# Patient Record
Sex: FEMALE | Race: BLACK OR AFRICAN AMERICAN | NOT HISPANIC OR LATINO
[De-identification: names, ages, dates, MRNs, and addresses within clinical notes are randomized per-mention and may not be internally consistent; named-entity substitution may affect disease eponyms.]

---

## 2017-12-11 ENCOUNTER — TRANSCRIPTION ENCOUNTER (OUTPATIENT)
Age: 72
End: 2017-12-11

## 2018-02-01 ENCOUNTER — EMERGENCY (EMERGENCY)
Facility: HOSPITAL | Age: 73
LOS: 1 days | Discharge: HOME | End: 2018-02-01
Attending: EMERGENCY MEDICINE | Admitting: EMERGENCY MEDICINE
Payer: COMMERCIAL

## 2018-02-01 VITALS
RESPIRATION RATE: 18 BRPM | WEIGHT: 197.98 LBS | HEART RATE: 81 BPM | TEMPERATURE: 98 F | OXYGEN SATURATION: 97 % | HEIGHT: 64 IN | SYSTOLIC BLOOD PRESSURE: 199 MMHG | DIASTOLIC BLOOD PRESSURE: 71 MMHG

## 2018-02-01 DIAGNOSIS — E89.0 POSTPROCEDURAL HYPOTHYROIDISM: Chronic | ICD-10-CM

## 2018-02-01 PROCEDURE — 70450 CT HEAD/BRAIN W/O DYE: CPT | Mod: 26

## 2018-02-01 PROCEDURE — 96375 TX/PRO/DX INJ NEW DRUG ADDON: CPT

## 2018-02-01 PROCEDURE — 96374 THER/PROPH/DIAG INJ IV PUSH: CPT

## 2018-02-01 PROCEDURE — 70450 CT HEAD/BRAIN W/O DYE: CPT

## 2018-02-01 PROCEDURE — 99284 EMERGENCY DEPT VISIT MOD MDM: CPT

## 2018-02-01 PROCEDURE — 99284 EMERGENCY DEPT VISIT MOD MDM: CPT | Mod: 25

## 2018-02-01 RX ORDER — ACETAMINOPHEN 500 MG
1000 TABLET ORAL ONCE
Qty: 0 | Refills: 0 | Status: COMPLETED | OUTPATIENT
Start: 2018-02-01 | End: 2018-02-01

## 2018-02-01 RX ORDER — SODIUM CHLORIDE 9 MG/ML
1000 INJECTION INTRAMUSCULAR; INTRAVENOUS; SUBCUTANEOUS ONCE
Qty: 0 | Refills: 0 | Status: COMPLETED | OUTPATIENT
Start: 2018-02-01 | End: 2018-02-01

## 2018-02-01 RX ORDER — METOCLOPRAMIDE HCL 10 MG
10 TABLET ORAL ONCE
Qty: 0 | Refills: 0 | Status: COMPLETED | OUTPATIENT
Start: 2018-02-01 | End: 2018-02-01

## 2018-02-01 RX ADMIN — Medication 10 MILLIGRAM(S): at 22:21

## 2018-02-01 RX ADMIN — Medication 400 MILLIGRAM(S): at 22:22

## 2018-02-01 RX ADMIN — SODIUM CHLORIDE 1000 MILLILITER(S): 9 INJECTION INTRAMUSCULAR; INTRAVENOUS; SUBCUTANEOUS at 22:22

## 2018-02-01 NOTE — ED PROVIDER NOTE - OBJECTIVE STATEMENT
73F pmhx dm, htn, here c/o left sided, pulsating headache that began approx 2 weeks ago while pt was sleeping. Since then the headache has come intermittently and usually comes at night. Pt denies f/c, numbness/tingling, focal weakness, changes in her vision. Today she went to see her neurologist who recommended she come to the ED 73F pmhx dm, htn, here c/o left sided, pulsating headache that began approx 2 weeks ago while pt was sleeping. Since then the headache has come intermittently and usually comes at night. Pt denies f/c, numbness/tingling, focal weakness, changes in her vision, n/v/d. Today she went to see her neurologist who recommended she come to the ED for a repeat ct scan head (she had one a week ago for same symptoms at [facility] which was negative). Pt unable to explain what her neurologist told her about why she needs another CT scan.     Neurologist: Leader

## 2018-02-01 NOTE — ED ADULT NURSE NOTE - OBJECTIVE STATEMENT
72 y/o female hx DM, HTN Anemia came in c/o headache started 12/29 74 y/o female hx DM, HTN Anemia came in c/o headache started 12/29. Went to [facility], given prednosone -CT. Pt is alert & orientedx4, no weakness, no facial droop, no drift. Neuro intact. Pt denies chest pain or SOB, no nausea or vomiting, no urinary symptoms as per pt. Safety maintained & continue monitor.

## 2018-02-01 NOTE — ED PROVIDER NOTE - ATTENDING CONTRIBUTION TO CARE
Nemes - 72yo F w MDS, HTN, in the ER for 2 wks of R sided HA, no trauma, no neuro, no other stx. Seen at a different facility 1 mo ago for similar stx, had neg w/u, CT neg, except ESR-70s Nemes - 74yo F w MDS, HTN, in the ER for 2 wks of R sided throbbing HA, no trauma, no neuro, no blurry vision/deficits, no other stx. Seen at a different facility 1 mo ago for similar stx, had neg w/u, CT neg, except ESR-70s. Seen by Neuro today, sent in the ER for CT head. Well appearing, neck supple, HEENT wnl, no tenderness over temples/scalp, neuro intact. Will give Tylenol/Reglan IV, get Ct head, DC w Neuro f/u. Low suspicion for SDH, dural sinus thrombosis (no risk factors), temporal arteritis (no scalp tenderness, no vision deficits).

## 2018-02-01 NOTE — ED PROVIDER NOTE - PLAN OF CARE
1) Please follow-up with your neurologist within the next 3 days.  If you cannot follow-up with your doctor(s), please return to the ED for any urgent issues.  2) If you have any worsening of symptoms or any other concerns please return to the ED immediately.  3) Please continue taking your home medications as directed.  4) You may have been given a copy of your labs and/or imaging.  Please go over these with your neurologist.

## 2018-02-01 NOTE — ED ADULT NURSE NOTE - PMH
Anemia    Diabetes    Hypercholesteremia    Hypertension    Myelodysplasia (myelodysplastic syndrome)    Thyromegaly

## 2018-02-01 NOTE — ED ADULT TRIAGE NOTE - CHIEF COMPLAINT QUOTE
pt sent from neuro office for ct of head pt has been having headaches x 2 weeks feeling pulsating in her head

## 2018-02-01 NOTE — ED PROVIDER NOTE - CARE PLAN
Principal Discharge DX:	Headache  Assessment and plan of treatment:	1) Please follow-up with your neurologist within the next 3 days.  If you cannot follow-up with your doctor(s), please return to the ED for any urgent issues.  2) If you have any worsening of symptoms or any other concerns please return to the ED immediately.  3) Please continue taking your home medications as directed.  4) You may have been given a copy of your labs and/or imaging.  Please go over these with your neurologist.

## 2018-02-01 NOTE — ED PROVIDER NOTE - MEDICAL DECISION MAKING DETAILS
73F pmhx dm, htn, w/ r sided HA x 2 weeks, worse at night. Had CTH done last week in setting of same headache at [place] and it was negative. Today pt saw neurologist who advised she go for a repeat head CT. Plan for head ct and pain control. Will d/c w/ neuro f/u if neg.

## 2018-02-02 VITALS
HEART RATE: 72 BPM | RESPIRATION RATE: 18 BRPM | SYSTOLIC BLOOD PRESSURE: 161 MMHG | OXYGEN SATURATION: 98 % | DIASTOLIC BLOOD PRESSURE: 89 MMHG | TEMPERATURE: 98 F

## 2018-02-06 ENCOUNTER — APPOINTMENT (OUTPATIENT)
Dept: MRI IMAGING | Facility: HOSPITAL | Age: 73
End: 2018-02-06
Payer: COMMERCIAL

## 2018-02-06 ENCOUNTER — OUTPATIENT (OUTPATIENT)
Dept: OUTPATIENT SERVICES | Facility: HOSPITAL | Age: 73
LOS: 1 days | End: 2018-02-06
Payer: COMMERCIAL

## 2018-02-06 DIAGNOSIS — E89.0 POSTPROCEDURAL HYPOTHYROIDISM: Chronic | ICD-10-CM

## 2018-02-06 DIAGNOSIS — Z00.8 ENCOUNTER FOR OTHER GENERAL EXAMINATION: ICD-10-CM

## 2018-02-06 DIAGNOSIS — H93.A9 PULSATILE TINNITUS, UNSPECIFIED EAR: ICD-10-CM

## 2018-02-06 PROCEDURE — 70551 MRI BRAIN STEM W/O DYE: CPT | Mod: 26

## 2018-02-06 PROCEDURE — 70551 MRI BRAIN STEM W/O DYE: CPT

## 2018-02-06 PROCEDURE — 70544 MR ANGIOGRAPHY HEAD W/O DYE: CPT

## 2018-02-06 PROCEDURE — 70544 MR ANGIOGRAPHY HEAD W/O DYE: CPT | Mod: 26,59

## 2018-03-09 ENCOUNTER — OUTPATIENT (OUTPATIENT)
Dept: OUTPATIENT SERVICES | Facility: HOSPITAL | Age: 73
LOS: 1 days | End: 2018-03-09
Payer: COMMERCIAL

## 2018-03-09 ENCOUNTER — APPOINTMENT (OUTPATIENT)
Dept: CV DIAGNOSTICS | Facility: HOSPITAL | Age: 73
End: 2018-03-09

## 2018-03-09 DIAGNOSIS — I25.10 ATHEROSCLEROTIC HEART DISEASE OF NATIVE CORONARY ARTERY WITHOUT ANGINA PECTORIS: ICD-10-CM

## 2018-03-09 DIAGNOSIS — E89.0 POSTPROCEDURAL HYPOTHYROIDISM: Chronic | ICD-10-CM

## 2018-03-09 PROCEDURE — 78452 HT MUSCLE IMAGE SPECT MULT: CPT | Mod: 26

## 2018-03-09 PROCEDURE — 93016 CV STRESS TEST SUPVJ ONLY: CPT

## 2018-03-09 PROCEDURE — 93018 CV STRESS TEST I&R ONLY: CPT

## 2018-03-09 PROCEDURE — 78452 HT MUSCLE IMAGE SPECT MULT: CPT

## 2018-03-09 PROCEDURE — A9500: CPT

## 2018-03-09 PROCEDURE — 93017 CV STRESS TEST TRACING ONLY: CPT

## 2018-03-22 ENCOUNTER — APPOINTMENT (OUTPATIENT)
Dept: GASTROENTEROLOGY | Facility: CLINIC | Age: 73
End: 2018-03-22
Payer: COMMERCIAL

## 2018-03-22 VITALS
SYSTOLIC BLOOD PRESSURE: 160 MMHG | OXYGEN SATURATION: 99 % | TEMPERATURE: 98 F | HEART RATE: 91 BPM | DIASTOLIC BLOOD PRESSURE: 74 MMHG

## 2018-03-22 VITALS — BODY MASS INDEX: 34.15 KG/M2 | HEIGHT: 64 IN | WEIGHT: 200 LBS

## 2018-03-22 DIAGNOSIS — Z80.0 FAMILY HISTORY OF MALIGNANT NEOPLASM OF DIGESTIVE ORGANS: ICD-10-CM

## 2018-03-22 PROCEDURE — 99204 OFFICE O/P NEW MOD 45 MIN: CPT

## 2018-04-25 ENCOUNTER — APPOINTMENT (OUTPATIENT)
Dept: VASCULAR SURGERY | Facility: CLINIC | Age: 73
End: 2018-04-25
Payer: MEDICARE

## 2018-04-25 VITALS
HEIGHT: 64 IN | TEMPERATURE: 98.1 F | DIASTOLIC BLOOD PRESSURE: 79 MMHG | HEART RATE: 89 BPM | SYSTOLIC BLOOD PRESSURE: 145 MMHG

## 2018-04-25 PROCEDURE — 99204 OFFICE O/P NEW MOD 45 MIN: CPT

## 2018-04-25 RX ORDER — MULTIVIT-MIN/IRON FUM/FOLIC AC 7.5 MG-4
TABLET ORAL
Refills: 0 | Status: ACTIVE | COMMUNITY

## 2018-04-25 RX ORDER — METOPROLOL SUCCINATE 50 MG/1
50 TABLET, EXTENDED RELEASE ORAL
Refills: 0 | Status: ACTIVE | COMMUNITY

## 2018-04-25 RX ORDER — BLOOD SUGAR DIAGNOSTIC
STRIP MISCELLANEOUS
Qty: 50 | Refills: 0 | Status: ACTIVE | COMMUNITY
Start: 2017-12-12

## 2018-04-25 RX ORDER — GLIPIZIDE 2.5 MG/1
2.5 TABLET, FILM COATED, EXTENDED RELEASE ORAL
Qty: 90 | Refills: 0 | Status: ACTIVE | COMMUNITY
Start: 2018-02-23

## 2018-04-30 ENCOUNTER — OUTPATIENT (OUTPATIENT)
Dept: OUTPATIENT SERVICES | Facility: HOSPITAL | Age: 73
LOS: 1 days | End: 2018-04-30
Payer: COMMERCIAL

## 2018-04-30 ENCOUNTER — RESULT REVIEW (OUTPATIENT)
Age: 73
End: 2018-04-30

## 2018-04-30 DIAGNOSIS — K31.7 POLYP OF STOMACH AND DUODENUM: ICD-10-CM

## 2018-04-30 DIAGNOSIS — E89.0 POSTPROCEDURAL HYPOTHYROIDISM: Chronic | ICD-10-CM

## 2018-04-30 PROCEDURE — 88305 TISSUE EXAM BY PATHOLOGIST: CPT

## 2018-04-30 PROCEDURE — 88312 SPECIAL STAINS GROUP 1: CPT

## 2018-04-30 PROCEDURE — 43251 EGD REMOVE LESION SNARE: CPT

## 2018-04-30 PROCEDURE — 43239 EGD BIOPSY SINGLE/MULTIPLE: CPT | Mod: XS

## 2018-04-30 PROCEDURE — 88312 SPECIAL STAINS GROUP 1: CPT | Mod: 26

## 2018-04-30 PROCEDURE — 88305 TISSUE EXAM BY PATHOLOGIST: CPT | Mod: 26

## 2018-04-30 PROCEDURE — C1889: CPT

## 2018-04-30 PROCEDURE — 82962 GLUCOSE BLOOD TEST: CPT

## 2018-05-01 ENCOUNTER — APPOINTMENT (OUTPATIENT)
Dept: RHEUMATOLOGY | Facility: CLINIC | Age: 73
End: 2018-05-01
Payer: MEDICARE

## 2018-05-01 VITALS
SYSTOLIC BLOOD PRESSURE: 160 MMHG | WEIGHT: 206 LBS | HEART RATE: 57 BPM | DIASTOLIC BLOOD PRESSURE: 78 MMHG | HEIGHT: 64 IN | RESPIRATION RATE: 16 BRPM | OXYGEN SATURATION: 97 % | BODY MASS INDEX: 35.17 KG/M2

## 2018-05-01 DIAGNOSIS — M19.90 UNSPECIFIED OSTEOARTHRITIS, UNSPECIFIED SITE: ICD-10-CM

## 2018-05-01 DIAGNOSIS — R06.00 DYSPNEA, UNSPECIFIED: ICD-10-CM

## 2018-05-01 DIAGNOSIS — Z86.39 PERSONAL HISTORY OF OTHER ENDOCRINE, NUTRITIONAL AND METABOLIC DISEASE: ICD-10-CM

## 2018-05-01 DIAGNOSIS — Z87.09 PERSONAL HISTORY OF OTHER DISEASES OF THE RESPIRATORY SYSTEM: ICD-10-CM

## 2018-05-01 DIAGNOSIS — Z63.4 DISAPPEARANCE AND DEATH OF FAMILY MEMBER: ICD-10-CM

## 2018-05-01 DIAGNOSIS — Z86.2 PERSONAL HISTORY OF DISEASES OF THE BLOOD AND BLOOD-FORMING ORGANS AND CERTAIN DISORDERS INVOLVING THE IMMUNE MECHANISM: ICD-10-CM

## 2018-05-01 PROCEDURE — 99205 OFFICE O/P NEW HI 60 MIN: CPT

## 2018-05-01 SDOH — SOCIAL STABILITY - SOCIAL INSECURITY: DISSAPEARANCE AND DEATH OF FAMILY MEMBER: Z63.4

## 2018-05-04 ENCOUNTER — OUTPATIENT (OUTPATIENT)
Dept: OUTPATIENT SERVICES | Facility: HOSPITAL | Age: 73
LOS: 1 days | End: 2018-05-04
Payer: MEDICARE

## 2018-05-04 VITALS
SYSTOLIC BLOOD PRESSURE: 134 MMHG | RESPIRATION RATE: 16 BRPM | DIASTOLIC BLOOD PRESSURE: 80 MMHG | WEIGHT: 205.91 LBS | OXYGEN SATURATION: 97 % | HEART RATE: 80 BPM | HEIGHT: 64.5 IN | TEMPERATURE: 97 F

## 2018-05-04 DIAGNOSIS — I10 ESSENTIAL (PRIMARY) HYPERTENSION: ICD-10-CM

## 2018-05-04 DIAGNOSIS — Z90.710 ACQUIRED ABSENCE OF BOTH CERVIX AND UTERUS: Chronic | ICD-10-CM

## 2018-05-04 DIAGNOSIS — E89.0 POSTPROCEDURAL HYPOTHYROIDISM: Chronic | ICD-10-CM

## 2018-05-04 DIAGNOSIS — M31.6 OTHER GIANT CELL ARTERITIS: ICD-10-CM

## 2018-05-04 DIAGNOSIS — E11.9 TYPE 2 DIABETES MELLITUS WITHOUT COMPLICATIONS: ICD-10-CM

## 2018-05-04 DIAGNOSIS — Z98.49 CATARACT EXTRACTION STATUS, UNSPECIFIED EYE: Chronic | ICD-10-CM

## 2018-05-04 LAB — HBA1C BLD-MCNC: 7.8 % — HIGH (ref 4–5.6)

## 2018-05-04 PROCEDURE — 93010 ELECTROCARDIOGRAM REPORT: CPT

## 2018-05-04 RX ORDER — SODIUM CHLORIDE 9 MG/ML
1000 INJECTION, SOLUTION INTRAVENOUS
Qty: 0 | Refills: 0 | Status: DISCONTINUED | OUTPATIENT
Start: 2018-05-08 | End: 2018-05-23

## 2018-05-04 NOTE — H&P PST ADULT - FAMILY HISTORY
Mother  Still living? No  Family history of diabetes mellitus, Age at diagnosis: Age Unknown  Family history of hypertension, Age at diagnosis: Age Unknown     Father  Still living? No  Family history of diabetes mellitus, Age at diagnosis: Age Unknown  Family history of hypertension, Age at diagnosis: Age Unknown  Family history of acute renal failure, Age at diagnosis: Age Unknown

## 2018-05-04 NOTE — H&P PST ADULT - RS GEN PE MLT RESP DETAILS PC
no wheezes/respirations non-labored/clear to auscultation bilaterally/no chest wall tenderness/breath sounds equal/no rales/good air movement/airway patent/no rhonchi

## 2018-05-04 NOTE — H&P PST ADULT - NEGATIVE OPHTHALMOLOGIC SYMPTOMS
no blurred vision R/no discharge L/no pain R/no loss of vision L/no discharge R/no loss of vision R/no blurred vision L

## 2018-05-04 NOTE — H&P PST ADULT - NSANTHOSAYNRD_GEN_A_CORE
No. SHERRI screening performed.  STOP BANG Legend: 0-2 = LOW Risk; 3-4 = INTERMEDIATE Risk; 5-8 = HIGH Risk

## 2018-05-04 NOTE — H&P PST ADULT - PROBLEM SELECTOR PLAN 3
instructed last dose on diabetes medication on 5/7 am. instructed last dose on diabetes medication on 5/7 am. OR booking notified of hx of DM type 2

## 2018-05-04 NOTE — H&P PST ADULT - NEGATIVE ENMT SYMPTOMS
no sinus symptoms/no throat pain/no dysphagia/no tinnitus/no nose bleeds/no ear pain/no hearing difficulty/no vertigo

## 2018-05-04 NOTE — H&P PST ADULT - NEGATIVE GENERAL SYMPTOMS
no polydipsia/no fever/no chills/no fatigue/no anorexia/no weight loss/no polyphagia/no weight gain/no polyuria/no malaise

## 2018-05-04 NOTE — H&P PST ADULT - BP NONINVASIVE SYSTOLIC (MM HG)
Quality 110: Preventive Care And Screening: Influenza Immunization: Influenza Immunization previously received during influenza season Detail Level: Detailed Quality 111:Pneumonia Vaccination Status For Older Adults: Pneumococcal Vaccination Previously Received Quality 226: Preventive Care And Screening: Tobacco Use: Screening And Cessation Intervention: Patient screened for tobacco and is an ex-smoker Quality 130: Documentation Of Current Medications In The Medical Record: Current Medications Documented Quality 131: Pain Assessment And Follow-Up: Pain assessment using a standardized tool is documented as negative, no follow-up plan required Quality 47: Advance Care Plan: Advance Care Planning discussed and documented in the medical record; patient did not wish or was not able to name a surrogate decision maker or provide an advance care plan. 134

## 2018-05-04 NOTE — H&P PST ADULT - HISTORY OF PRESENT ILLNESS
73 year old female presents to presurgical testing with diagnosis of other giant cell arteritis scheduled for bilateral temporal artery biopsy for 5/8/18. Pt reports a throbbing right sided temporal headache since 12/2017, evaluated at [facility] ED in 2/2018, referred for biopsy. Denies vision changes. Pt was treated with prednisone but stopped taking medication due to lower extremity swelling. 73 year old female presents to presurgical testing with diagnosis of other giant cell arteritis scheduled for bilateral temporal artery biopsy for 5/8/18. Pt reports a throbbing right sided temporal headache since 12/2017, evaluated at [facility] ED in 2/2018, referred for biopsy. Denies vision changes. Pt was treated with prednisone but stopped taking medication due to lower extremity swelling and shortness of breath in 4/2018.

## 2018-05-04 NOTE — H&P PST ADULT - NEGATIVE NEUROLOGICAL SYMPTOMS
no transient paralysis/no vertigo/no syncope/no tremors/no weakness/no generalized seizures/no focal seizures

## 2018-05-04 NOTE — H&P PST ADULT - MUSCULOSKELETAL
details… detailed exam no calf tenderness/no joint warmth/no joint erythema/ROM intact/no joint swelling/normal strength

## 2018-05-04 NOTE — H&P PST ADULT - PROBLEM SELECTOR PLAN 2
instructed to take amlodipine losartan and metoprolol on the morning of procedure with a sip of water.

## 2018-05-04 NOTE — H&P PST ADULT - PMH
Anemia    Arthritis    Diabetes  type 2  Hypercholesteremia    Hypertension    Myelodysplasia (myelodysplastic syndrome)    Other giant cell arteritis    Thyroid disease    Thyromegaly  s/p partial thyroidectomy long time ago

## 2018-05-07 ENCOUNTER — TRANSCRIPTION ENCOUNTER (OUTPATIENT)
Age: 73
End: 2018-05-07

## 2018-05-07 LAB
25(OH)D3 SERPL-MCNC: 28.6 NG/ML
ALBUMIN SERPL ELPH-MCNC: 4 G/DL
ALP BLD-CCNC: 62 U/L
ALT SERPL-CCNC: 16 U/L
ANA SER IF-ACNC: NEGATIVE
ANION GAP SERPL CALC-SCNC: 18 MMOL/L
APPEARANCE: CLEAR
AST SERPL-CCNC: 17 U/L
BACTERIA: NEGATIVE
BASOPHILS # BLD AUTO: 0.05 K/UL
BASOPHILS NFR BLD AUTO: 0.6 %
BILIRUB SERPL-MCNC: <0.2 MG/DL
BILIRUBIN URINE: NEGATIVE
BLOOD URINE: NEGATIVE
BUN SERPL-MCNC: 30 MG/DL
CALCIUM SERPL-MCNC: 9.3 MG/DL
CCP AB SER IA-ACNC: <8 UNITS
CHLORIDE SERPL-SCNC: 105 MMOL/L
CO2 SERPL-SCNC: 22 MMOL/L
COLOR: YELLOW
CREAT SERPL-MCNC: 1.38 MG/DL
CREAT SPEC-SCNC: 170 MG/DL
CREAT/PROT UR: 3.2 RATIO
CRP SERPL-MCNC: 1.4 MG/DL
EOSINOPHIL # BLD AUTO: 0.31 K/UL
EOSINOPHIL NFR BLD AUTO: 3.5 %
ERYTHROCYTE [SEDIMENTATION RATE] IN BLOOD BY WESTERGREN METHOD: 87 MM/HR
GLUCOSE QUALITATIVE U: NEGATIVE MG/DL
GLUCOSE SERPL-MCNC: 94 MG/DL
HCT VFR BLD CALC: 31 %
HGB BLD-MCNC: 9.7 G/DL
HYALINE CASTS: 6 /LPF
IMM GRANULOCYTES NFR BLD AUTO: 0.1 %
KETONES URINE: NEGATIVE
LEUKOCYTE ESTERASE URINE: NEGATIVE
LYMPHOCYTES # BLD AUTO: 2.98 K/UL
LYMPHOCYTES NFR BLD AUTO: 34.1 %
MAN DIFF?: NORMAL
MCHC RBC-ENTMCNC: 26.6 PG
MCHC RBC-ENTMCNC: 31.3 GM/DL
MCV RBC AUTO: 85.2 FL
MICROSCOPIC-UA: NORMAL
MONOCYTES # BLD AUTO: 0.6 K/UL
MONOCYTES NFR BLD AUTO: 6.9 %
NEUTROPHILS # BLD AUTO: 4.8 K/UL
NEUTROPHILS NFR BLD AUTO: 54.8 %
NITRITE URINE: NEGATIVE
PH URINE: 5.5
PLATELET # BLD AUTO: 406 K/UL
POTASSIUM SERPL-SCNC: 4.5 MMOL/L
PROT SERPL-MCNC: 7 G/DL
PROT UR-MCNC: 543 MG/DL
PROTEIN URINE: 300 MG/DL
RBC # BLD: 3.64 M/UL
RBC # FLD: 16 %
RED BLOOD CELLS URINE: 3 /HPF
RF+CCP IGG SER-IMP: NEGATIVE
RHEUMATOID FACT SER QL: <7 IU/ML
SODIUM SERPL-SCNC: 145 MMOL/L
SPECIFIC GRAVITY URINE: 1.02
SQUAMOUS EPITHELIAL CELLS: 5 /HPF
TSH SERPL-ACNC: 0.8 UIU/ML
URATE SERPL-MCNC: 7.7 MG/DL
UROBILINOGEN URINE: NEGATIVE MG/DL
WBC # FLD AUTO: 8.75 K/UL
WHITE BLOOD CELLS URINE: 3 /HPF

## 2018-05-08 ENCOUNTER — OUTPATIENT (OUTPATIENT)
Dept: OUTPATIENT SERVICES | Facility: HOSPITAL | Age: 73
LOS: 1 days | Discharge: HOME | End: 2018-05-08
Payer: MEDICARE

## 2018-05-08 ENCOUNTER — APPOINTMENT (OUTPATIENT)
Dept: VASCULAR SURGERY | Facility: HOSPITAL | Age: 73
End: 2018-05-08

## 2018-05-08 ENCOUNTER — RESULT REVIEW (OUTPATIENT)
Age: 73
End: 2018-05-08

## 2018-05-08 VITALS
OXYGEN SATURATION: 99 % | SYSTOLIC BLOOD PRESSURE: 147 MMHG | HEART RATE: 97 BPM | TEMPERATURE: 98 F | HEIGHT: 64.5 IN | DIASTOLIC BLOOD PRESSURE: 77 MMHG | RESPIRATION RATE: 16 BRPM | WEIGHT: 205.91 LBS

## 2018-05-08 VITALS
RESPIRATION RATE: 16 BRPM | SYSTOLIC BLOOD PRESSURE: 124 MMHG | DIASTOLIC BLOOD PRESSURE: 74 MMHG | HEART RATE: 80 BPM | OXYGEN SATURATION: 95 %

## 2018-05-08 DIAGNOSIS — E89.0 POSTPROCEDURAL HYPOTHYROIDISM: Chronic | ICD-10-CM

## 2018-05-08 DIAGNOSIS — Z90.710 ACQUIRED ABSENCE OF BOTH CERVIX AND UTERUS: Chronic | ICD-10-CM

## 2018-05-08 DIAGNOSIS — M31.6 OTHER GIANT CELL ARTERITIS: ICD-10-CM

## 2018-05-08 DIAGNOSIS — Z98.49 CATARACT EXTRACTION STATUS, UNSPECIFIED EYE: Chronic | ICD-10-CM

## 2018-05-08 PROCEDURE — 88305 TISSUE EXAM BY PATHOLOGIST: CPT | Mod: 26

## 2018-05-08 PROCEDURE — 37609 LIGATION/BX TEMPORAL ARTERY: CPT | Mod: 50

## 2018-05-08 PROCEDURE — 88313 SPECIAL STAINS GROUP 2: CPT | Mod: 26

## 2018-05-08 NOTE — ASU DISCHARGE PLAN (ADULT/PEDIATRIC). - NOTIFY
Fever greater than 101/Pain not relieved by Medications/Bleeding that does not stop Persistent Nausea and Vomiting/Fever greater than 101/Pain not relieved by Medications/Unable to Urinate/Bleeding that does not stop/Swelling that continues

## 2018-05-08 NOTE — ASU DISCHARGE PLAN (ADULT/PEDIATRIC). - NURSING INSTRUCTIONS
Please report any signs and symptoms of infection including Fever (Temp >101 or >100.4 if GYN procedure), uncontrollable nausea, vomiting, diarrhea, chills & inability to urinate. Shower with soap & water when appropriate, pat dry with clean towel & no ointments, creams, powders or lotions on incisions unless okayed by MD. Please report any puss or increased drainage from incision sites, or if redness develops and spreads around sites. Please practice good hand hygiene especially after using the bathroom. Follow up with all MD appointments and take medication(s) as prescribed    Do not take pain medication on an empty stomach.  Increase fluids and fiber in diet to prevent constipation.  When taking pain meds - take with food and know it may cause constipation and nausea - Do NOT drive while on narcotics.

## 2018-05-08 NOTE — ASU DISCHARGE PLAN (ADULT/PEDIATRIC). - MEDICATION SUMMARY - MEDICATIONS TO TAKE
I will START or STAY ON the medications listed below when I get home from the hospital:    predniSONE 10 mg oral tablet  -- 1 tab(s) by mouth 4 times a day   stopped on 4/10/18  -- Indication: For giant cell arteritis    Tylenol 325 mg oral tablet  -- 2 tab(s) by mouth every 4 hours, As Needed  -- Indication: For pain    Percocet 5/325 oral tablet  -- 1 tab(s) by mouth every 6 hours, As Needed MDD:4   -- Caution federal law prohibits the transfer of this drug to any person other  than the person for whom it was prescribed.  May cause drowsiness.  Alcohol may intensify this effect.  Use care when operating dangerous machinery.  This prescription cannot be refilled.  This product contains acetaminophen.  Do not use  with any other product containing acetaminophen to prevent possible liver damage.  Using more of this medication than prescribed may cause serious breathing problems.    -- Indication: For post op pain    losartan 50 mg oral tablet  -- 1 tab(s) by mouth once a day  -- Indication: For hypertension    Glucotrol XL 2.5 mg oral tablet, extended release  -- 1 tab(s) by mouth once a day  -- Indication: For diabetes    Glucophage 1000 mg oral tablet  -- 1 tab(s) by mouth 2 times a day  -- Indication: For diabetes    metoprolol succinate 50 mg oral tablet, extended release  -- 1 tab(s) by mouth once a day  -- Indication: For hypertension    amLODIPine 10 mg oral tablet  -- 1 tab(s) by mouth once a day  -- Indication: For hypertension    ferrous sulfate 325 mg (65 mg elemental iron) oral delayed release tablet  -- 1 tab(s) by mouth once a day  -- Indication: For supplement    Multiple Vitamins oral tablet  -- 1 tab(s) by mouth once a day  -- Indication: For supplement

## 2018-05-10 NOTE — H&P PST ADULT - SURGICAL SITE INCISION
- c/w atenolol 50 mg PO daily  - INR supratherapeutic, will hold today's coumadin. - c/w atenolol 50 mg PO daily  - dose coumadin tonight  - outpatient follow up with PCP for INR check no

## 2018-05-15 ENCOUNTER — APPOINTMENT (OUTPATIENT)
Dept: NEPHROLOGY | Facility: CLINIC | Age: 73
End: 2018-05-15

## 2018-05-24 ENCOUNTER — APPOINTMENT (OUTPATIENT)
Dept: GASTROENTEROLOGY | Facility: CLINIC | Age: 73
End: 2018-05-24
Payer: MEDICARE

## 2018-05-24 VITALS
WEIGHT: 201 LBS | OXYGEN SATURATION: 98 % | SYSTOLIC BLOOD PRESSURE: 148 MMHG | BODY MASS INDEX: 34.5 KG/M2 | DIASTOLIC BLOOD PRESSURE: 78 MMHG | TEMPERATURE: 97.8 F | HEART RATE: 69 BPM

## 2018-05-24 PROCEDURE — 99213 OFFICE O/P EST LOW 20 MIN: CPT

## 2018-06-08 ENCOUNTER — APPOINTMENT (OUTPATIENT)
Dept: NEPHROLOGY | Facility: CLINIC | Age: 73
End: 2018-06-08
Payer: MEDICARE

## 2018-06-08 VITALS
BODY MASS INDEX: 34.83 KG/M2 | HEIGHT: 64 IN | DIASTOLIC BLOOD PRESSURE: 79 MMHG | WEIGHT: 204 LBS | HEART RATE: 85 BPM | OXYGEN SATURATION: 98 % | SYSTOLIC BLOOD PRESSURE: 152 MMHG

## 2018-06-08 DIAGNOSIS — E11.9 TYPE 2 DIABETES MELLITUS W/OUT COMPLICATIONS: ICD-10-CM

## 2018-06-08 DIAGNOSIS — N17.9 ACUTE KIDNEY FAILURE, UNSPECIFIED: ICD-10-CM

## 2018-06-08 DIAGNOSIS — N20.0 CALCULUS OF KIDNEY: ICD-10-CM

## 2018-06-08 DIAGNOSIS — E78.5 HYPERLIPIDEMIA, UNSPECIFIED: ICD-10-CM

## 2018-06-08 PROCEDURE — 99205 OFFICE O/P NEW HI 60 MIN: CPT

## 2018-06-11 ENCOUNTER — OUTPATIENT (OUTPATIENT)
Dept: OUTPATIENT SERVICES | Facility: HOSPITAL | Age: 73
LOS: 1 days | End: 2018-06-11
Payer: COMMERCIAL

## 2018-06-11 ENCOUNTER — APPOINTMENT (OUTPATIENT)
Dept: ULTRASOUND IMAGING | Facility: CLINIC | Age: 73
End: 2018-06-11
Payer: MEDICARE

## 2018-06-11 DIAGNOSIS — Z90.710 ACQUIRED ABSENCE OF BOTH CERVIX AND UTERUS: Chronic | ICD-10-CM

## 2018-06-11 DIAGNOSIS — E89.0 POSTPROCEDURAL HYPOTHYROIDISM: Chronic | ICD-10-CM

## 2018-06-11 DIAGNOSIS — Z98.49 CATARACT EXTRACTION STATUS, UNSPECIFIED EYE: Chronic | ICD-10-CM

## 2018-06-11 DIAGNOSIS — N18.3 CHRONIC KIDNEY DISEASE, STAGE 3 (MODERATE): ICD-10-CM

## 2018-06-11 PROCEDURE — 76775 US EXAM ABDO BACK WALL LIM: CPT

## 2018-06-11 PROCEDURE — 76775 US EXAM ABDO BACK WALL LIM: CPT | Mod: 26

## 2018-06-26 ENCOUNTER — LABORATORY RESULT (OUTPATIENT)
Age: 73
End: 2018-06-26

## 2018-06-26 ENCOUNTER — APPOINTMENT (OUTPATIENT)
Dept: NEPHROLOGY | Facility: CLINIC | Age: 73
End: 2018-06-26
Payer: MEDICARE

## 2018-06-26 VITALS
SYSTOLIC BLOOD PRESSURE: 155 MMHG | OXYGEN SATURATION: 98 % | DIASTOLIC BLOOD PRESSURE: 84 MMHG | WEIGHT: 202.82 LBS | HEART RATE: 95 BPM | HEIGHT: 64 IN | BODY MASS INDEX: 34.63 KG/M2

## 2018-06-26 DIAGNOSIS — R80.9 PROTEINURIA, UNSPECIFIED: ICD-10-CM

## 2018-06-26 DIAGNOSIS — I10 ESSENTIAL (PRIMARY) HYPERTENSION: ICD-10-CM

## 2018-06-26 DIAGNOSIS — N18.3 CHRONIC KIDNEY DISEASE, STAGE 3 (MODERATE): ICD-10-CM

## 2018-06-26 DIAGNOSIS — D64.9 ANEMIA, UNSPECIFIED: ICD-10-CM

## 2018-06-26 PROCEDURE — 99214 OFFICE O/P EST MOD 30 MIN: CPT

## 2018-06-26 RX ORDER — METOPROLOL SUCCINATE 50 MG/1
50 TABLET, EXTENDED RELEASE ORAL
Qty: 90 | Refills: 0 | Status: DISCONTINUED | COMMUNITY
Start: 2017-08-01 | End: 2018-06-26

## 2018-06-26 RX ORDER — LOSARTAN POTASSIUM 50 MG/1
50 TABLET, FILM COATED ORAL
Refills: 0 | Status: DISCONTINUED | COMMUNITY
End: 2018-06-26

## 2018-06-27 LAB
ALBUMIN MFR SERPL ELPH: 54.4 %
ALBUMIN SERPL ELPH-MCNC: 4.1 G/DL
ALBUMIN SERPL-MCNC: 4 G/DL
ALBUMIN/GLOB SERPL: 1.2 RATIO
ALPHA1 GLOB MFR SERPL ELPH: 4.2 %
ALPHA1 GLOB SERPL ELPH-MCNC: 0.3 G/DL
ALPHA2 GLOB MFR SERPL ELPH: 16.4 %
ALPHA2 GLOB SERPL ELPH-MCNC: 1.2 G/DL
ANA SER IF-ACNC: NEGATIVE
ANION GAP SERPL CALC-SCNC: 18 MMOL/L
APPEARANCE: CLEAR
APTT BLD: 29.6 SEC
B-GLOBULIN MFR SERPL ELPH: 10.8 %
B-GLOBULIN SERPL ELPH-MCNC: 0.8 G/DL
BACTERIA: NEGATIVE
BASOPHILS # BLD AUTO: 0.03 K/UL
BASOPHILS NFR BLD AUTO: 0.4 %
BILIRUBIN URINE: NEGATIVE
BLOOD URINE: NEGATIVE
BUN SERPL-MCNC: 40 MG/DL
C3 SERPL-MCNC: 185 MG/DL
C4 SERPL-MCNC: 40 MG/DL
CALCIUM SERPL-MCNC: 9.5 MG/DL
CHLORIDE SERPL-SCNC: 102 MMOL/L
CO2 SERPL-SCNC: 21 MMOL/L
COLOR: YELLOW
CREAT SERPL-MCNC: 1.35 MG/DL
CREAT SPEC-SCNC: 76 MG/DL
CREAT/PROT UR: 1.3 RATIO
DEPRECATED KAPPA LC FREE/LAMBDA SER: 1.77 RATIO
DSDNA AB SER-ACNC: <12 IU/ML
EOSINOPHIL # BLD AUTO: 0.26 K/UL
EOSINOPHIL NFR BLD AUTO: 3.9 %
GAMMA GLOB FLD ELPH-MCNC: 1 G/DL
GAMMA GLOB MFR SERPL ELPH: 14.2 %
GLUCOSE QUALITATIVE U: NEGATIVE MG/DL
GLUCOSE SERPL-MCNC: 149 MG/DL
HBV CORE IGG+IGM SER QL: NONREACTIVE
HBV CORE IGM SER QL: NONREACTIVE
HBV SURFACE AB SER QL: NONREACTIVE
HBV SURFACE AG SER QL: NONREACTIVE
HCT VFR BLD CALC: 31.7 %
HCV AB SER QL: NONREACTIVE
HCV S/CO RATIO: 0.08 S/CO
HGB BLD-MCNC: 9.9 G/DL
HIV1+2 AB SPEC QL IA.RAPID: NONREACTIVE
HYALINE CASTS: 3 /LPF
IMM GRANULOCYTES NFR BLD AUTO: 0.1 %
INR PPP: 0.98 RATIO
INTERPRETATION SERPL IEP-IMP: NORMAL
KAPPA LC CSF-MCNC: 2.08 MG/DL
KAPPA LC SERPL-MCNC: 3.69 MG/DL
KETONES URINE: NEGATIVE
LEUKOCYTE ESTERASE URINE: NEGATIVE
LYMPHOCYTES # BLD AUTO: 2.6 K/UL
LYMPHOCYTES NFR BLD AUTO: 38.8 %
M PROTEIN SPEC IFE-MCNC: NORMAL
MAN DIFF?: NORMAL
MCHC RBC-ENTMCNC: 26.3 PG
MCHC RBC-ENTMCNC: 31.2 GM/DL
MCV RBC AUTO: 84.3 FL
MICROSCOPIC-UA: NORMAL
MONOCYTES # BLD AUTO: 0.43 K/UL
MONOCYTES NFR BLD AUTO: 6.4 %
MPO AB + PR3 PNL SER: NORMAL
NEUTROPHILS # BLD AUTO: 3.37 K/UL
NEUTROPHILS NFR BLD AUTO: 50.4 %
NITRITE URINE: NEGATIVE
PH URINE: 5
PHOSPHATE SERPL-MCNC: 4.2 MG/DL
PLATELET # BLD AUTO: 293 K/UL
POTASSIUM SERPL-SCNC: 4.1 MMOL/L
PROT SERPL-MCNC: 7.3 G/DL
PROT SERPL-MCNC: 7.3 G/DL
PROT UR-MCNC: 100 MG/DL
PROTEIN URINE: 100 MG/DL
PT BLD: 11.1 SEC
RBC # BLD: 3.76 M/UL
RBC # FLD: 14.9 %
RED BLOOD CELLS URINE: 1 /HPF
SODIUM SERPL-SCNC: 141 MMOL/L
SPECIFIC GRAVITY URINE: 1.02
SQUAMOUS EPITHELIAL CELLS: 1 /HPF
UROBILINOGEN URINE: NEGATIVE MG/DL
WBC # FLD AUTO: 6.7 K/UL
WHITE BLOOD CELLS URINE: 1 /HPF

## 2018-07-02 LAB
CRYOGLOB SERPL-MCNC: NEGATIVE
GBM AB TITR SER IF: <0.2 U
PHOSPHOLIPASE A2 RECEPTOR ELISA: <2 RU/ML
PHOSPHOLIPASE A2 RECEPTOR IFA: NEGATIVE

## 2018-07-13 RX ORDER — CHLORTHALIDONE 25 MG/1
25 TABLET ORAL DAILY
Qty: 90 | Refills: 3 | Status: ACTIVE | COMMUNITY
Start: 2018-06-08 | End: 1900-01-01

## 2018-07-13 RX ORDER — TELMISARTAN 80 MG/1
80 TABLET ORAL
Qty: 90 | Refills: 3 | Status: ACTIVE | COMMUNITY
Start: 2018-06-08 | End: 1900-01-01

## 2018-07-17 PROBLEM — E01.0 IODINE-DEFICIENCY RELATED DIFFUSE (ENDEMIC) GOITER: Chronic | Status: ACTIVE | Noted: 2018-02-01

## 2018-07-17 PROBLEM — E11.9 TYPE 2 DIABETES MELLITUS WITHOUT COMPLICATIONS: Chronic | Status: INACTIVE | Noted: 2018-02-01 | Resolved: 2018-05-04

## 2018-07-17 PROBLEM — I10 ESSENTIAL (PRIMARY) HYPERTENSION: Chronic | Status: INACTIVE | Noted: 2018-02-01 | Resolved: 2018-05-04

## 2018-08-09 ENCOUNTER — APPOINTMENT (OUTPATIENT)
Dept: GASTROENTEROLOGY | Facility: CLINIC | Age: 73
End: 2018-08-09

## 2018-12-26 ENCOUNTER — TRANSCRIPTION ENCOUNTER (OUTPATIENT)
Age: 73
End: 2018-12-26

## 2019-02-26 PROBLEM — E78.00 PURE HYPERCHOLESTEROLEMIA, UNSPECIFIED: Chronic | Status: ACTIVE | Noted: 2018-02-01

## 2019-02-26 PROBLEM — M19.90 UNSPECIFIED OSTEOARTHRITIS, UNSPECIFIED SITE: Chronic | Status: ACTIVE | Noted: 2018-05-04

## 2019-02-26 PROBLEM — M31.6 OTHER GIANT CELL ARTERITIS: Chronic | Status: ACTIVE | Noted: 2018-05-04

## 2019-02-26 PROBLEM — D64.9 ANEMIA, UNSPECIFIED: Chronic | Status: ACTIVE | Noted: 2018-02-01

## 2019-02-26 PROBLEM — D46.9 MYELODYSPLASTIC SYNDROME, UNSPECIFIED: Chronic | Status: ACTIVE | Noted: 2018-02-01

## 2019-03-12 ENCOUNTER — APPOINTMENT (OUTPATIENT)
Dept: OTOLARYNGOLOGY | Facility: CLINIC | Age: 74
End: 2019-03-12
Payer: MEDICARE

## 2019-03-12 VITALS
HEIGHT: 64 IN | HEART RATE: 72 BPM | SYSTOLIC BLOOD PRESSURE: 126 MMHG | RESPIRATION RATE: 16 BRPM | WEIGHT: 202 LBS | DIASTOLIC BLOOD PRESSURE: 74 MMHG | BODY MASS INDEX: 34.49 KG/M2

## 2019-03-12 DIAGNOSIS — J39.8 OTHER SPECIFIED DISEASES OF UPPER RESPIRATORY TRACT: ICD-10-CM

## 2019-03-12 PROCEDURE — 31575 DIAGNOSTIC LARYNGOSCOPY: CPT

## 2019-03-12 PROCEDURE — 99204 OFFICE O/P NEW MOD 45 MIN: CPT | Mod: 25

## 2019-03-12 NOTE — HISTORY OF PRESENT ILLNESS
[de-identified] : Mrs. Gupta presents with history of having an enlarged substernal thyroid found on a chest xray done in January 2019.  Patient is under the care of Dr. Samir Workman who ordered a thyroid ultrasound noting the enlarged right thyroid with a 9.7cm nodule.  Patient also gives a history of having a left thyroidectomy about 10 plus years ago at [facility] but recent sonogram notes the left thyroid lobe. Denies any shortness of breath, pain, discomfort, or dysphagia. her goiter on L was reported as substernal by pt.  Pt had bx about 3 yeasr ago by her report and was told benign. no swallow or resp complaints.  no reported cardiac issues.  pt does not lay flat w sleeping.  pt breathing sl noisy. [Neck Mass] : no neck mass [Difficulty Swallowing] : no difficulty swallowing [Painful Swallowing] : no painful swallowing

## 2019-03-12 NOTE — PROCEDURE
[Topical Lidocaine] : topical lidocaine [Oxymetazoline HCl] : oxymetazoline HCl [Flexible Endoscope] : examined with the flexible endoscope [Serial Number: ___] : Serial Number: [unfilled] [Normal] : normal [de-identified] : nl Bilat TVF mobiltiy w sl limited bilat abduction.  Omega shaped epiglottis [de-identified] : goiter

## 2019-03-12 NOTE — CONSULT LETTER
[Dear  ___] : Dear  [unfilled], [Consult Letter:] : I had the pleasure of evaluating your patient, [unfilled]. [Please see my note below.] : Please see my note below. [Consult Closing:] : Thank you very much for allowing me to participate in the care of this patient.  If you have any questions, please do not hesitate to contact me. [Sincerely,] : Sincerely, [FreeTextEntry2] : Samir Workman MD ([place], [place])\par  [FreeTextEntry3] : Aroldo Landon MD

## 2019-03-12 NOTE — PHYSICAL EXAM
[Enlarged] : enlarged [FreeTextEntry1] : bilat enlarged thyroid [Midline] : trachea located in midline position [Normal] : no rashes

## 2019-03-14 ENCOUNTER — FORM ENCOUNTER (OUTPATIENT)
Age: 74
End: 2019-03-14

## 2019-03-15 ENCOUNTER — OUTPATIENT (OUTPATIENT)
Dept: OUTPATIENT SERVICES | Facility: HOSPITAL | Age: 74
LOS: 1 days | End: 2019-03-15
Payer: COMMERCIAL

## 2019-03-15 ENCOUNTER — APPOINTMENT (OUTPATIENT)
Dept: CT IMAGING | Facility: IMAGING CENTER | Age: 74
End: 2019-03-15

## 2019-03-15 DIAGNOSIS — Z98.49 CATARACT EXTRACTION STATUS, UNSPECIFIED EYE: Chronic | ICD-10-CM

## 2019-03-15 DIAGNOSIS — J39.8 OTHER SPECIFIED DISEASES OF UPPER RESPIRATORY TRACT: ICD-10-CM

## 2019-03-15 DIAGNOSIS — E89.0 POSTPROCEDURAL HYPOTHYROIDISM: Chronic | ICD-10-CM

## 2019-03-15 DIAGNOSIS — Z90.710 ACQUIRED ABSENCE OF BOTH CERVIX AND UTERUS: Chronic | ICD-10-CM

## 2019-03-15 DIAGNOSIS — Z00.8 ENCOUNTER FOR OTHER GENERAL EXAMINATION: ICD-10-CM

## 2019-03-15 PROCEDURE — 70490 CT SOFT TISSUE NECK W/O DYE: CPT

## 2019-03-15 PROCEDURE — 70490 CT SOFT TISSUE NECK W/O DYE: CPT | Mod: 26

## 2019-04-23 ENCOUNTER — APPOINTMENT (OUTPATIENT)
Dept: OTOLARYNGOLOGY | Facility: CLINIC | Age: 74
End: 2019-04-23
Payer: MEDICARE

## 2019-04-23 VITALS
BODY MASS INDEX: 34.49 KG/M2 | HEART RATE: 86 BPM | DIASTOLIC BLOOD PRESSURE: 73 MMHG | SYSTOLIC BLOOD PRESSURE: 136 MMHG | RESPIRATION RATE: 16 BRPM | WEIGHT: 202 LBS | HEIGHT: 64 IN

## 2019-04-23 PROCEDURE — 99214 OFFICE O/P EST MOD 30 MIN: CPT

## 2019-04-23 NOTE — REVIEW OF SYSTEMS
[As Noted in HPI] : as noted in HPI [Swelling Neck] : swelling neck [Negative] : Endocrine [Swelling Face] : no face swelling

## 2019-04-23 NOTE — CONSULT LETTER
[Courtesy Letter:] : I had the pleasure of seeing your patient, [unfilled], in my office today. [Dear  ___] : Dear  [unfilled], [Please see my note below.] : Please see my note below. [Sincerely,] : Sincerely, [FreeTextEntry2] : Sera Workman MD ([place], [place]) [FreeTextEntry3] : Aroldo Landon MD

## 2019-04-23 NOTE — HISTORY OF PRESENT ILLNESS
[Neck Mass] : neck mass [de-identified] : Mrs. Gupta presents for follow up for an enlarged substernal thyroid found on chest xray.  She is under the care of Dr. Samir Workman for endocrinology.  Her recent chest CT noted R>L thyroid goiter with left sided tracheal deviation and right thyroid substernal component.  She presents today for planning.  Denies any pain ro discomfort. [Painful Swallowing] : no painful swallowing [Difficulty Swallowing] : no difficulty swallowing

## 2019-06-06 ENCOUNTER — TRANSCRIPTION ENCOUNTER (OUTPATIENT)
Age: 74
End: 2019-06-06

## 2019-08-25 ENCOUNTER — FORM ENCOUNTER (OUTPATIENT)
Age: 74
End: 2019-08-25

## 2019-08-26 ENCOUNTER — APPOINTMENT (OUTPATIENT)
Dept: ULTRASOUND IMAGING | Facility: IMAGING CENTER | Age: 74
End: 2019-08-26
Payer: MEDICARE

## 2019-08-26 ENCOUNTER — OUTPATIENT (OUTPATIENT)
Dept: OUTPATIENT SERVICES | Facility: HOSPITAL | Age: 74
LOS: 1 days | End: 2019-08-26
Payer: COMMERCIAL

## 2019-08-26 DIAGNOSIS — E89.0 POSTPROCEDURAL HYPOTHYROIDISM: Chronic | ICD-10-CM

## 2019-08-26 DIAGNOSIS — E04.2 NONTOXIC MULTINODULAR GOITER: ICD-10-CM

## 2019-08-26 DIAGNOSIS — Z98.49 CATARACT EXTRACTION STATUS, UNSPECIFIED EYE: Chronic | ICD-10-CM

## 2019-08-26 DIAGNOSIS — Z90.710 ACQUIRED ABSENCE OF BOTH CERVIX AND UTERUS: Chronic | ICD-10-CM

## 2019-08-26 PROCEDURE — 76536 US EXAM OF HEAD AND NECK: CPT | Mod: 26

## 2019-08-26 PROCEDURE — 76536 US EXAM OF HEAD AND NECK: CPT

## 2019-08-28 ENCOUNTER — RESULT REVIEW (OUTPATIENT)
Age: 74
End: 2019-08-28

## 2019-10-29 ENCOUNTER — APPOINTMENT (OUTPATIENT)
Dept: OTOLARYNGOLOGY | Facility: CLINIC | Age: 74
End: 2019-10-29
Payer: MEDICARE

## 2019-10-29 VITALS
DIASTOLIC BLOOD PRESSURE: 80 MMHG | HEART RATE: 85 BPM | HEIGHT: 64 IN | SYSTOLIC BLOOD PRESSURE: 160 MMHG | WEIGHT: 200 LBS | RESPIRATION RATE: 18 BRPM | BODY MASS INDEX: 34.15 KG/M2

## 2019-10-29 PROCEDURE — 99214 OFFICE O/P EST MOD 30 MIN: CPT

## 2019-10-29 NOTE — REASON FOR VISIT
[Subsequent Evaluation] : a subsequent evaluation for [Other: _____] : [unfilled] [FreeTextEntry2] : follow up visit for enlarged thyroid

## 2019-10-29 NOTE — HISTORY OF PRESENT ILLNESS
[de-identified] : 74 year old female presents  for follow up visit for an enlarged substernal thyroid found on chest x -ray showing R>L thyroid goiter with left sided tracheal  deviation and right thyroid substernal component. Pt had thyroid sonogram August 2019 is here today to discuss treatment plan. Pt had ? partial L lobectomy in past. 10 years or so ago on L at [facility].

## 2019-10-29 NOTE — CONSULT LETTER
[Dear  ___] : Dear  [unfilled], [Courtesy Letter:] : I had the pleasure of seeing your patient, [unfilled], in my office today. [Please see my note below.] : Please see my note below. [Sincerely,] : Sincerely, [FreeTextEntry2] : Sera Workman MD ([place], [place]) [FreeTextEntry3] : Aroldo Landon MD

## 2019-10-29 NOTE — PHYSICAL EXAM
[Enlarged] : enlarged [FreeTextEntry1] : bilat enlarged thyroid. dom on R [Midline] : trachea located in midline position [Normal] : no rashes

## 2019-11-06 ENCOUNTER — FORM ENCOUNTER (OUTPATIENT)
Age: 74
End: 2019-11-06

## 2019-11-07 ENCOUNTER — OUTPATIENT (OUTPATIENT)
Dept: OUTPATIENT SERVICES | Facility: HOSPITAL | Age: 74
LOS: 1 days | End: 2019-11-07
Payer: COMMERCIAL

## 2019-11-07 ENCOUNTER — APPOINTMENT (OUTPATIENT)
Dept: ULTRASOUND IMAGING | Facility: IMAGING CENTER | Age: 74
End: 2019-11-07
Payer: MEDICARE

## 2019-11-07 ENCOUNTER — RESULT REVIEW (OUTPATIENT)
Age: 74
End: 2019-11-07

## 2019-11-07 DIAGNOSIS — Z98.49 CATARACT EXTRACTION STATUS, UNSPECIFIED EYE: Chronic | ICD-10-CM

## 2019-11-07 DIAGNOSIS — Z90.710 ACQUIRED ABSENCE OF BOTH CERVIX AND UTERUS: Chronic | ICD-10-CM

## 2019-11-07 DIAGNOSIS — Z09 ENCOUNTER FOR FOLLOW-UP EXAMINATION AFTER COMPLETED TREATMENT FOR CONDITIONS OTHER THAN MALIGNANT NEOPLASM: ICD-10-CM

## 2019-11-07 DIAGNOSIS — E04.2 NONTOXIC MULTINODULAR GOITER: ICD-10-CM

## 2019-11-07 DIAGNOSIS — E89.0 POSTPROCEDURAL HYPOTHYROIDISM: Chronic | ICD-10-CM

## 2019-11-07 PROCEDURE — 10005 FNA BX W/US GDN 1ST LES: CPT

## 2019-11-07 PROCEDURE — 88173 CYTOPATH EVAL FNA REPORT: CPT | Mod: 26

## 2019-11-07 PROCEDURE — 88173 CYTOPATH EVAL FNA REPORT: CPT

## 2019-11-07 PROCEDURE — 88172 CYTP DX EVAL FNA 1ST EA SITE: CPT

## 2019-11-12 LAB — NON-GYNECOLOGICAL CYTOLOGY STUDY: SIGNIFICANT CHANGE UP

## 2019-11-13 ENCOUNTER — RESULT REVIEW (OUTPATIENT)
Age: 74
End: 2019-11-13

## 2019-12-30 ENCOUNTER — OUTPATIENT (OUTPATIENT)
Dept: OUTPATIENT SERVICES | Facility: HOSPITAL | Age: 74
LOS: 1 days | End: 2019-12-30

## 2019-12-30 VITALS
HEIGHT: 63 IN | RESPIRATION RATE: 14 BRPM | TEMPERATURE: 97 F | SYSTOLIC BLOOD PRESSURE: 122 MMHG | HEART RATE: 9 BPM | DIASTOLIC BLOOD PRESSURE: 71 MMHG | OXYGEN SATURATION: 98 % | WEIGHT: 199.96 LBS

## 2019-12-30 DIAGNOSIS — Z01.818 ENCOUNTER FOR OTHER PREPROCEDURAL EXAMINATION: ICD-10-CM

## 2019-12-30 DIAGNOSIS — E04.2 NONTOXIC MULTINODULAR GOITER: ICD-10-CM

## 2019-12-30 DIAGNOSIS — Z90.710 ACQUIRED ABSENCE OF BOTH CERVIX AND UTERUS: Chronic | ICD-10-CM

## 2019-12-30 DIAGNOSIS — E89.0 POSTPROCEDURAL HYPOTHYROIDISM: Chronic | ICD-10-CM

## 2019-12-30 DIAGNOSIS — Z98.49 CATARACT EXTRACTION STATUS, UNSPECIFIED EYE: Chronic | ICD-10-CM

## 2019-12-30 LAB
ALBUMIN SERPL ELPH-MCNC: 4.1 G/DL — SIGNIFICANT CHANGE UP (ref 3.3–5)
ALP SERPL-CCNC: 73 U/L — SIGNIFICANT CHANGE UP (ref 40–120)
ALT FLD-CCNC: 10 U/L — SIGNIFICANT CHANGE UP (ref 4–33)
ANION GAP SERPL CALC-SCNC: 14 MMO/L — SIGNIFICANT CHANGE UP (ref 7–14)
AST SERPL-CCNC: 13 U/L — SIGNIFICANT CHANGE UP (ref 4–32)
BILIRUB SERPL-MCNC: 0.2 MG/DL — SIGNIFICANT CHANGE UP (ref 0.2–1.2)
BUN SERPL-MCNC: 28 MG/DL — HIGH (ref 7–23)
CALCIUM SERPL-MCNC: 9.6 MG/DL — SIGNIFICANT CHANGE UP (ref 8.4–10.5)
CHLORIDE SERPL-SCNC: 104 MMOL/L — SIGNIFICANT CHANGE UP (ref 98–107)
CO2 SERPL-SCNC: 22 MMOL/L — SIGNIFICANT CHANGE UP (ref 22–31)
CREAT SERPL-MCNC: 1.09 MG/DL — SIGNIFICANT CHANGE UP (ref 0.5–1.3)
GLUCOSE SERPL-MCNC: 117 MG/DL — HIGH (ref 70–99)
HBA1C BLD-MCNC: 6.4 % — HIGH (ref 4–5.6)
HCT VFR BLD CALC: 31.4 % — LOW (ref 34.5–45)
HGB BLD-MCNC: 9.4 G/DL — LOW (ref 11.5–15.5)
MCHC RBC-ENTMCNC: 26.4 PG — LOW (ref 27–34)
MCHC RBC-ENTMCNC: 29.9 % — LOW (ref 32–36)
MCV RBC AUTO: 88.2 FL — SIGNIFICANT CHANGE UP (ref 80–100)
NRBC # FLD: 0 K/UL — SIGNIFICANT CHANGE UP (ref 0–0)
PLATELET # BLD AUTO: 283 K/UL — SIGNIFICANT CHANGE UP (ref 150–400)
PMV BLD: 10.4 FL — SIGNIFICANT CHANGE UP (ref 7–13)
POTASSIUM SERPL-MCNC: 4.7 MMOL/L — SIGNIFICANT CHANGE UP (ref 3.5–5.3)
POTASSIUM SERPL-SCNC: 4.7 MMOL/L — SIGNIFICANT CHANGE UP (ref 3.5–5.3)
PROT SERPL-MCNC: 7.4 G/DL — SIGNIFICANT CHANGE UP (ref 6–8.3)
RBC # BLD: 3.56 M/UL — LOW (ref 3.8–5.2)
RBC # FLD: 15.9 % — HIGH (ref 10.3–14.5)
SODIUM SERPL-SCNC: 140 MMOL/L — SIGNIFICANT CHANGE UP (ref 135–145)
WBC # BLD: 6.18 K/UL — SIGNIFICANT CHANGE UP (ref 3.8–10.5)
WBC # FLD AUTO: 6.18 K/UL — SIGNIFICANT CHANGE UP (ref 3.8–10.5)

## 2019-12-30 RX ORDER — SODIUM CHLORIDE 9 MG/ML
1000 INJECTION, SOLUTION INTRAVENOUS
Refills: 0 | Status: DISCONTINUED | OUTPATIENT
Start: 2020-01-09 | End: 2020-01-09

## 2019-12-30 RX ORDER — METFORMIN HYDROCHLORIDE 850 MG/1
1 TABLET ORAL
Qty: 0 | Refills: 0 | DISCHARGE

## 2019-12-30 RX ORDER — LOSARTAN POTASSIUM 100 MG/1
1 TABLET, FILM COATED ORAL
Qty: 0 | Refills: 0 | DISCHARGE

## 2019-12-30 NOTE — H&P PST ADULT - NSICDXPASTMEDICALHX_GEN_ALL_CORE_FT
PAST MEDICAL HISTORY:  Anemia     Arthritis     Diabetes type 2    Hypercholesteremia     Hypertension     Myelodysplasia (myelodysplastic syndrome)     Other giant cell arteritis     Thyroid disease     Thyromegaly s/p partial thyroidectomy long time ago

## 2019-12-30 NOTE — H&P PST ADULT - NSICDXPASTSURGICALHX_GEN_ALL_CORE_FT
PAST SURGICAL HISTORY:  H/O partial thyroidectomy     H/O: hysterectomy     History of cataract surgery

## 2019-12-30 NOTE — H&P PST ADULT - RS GEN PE MLT RESP DETAILS PC
good air movement/no chest wall tenderness/breath sounds equal/no rhonchi/no wheezes/no intercostal retractions/airway patent/clear to auscultation bilaterally/respirations non-labored/no rales

## 2019-12-30 NOTE — H&P PST ADULT - NEGATIVE SKIN SYMPTOMS
no change in size/color of mole/no tumor/no pitted nails/no hair loss/no rash/no itching/no dryness/no brittle nails

## 2019-12-30 NOTE — H&P PST ADULT - NEGATIVE OPHTHALMOLOGIC SYMPTOMS
no loss of vision R/no irritation L/no diplopia/no photophobia/no pain R/no scleral injection L/no discharge R/no irritation R/no loss of vision L/no discharge L/no lacrimation L/no lacrimation R/no blurred vision L/no blurred vision R/no pain L

## 2019-12-30 NOTE — H&P PST ADULT - NSANTHOSAYNRD_GEN_A_CORE
No. SHERRI screening performed.  STOP BANG Legend: 0-2 = LOW Risk; 3-4 = INTERMEDIATE Risk; 5-8 = HIGH Risk/denies test

## 2019-12-30 NOTE — H&P PST ADULT - NEGATIVE PSYCHIATRIC SYMPTOMS
no auditory hallucinations/no depression/no insomnia/no mood swings/no agitation/no suicidal ideation/no anxiety/no memory loss/no hyperactivity/no paranoia/no visual hallucinations

## 2019-12-30 NOTE — H&P PST ADULT - NSICDXPROBLEM_GEN_ALL_CORE_FT
PROBLEM DIAGNOSES  Problem: Nontoxic multinodular goiter  Assessment and Plan: preop instructions provided including npo status, pepcid and hbiclense wash. Pt t stop MVI/ OTC meds herbals and melixicam on 1/2/20. C/W all meds ( am iron, metoprolol and pm telmisartan, amlodipine ) as ordered, diuretic to be held in am dos but may continue as ordered until 1/8/20. but aware to stop Metformin and glipizide on 1/8/20 after am doses, hold any pm doses and none to be taken on 1/9/20. FS ordered.  BW sent awaiting results. MC/CC requested and pending (c/o MORGAN). PROBLEM DIAGNOSES  Problem: Nontoxic multinodular goiter  Assessment and Plan: preop instructions provided including npo status, Pepcid and hibiclense wash. Pt t stop MVI/ OTC meds herbals and melixicam on 1/2/20. C/W all meds ( am iron, metoprolol and pm telmisartan, amlodipine ) as ordered, diuretic to be held in am dos but may continue as ordered until 1/8/20. Aware to stop Metformin and glipizide on 1/8/20 after am doses, hold any pm doses and none to be taken on 1/9/20. FS ordered.  BW sent awaiting results. MC/CC requested and pending (c/o MORGAN).

## 2019-12-30 NOTE — H&P PST ADULT - NSICDXFAMILYHX_GEN_ALL_CORE_FT
FAMILY HISTORY:  Father  Still living? No  Family history of acute renal failure, Age at diagnosis: Age Unknown  Family history of diabetes mellitus, Age at diagnosis: Age Unknown  Family history of hypertension, Age at diagnosis: Age Unknown    Mother  Still living? No  Family history of diabetes mellitus, Age at diagnosis: Age Unknown  Family history of hypertension, Age at diagnosis: Age Unknown

## 2019-12-30 NOTE — H&P PST ADULT - NEGATIVE GENERAL GENITOURINARY SYMPTOMS
no flank pain R/no bladder infections/normal urinary frequency/no nocturia/no urine discoloration/no gas in urine/no flank pain L/no hematuria/no dysuria/no urinary hesitancy/no renal colic

## 2019-12-30 NOTE — H&P PST ADULT - NEGATIVE ENMT SYMPTOMS
no hearing difficulty/no ear pain/no vertigo/no sinus symptoms/no nose bleeds/no throat pain/no dysphagia/no tinnitus no sinus symptoms/no hearing difficulty/no post-nasal discharge/no gum bleeding/no dry mouth/no nose bleeds/no abnormal taste sensation/no vertigo/no nasal congestion/no nasal discharge/no recurrent cold sores/no throat pain/no dysphagia/no ear pain/no tinnitus/no nasal obstruction

## 2019-12-30 NOTE — H&P PST ADULT - GASTROINTESTINAL DETAILS
no guarding/no organomegaly/bowel sounds normal/no distention/nontender/no rebound tenderness/no rigidity/soft

## 2019-12-30 NOTE — H&P PST ADULT - NEGATIVE NEUROLOGICAL SYMPTOMS
no generalized seizures/no tremors/no syncope/no facial palsy/no focal seizures/no loss of consciousness/no hemiparesis/no transient paralysis/no vertigo/no confusion

## 2019-12-30 NOTE — H&P PST ADULT - HISTORY OF PRESENT ILLNESS
74 year old female presents to presurgical testing with diagnosis of nontoxic multinodular goiter and to be evaluated for a scheduled right thyroidectomy substernal thyroid cervical approach on 1/9/20.   Pt reports had s/p  left thyroidectomy > 10 years ago. For few years noted rt side of neck increasing in size but did not re evaluate until went to an [facility] recently for PNA and x-rays done where noted thyroid to be "pressuring to trachea causing SOB and some deviation". Pt referred to Dr Landon who recommended surgical intervention at this time.

## 2019-12-30 NOTE — H&P PST ADULT - MAMMOGRAM, RESULTS OF LAST, PROFILE
Medical Necessity Information: It is in your best interest to select a reason for this procedure from the list below. All of these items fulfill various CMS LCD requirements except the new and changing color options. normal as per patient

## 2019-12-30 NOTE — H&P PST ADULT - ASSESSMENT
DX: nontoxic multinodular goiter and evaluated for a scheduled right thyroidectomy substernal thyroid cervical approach on 1/9/20.

## 2019-12-30 NOTE — H&P PST ADULT - MUSCULOSKELETAL
details… detailed exam no calf tenderness/decreased ROM/no joint swelling/diminished strength/no joint erythema/no joint warmth

## 2019-12-30 NOTE — H&P PST ADULT - NEGATIVE GENERAL SYMPTOMS
no weight loss/no chills/no anorexia/no fever/no weight gain/no polyphagia/no polyuria/no polydipsia/no fatigue/no malaise

## 2020-01-08 ENCOUNTER — TRANSCRIPTION ENCOUNTER (OUTPATIENT)
Age: 75
End: 2020-01-08

## 2020-01-09 ENCOUNTER — INPATIENT (INPATIENT)
Facility: HOSPITAL | Age: 75
LOS: 0 days | Discharge: HOME | End: 2020-01-09
Attending: OTOLARYNGOLOGY | Admitting: OTOLARYNGOLOGY
Payer: MEDICARE

## 2020-01-09 ENCOUNTER — APPOINTMENT (OUTPATIENT)
Dept: OTOLARYNGOLOGY | Facility: HOSPITAL | Age: 75
End: 2020-01-09

## 2020-01-09 ENCOUNTER — RESULT REVIEW (OUTPATIENT)
Age: 75
End: 2020-01-09

## 2020-01-09 VITALS
WEIGHT: 199.96 LBS | HEIGHT: 64 IN | DIASTOLIC BLOOD PRESSURE: 79 MMHG | TEMPERATURE: 98 F | OXYGEN SATURATION: 100 % | HEART RATE: 84 BPM | SYSTOLIC BLOOD PRESSURE: 154 MMHG

## 2020-01-09 VITALS
RESPIRATION RATE: 18 BRPM | OXYGEN SATURATION: 98 % | SYSTOLIC BLOOD PRESSURE: 144 MMHG | DIASTOLIC BLOOD PRESSURE: 76 MMHG | HEART RATE: 86 BPM

## 2020-01-09 DIAGNOSIS — Z98.49 CATARACT EXTRACTION STATUS, UNSPECIFIED EYE: Chronic | ICD-10-CM

## 2020-01-09 DIAGNOSIS — E89.0 POSTPROCEDURAL HYPOTHYROIDISM: Chronic | ICD-10-CM

## 2020-01-09 DIAGNOSIS — E04.2 NONTOXIC MULTINODULAR GOITER: ICD-10-CM

## 2020-01-09 DIAGNOSIS — Z90.710 ACQUIRED ABSENCE OF BOTH CERVIX AND UTERUS: Chronic | ICD-10-CM

## 2020-01-09 LAB
BLD GP AB SCN SERPL QL: NEGATIVE — SIGNIFICANT CHANGE UP
CALCIUM SERPL-MCNC: 9 MG/DL — SIGNIFICANT CHANGE UP (ref 8.4–10.5)
GLUCOSE BLDC GLUCOMTR-MCNC: 176 MG/DL — HIGH (ref 70–99)
GLUCOSE BLDC GLUCOMTR-MCNC: 99 MG/DL — SIGNIFICANT CHANGE UP (ref 70–99)
PTH-INTACT SERPL-MCNC: 37.99 PG/ML — SIGNIFICANT CHANGE UP (ref 15–65)
RH IG SCN BLD-IMP: NEGATIVE — SIGNIFICANT CHANGE UP
RH IG SCN BLD-IMP: NEGATIVE — SIGNIFICANT CHANGE UP

## 2020-01-09 PROCEDURE — 88307 TISSUE EXAM BY PATHOLOGIST: CPT | Mod: 26

## 2020-01-09 RX ORDER — OXYCODONE HYDROCHLORIDE 5 MG/1
5 TABLET ORAL EVERY 6 HOURS
Refills: 0 | Status: DISCONTINUED | OUTPATIENT
Start: 2020-01-09 | End: 2020-01-09

## 2020-01-09 RX ORDER — DEXTROSE 50 % IN WATER 50 %
12.5 SYRINGE (ML) INTRAVENOUS ONCE
Refills: 0 | Status: DISCONTINUED | OUTPATIENT
Start: 2020-01-09 | End: 2020-01-09

## 2020-01-09 RX ORDER — LOSARTAN POTASSIUM 100 MG/1
100 TABLET, FILM COATED ORAL DAILY
Refills: 0 | Status: DISCONTINUED | OUTPATIENT
Start: 2020-01-09 | End: 2020-01-09

## 2020-01-09 RX ORDER — OXYCODONE HYDROCHLORIDE 5 MG/1
10 TABLET ORAL EVERY 6 HOURS
Refills: 0 | Status: DISCONTINUED | OUTPATIENT
Start: 2020-01-09 | End: 2020-01-09

## 2020-01-09 RX ORDER — DEXTROSE 50 % IN WATER 50 %
15 SYRINGE (ML) INTRAVENOUS ONCE
Refills: 0 | Status: DISCONTINUED | OUTPATIENT
Start: 2020-01-09 | End: 2020-01-09

## 2020-01-09 RX ORDER — ONDANSETRON 8 MG/1
4 TABLET, FILM COATED ORAL ONCE
Refills: 0 | Status: DISCONTINUED | OUTPATIENT
Start: 2020-01-09 | End: 2020-01-09

## 2020-01-09 RX ORDER — INSULIN LISPRO 100/ML
VIAL (ML) SUBCUTANEOUS
Refills: 0 | Status: DISCONTINUED | OUTPATIENT
Start: 2020-01-09 | End: 2020-01-09

## 2020-01-09 RX ORDER — ACETAMINOPHEN 500 MG
2 TABLET ORAL
Qty: 0 | Refills: 0 | DISCHARGE

## 2020-01-09 RX ORDER — HEPARIN SODIUM 5000 [USP'U]/ML
5000 INJECTION INTRAVENOUS; SUBCUTANEOUS EVERY 12 HOURS
Refills: 0 | Status: DISCONTINUED | OUTPATIENT
Start: 2020-01-09 | End: 2020-01-09

## 2020-01-09 RX ORDER — OXYCODONE HYDROCHLORIDE 5 MG/1
1 TABLET ORAL
Qty: 12 | Refills: 0
Start: 2020-01-09

## 2020-01-09 RX ORDER — SODIUM CHLORIDE 9 MG/ML
1000 INJECTION, SOLUTION INTRAVENOUS
Refills: 0 | Status: DISCONTINUED | OUTPATIENT
Start: 2020-01-09 | End: 2020-01-09

## 2020-01-09 RX ORDER — AMLODIPINE BESYLATE 2.5 MG/1
10 TABLET ORAL DAILY
Refills: 0 | Status: DISCONTINUED | OUTPATIENT
Start: 2020-01-09 | End: 2020-01-09

## 2020-01-09 RX ORDER — HYDROMORPHONE HYDROCHLORIDE 2 MG/ML
0.25 INJECTION INTRAMUSCULAR; INTRAVENOUS; SUBCUTANEOUS
Refills: 0 | Status: DISCONTINUED | OUTPATIENT
Start: 2020-01-09 | End: 2020-01-09

## 2020-01-09 RX ORDER — DEXTROSE 50 % IN WATER 50 %
25 SYRINGE (ML) INTRAVENOUS ONCE
Refills: 0 | Status: DISCONTINUED | OUTPATIENT
Start: 2020-01-09 | End: 2020-01-09

## 2020-01-09 RX ORDER — GLUCAGON INJECTION, SOLUTION 0.5 MG/.1ML
1 INJECTION, SOLUTION SUBCUTANEOUS ONCE
Refills: 0 | Status: DISCONTINUED | OUTPATIENT
Start: 2020-01-09 | End: 2020-01-09

## 2020-01-09 RX ORDER — ACETAMINOPHEN 500 MG
650 TABLET ORAL EVERY 6 HOURS
Refills: 0 | Status: DISCONTINUED | OUTPATIENT
Start: 2020-01-09 | End: 2020-01-09

## 2020-01-09 RX ORDER — METOPROLOL TARTRATE 50 MG
50 TABLET ORAL DAILY
Refills: 0 | Status: DISCONTINUED | OUTPATIENT
Start: 2020-01-09 | End: 2020-01-09

## 2020-01-09 RX ADMIN — HYDROMORPHONE HYDROCHLORIDE 0.25 MILLIGRAM(S): 2 INJECTION INTRAMUSCULAR; INTRAVENOUS; SUBCUTANEOUS at 11:35

## 2020-01-09 RX ADMIN — HYDROMORPHONE HYDROCHLORIDE 0.25 MILLIGRAM(S): 2 INJECTION INTRAMUSCULAR; INTRAVENOUS; SUBCUTANEOUS at 11:21

## 2020-01-09 NOTE — ASU DISCHARGE PLAN (ADULT/PEDIATRIC) - ASU DC SPECIAL INSTRUCTIONSFT
Tylenol/ibuprofen for mild to moderate pain as needed  Oxycodone for severe pain every 6 hours as needed  Take stool softener while taking oxycodone to prevent constipation  Can take top dressing off tomorrow, leave steri-strips (white stickers) in place, will fall off on their own  Empty and record drain output 2x daily  Follow-up with ENT as scheduled, Dr. Landon's office will call tomorrow with appointment date and time

## 2020-01-09 NOTE — ASU DISCHARGE PLAN (ADULT/PEDIATRIC) - CARE PROVIDER_API CALL
Aroldo Landon)  [facility]; Otolaryngology  [address] [place] [id number]  Phone: [phone]  Fax: [phone]  Follow Up Time:

## 2020-01-09 NOTE — ASU DISCHARGE PLAN (ADULT/PEDIATRIC) - CALL YOUR DOCTOR IF YOU HAVE ANY OF THE FOLLOWING:
Swelling that gets worse/Bleeding that does not stop Bleeding that does not stop/Swelling that gets worse/Nausea and vomiting that does not stop

## 2020-01-09 NOTE — ASU PREOP CHECKLIST - HEIGHT IN INCHES
Date of Birth: [birth year]	Time of Birth:     Admission Weight (g): 3870    Admission Date and Time:  19 @ 11:25         Gestational Age: 39     Source of admission [ _x ] Inborn     [ __ ]Transport from    [facility]:  39 wk female infant to a 38 y.o., , B+/GBS negative (). OBhx: c/s ( for macrosomia)- hx of post partum hemorrhage with this delivery, top x1 ().  Medhx: reflux and anxiety, HPV -abnormal pap, negative colposcopy.  IVF pregnancy, scheduled repeat c/s. Delayed cord clamping done, W/D/S/S. NCPAP started at ~4 min due to poor color, pulse ox placed and sat 67%, increased fiO2 to max 40%, attempeted to trial off and infant would have grunting/nasal flaring/retractions. Brought to NICU on NCPAP +6, FiO2 30%.    Social History: No history of alcohol/tobacco exposure obtained  FHx: non-contributory to the condition being treated or details of FH documented here  ROS: unable to obtain ()     PHYSICAL EXAM:    General:	         Awake and active;   Head:		AFOF  Eyes:		Normally set bilaterally  Ears:		Patent bilaterally, no deformities  Nose/Mouth:	Nares patent, palate intact  Neck:		No masses, intact clavicles  Chest/Lungs:      Breath sounds equal to auscultation. No retractions  CV:		No murmurs appreciated, normal pulses bilaterally  Abdomen:          Soft nontender nondistended, no masses, bowel sounds present  :		Normal for gestational age  Back:		Intact skin, no sacral dimples or tags  Anus:		Grossly patent  Extremities:	FROM, no hip clicks  Skin:		Pink, no lesions  Neuro exam:	Appropriate tone, activity    **************************************************************************************************  Age:1d    LOS:1d    Vital Signs:  T(C): 36.9 ( @ 08:00), Max: 37.3 ( @ 20:49)  HR: 130 ( @ 08:00) (114 - 164)  BP: 60/44 ( @ 20:49) (60/44 - 74/42)  RR: 38 ( @ 08:00) (29 - 76)  SpO2: 97% ( @ 08:00) (87% - 100%)        LABS:         Blood type, Baby [] ABO: AB  Rh; Positive DC; Negative                              16.7   19.06 )-----------( 199             [ @ 12:15]                  49.9  S 30.0%  B 0%  Samaria 1.0%  Myelo 1.0%  Promyelo 0%  Blasts 0%  Lymph 52.0%  Mono 10.0%  Eos 4.0%  Baso 0%  Retic 0%                         POCT Glucose:    65    [08:19] ,    53    [05:23] ,    63    [02:21] ,    72    [23:41] ,    78    [14:41] ,    75    [13:48] ,    40    [12:15]                  CBG - ( 2019 13:45 )  pH: 7.31  /  pCO2: 52    /  pO2: 42.8  / HCO3: 23    / Base Excess: -0.3  /  SO2: 80.9  / Lactate: x                           **************************************************************************************************		  DISCHARGE PLANNING (date and status):  Hep B Vacc:  CCHD:			  :					  Hearing:    screen:	  Circumcision:  Hip US rec:  	  Synagis: 			  Other Immunizations (with dates):    		  Neurodevelop eval?	  CPR class done?  	  PVS at DC?  Vit D at DC?	  FE at DC?	    PMD:          Name:  ______________ _             Contact information:  ______________ _  Pharmacy: Name:  ______________ _              Contact information:  ______________ _    Follow-up appointments (list):      Time spent on the total subsequent encounter with >50% of the visit spent on counseling and/or coordination of care:[ _ ] 15 min[ _ ] 25 min[ _ ] 35 min  [ _ ] Discharge time spent >30 min   [ __ ] Car seat oximetry reviewed. 4

## 2020-01-10 ENCOUNTER — APPOINTMENT (OUTPATIENT)
Dept: OTOLARYNGOLOGY | Facility: CLINIC | Age: 75
End: 2020-01-10
Payer: MEDICARE

## 2020-01-10 PROCEDURE — 99024 POSTOP FOLLOW-UP VISIT: CPT

## 2020-01-10 NOTE — HISTORY OF PRESENT ILLNESS
[de-identified] : pt is here for drain removal today, pt has right thyroidectomy on 1/9/2020 and doing well. pt has no c.o. pt has no dyspnea or dysphagia or voice change.

## 2020-01-14 LAB — SURGICAL PATHOLOGY STUDY: SIGNIFICANT CHANGE UP

## 2020-01-16 ENCOUNTER — APPOINTMENT (OUTPATIENT)
Dept: OTOLARYNGOLOGY | Facility: CLINIC | Age: 75
End: 2020-01-16
Payer: MEDICARE

## 2020-01-16 PROCEDURE — 99024 POSTOP FOLLOW-UP VISIT: CPT

## 2020-01-16 NOTE — HISTORY OF PRESENT ILLNESS
[None] : The patient is currently asymptomatic. [Neck Mass] : no neck mass [de-identified] : Mrs. Gupta presents one week post right thyroid lobectomy and isthmusectomy for an enlarged goiter on January 9, 2020.  She is doing well .  She is under the care of her endocrinologist, Dr. Workman whom she will be following up with in 3-4 weeks.  She denies any pain, discomfort, or dysphagia and is otherwise well.  final path was cw a benign process. [Difficulty Swallowing] : no difficulty swallowing [Painful Swallowing] : no painful swallowing

## 2020-01-16 NOTE — PHYSICAL EXAM
[Normal] : temporomandibular joint is normal [de-identified] : Wound clean and intact without any swellig, tenderness, erythema or discharge.

## 2020-01-16 NOTE — CONSULT LETTER
[Dear  ___] : Dear  [unfilled], [Courtesy Letter:] : I had the pleasure of seeing your patient, [unfilled], in my office today. [Please see my note below.] : Please see my note below. [Sincerely,] : Sincerely, [FreeTextEntry2] : Sera Workman MD ([place], [place])\par  [FreeTextEntry3] : Rahul Burciaga PA-C\par Aroldo Landon MD, FACS\par \par Department of Otolaryngology\par [facility].\par [address]\[place] [address]\par

## 2020-01-27 ENCOUNTER — APPOINTMENT (OUTPATIENT)
Dept: VASCULAR SURGERY | Facility: CLINIC | Age: 75
End: 2020-01-27

## 2020-01-27 ENCOUNTER — APPOINTMENT (OUTPATIENT)
Dept: VASCULAR SURGERY | Facility: CLINIC | Age: 75
End: 2020-01-27
Payer: MEDICARE

## 2020-01-27 VITALS
SYSTOLIC BLOOD PRESSURE: 176 MMHG | BODY MASS INDEX: 34.15 KG/M2 | DIASTOLIC BLOOD PRESSURE: 86 MMHG | HEIGHT: 64 IN | HEART RATE: 86 BPM | WEIGHT: 200 LBS

## 2020-01-27 PROCEDURE — 99214 OFFICE O/P EST MOD 30 MIN: CPT

## 2020-01-27 RX ORDER — GLIPIZIDE AND METFORMIN HYDROCHLORIDE 2.5; 25 MG/1; MG/1
TABLET, FILM COATED ORAL
Refills: 0 | Status: DISCONTINUED | COMMUNITY
End: 2020-01-27

## 2020-01-27 RX ORDER — METFORMIN HYDROCHLORIDE 850 MG/1
850 TABLET, COATED ORAL TWICE DAILY
Refills: 0 | Status: ACTIVE | COMMUNITY
Start: 2017-06-08

## 2020-01-27 RX ORDER — ROSUVASTATIN CALCIUM 10 MG/1
10 TABLET, FILM COATED ORAL
Refills: 0 | Status: ACTIVE | COMMUNITY

## 2020-01-27 RX ORDER — METOPROLOL TARTRATE 75 MG/1
TABLET, FILM COATED ORAL
Refills: 0 | Status: DISCONTINUED | COMMUNITY
End: 2020-01-27

## 2020-01-27 RX ORDER — METFORMIN HYDROCHLORIDE 1000 MG/1
1000 TABLET, EXTENDED RELEASE ORAL TWICE DAILY
Refills: 0 | Status: DISCONTINUED | COMMUNITY
End: 2020-01-27

## 2020-01-27 NOTE — REVIEW OF SYSTEMS
[Shortness Of Breath] : shortness of breath [As Noted in HPI] : as noted in HPI [Negative] : Neurological

## 2020-01-30 NOTE — PHYSICAL EXAM
[2+] : left 2+ [] : bilaterally [Ankle Swelling Bilaterally] : severe [No Rash or Lesion] : No rash or lesion [Alert] : alert [Calm] : calm [JVD] : no jugular venous distention  [Normal Breath Sounds] : Normal breath sounds [Normal Heart Sounds] : normal heart sounds [Ankle Swelling (On Exam)] : not present [Varicose Veins Of Lower Extremities] : not present [Skin Ulcer] : no ulcer [Abdomen Masses] : No abdominal masses [Oriented to Place] : oriented to place [Oriented to Person] : oriented to person [de-identified] : appears well

## 2020-01-30 NOTE — ASSESSMENT
[FreeTextEntry1] : 74 yo female with history of htn, hld, goider s/p thyroidectomy, anemia, dm, ckd presents for evaluation of b/l lower extremity edema pain and venous stasis skin changes.\par would recommend compression stockings and elevation \par pt to follow up in 2 months with venous duplex \par \par pt to follow up with pcp for sob on exertion \par pt to follow up with renal given history of ckd

## 2020-01-30 NOTE — HISTORY OF PRESENT ILLNESS
[FreeTextEntry1] : 76 yo female with history of htn, hld, goider s/p thyroidectomy, anemia, dm, ckd presents for evaluation of b/l lower extremity edema pain and venous stasis skin changes for 2 years.  pt states that she has been wearing her compression stockings without any improvement in her symptoms.  pt states that she has had an ulcer of the left lower extremity in the past that took about 6 months to heal with local wound care.  pt reports left knee pain and back pain is what limits her ability to ambulate.  \par pt denies any active wounds or ulcers at this time.

## 2020-07-21 ENCOUNTER — APPOINTMENT (OUTPATIENT)
Dept: OTOLARYNGOLOGY | Facility: CLINIC | Age: 75
End: 2020-07-21
Payer: MEDICARE

## 2020-07-21 VITALS
WEIGHT: 195 LBS | BODY MASS INDEX: 33.29 KG/M2 | DIASTOLIC BLOOD PRESSURE: 77 MMHG | TEMPERATURE: 98.9 F | HEIGHT: 64 IN | SYSTOLIC BLOOD PRESSURE: 145 MMHG | RESPIRATION RATE: 18 BRPM | HEART RATE: 81 BPM

## 2020-07-21 DIAGNOSIS — E04.2 NONTOXIC MULTINODULAR GOITER: ICD-10-CM

## 2020-07-21 DIAGNOSIS — Z09 ENCOUNTER FOR FOLLOW-UP EXAMINATION AFTER COMPLETED TREATMENT FOR CONDITIONS OTHER THAN MALIGNANT NEOPLASM: ICD-10-CM

## 2020-07-21 PROCEDURE — 99214 OFFICE O/P EST MOD 30 MIN: CPT

## 2020-07-21 NOTE — HISTORY OF PRESENT ILLNESS
[de-identified] : 75 year old female presents s/p R thyroid lobectomy and isthmusectomy for an enlarged goiter on January 9, 2020. pt to see endocrinologist Dr. Workman September 2, 2020, pt to have blood work August 26, 2020. pt doing well, no c/o's offered.

## 2020-07-21 NOTE — REASON FOR VISIT
[Subsequent Evaluation] : a subsequent evaluation for [Other: _____] : [unfilled] [FreeTextEntry2] : follow up visit s/p R thyroid lobectomy

## 2020-07-27 ENCOUNTER — APPOINTMENT (OUTPATIENT)
Dept: VASCULAR SURGERY | Facility: CLINIC | Age: 75
End: 2020-07-27
Payer: MEDICARE

## 2020-07-27 VITALS — TEMPERATURE: 97.7 F

## 2020-07-27 PROCEDURE — 99213 OFFICE O/P EST LOW 20 MIN: CPT

## 2020-07-27 PROCEDURE — 93970 EXTREMITY STUDY: CPT

## 2020-07-27 RX ORDER — AMLODIPINE BESYLATE 10 MG/1
10 TABLET ORAL
Qty: 90 | Refills: 0 | Status: ACTIVE | COMMUNITY
Start: 2020-06-30

## 2020-07-27 NOTE — HISTORY OF PRESENT ILLNESS
[FreeTextEntry1] : 74 yo female with history of htn, hld, goider s/p thyroidectomy, anemia, dm, ckd presents for evaluation of b/l lower extremity edema pain and venous stasis skin changes for 2 years.  pt states that she has been wearing her compression stockings without any improvement in her symptoms.  pt states that she has had an ulcer of the left lower extremity in the past that took about 6 months to heal with local wound care.  pt reports left knee pain and back pain is what limits her ability to ambulate.  \par \par

## 2020-07-27 NOTE — PHYSICAL EXAM
[Normal Heart Sounds] : normal heart sounds [JVD] : no jugular venous distention  [Normal Breath Sounds] : Normal breath sounds [2+] : right 2+ [Ankle Swelling (On Exam)] : not present [Varicose Veins Of Lower Extremities] : not present [] : bilaterally [Ankle Swelling Bilaterally] : severe [Abdomen Masses] : No abdominal masses [Skin Ulcer] : no ulcer [No Rash or Lesion] : No rash or lesion [Alert] : alert [Oriented to Person] : oriented to person [Oriented to Place] : oriented to place [de-identified] : appears well [Calm] : calm

## 2020-07-27 NOTE — ASSESSMENT
[FreeTextEntry1] : 74 yo female with history of htn, hld, goider s/p thyroidectomy, anemia, dm, ckd presents for evaluation of b/l lower extremity edema pain and venous stasis skin changes.\par \par Patient with recurrent ulceration of bilateral lower extremity, right leg ulceration was the latest.\par \par BURKE ABARCA is a 75 year F with persistent and worsening right____ Lower extremity venous insufficiency, which is unresponsive to at least 3 months of compression stockings 20-30 mm, leg elevation, exercise and over-the-counter pain medication. Patient has complaints of worsening leg discomfort with swelling, fatigue, heaviness, achiness, and painful varicosities. The patient's symptoms interfere with their activity of daily life such as walking, shopping, housework and their ability to walk and exercise. Patient has intact pulses in both legs without evidence of arterial insufficiency.\par \par Treatment is indicated now for non-cosmetic reasons for symptomatic venous reflux disease with symptoms which is refractory to conservative therapy. Venous duplex study demonstrates a lower extremity venous insufficiency. The need for definite elective treatment is based on severe interfering and worsening reflux symptoms with evidence of venous insufficiency on venous ultrasound.\par \par The risks and benefits of endovenous ablation of saphenous vein versus conservative treatment was discussed with the patient. Patient chooses to proceed with the procedure. Treatment plan to be scheduled.\par \par \par needs medical clearance\par \par \par \par pt to follow up with renal given history of ckd

## 2020-07-27 NOTE — REVIEW OF SYSTEMS
[Shortness Of Breath] : shortness of breath [As Noted in HPI] : as noted in HPI [Negative] : Cardiovascular

## 2020-09-06 ENCOUNTER — APPOINTMENT (OUTPATIENT)
Dept: DISASTER EMERGENCY | Facility: CLINIC | Age: 75
End: 2020-09-06

## 2020-09-26 DIAGNOSIS — Z01.818 ENCOUNTER FOR OTHER PREPROCEDURAL EXAMINATION: ICD-10-CM

## 2020-09-27 ENCOUNTER — APPOINTMENT (OUTPATIENT)
Dept: DISASTER EMERGENCY | Facility: CLINIC | Age: 75
End: 2020-09-27

## 2020-09-28 LAB — SARS-COV-2 N GENE NPH QL NAA+PROBE: NOT DETECTED

## 2020-09-29 PROBLEM — I87.8 VENOUS STASIS: Status: ACTIVE | Noted: 2020-01-27

## 2020-09-30 ENCOUNTER — APPOINTMENT (OUTPATIENT)
Dept: ENDOVASCULAR SURGERY | Facility: CLINIC | Age: 75
End: 2020-09-30
Payer: MEDICARE

## 2020-09-30 ENCOUNTER — LABORATORY RESULT (OUTPATIENT)
Age: 75
End: 2020-09-30

## 2020-09-30 VITALS
HEIGHT: 64 IN | HEART RATE: 66 BPM | BODY MASS INDEX: 33.29 KG/M2 | SYSTOLIC BLOOD PRESSURE: 161 MMHG | DIASTOLIC BLOOD PRESSURE: 80 MMHG | OXYGEN SATURATION: 99 % | RESPIRATION RATE: 15 BRPM | WEIGHT: 195 LBS | TEMPERATURE: 97.6 F

## 2020-09-30 DIAGNOSIS — I87.8 OTHER SPECIFIED DISORDERS OF VEINS: ICD-10-CM

## 2020-09-30 PROCEDURE — 37765 STAB PHLEB VEINS XTR 10-20: CPT | Mod: RT

## 2020-09-30 PROCEDURE — 36475 ENDOVENOUS RF 1ST VEIN: CPT | Mod: RT

## 2020-09-30 RX ORDER — ATENOLOL 50 MG/1
TABLET ORAL
Refills: 0 | Status: ACTIVE | COMMUNITY

## 2020-10-05 ENCOUNTER — APPOINTMENT (OUTPATIENT)
Dept: VASCULAR SURGERY | Facility: CLINIC | Age: 75
End: 2020-10-05
Payer: MEDICARE

## 2020-10-05 VITALS
HEIGHT: 64 IN | DIASTOLIC BLOOD PRESSURE: 70 MMHG | SYSTOLIC BLOOD PRESSURE: 140 MMHG | BODY MASS INDEX: 33.29 KG/M2 | WEIGHT: 195 LBS | HEART RATE: 65 BPM

## 2020-10-05 VITALS — TEMPERATURE: 97.5 F

## 2020-10-05 PROCEDURE — 99024 POSTOP FOLLOW-UP VISIT: CPT

## 2020-10-05 PROCEDURE — 93971 EXTREMITY STUDY: CPT

## 2020-10-05 RX ORDER — ATORVASTATIN CALCIUM 20 MG/1
20 TABLET, FILM COATED ORAL
Qty: 90 | Refills: 0 | Status: ACTIVE | COMMUNITY
Start: 2020-08-06

## 2020-10-05 NOTE — REASON FOR VISIT
[de-identified] : Status post ablation of right saphenous vein with stab phlebectomy.  Patient doing very well.  Right foot feels a lot better.  No evidence of DVT.  Follow-up PRN.

## 2020-10-13 NOTE — ASU PREOP CHECKLIST - ASSESSMENT, HISTORY & PHYSICAL COMPLETED AND ON MEDICAL RECORD
Cardiovascular Disease Progress Note    Overnight events: No acute events overnight. Mr. Raymundo denies chest pain or SOB.    Otherwise review of systems negative    Objective Findings:  T(C): 36.8 (10-13-20 @ 04:17), Max: 36.9 (10-12-20 @ 21:00)  HR: 75 (10-13-20 @ 04:17) (67 - 76)  BP: 132/84 (10-13-20 @ 04:17) (122/73 - 151/90)  RR: 18 (10-13-20 @ 04:17) (18 - 18)  SpO2: 96% (10-13-20 @ 04:17) (96% - 96%)  Wt(kg): --  Daily     Daily Weight in k.6 (13 Oct 2020 04:17)      Physical Exam:  Gen: NAD; Patient resting comfortably  HEENT: EOMI, Normocephalic/ atraumatic  CV: RRR, normal S1 + S2, no m/r/g  Lungs:  Normal respiratory effort; clear to auscultation bilaterally  Abd: soft, non-tender; bowel sounds present  Ext: No edema; warm and well perfused    Telemetry: Sinus    Laboratory Data:                        7.4    5.33  )-----------( 146      ( 13 Oct 2020 07:05 )             23.2     10-12    143  |  106  |  62<H>  ----------------------------<  119<H>  3.7   |  25  |  5.45<H>    Ca    7.9<L>      12 Oct 2020 19:10  Mg     1.8     10-12                Inpatient Medications:  MEDICATIONS  (STANDING):  finasteride 5 milliGRAM(s) Oral daily  heparin   Injectable 5000 Unit(s) SubCutaneous every 8 hours  influenza   Vaccine 0.5 milliLiter(s) IntraMuscular once  labetalol 100 milliGRAM(s) Oral two times a day  polyethylene glycol 3350 17 Gram(s) Oral daily  senna 2 Tablet(s) Oral at bedtime  sodium chloride 0.9%. 1000 milliLiter(s) (50 mL/Hr) IV Continuous <Continuous>  tamsulosin 0.4 milliGRAM(s) Oral at bedtime      Assessment: 75 year old man with CAD s/p PCI several years ago, HTN, and BPH presents with obstructive VENUS and a-fib with RVR.     Plan of Care:    #A-fib with RVR-  Driven by obstructive uropathy and profound renal injury in the ED.  No further a-fib noted after Mr. Raymundo was admitted.   Given the transient nature of his a-fib, I would hold off on anticoagulation.   Echo reviewed- TR with mild pHTN noted.     #Elevated troponins-  Secondary to reduced creatinine clearance.  No plan for coronary ischemic work up at this time given VENUS.    #CAD s/p PCI-  PCI several years ago.   Start ASA 81 mg, as there is no plan for urologic intervention at this time.       Over 25 minutes spent on total encounter; more than 50% of the visit was spent counseling and/or coordinating care by the attending physician.      Lionel Miller MD [facility]  Cardiovascular Disease  [phone] done

## 2020-10-23 NOTE — PAST MEDICAL HISTORY
[Increasing age ( >40 years old)] : Increasing age ( >40 years old) [No therapy indicated for cases scheduled for less than one hour] : No therapy indicated for cases scheduled for less than one hour. [FreeTextEntry1] : Malignant Hyperthermia Screening Tool and Risk of Bleeding Assessment\par \par Ms. BURKE ABARCA denies family history of unexpected death following Anesthesia or Exercise.\par Denies Family history of Malignant Hyperthermia, Muscle or Neuromuscular disorder and High Temperature following exercise.\par \par Ms. BURKE ABARCA denies history of Muscle Spasm, Dark or Chocolate - Colored urine and Unanticipated fever immediately following anesthesia or serious exercise. \par Ms. ABARCA also denies bleeding tendencies/ Risks of Bleeding.\par

## 2020-10-23 NOTE — PROCEDURE
[D/C IV on discharge] : D/C IV on discharge [Resume diet] : resume diet [Site check for bleeding/hematoma] : Site check for bleeding/hematoma [Vital signs on admission the q 15 mins x2] : Vital signs on admission the q 15 mins x2 [FreeTextEntry1] : right leg radiofrequency ablation/phlebectomy

## 2020-10-23 NOTE — HISTORY OF PRESENT ILLNESS
[FreeTextEntry1] : alert and oriented x 3 \par accompanied by\par no reported falls \par 9/27/2020 covid test negative  [FreeTextEntry5] : yesterday at 8 pm [FreeTextEntry6] : Dr Franco

## 2021-05-17 NOTE — H&P PST ADULT - PROBLEM SELECTOR PLAN 1
17-May-2021 22:30 Pt is scheduled for bilateral temporal artery biopsy for 5/8/18. Preop instructions, pepcid, provided. Pt stated understanding. SHERRI precaution, OR booking notified. Pt is scheduled for bilateral temporal artery biopsy for 5/8/18. Preop instructions, pepcid, provided. Pt stated understanding. SHERRI precaution, OR booking notified.  Pending last PMD note from last visit in 4/2018 due to Hx of HTN HLD DM type 2 and anemia.

## 2021-11-18 ENCOUNTER — APPOINTMENT (OUTPATIENT)
Dept: GASTROENTEROLOGY | Facility: CLINIC | Age: 76
End: 2021-11-18
Payer: MEDICARE

## 2021-11-18 VITALS
DIASTOLIC BLOOD PRESSURE: 80 MMHG | HEIGHT: 64 IN | RESPIRATION RATE: 15 BRPM | OXYGEN SATURATION: 98 % | WEIGHT: 195 LBS | HEART RATE: 70 BPM | SYSTOLIC BLOOD PRESSURE: 130 MMHG | TEMPERATURE: 98 F | BODY MASS INDEX: 33.29 KG/M2

## 2021-11-18 PROCEDURE — 99203 OFFICE O/P NEW LOW 30 MIN: CPT

## 2021-11-26 LAB
ALBUMIN SERPL ELPH-MCNC: 4.4 G/DL
ALP BLD-CCNC: 93 U/L
ALT SERPL-CCNC: 11 U/L
ANION GAP SERPL CALC-SCNC: 18 MMOL/L
AST SERPL-CCNC: 10 U/L
BASOPHILS # BLD AUTO: 0.04 K/UL
BASOPHILS NFR BLD AUTO: 0.5 %
BILIRUB SERPL-MCNC: 0.2 MG/DL
BUN SERPL-MCNC: 54 MG/DL
CALCIUM SERPL-MCNC: 9.3 MG/DL
CHLORIDE SERPL-SCNC: 108 MMOL/L
CO2 SERPL-SCNC: 17 MMOL/L
CREAT SERPL-MCNC: 1.58 MG/DL
EOSINOPHIL # BLD AUTO: 0.17 K/UL
EOSINOPHIL NFR BLD AUTO: 2.1 %
GLUCOSE SERPL-MCNC: 109 MG/DL
HCT VFR BLD CALC: 29.8 %
HGB BLD-MCNC: 9.2 G/DL
IMM GRANULOCYTES NFR BLD AUTO: 0.3 %
LYMPHOCYTES # BLD AUTO: 2.38 K/UL
LYMPHOCYTES NFR BLD AUTO: 30 %
MAN DIFF?: NORMAL
MCHC RBC-ENTMCNC: 26.8 PG
MCHC RBC-ENTMCNC: 30.9 GM/DL
MCV RBC AUTO: 86.9 FL
MONOCYTES # BLD AUTO: 0.51 K/UL
MONOCYTES NFR BLD AUTO: 6.4 %
NEUTROPHILS # BLD AUTO: 4.82 K/UL
NEUTROPHILS NFR BLD AUTO: 60.7 %
PLATELET # BLD AUTO: 354 K/UL
POTASSIUM SERPL-SCNC: 4.5 MMOL/L
PROT SERPL-MCNC: 7.6 G/DL
RBC # BLD: 3.43 M/UL
RBC # FLD: 15.6 %
SODIUM SERPL-SCNC: 142 MMOL/L
WBC # FLD AUTO: 7.94 K/UL

## 2022-05-26 NOTE — ASU PATIENT PROFILE, ADULT - MEDICATIONS BROUGHT TO HOSPITAL, PROFILE
Rx Refill Note  Requested Prescriptions     Pending Prescriptions Disp Refills   • ALPRAZolam (XANAX) 0.25 MG tablet [Pharmacy Med Name: ALPRAZOLAM 0.25MG] 15 tablet 0     Sig: TAKE 1-2 TABLETS BY MOUTH TWICE DAILY AS NEEDED FOR SEVERE ANXIETY      Last office visit with prescribing clinician: Visit date not found      Next office visit with prescribing clinician: Visit date not found            Sherley Blank MA  05/26/22, 10:54 EDT   no

## 2022-11-19 ENCOUNTER — NON-APPOINTMENT (OUTPATIENT)
Age: 77
End: 2022-11-19

## 2023-02-27 ENCOUNTER — APPOINTMENT (OUTPATIENT)
Dept: VASCULAR SURGERY | Facility: CLINIC | Age: 78
End: 2023-02-27
Payer: MEDICARE

## 2023-02-27 ENCOUNTER — LABORATORY RESULT (OUTPATIENT)
Age: 78
End: 2023-02-27

## 2023-02-27 VITALS
HEIGHT: 64 IN | WEIGHT: 198 LBS | SYSTOLIC BLOOD PRESSURE: 152 MMHG | HEART RATE: 80 BPM | DIASTOLIC BLOOD PRESSURE: 78 MMHG | BODY MASS INDEX: 33.8 KG/M2

## 2023-02-27 PROCEDURE — 99214 OFFICE O/P EST MOD 30 MIN: CPT | Mod: 25

## 2023-02-27 PROCEDURE — 93971 EXTREMITY STUDY: CPT | Mod: RT

## 2023-02-27 PROCEDURE — 10160 PNXR ASPIR ABSC HMTMA BULLA: CPT | Mod: RT

## 2023-02-27 RX ORDER — MULTIVIT-MIN/IRON/FOLIC ACID/K 18-600-40
CAPSULE ORAL
Refills: 0 | Status: ACTIVE | COMMUNITY

## 2023-02-27 RX ORDER — AMLODIPINE BESYLATE 5 MG/1
5 TABLET ORAL DAILY
Qty: 90 | Refills: 3 | Status: DISCONTINUED | COMMUNITY
End: 2023-02-27

## 2023-02-27 RX ORDER — MELOXICAM 15 MG/1
TABLET ORAL
Refills: 0 | Status: DISCONTINUED | COMMUNITY
End: 2023-02-27

## 2023-02-27 RX ORDER — AMOXICILLIN 500 MG/1
500 CAPSULE ORAL
Qty: 30 | Refills: 0 | Status: DISCONTINUED | COMMUNITY
Start: 2020-09-18 | End: 2023-02-27

## 2023-02-27 RX ORDER — ERYTHROPOIETIN 40000 [IU]/ML
40000 INJECTION, SOLUTION INTRAVENOUS; SUBCUTANEOUS
Refills: 0 | Status: ACTIVE | COMMUNITY

## 2023-02-27 RX ORDER — FERROUS SULFATE TAB EC 325 MG (65 MG FE EQUIVALENT) 325 (65 FE) MG
325 (65 FE) TABLET DELAYED RESPONSE ORAL
Qty: 90 | Refills: 0 | Status: DISCONTINUED | COMMUNITY
Start: 2020-06-30 | End: 2023-02-27

## 2023-02-27 RX ORDER — ALLOPURINOL 100 MG/1
100 TABLET ORAL
Refills: 0 | Status: ACTIVE | COMMUNITY

## 2023-02-27 NOTE — PHYSICAL EXAM
[JVD] : no jugular venous distention  [2+] : left 2+ [] : bilaterally [Ankle Swelling On The Left] : moderate [Abdomen Masses] : No abdominal masses [Skin Ulcer] : no ulcer [de-identified] : 10 x 10 cm soft cystic mass on the proximal portion of the medial calf.  No redness or tenderness associated with it

## 2023-02-27 NOTE — PROCEDURE
[FreeTextEntry1] : Needle aspiration of the cystic mass was done under sterile conditions.  80 cc of clear fluid was drawn and sent for cultures.

## 2023-02-27 NOTE — ASSESSMENT
[FreeTextEntry1] : Patient with venous insufficiency and right leg cystic mass which was aspirated.  Suspect seroma.  We will check the cultures.  Follow-up in 1 to 2 weeks.

## 2023-02-27 NOTE — HISTORY OF PRESENT ILLNESS
[FreeTextEntry1] : Patient is a 78-year-old who underwent ablation of right saphenous vein and stab phlebectomy of varicosities 3 years ago.  Patient has developed a right medial proximal leg mass over the past year.  This has been growing steadily.  No fevers or chills.  No rest pain or claudication.  No chest pain or shortness of breath.

## 2023-03-13 ENCOUNTER — APPOINTMENT (OUTPATIENT)
Dept: VASCULAR SURGERY | Facility: CLINIC | Age: 78
End: 2023-03-13
Payer: MEDICARE

## 2023-03-13 VITALS
HEIGHT: 64 IN | HEART RATE: 81 BPM | DIASTOLIC BLOOD PRESSURE: 70 MMHG | BODY MASS INDEX: 33.8 KG/M2 | SYSTOLIC BLOOD PRESSURE: 142 MMHG | WEIGHT: 198 LBS

## 2023-03-13 PROCEDURE — 10160 PNXR ASPIR ABSC HMTMA BULLA: CPT | Mod: RT

## 2023-03-13 PROCEDURE — 99213 OFFICE O/P EST LOW 20 MIN: CPT | Mod: 25

## 2023-03-13 NOTE — ASSESSMENT
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  Continue aspiration.  May need open ligation of lymphatic leak.

## 2023-03-13 NOTE — REASON FOR VISIT
[Follow-Up: _____] : a [unfilled] follow-up visit [FreeTextEntry1] : venous insufficient and right medial cystic mass

## 2023-03-13 NOTE — HISTORY OF PRESENT ILLNESS
[FreeTextEntry1] : Patient is a 78-year-old who underwent ablation of right saphenous vein and stab phlebectomy of varicosities 3 years ago. Patient has developed a right medial cystic leg mass s/p needle aspiration. No fevers or chills. No rest pain or claudication. No chest pain or shortness of breath.

## 2023-04-03 ENCOUNTER — APPOINTMENT (OUTPATIENT)
Dept: VASCULAR SURGERY | Facility: CLINIC | Age: 78
End: 2023-04-03
Payer: MEDICARE

## 2023-04-03 DIAGNOSIS — M25.861 OTHER SPECIFIED JOINT DISORDERS, RIGHT KNEE: ICD-10-CM

## 2023-04-03 PROCEDURE — 93971 EXTREMITY STUDY: CPT | Mod: RT

## 2023-04-03 PROCEDURE — 99214 OFFICE O/P EST MOD 30 MIN: CPT

## 2023-04-03 NOTE — ASSESSMENT
[FreeTextEntry1] : Patient with recurrent seroma of the right medial leg.\par \par Patient has had recurrent accumulation of the fluid.\par \par We will order MRI of the right knee to evaluate if there is any connection between the seroma and the popliteal fossa with possibility of a Baker's cyst.  Unusual for postoperative seroma to accumulate about 3 years postoperatively.\par \par This was all discussed with the patient detail.

## 2023-04-10 ENCOUNTER — NON-APPOINTMENT (OUTPATIENT)
Age: 78
End: 2023-04-10

## 2023-05-04 ENCOUNTER — APPOINTMENT (OUTPATIENT)
Dept: MRI IMAGING | Facility: CLINIC | Age: 78
End: 2023-05-04
Payer: MEDICARE

## 2023-05-04 ENCOUNTER — OUTPATIENT (OUTPATIENT)
Dept: OUTPATIENT SERVICES | Facility: HOSPITAL | Age: 78
LOS: 1 days | End: 2023-05-04
Payer: COMMERCIAL

## 2023-05-04 DIAGNOSIS — Z98.49 CATARACT EXTRACTION STATUS, UNSPECIFIED EYE: Chronic | ICD-10-CM

## 2023-05-04 DIAGNOSIS — Z90.710 ACQUIRED ABSENCE OF BOTH CERVIX AND UTERUS: Chronic | ICD-10-CM

## 2023-05-04 DIAGNOSIS — E89.0 POSTPROCEDURAL HYPOTHYROIDISM: Chronic | ICD-10-CM

## 2023-05-04 DIAGNOSIS — M25.861 OTHER SPECIFIED JOINT DISORDERS, RIGHT KNEE: ICD-10-CM

## 2023-05-04 PROCEDURE — 73723 MRI JOINT LWR EXTR W/O&W/DYE: CPT | Mod: 26,RT

## 2023-05-04 PROCEDURE — 73723 MRI JOINT LWR EXTR W/O&W/DYE: CPT

## 2023-05-04 PROCEDURE — A9585: CPT

## 2023-06-12 ENCOUNTER — APPOINTMENT (OUTPATIENT)
Dept: VASCULAR SURGERY | Facility: CLINIC | Age: 78
End: 2023-06-12
Payer: MEDICARE

## 2023-06-12 PROCEDURE — 99214 OFFICE O/P EST MOD 30 MIN: CPT

## 2023-06-12 NOTE — ASSESSMENT
[FreeTextEntry1] : Patient with recurrent seroma of the right medial leg.\par \par Patient has had recurrent accumulation of the fluid.\par \par MRI shows no connection to the joint space.  Seroma seems to be all subcutaneous in nature.\par \par We will schedule the patient for resection and repair of this seroma.\par This was all discussed with the patient detail.

## 2023-08-14 ENCOUNTER — OUTPATIENT (OUTPATIENT)
Dept: OUTPATIENT SERVICES | Facility: HOSPITAL | Age: 78
LOS: 1 days | End: 2023-08-14
Payer: COMMERCIAL

## 2023-08-14 VITALS
HEART RATE: 85 BPM | SYSTOLIC BLOOD PRESSURE: 134 MMHG | RESPIRATION RATE: 16 BRPM | WEIGHT: 201.06 LBS | TEMPERATURE: 99 F | DIASTOLIC BLOOD PRESSURE: 73 MMHG | OXYGEN SATURATION: 97 % | HEIGHT: 63 IN

## 2023-08-14 DIAGNOSIS — E89.0 POSTPROCEDURAL HYPOTHYROIDISM: Chronic | ICD-10-CM

## 2023-08-14 DIAGNOSIS — I97.648 POSTPROCEDURAL SEROMA OF A CIRCULATORY SYSTEM ORGAN OR STRUCTURE FOLLOWING OTHER CIRCULATORY SYSTEM PROCEDURE: ICD-10-CM

## 2023-08-14 DIAGNOSIS — Z01.818 ENCOUNTER FOR OTHER PREPROCEDURAL EXAMINATION: ICD-10-CM

## 2023-08-14 DIAGNOSIS — I10 ESSENTIAL (PRIMARY) HYPERTENSION: ICD-10-CM

## 2023-08-14 DIAGNOSIS — Z98.49 CATARACT EXTRACTION STATUS, UNSPECIFIED EYE: Chronic | ICD-10-CM

## 2023-08-14 DIAGNOSIS — E11.9 TYPE 2 DIABETES MELLITUS WITHOUT COMPLICATIONS: ICD-10-CM

## 2023-08-14 DIAGNOSIS — L76.34 POSTPROCEDURAL SEROMA OF SKIN AND SUBCUTANEOUS TISSUE FOLLOWING OTHER PROCEDURE: ICD-10-CM

## 2023-08-14 DIAGNOSIS — D64.9 ANEMIA, UNSPECIFIED: ICD-10-CM

## 2023-08-14 DIAGNOSIS — I73.9 PERIPHERAL VASCULAR DISEASE, UNSPECIFIED: ICD-10-CM

## 2023-08-14 DIAGNOSIS — Z90.710 ACQUIRED ABSENCE OF BOTH CERVIX AND UTERUS: Chronic | ICD-10-CM

## 2023-08-14 DIAGNOSIS — Z98.890 OTHER SPECIFIED POSTPROCEDURAL STATES: Chronic | ICD-10-CM

## 2023-08-14 DIAGNOSIS — E78.5 HYPERLIPIDEMIA, UNSPECIFIED: ICD-10-CM

## 2023-08-14 LAB
ALBUMIN SERPL ELPH-MCNC: 3.3 G/DL — SIGNIFICANT CHANGE UP (ref 3.3–5)
ALP SERPL-CCNC: 80 U/L — SIGNIFICANT CHANGE UP (ref 40–120)
ALT FLD-CCNC: 10 U/L — SIGNIFICANT CHANGE UP (ref 10–45)
ANION GAP SERPL CALC-SCNC: 12 MMOL/L — SIGNIFICANT CHANGE UP (ref 5–17)
AST SERPL-CCNC: 13 U/L — SIGNIFICANT CHANGE UP (ref 10–40)
BILIRUB SERPL-MCNC: 0.3 MG/DL — SIGNIFICANT CHANGE UP (ref 0.2–1.2)
BUN SERPL-MCNC: 42 MG/DL — HIGH (ref 7–23)
CALCIUM SERPL-MCNC: 8.7 MG/DL — SIGNIFICANT CHANGE UP (ref 8.4–10.5)
CHLORIDE SERPL-SCNC: 109 MMOL/L — HIGH (ref 96–108)
CO2 SERPL-SCNC: 23 MMOL/L — SIGNIFICANT CHANGE UP (ref 22–31)
CREAT SERPL-MCNC: 1.74 MG/DL — HIGH (ref 0.5–1.3)
EGFR: 30 ML/MIN/1.73M2 — LOW
GLUCOSE SERPL-MCNC: 107 MG/DL — HIGH (ref 70–99)
HCT VFR BLD CALC: 32.3 % — LOW (ref 34.5–45)
HGB BLD-MCNC: 9.7 G/DL — LOW (ref 11.5–15.5)
MCHC RBC-ENTMCNC: 27.2 PG — SIGNIFICANT CHANGE UP (ref 27–34)
MCHC RBC-ENTMCNC: 30 GM/DL — LOW (ref 32–36)
MCV RBC AUTO: 90.5 FL — SIGNIFICANT CHANGE UP (ref 80–100)
NRBC # BLD: 0 /100 WBCS — SIGNIFICANT CHANGE UP (ref 0–0)
PLATELET # BLD AUTO: 276 K/UL — SIGNIFICANT CHANGE UP (ref 150–400)
POTASSIUM SERPL-MCNC: 4.3 MMOL/L — SIGNIFICANT CHANGE UP (ref 3.5–5.3)
POTASSIUM SERPL-SCNC: 4.3 MMOL/L — SIGNIFICANT CHANGE UP (ref 3.5–5.3)
PROT SERPL-MCNC: 7.5 G/DL — SIGNIFICANT CHANGE UP (ref 6–8.3)
RBC # BLD: 3.57 M/UL — LOW (ref 3.8–5.2)
RBC # FLD: 15.9 % — HIGH (ref 10.3–14.5)
SODIUM SERPL-SCNC: 144 MMOL/L — SIGNIFICANT CHANGE UP (ref 135–145)
WBC # BLD: 6.53 K/UL — SIGNIFICANT CHANGE UP (ref 3.8–10.5)
WBC # FLD AUTO: 6.53 K/UL — SIGNIFICANT CHANGE UP (ref 3.8–10.5)

## 2023-08-14 PROCEDURE — G0463: CPT

## 2023-08-14 PROCEDURE — 85027 COMPLETE CBC AUTOMATED: CPT

## 2023-08-14 PROCEDURE — 80053 COMPREHEN METABOLIC PANEL: CPT

## 2023-08-14 PROCEDURE — 36415 COLL VENOUS BLD VENIPUNCTURE: CPT

## 2023-08-14 PROCEDURE — 93005 ELECTROCARDIOGRAM TRACING: CPT

## 2023-08-14 PROCEDURE — 93010 ELECTROCARDIOGRAM REPORT: CPT | Mod: NC

## 2023-08-14 RX ORDER — MELOXICAM 15 MG/1
1 TABLET ORAL
Qty: 0 | Refills: 0 | DISCHARGE

## 2023-08-14 RX ORDER — METOPROLOL TARTRATE 50 MG
1 TABLET ORAL
Qty: 0 | Refills: 0 | DISCHARGE

## 2023-08-14 NOTE — H&P PST ADULT - HISTORY OF PRESENT ILLNESS
77yo female with medical h/o HTN, HDL, T2DM, Kidney disease w/Anemia, and PVD lower extremities, reports pshx Vascular surgery x 3years ago with recurrent right knee mass/seroma. Pt presents today for PST for scheduled Excision Right Medial Knee Seroma on 8/21/2023

## 2023-08-14 NOTE — H&P PST ADULT - NEGATIVE MUSCULOSKELETAL SYMPTOMS
no joint swelling/no myalgia/no muscle cramps/no muscle weakness/no neck pain/no arm pain L/no arm pain R/no back pain

## 2023-08-14 NOTE — H&P PST ADULT - NSICDXPASTSURGICALHX_GEN_ALL_CORE_FT
PAST SURGICAL HISTORY:  H/O partial thyroidectomy     H/O: hysterectomy     History of cataract surgery     History of vascular surgery

## 2023-08-14 NOTE — H&P PST ADULT - NSICDXFAMILYHX_GEN_ALL_CORE_FT
FAMILY HISTORY:  Family history of acute renal failure  Family history of diabetes mellitus  Family history of hypertension

## 2023-08-14 NOTE — H&P PST ADULT - NSANTHOSAYNRD_GEN_A_CORE
neck 13inches/No. SHERRI screening performed.  STOP BANG Legend: 0-2 = LOW Risk; 3-4 = INTERMEDIATE Risk; 5-8 = HIGH Risk

## 2023-08-14 NOTE — H&P PST ADULT - MUSCULOSKELETAL
ROM intact/no joint warmth/no calf tenderness/decreased ROM/strength 5/5 bilateral upper extremities/strength 5/5 bilateral lower extremities details…

## 2023-08-14 NOTE — H&P PST ADULT - PROBLEM SELECTOR PLAN 1
Pre-op instructions given. Pt verbalized understanding  Chlorhexidine wash instructions given  Pt instructed to hold all NSAIDs + herbal supplements/vitamins 7 days before surgery  Pt instructed to HOLD all diabetic meds + Accu-Chek ordered STAT for day of surgery  Pending: lab result  Pending: M/C

## 2023-08-14 NOTE — H&P PST ADULT - CONSTITUTIONAL
Constitutional: No fever, chills.  Eyes: No visual changes.  ENT: No hearing changes. No sore throat.  Neck: No neck pain or stiffness.  Cardiovascular: No chest pain, palpitations, edema.  Pulmonary: No SOB, cough. No hemoptysis.  Abdominal: No abdominal pain, nausea, vomiting, diarrhea.  : No dysuria, frequency.  Neuro: No headache, dizziness. + syncope.  MS: No back pain. No calf pain/swelling.  Psych: No suicidal ideations.
well-groomed/no distress

## 2023-08-14 NOTE — H&P PST ADULT - SKIN COMMENTS
hyperpigmented skin to lower extremities hyperpigmented skin to lower extremities + right medial knee mass/seroma

## 2023-08-14 NOTE — H&P PST ADULT - NSICDXPASTMEDICALHX_GEN_ALL_CORE_FT
PAST MEDICAL HISTORY:  Anemia     Arthritis     Diabetes type 2    Hypercholesteremia     Hypertension     Kidney insufficiency     Mass of right knee     Myelodysplasia (myelodysplastic syndrome)     Other giant cell arteritis     PVD (peripheral vascular disease)     Thyroid disease     Thyromegaly s/p partial thyroidectomy long time ago

## 2023-08-20 ENCOUNTER — TRANSCRIPTION ENCOUNTER (OUTPATIENT)
Age: 78
End: 2023-08-20

## 2023-08-21 ENCOUNTER — APPOINTMENT (OUTPATIENT)
Dept: VASCULAR SURGERY | Facility: HOSPITAL | Age: 78
End: 2023-08-21
Payer: MEDICARE

## 2023-08-21 ENCOUNTER — TRANSCRIPTION ENCOUNTER (OUTPATIENT)
Age: 78
End: 2023-08-21

## 2023-08-21 ENCOUNTER — OUTPATIENT (OUTPATIENT)
Dept: OUTPATIENT SERVICES | Facility: HOSPITAL | Age: 78
LOS: 1 days | End: 2023-08-21
Payer: COMMERCIAL

## 2023-08-21 VITALS
OXYGEN SATURATION: 97 % | HEART RATE: 65 BPM | DIASTOLIC BLOOD PRESSURE: 75 MMHG | HEIGHT: 63 IN | RESPIRATION RATE: 15 BRPM | WEIGHT: 201.06 LBS | TEMPERATURE: 98 F | SYSTOLIC BLOOD PRESSURE: 131 MMHG

## 2023-08-21 VITALS
RESPIRATION RATE: 16 BRPM | SYSTOLIC BLOOD PRESSURE: 152 MMHG | HEART RATE: 90 BPM | DIASTOLIC BLOOD PRESSURE: 79 MMHG | TEMPERATURE: 98 F

## 2023-08-21 DIAGNOSIS — Z98.49 CATARACT EXTRACTION STATUS, UNSPECIFIED EYE: Chronic | ICD-10-CM

## 2023-08-21 DIAGNOSIS — E89.0 POSTPROCEDURAL HYPOTHYROIDISM: Chronic | ICD-10-CM

## 2023-08-21 DIAGNOSIS — Z98.890 OTHER SPECIFIED POSTPROCEDURAL STATES: Chronic | ICD-10-CM

## 2023-08-21 DIAGNOSIS — I97.648 POSTPROCEDURAL SEROMA OF A CIRCULATORY SYSTEM ORGAN OR STRUCTURE FOLLOWING OTHER CIRCULATORY SYSTEM PROCEDURE: ICD-10-CM

## 2023-08-21 DIAGNOSIS — Z90.710 ACQUIRED ABSENCE OF BOTH CERVIX AND UTERUS: Chronic | ICD-10-CM

## 2023-08-21 LAB
GLUCOSE BLDC GLUCOMTR-MCNC: 83 MG/DL — SIGNIFICANT CHANGE UP (ref 70–99)
GLUCOSE BLDC GLUCOMTR-MCNC: 86 MG/DL — SIGNIFICANT CHANGE UP (ref 70–99)

## 2023-08-21 PROCEDURE — 10140 I&D HMTMA SEROMA/FLUID COLLJ: CPT | Mod: RT

## 2023-08-21 PROCEDURE — 93010 ELECTROCARDIOGRAM REPORT: CPT

## 2023-08-21 DEVICE — ARISTA 3GR: Type: IMPLANTABLE DEVICE | Site: RIGHT | Status: FUNCTIONAL

## 2023-08-21 DEVICE — CLIP APPLIER ETHICON LIGACLIP 11.5" MEDIUM: Type: IMPLANTABLE DEVICE | Site: RIGHT | Status: FUNCTIONAL

## 2023-08-21 RX ORDER — IBUPROFEN 200 MG
400 TABLET ORAL EVERY 6 HOURS
Refills: 0 | Status: DISCONTINUED | OUTPATIENT
Start: 2023-08-21 | End: 2023-09-04

## 2023-08-21 RX ORDER — FERROUS SULFATE 325(65) MG
1 TABLET ORAL
Qty: 0 | Refills: 0 | DISCHARGE

## 2023-08-21 RX ORDER — HYDROMORPHONE HYDROCHLORIDE 2 MG/ML
0.5 INJECTION INTRAMUSCULAR; INTRAVENOUS; SUBCUTANEOUS
Refills: 0 | Status: DISCONTINUED | OUTPATIENT
Start: 2023-08-21 | End: 2023-08-21

## 2023-08-21 RX ORDER — OXYCODONE HYDROCHLORIDE 5 MG/1
5 TABLET ORAL EVERY 6 HOURS
Refills: 0 | Status: DISCONTINUED | OUTPATIENT
Start: 2023-08-21 | End: 2023-08-21

## 2023-08-21 RX ORDER — ACETAMINOPHEN 500 MG
2 TABLET ORAL
Refills: 0 | DISCHARGE

## 2023-08-21 RX ORDER — SODIUM CHLORIDE 9 MG/ML
1000 INJECTION, SOLUTION INTRAVENOUS
Refills: 0 | Status: DISCONTINUED | OUTPATIENT
Start: 2023-08-21 | End: 2023-08-21

## 2023-08-21 RX ORDER — OXYCODONE HYDROCHLORIDE 5 MG/1
2.5 TABLET ORAL EVERY 6 HOURS
Refills: 0 | Status: DISCONTINUED | OUTPATIENT
Start: 2023-08-21 | End: 2023-08-21

## 2023-08-21 RX ORDER — OXYCODONE AND ACETAMINOPHEN 5; 325 MG/1; MG/1
1 TABLET ORAL
Qty: 6 | Refills: 0
Start: 2023-08-21

## 2023-08-21 RX ORDER — ONDANSETRON 8 MG/1
4 TABLET, FILM COATED ORAL ONCE
Refills: 0 | Status: DISCONTINUED | OUTPATIENT
Start: 2023-08-21 | End: 2023-08-21

## 2023-08-21 RX ORDER — ACETAMINOPHEN 500 MG
975 TABLET ORAL EVERY 6 HOURS
Refills: 0 | Status: DISCONTINUED | OUTPATIENT
Start: 2023-08-21 | End: 2023-09-04

## 2023-08-21 RX ADMIN — SODIUM CHLORIDE 50 MILLILITER(S): 9 INJECTION, SOLUTION INTRAVENOUS at 13:13

## 2023-08-21 NOTE — CHART NOTE - NSCHARTNOTEFT_GEN_A_CORE
78 yof pmh HTN, HDL, T2DM, Kidney disease w/Anemia, and PVD lower extremities, reports pshx Vascular surgery x 3years ago with recurrent right knee mass/seroma. s/p Excision Right Medial Knee Seroma on 8/21/2023, called by RN for abnormal ekg reading.  Pt seen at bedside comfortable, denied any chest pain, sob, dizziness, N/V/HA/Palpitations.     PAST MEDICAL & SURGICAL HISTORY:  Hypertension      Diabetes  type 2      Thyroid disease      Anemia      Hypercholesteremia      Myelodysplasia (myelodysplastic syndrome)      Thyromegaly  s/p partial thyroidectomy long time ago      Other giant cell arteritis      Arthritis      Kidney insufficiency      PVD (peripheral vascular disease)      Mass of right knee      Urinary incontinence      H/O partial thyroidectomy      H/O: hysterectomy      History of cataract surgery      History of vascular surgery      MEDICATIONS  (STANDING):  acetaminophen     Tablet .. 975 milliGRAM(s) Oral every 6 hours  ibuprofen  Tablet. 400 milliGRAM(s) Oral every 6 hours  lactated ringers. 1000 milliLiter(s) (75 mL/Hr) IV Continuous <Continuous>    MEDICATIONS  (PRN):  HYDROmorphone  Injectable 0.5 milliGRAM(s) IV Push every 10 minutes PRN Moderate Pain (4 - 6)  ondansetron Injectable 4 milliGRAM(s) IV Push once PRN Nausea and/or Vomiting  oxyCODONE    IR 5 milliGRAM(s) Oral every 6 hours PRN Severe Pain (7 - 10)  oxyCODONE    IR 2.5 milliGRAM(s) Oral every 6 hours PRN Moderate Pain (4 - 6)      PE: Vital Signs Last 24 Hrs  T(C): 36.2 (21 Aug 2023 16:07), Max: 36.6 (21 Aug 2023 05:01)  T(F): 97.2 (21 Aug 2023 16:07), Max: 97.8 (21 Aug 2023 05:01)  HR: 90 (21 Aug 2023 16:22) (62 - 91)  BP: 149/90 (21 Aug 2023 16:22) (131/75 - 160/90)  BP(mean): 108 (21 Aug 2023 16:07) (89 - 111)  RR: 14 (21 Aug 2023 16:22) (14 - 15)  SpO2: 100% (21 Aug 2023 16:22) (97% - 100%)    Parameters below as of 21 Aug 2023 16:22  Patient On (Oxygen Delivery Method): room air    Gen: A&O x3 nad  abd: soft, nt, nd,ng  RLE: dressing intact clean dry   : voiding    EKG: acclerated junctional 102 BPM    Plan:   will continue to monitor, if pt symptomatic or rhythm changes call surgical PA  can dc home with outpt Medical/Vascular fu  discussed EKG changes and plan with Anesthesia and Dr. Stout (vascular surgery)

## 2023-08-21 NOTE — ASU DISCHARGE PLAN (ADULT/PEDIATRIC) - ASU DC SPECIAL INSTRUCTIONSFT
PAIN CONTROL: Please take tylenol for pain control. You may take  every 6 hours.    You have been prescribed Percocet for severe pain not managed with over the counter tylenol.  Please be aware that Percocet contains Tylenol. Do not exceed 4000mg Tylenol in 24 hours.     WOUND CARE:  Please keep incisions clean and dry. Please do not Scrub or rub incisions. Do not use lotion or powder on incisions. You have staples in your incision, these will come out in the office with Dr. Stout. You have an ACE wrap on your leg, please leave in place until Wednesday.     BATHING: You may shower and/or sponge bathe starting Wednesday evening. Please do not submerge wound underwater. You may use warm soapy water in the shower and rinse, pat dry.    ACTIVITY: No heavy lifting or straining. Otherwise, you may return to your usual level of physical activity. If you are taking narcotic pain medication DO NOT drive a car, operate machinery or make important decisions.    DIET: Return to your usual diet.    NOTIFY YOUR SURGEON IF YOU HAVE: any bleeding that does not stop, any pus draining from your wound(s), any fever (over 100.4 F) persistent nausea/vomiting, or if your pain is not controlled on your discharge pain medications, unable to urinate.    Please follow up with your primary care physician in one week regarding your hospitalization, bring copies of your discharge paperwork.    Please follow-up with your surgeon, Dr. Stout, at your scheduled appointment.

## 2023-08-21 NOTE — BRIEF OPERATIVE NOTE - OPERATION/FINDINGS
Incision and drainage of seroma at medial aspect of R knee. Serous fluid expressed ~25cc. Areas of lymphatic leakage within seroma space, ligated with 2-0 vicryl. Potential space obliterated with 3-0 vicryl interrupted sutures. Skin closure with staples.

## 2023-08-21 NOTE — ASU PATIENT PROFILE, ADULT - FALL HARM RISK - UNIVERSAL INTERVENTIONS
Bed in lowest position, wheels locked, appropriate side rails in place/Call bell, personal items and telephone in reach/Instruct patient to call for assistance before getting out of bed or chair/Non-slip footwear when patient is out of bed/[place] to call system/Physically safe environment - no spills, clutter or unnecessary equipment/Purposeful Proactive Rounding/Room/bathroom lighting operational, light cord in reach

## 2023-08-21 NOTE — ASU DISCHARGE PLAN (ADULT/PEDIATRIC) - NS MD DC FALL RISK RISK
For information on Fall & Injury Prevention, visit: [email or web address] OR  [email or web address] OR  https://www.cdc.gov/steadi/patient.html

## 2023-08-21 NOTE — ASU DISCHARGE PLAN (ADULT/PEDIATRIC) - CARE PROVIDER_API CALL
Donn Stout  Vascular Surgery  [address], [address]  [place] [address]  Phone: [phone]  Fax: [phone]  Follow Up Time: Routine

## 2023-08-21 NOTE — ASU PATIENT PROFILE, ADULT - NSICDXPASTMEDICALHX_GEN_ALL_CORE_FT
PAST MEDICAL HISTORY:  Anemia     Arthritis     Diabetes type 2    Hypercholesteremia     Hypertension     Kidney insufficiency     Mass of right knee     Myelodysplasia (myelodysplastic syndrome)     Other giant cell arteritis     PVD (peripheral vascular disease)     Thyroid disease     Thyromegaly s/p partial thyroidectomy long time ago     PAST MEDICAL HISTORY:  Anemia     Arthritis     Diabetes type 2    Hypercholesteremia     Hypertension     Kidney insufficiency     Mass of right knee     Myelodysplasia (myelodysplastic syndrome)     Other giant cell arteritis     PVD (peripheral vascular disease)     Thyroid disease     Thyromegaly s/p partial thyroidectomy long time ago    Urinary incontinence

## 2023-08-22 ENCOUNTER — RESULT REVIEW (OUTPATIENT)
Age: 78
End: 2023-08-22

## 2023-08-22 PROCEDURE — 10140 I&D HMTMA SEROMA/FLUID COLLJ: CPT

## 2023-08-22 PROCEDURE — 88304 TISSUE EXAM BY PATHOLOGIST: CPT

## 2023-08-22 PROCEDURE — 93005 ELECTROCARDIOGRAM TRACING: CPT

## 2023-08-22 PROCEDURE — C1889: CPT

## 2023-08-22 PROCEDURE — 88304 TISSUE EXAM BY PATHOLOGIST: CPT | Mod: 26

## 2023-08-22 PROCEDURE — 82962 GLUCOSE BLOOD TEST: CPT

## 2023-08-25 LAB — SURGICAL PATHOLOGY STUDY: SIGNIFICANT CHANGE UP

## 2023-09-05 ENCOUNTER — APPOINTMENT (OUTPATIENT)
Dept: VASCULAR SURGERY | Facility: CLINIC | Age: 78
End: 2023-09-05
Payer: MEDICARE

## 2023-09-05 DIAGNOSIS — I97.648 POSTPROCEDURAL SEROMA OF A CIRCULATORY SYSTEM ORGAN OR STRUCTURE FOLLOWING OTHER CIRCULATORY SYSTEM PROCEDURE: ICD-10-CM

## 2023-09-05 PROBLEM — N28.9 DISORDER OF KIDNEY AND URETER, UNSPECIFIED: Chronic | Status: ACTIVE | Noted: 2023-08-14

## 2023-09-05 PROBLEM — R22.41 LOCALIZED SWELLING, MASS AND LUMP, RIGHT LOWER LIMB: Chronic | Status: ACTIVE | Noted: 2023-08-14

## 2023-09-05 PROBLEM — I73.9 PERIPHERAL VASCULAR DISEASE, UNSPECIFIED: Chronic | Status: ACTIVE | Noted: 2023-08-14

## 2023-09-05 PROBLEM — R32 UNSPECIFIED URINARY INCONTINENCE: Chronic | Status: ACTIVE | Noted: 2023-08-21

## 2023-09-05 PROCEDURE — 99024 POSTOP FOLLOW-UP VISIT: CPT

## 2023-09-05 NOTE — REASON FOR VISIT
[de-identified] : Resection of right lower extremity seroma [de-identified] : 8/21/2023 [de-identified] : Patient with no current concerns.  Denies fever or chills.

## 2023-09-05 NOTE — DISCUSSION/SUMMARY
[FreeTextEntry1] : Incision is clean dry and intact.  Staples were removed.  Patient to return to office in 4 to 6 weeks for duplex of right lower extremity.

## 2023-10-19 ENCOUNTER — APPOINTMENT (OUTPATIENT)
Dept: VASCULAR SURGERY | Facility: CLINIC | Age: 78
End: 2023-10-19
Payer: MEDICARE

## 2023-10-19 PROCEDURE — 93971 EXTREMITY STUDY: CPT | Mod: RT

## 2023-10-19 PROCEDURE — XXXXX: CPT

## 2024-03-08 ENCOUNTER — OUTPATIENT (OUTPATIENT)
Dept: OUTPATIENT SERVICES | Facility: HOSPITAL | Age: 79
LOS: 1 days | End: 2024-03-08
Payer: MEDICARE

## 2024-03-08 ENCOUNTER — APPOINTMENT (OUTPATIENT)
Dept: RADIOLOGY | Facility: HOSPITAL | Age: 79
End: 2024-03-08
Payer: MEDICARE

## 2024-03-08 ENCOUNTER — APPOINTMENT (OUTPATIENT)
Dept: NUCLEAR MEDICINE | Facility: HOSPITAL | Age: 79
End: 2024-03-08
Payer: MEDICARE

## 2024-03-08 ENCOUNTER — OUTPATIENT (OUTPATIENT)
Dept: OUTPATIENT SERVICES | Facility: HOSPITAL | Age: 79
LOS: 1 days | End: 2024-03-08

## 2024-03-08 DIAGNOSIS — E89.0 POSTPROCEDURAL HYPOTHYROIDISM: Chronic | ICD-10-CM

## 2024-03-08 DIAGNOSIS — Z98.49 CATARACT EXTRACTION STATUS, UNSPECIFIED EYE: Chronic | ICD-10-CM

## 2024-03-08 DIAGNOSIS — Z98.890 OTHER SPECIFIED POSTPROCEDURAL STATES: Chronic | ICD-10-CM

## 2024-03-08 DIAGNOSIS — R06.09 OTHER FORMS OF DYSPNEA: ICD-10-CM

## 2024-03-08 DIAGNOSIS — I47.19 OTHER SUPRAVENTRICULAR TACHYCARDIA: ICD-10-CM

## 2024-03-08 DIAGNOSIS — Z90.710 ACQUIRED ABSENCE OF BOTH CERVIX AND UTERUS: Chronic | ICD-10-CM

## 2024-03-08 PROCEDURE — 78582 LUNG VENTILAT&PERFUS IMAGING: CPT | Mod: 26,GC

## 2024-03-08 PROCEDURE — 71046 X-RAY EXAM CHEST 2 VIEWS: CPT | Mod: 26

## 2024-05-05 ENCOUNTER — INPATIENT (INPATIENT)
Facility: HOSPITAL | Age: 79
LOS: 3 days | Discharge: HOME | DRG: 189 | End: 2024-05-09
Attending: FAMILY MEDICINE | Admitting: STUDENT IN AN ORGANIZED HEALTH CARE EDUCATION/TRAINING PROGRAM
Payer: COMMERCIAL

## 2024-05-05 VITALS
RESPIRATION RATE: 20 BRPM | SYSTOLIC BLOOD PRESSURE: 205 MMHG | HEART RATE: 85 BPM | WEIGHT: 199.96 LBS | TEMPERATURE: 98 F | OXYGEN SATURATION: 98 % | DIASTOLIC BLOOD PRESSURE: 101 MMHG

## 2024-05-05 DIAGNOSIS — Z98.890 OTHER SPECIFIED POSTPROCEDURAL STATES: Chronic | ICD-10-CM

## 2024-05-05 DIAGNOSIS — E89.0 POSTPROCEDURAL HYPOTHYROIDISM: Chronic | ICD-10-CM

## 2024-05-05 DIAGNOSIS — Z90.710 ACQUIRED ABSENCE OF BOTH CERVIX AND UTERUS: Chronic | ICD-10-CM

## 2024-05-05 DIAGNOSIS — Z98.49 CATARACT EXTRACTION STATUS, UNSPECIFIED EYE: Chronic | ICD-10-CM

## 2024-05-05 LAB
ANION GAP SERPL CALC-SCNC: 12 MMOL/L — SIGNIFICANT CHANGE UP (ref 5–17)
BASOPHILS # BLD AUTO: 0.04 K/UL — SIGNIFICANT CHANGE UP (ref 0–0.2)
BASOPHILS NFR BLD AUTO: 0.3 % — SIGNIFICANT CHANGE UP (ref 0–2)
BUN SERPL-MCNC: 65 MG/DL — HIGH (ref 7–23)
CALCIUM SERPL-MCNC: 7.1 MG/DL — LOW (ref 8.4–10.5)
CHLORIDE SERPL-SCNC: 108 MMOL/L — SIGNIFICANT CHANGE UP (ref 96–108)
CO2 SERPL-SCNC: 22 MMOL/L — SIGNIFICANT CHANGE UP (ref 22–31)
CREAT SERPL-MCNC: 2.24 MG/DL — HIGH (ref 0.5–1.3)
EGFR: 22 ML/MIN/1.73M2 — LOW
EOSINOPHIL # BLD AUTO: 0.06 K/UL — SIGNIFICANT CHANGE UP (ref 0–0.5)
EOSINOPHIL NFR BLD AUTO: 0.5 % — SIGNIFICANT CHANGE UP (ref 0–6)
GLUCOSE SERPL-MCNC: 228 MG/DL — HIGH (ref 70–99)
HCT VFR BLD CALC: 31.4 % — LOW (ref 34.5–45)
HGB BLD-MCNC: 9.1 G/DL — LOW (ref 11.5–15.5)
IMM GRANULOCYTES NFR BLD AUTO: 0.5 % — SIGNIFICANT CHANGE UP (ref 0–0.9)
LYMPHOCYTES # BLD AUTO: 0.84 K/UL — LOW (ref 1–3.3)
LYMPHOCYTES # BLD AUTO: 6.7 % — LOW (ref 13–44)
MCHC RBC-ENTMCNC: 26.4 PG — LOW (ref 27–34)
MCHC RBC-ENTMCNC: 29 GM/DL — LOW (ref 32–36)
MCV RBC AUTO: 91 FL — SIGNIFICANT CHANGE UP (ref 80–100)
MONOCYTES # BLD AUTO: 0.42 K/UL — SIGNIFICANT CHANGE UP (ref 0–0.9)
MONOCYTES NFR BLD AUTO: 3.3 % — SIGNIFICANT CHANGE UP (ref 2–14)
NEUTROPHILS # BLD AUTO: 11.14 K/UL — HIGH (ref 1.8–7.4)
NEUTROPHILS NFR BLD AUTO: 88.7 % — HIGH (ref 43–77)
NRBC # BLD: 0 /100 WBCS — SIGNIFICANT CHANGE UP (ref 0–0)
PLATELET # BLD AUTO: 272 K/UL — SIGNIFICANT CHANGE UP (ref 150–400)
POTASSIUM SERPL-MCNC: 5.3 MMOL/L — SIGNIFICANT CHANGE UP (ref 3.5–5.3)
POTASSIUM SERPL-SCNC: 5.3 MMOL/L — SIGNIFICANT CHANGE UP (ref 3.5–5.3)
RBC # BLD: 3.45 M/UL — LOW (ref 3.8–5.2)
RBC # FLD: 17.2 % — HIGH (ref 10.3–14.5)
SODIUM SERPL-SCNC: 142 MMOL/L — SIGNIFICANT CHANGE UP (ref 135–145)
WBC # BLD: 12.56 K/UL — HIGH (ref 3.8–10.5)
WBC # FLD AUTO: 12.56 K/UL — HIGH (ref 3.8–10.5)

## 2024-05-05 PROCEDURE — 93010 ELECTROCARDIOGRAM REPORT: CPT

## 2024-05-05 PROCEDURE — 99285 EMERGENCY DEPT VISIT HI MDM: CPT

## 2024-05-05 PROCEDURE — 71045 X-RAY EXAM CHEST 1 VIEW: CPT | Mod: 26

## 2024-05-05 RX ORDER — NITROGLYCERIN 6.5 MG
0.4 CAPSULE, EXTENDED RELEASE ORAL ONCE
Refills: 0 | Status: COMPLETED | OUTPATIENT
Start: 2024-05-05 | End: 2024-05-05

## 2024-05-05 RX ORDER — FUROSEMIDE 40 MG
40 TABLET ORAL ONCE
Refills: 0 | Status: COMPLETED | OUTPATIENT
Start: 2024-05-05 | End: 2024-05-05

## 2024-05-05 RX ADMIN — Medication 0.4 MILLIGRAM(S): at 23:06

## 2024-05-05 RX ADMIN — Medication 40 MILLIGRAM(S): at 23:30

## 2024-05-05 NOTE — ED ADULT NURSE NOTE - NSFALLHARMRISKINTERV_ED_ALL_ED

## 2024-05-05 NOTE — ED PROVIDER NOTE - NSICDXPASTMEDICALHX_GEN_ALL_CORE_FT
PAST MEDICAL HISTORY:  Anemia     Arthritis     Diabetes type 2    Hypercholesteremia     Hypertension     Kidney insufficiency     Mass of right knee     Myelodysplasia (myelodysplastic syndrome)     Other giant cell arteritis     PVD (peripheral vascular disease)     Thyroid disease     Thyromegaly s/p partial thyroidectomy long time ago    Urinary incontinence

## 2024-05-05 NOTE — ED PROVIDER NOTE - CLINICAL SUMMARY MEDICAL DECISION MAKING FREE TEXT BOX
79-year-old female with history of diabetes, hypertension, anemia complaining of acute onset shortness of breath at about 9 PM today after dinner.  No chest pain or palpitations.  No abdominal pain nausea or vomiting.  Was well earlier in the day.  No recent cough congestion sore throat rhinorrhea congestion.  No fever or chills.  Has chronic leg swelling, no change.  Patient states she was taken off a diuretic about a month ago by her kidney doctor    Patient with mildly labored breathing, rales lower third lung fields with mildly decreased breath sounds, hypertensive, 205/101.  Most likely acute pulmonary edema/CHF.  Partial DDx: CHF, ACS, pneumonia, PE.  Will check EKG, labs, chest x-ray.  Give IV Lasix.  Nitroglycerin.  Close reassessment.  Will need admission.  Consider BiPAP and ICU admission if not improving 79-year-old female with history of diabetes, hypertension, anemia complaining of acute onset shortness of breath at about 9 PM today after dinner.  No chest pain or palpitations.  No abdominal pain nausea or vomiting.  Was well earlier in the day.  No recent cough congestion sore throat rhinorrhea congestion.  No fever or chills.  Has chronic leg swelling, no change.  Patient states she was taken off a diuretic about a month ago by her kidney doctor    Patient with mildly labored breathing, rales lower third lung fields with mildly decreased breath sounds, hypertensive, 205/101.  Most likely acute pulmonary edema/CHF.  Partial DDx: CHF, ACS, pneumonia, PE.  Will check EKG, labs, chest x-ray.  Give IV Lasix.  Nitroglycerin.  Close reassessment.  Will need admission.  Consider BiPAP and ICU admission if not improving    Patient with chest x-ray consistent with pulmonary edema.  Has elevated proBNP, 1476.  Mild leukocytosis, 12 K.  Afebrile.  No obvious focal infiltrate.  Case discussed with attending hospitalist Dr. Ac, requesting coverage for possible underlying pna. will start rocephin /zithromax. Patient appears significantly improved clinically.  No labored breathing.  Blood pressure improved to 150s.  SpO2 99% on 2 L nasal cannula

## 2024-05-05 NOTE — ED ADULT TRIAGE NOTE - CCCP TRG CHIEF CMPLNT
difficulty breathing Brow Lift Text: A midfrontal incision was made medially to the defect to allow access to the tissues just superior to the left eyebrow. Following careful dissection inferiorly in a supraperiosteal plane to the level of the left eyebrow, several 3-0 monocryl sutures were used to resuspend the eyebrow orbicularis oculi muscular unit to the superior frontal bone periosteum. This resulted in an appropriate reapproximation of static eyebrow symmetry and correction of the left brow ptosis.

## 2024-05-05 NOTE — ED PROVIDER NOTE - OBJECTIVE STATEMENT
79-year-old female with history of diabetes, hypertension, anemia complaining of acute onset shortness of breath at about 9 PM today after dinner.  No chest pain or palpitations.  No abdominal pain nausea or vomiting.  Was well earlier in the day.  No recent cough congestion sore throat rhinorrhea congestion.  No fever or chills.  Has chronic leg swelling, no change.  Patient states she was taken off a diuretic about a month ago by her kidney doctor

## 2024-05-05 NOTE — ED PROVIDER NOTE - PHYSICAL EXAMINATION
exam:   General: Mildly distressed, mildly labored breathing  HEENT: eyes perrl, nose normal, OP no erythema/exudate/swelling.   cor: RRR, s1s2, 2+rad pulses.   lungs: Mildly labored breathing.  Rales lower third lung fields bilaterally.  Mildly decreased breath sounds  abd: soft, ntnd.   neuro: a&ox3, cn2-12 intact, WHITMAN, 5/5 strength c nl sensation all extremities, nl coordination.   MSK: Equal bilateral mild lower extremity edema  Skin: normal, no rash

## 2024-05-06 ENCOUNTER — RESULT REVIEW (OUTPATIENT)
Age: 79
End: 2024-05-06

## 2024-05-06 DIAGNOSIS — J81.0 ACUTE PULMONARY EDEMA: ICD-10-CM

## 2024-05-06 LAB
A1C WITH ESTIMATED AVERAGE GLUCOSE RESULT: 6.8 % — HIGH (ref 4–5.6)
ALBUMIN SERPL ELPH-MCNC: 3.1 G/DL — LOW (ref 3.3–5)
ALBUMIN SERPL ELPH-MCNC: 3.2 G/DL — LOW (ref 3.3–5)
ALP SERPL-CCNC: 94 U/L — SIGNIFICANT CHANGE UP (ref 40–120)
ALP SERPL-CCNC: 97 U/L — SIGNIFICANT CHANGE UP (ref 40–120)
ALT FLD-CCNC: 25 U/L — SIGNIFICANT CHANGE UP (ref 10–45)
ALT FLD-CCNC: 26 U/L — SIGNIFICANT CHANGE UP (ref 10–45)
ANION GAP SERPL CALC-SCNC: 14 MMOL/L — SIGNIFICANT CHANGE UP (ref 5–17)
APPEARANCE UR: CLEAR — SIGNIFICANT CHANGE UP
APTT BLD: 24.4 SEC — LOW (ref 24.5–35.6)
APTT BLD: 26.6 SEC — SIGNIFICANT CHANGE UP (ref 24.5–35.6)
APTT BLD: 48.5 SEC — HIGH (ref 24.5–35.6)
AST SERPL-CCNC: 12 U/L — SIGNIFICANT CHANGE UP (ref 10–40)
AST SERPL-CCNC: 21 U/L — SIGNIFICANT CHANGE UP (ref 10–40)
BACTERIA # UR AUTO: NEGATIVE /HPF — SIGNIFICANT CHANGE UP
BILIRUB SERPL-MCNC: 0.1 MG/DL — LOW (ref 0.2–1.2)
BILIRUB SERPL-MCNC: 0.2 MG/DL — SIGNIFICANT CHANGE UP (ref 0.2–1.2)
BILIRUB UR-MCNC: NEGATIVE — SIGNIFICANT CHANGE UP
BUN SERPL-MCNC: 64 MG/DL — HIGH (ref 7–23)
BUN SERPL-MCNC: 65 MG/DL — HIGH (ref 7–23)
CALCIUM SERPL-MCNC: 7.2 MG/DL — LOW (ref 8.4–10.5)
CALCIUM SERPL-MCNC: 7.4 MG/DL — LOW (ref 8.4–10.5)
CHLORIDE SERPL-SCNC: 107 MMOL/L — SIGNIFICANT CHANGE UP (ref 96–108)
CHOLEST SERPL-MCNC: 203 MG/DL — HIGH
CO2 SERPL-SCNC: 21 MMOL/L — LOW (ref 22–31)
COLOR SPEC: YELLOW — SIGNIFICANT CHANGE UP
CREAT ?TM UR-MCNC: 20 MG/DL — SIGNIFICANT CHANGE UP
CREAT SERPL-MCNC: 2.13 MG/DL — HIGH (ref 0.5–1.3)
CREAT SERPL-MCNC: 2.21 MG/DL — HIGH (ref 0.5–1.3)
D DIMER BLD IA.RAPID-MCNC: 1564 NG/ML DDU — HIGH
DIFF PNL FLD: NEGATIVE — SIGNIFICANT CHANGE UP
EGFR: 22 ML/MIN/1.73M2 — LOW
EGFR: 23 ML/MIN/1.73M2 — LOW
EPI CELLS # UR: 7 — SIGNIFICANT CHANGE UP
ESTIMATED AVERAGE GLUCOSE: 148 MG/DL — HIGH (ref 68–114)
FERRITIN SERPL-MCNC: 285 NG/ML — SIGNIFICANT CHANGE UP (ref 13–330)
GLUCOSE BLDC GLUCOMTR-MCNC: 104 MG/DL — HIGH (ref 70–99)
GLUCOSE BLDC GLUCOMTR-MCNC: 118 MG/DL — HIGH (ref 70–99)
GLUCOSE BLDC GLUCOMTR-MCNC: 147 MG/DL — HIGH (ref 70–99)
GLUCOSE BLDC GLUCOMTR-MCNC: 218 MG/DL — HIGH (ref 70–99)
GLUCOSE SERPL-MCNC: 154 MG/DL — HIGH (ref 70–99)
GLUCOSE UR QL: 250 MG/DL
HCT VFR BLD CALC: 28.7 % — LOW (ref 34.5–45)
HCT VFR BLD CALC: 29.4 % — LOW (ref 34.5–45)
HDLC SERPL-MCNC: 55 MG/DL — SIGNIFICANT CHANGE UP
HGB BLD-MCNC: 8.8 G/DL — LOW (ref 11.5–15.5)
HGB BLD-MCNC: 8.8 G/DL — LOW (ref 11.5–15.5)
HYALINE CASTS # UR AUTO: SIGNIFICANT CHANGE UP
INR BLD: 1 RATIO — SIGNIFICANT CHANGE UP (ref 0.85–1.18)
IRON SATN MFR SERPL: 25 % — SIGNIFICANT CHANGE UP (ref 14–50)
IRON SATN MFR SERPL: 54 UG/DL — SIGNIFICANT CHANGE UP (ref 30–160)
KETONES UR-MCNC: NEGATIVE MG/DL — SIGNIFICANT CHANGE UP
LEUKOCYTE ESTERASE UR-ACNC: NEGATIVE — SIGNIFICANT CHANGE UP
LIPID PNL WITH DIRECT LDL SERPL: 137 MG/DL — HIGH
MAGNESIUM SERPL-MCNC: 1.9 MG/DL — SIGNIFICANT CHANGE UP (ref 1.6–2.6)
MCHC RBC-ENTMCNC: 26.5 PG — LOW (ref 27–34)
MCHC RBC-ENTMCNC: 26.5 PG — LOW (ref 27–34)
MCHC RBC-ENTMCNC: 29.9 GM/DL — LOW (ref 32–36)
MCHC RBC-ENTMCNC: 30.7 GM/DL — LOW (ref 32–36)
MCV RBC AUTO: 86.4 FL — SIGNIFICANT CHANGE UP (ref 80–100)
MCV RBC AUTO: 88.6 FL — SIGNIFICANT CHANGE UP (ref 80–100)
NITRITE UR-MCNC: NEGATIVE — SIGNIFICANT CHANGE UP
NON HDL CHOLESTEROL: 148 MG/DL — HIGH
NRBC # BLD: 0 /100 WBCS — SIGNIFICANT CHANGE UP (ref 0–0)
NRBC # BLD: 0 /100 WBCS — SIGNIFICANT CHANGE UP (ref 0–0)
NT-PROBNP SERPL-SCNC: 1476 PG/ML — HIGH (ref 0–300)
PH UR: 5 — SIGNIFICANT CHANGE UP (ref 5–8)
PHOSPHATE SERPL-MCNC: 6.3 MG/DL — HIGH (ref 2.5–4.5)
PLATELET # BLD AUTO: 286 K/UL — SIGNIFICANT CHANGE UP (ref 150–400)
PLATELET # BLD AUTO: 308 K/UL — SIGNIFICANT CHANGE UP (ref 150–400)
POTASSIUM SERPL-MCNC: 4.5 MMOL/L — SIGNIFICANT CHANGE UP (ref 3.5–5.3)
POTASSIUM SERPL-SCNC: 4.5 MMOL/L — SIGNIFICANT CHANGE UP (ref 3.5–5.3)
PROCALCITONIN SERPL-MCNC: 0.14 NG/ML — HIGH
PROT SERPL-MCNC: 7.7 G/DL — SIGNIFICANT CHANGE UP (ref 6–8.3)
PROT SERPL-MCNC: 7.9 G/DL — SIGNIFICANT CHANGE UP (ref 6–8.3)
PROT UR-MCNC: 100 MG/DL
PROTHROM AB SERPL-ACNC: 11.7 SEC — SIGNIFICANT CHANGE UP (ref 9.5–13)
PTH-INTACT FLD-MCNC: 129 PG/ML — HIGH (ref 15–65)
RAPID RVP RESULT: SIGNIFICANT CHANGE UP
RBC # BLD: 3.32 M/UL — LOW (ref 3.8–5.2)
RBC # BLD: 3.32 M/UL — LOW (ref 3.8–5.2)
RBC # FLD: 16.5 % — HIGH (ref 10.3–14.5)
RBC # FLD: 16.9 % — HIGH (ref 10.3–14.5)
RBC CASTS # UR COMP ASSIST: 1 /HPF — SIGNIFICANT CHANGE UP (ref 0–4)
SARS-COV-2 RNA SPEC QL NAA+PROBE: SIGNIFICANT CHANGE UP
SODIUM SERPL-SCNC: 142 MMOL/L — SIGNIFICANT CHANGE UP (ref 135–145)
SODIUM UR-SCNC: 114 MMOL/L — SIGNIFICANT CHANGE UP
SP GR SPEC: 1.01 — SIGNIFICANT CHANGE UP (ref 1–1.03)
TIBC SERPL-MCNC: 219 UG/DL — LOW (ref 220–430)
TRIGL SERPL-MCNC: 63 MG/DL — SIGNIFICANT CHANGE UP
TROPONIN I, HIGH SENSITIVITY RESULT: 27 NG/L — SIGNIFICANT CHANGE UP
TROPONIN I, HIGH SENSITIVITY RESULT: 34.8 NG/L — SIGNIFICANT CHANGE UP
TSH SERPL-MCNC: 1.94 UIU/ML — SIGNIFICANT CHANGE UP (ref 0.36–3.74)
UIBC SERPL-MCNC: 165 UG/DL — SIGNIFICANT CHANGE UP (ref 110–370)
UROBILINOGEN FLD QL: 0.2 MG/DL — SIGNIFICANT CHANGE UP (ref 0.2–1)
UUN UR-MCNC: 265 MG/DL — SIGNIFICANT CHANGE UP
VIT D25+D1,25 OH+D1,25 PNL SERPL-MCNC: 47.2 PG/ML — SIGNIFICANT CHANGE UP (ref 19.9–79.3)
WBC # BLD: 10.16 K/UL — SIGNIFICANT CHANGE UP (ref 3.8–10.5)
WBC # BLD: 9.48 K/UL — SIGNIFICANT CHANGE UP (ref 3.8–10.5)
WBC # FLD AUTO: 10.16 K/UL — SIGNIFICANT CHANGE UP (ref 3.8–10.5)
WBC # FLD AUTO: 9.48 K/UL — SIGNIFICANT CHANGE UP (ref 3.8–10.5)
WBC UR QL: 1 /HPF — SIGNIFICANT CHANGE UP (ref 0–5)

## 2024-05-06 PROCEDURE — 99222 1ST HOSP IP/OBS MODERATE 55: CPT

## 2024-05-06 PROCEDURE — 93306 TTE W/DOPPLER COMPLETE: CPT | Mod: 26

## 2024-05-06 PROCEDURE — 99223 1ST HOSP IP/OBS HIGH 75: CPT | Mod: GC

## 2024-05-06 PROCEDURE — 78582 LUNG VENTILAT&PERFUS IMAGING: CPT | Mod: 26

## 2024-05-06 PROCEDURE — 93970 EXTREMITY STUDY: CPT | Mod: 26

## 2024-05-06 PROCEDURE — 76775 US EXAM ABDO BACK WALL LIM: CPT | Mod: 26

## 2024-05-06 RX ORDER — DILTIAZEM HCL 120 MG
180 CAPSULE, EXT RELEASE 24 HR ORAL DAILY
Refills: 0 | Status: DISCONTINUED | OUTPATIENT
Start: 2024-05-06 | End: 2024-05-09

## 2024-05-06 RX ORDER — INSULIN LISPRO 100/ML
VIAL (ML) SUBCUTANEOUS AT BEDTIME
Refills: 0 | Status: DISCONTINUED | OUTPATIENT
Start: 2024-05-06 | End: 2024-05-09

## 2024-05-06 RX ORDER — NITROGLYCERIN 6.5 MG
0.4 CAPSULE, EXTENDED RELEASE ORAL ONCE
Refills: 0 | Status: COMPLETED | OUTPATIENT
Start: 2024-05-06 | End: 2024-05-06

## 2024-05-06 RX ORDER — ONDANSETRON 8 MG/1
4 TABLET, FILM COATED ORAL EVERY 8 HOURS
Refills: 0 | Status: DISCONTINUED | OUTPATIENT
Start: 2024-05-06 | End: 2024-05-09

## 2024-05-06 RX ORDER — DEXTROSE 50 % IN WATER 50 %
25 SYRINGE (ML) INTRAVENOUS ONCE
Refills: 0 | Status: DISCONTINUED | OUTPATIENT
Start: 2024-05-06 | End: 2024-05-09

## 2024-05-06 RX ORDER — ROSUVASTATIN CALCIUM 5 MG/1
5 TABLET ORAL AT BEDTIME
Refills: 0 | Status: DISCONTINUED | OUTPATIENT
Start: 2024-05-06 | End: 2024-05-09

## 2024-05-06 RX ORDER — CEFTRIAXONE 500 MG/1
1000 INJECTION, POWDER, FOR SOLUTION INTRAMUSCULAR; INTRAVENOUS ONCE
Refills: 0 | Status: COMPLETED | OUTPATIENT
Start: 2024-05-06 | End: 2024-05-06

## 2024-05-06 RX ORDER — INSULIN LISPRO 100/ML
VIAL (ML) SUBCUTANEOUS
Refills: 0 | Status: DISCONTINUED | OUTPATIENT
Start: 2024-05-06 | End: 2024-05-09

## 2024-05-06 RX ORDER — MAGNESIUM SULFATE 500 MG/ML
1 VIAL (ML) INJECTION ONCE
Refills: 0 | Status: COMPLETED | OUTPATIENT
Start: 2024-05-06 | End: 2024-05-06

## 2024-05-06 RX ORDER — SODIUM CHLORIDE 9 MG/ML
1000 INJECTION, SOLUTION INTRAVENOUS
Refills: 0 | Status: DISCONTINUED | OUTPATIENT
Start: 2024-05-06 | End: 2024-05-09

## 2024-05-06 RX ORDER — GLIMEPIRIDE 1 MG
1 TABLET ORAL
Qty: 0 | Refills: 0 | DISCHARGE

## 2024-05-06 RX ORDER — CALCIUM ACETATE 667 MG
667 TABLET ORAL
Refills: 0 | Status: DISCONTINUED | OUTPATIENT
Start: 2024-05-06 | End: 2024-05-09

## 2024-05-06 RX ORDER — FUROSEMIDE 40 MG
40 TABLET ORAL
Refills: 0 | Status: DISCONTINUED | OUTPATIENT
Start: 2024-05-06 | End: 2024-05-07

## 2024-05-06 RX ORDER — HEPARIN SODIUM 5000 [USP'U]/ML
5000 INJECTION INTRAVENOUS; SUBCUTANEOUS EVERY 8 HOURS
Refills: 0 | Status: DISCONTINUED | OUTPATIENT
Start: 2024-05-06 | End: 2024-05-06

## 2024-05-06 RX ORDER — ACETAMINOPHEN 500 MG
650 TABLET ORAL EVERY 6 HOURS
Refills: 0 | Status: DISCONTINUED | OUTPATIENT
Start: 2024-05-06 | End: 2024-05-09

## 2024-05-06 RX ORDER — ATENOLOL 25 MG/1
1 TABLET ORAL
Refills: 0 | DISCHARGE

## 2024-05-06 RX ORDER — IPRATROPIUM/ALBUTEROL SULFATE 18-103MCG
3 AEROSOL WITH ADAPTER (GRAM) INHALATION EVERY 6 HOURS
Refills: 0 | Status: DISCONTINUED | OUTPATIENT
Start: 2024-05-06 | End: 2024-05-06

## 2024-05-06 RX ORDER — HYDRALAZINE HCL 50 MG
25 TABLET ORAL EVERY 8 HOURS
Refills: 0 | Status: DISCONTINUED | OUTPATIENT
Start: 2024-05-06 | End: 2024-05-07

## 2024-05-06 RX ORDER — HYDRALAZINE HCL 50 MG
10 TABLET ORAL THREE TIMES A DAY
Refills: 0 | Status: DISCONTINUED | OUTPATIENT
Start: 2024-05-06 | End: 2024-05-06

## 2024-05-06 RX ORDER — HEPARIN SODIUM 5000 [USP'U]/ML
INJECTION INTRAVENOUS; SUBCUTANEOUS
Qty: 25000 | Refills: 0 | Status: DISCONTINUED | OUTPATIENT
Start: 2024-05-06 | End: 2024-05-06

## 2024-05-06 RX ORDER — IPRATROPIUM/ALBUTEROL SULFATE 18-103MCG
3 AEROSOL WITH ADAPTER (GRAM) INHALATION EVERY 4 HOURS
Refills: 0 | Status: DISCONTINUED | OUTPATIENT
Start: 2024-05-06 | End: 2024-05-06

## 2024-05-06 RX ORDER — DEXTROSE 10 % IN WATER 10 %
125 INTRAVENOUS SOLUTION INTRAVENOUS ONCE
Refills: 0 | Status: DISCONTINUED | OUTPATIENT
Start: 2024-05-06 | End: 2024-05-09

## 2024-05-06 RX ORDER — HEPARIN SODIUM 5000 [USP'U]/ML
4000 INJECTION INTRAVENOUS; SUBCUTANEOUS EVERY 6 HOURS
Refills: 0 | Status: DISCONTINUED | OUTPATIENT
Start: 2024-05-06 | End: 2024-05-06

## 2024-05-06 RX ORDER — DEXTROSE 50 % IN WATER 50 %
15 SYRINGE (ML) INTRAVENOUS ONCE
Refills: 0 | Status: DISCONTINUED | OUTPATIENT
Start: 2024-05-06 | End: 2024-05-09

## 2024-05-06 RX ORDER — DEXTROSE 50 % IN WATER 50 %
12.5 SYRINGE (ML) INTRAVENOUS ONCE
Refills: 0 | Status: DISCONTINUED | OUTPATIENT
Start: 2024-05-06 | End: 2024-05-09

## 2024-05-06 RX ORDER — CEFTRIAXONE 500 MG/1
1000 INJECTION, POWDER, FOR SOLUTION INTRAMUSCULAR; INTRAVENOUS EVERY 24 HOURS
Refills: 0 | Status: DISCONTINUED | OUTPATIENT
Start: 2024-05-07 | End: 2024-05-06

## 2024-05-06 RX ORDER — CARVEDILOL PHOSPHATE 80 MG/1
25 CAPSULE, EXTENDED RELEASE ORAL EVERY 12 HOURS
Refills: 0 | Status: DISCONTINUED | OUTPATIENT
Start: 2024-05-06 | End: 2024-05-09

## 2024-05-06 RX ORDER — AZITHROMYCIN 500 MG/1
500 TABLET, FILM COATED ORAL EVERY 24 HOURS
Refills: 0 | Status: DISCONTINUED | OUTPATIENT
Start: 2024-05-07 | End: 2024-05-06

## 2024-05-06 RX ORDER — AZITHROMYCIN 500 MG/1
500 TABLET, FILM COATED ORAL ONCE
Refills: 0 | Status: COMPLETED | OUTPATIENT
Start: 2024-05-06 | End: 2024-05-06

## 2024-05-06 RX ORDER — HEPARIN SODIUM 5000 [USP'U]/ML
5000 INJECTION INTRAVENOUS; SUBCUTANEOUS EVERY 8 HOURS
Refills: 0 | Status: DISCONTINUED | OUTPATIENT
Start: 2024-05-06 | End: 2024-05-09

## 2024-05-06 RX ORDER — GLUCAGON INJECTION, SOLUTION 0.5 MG/.1ML
1 INJECTION, SOLUTION SUBCUTANEOUS ONCE
Refills: 0 | Status: DISCONTINUED | OUTPATIENT
Start: 2024-05-06 | End: 2024-05-09

## 2024-05-06 RX ORDER — AMLODIPINE BESYLATE 2.5 MG/1
1 TABLET ORAL
Qty: 0 | Refills: 0 | DISCHARGE

## 2024-05-06 RX ORDER — HEPARIN SODIUM 5000 [USP'U]/ML
8000 INJECTION INTRAVENOUS; SUBCUTANEOUS ONCE
Refills: 0 | Status: DISCONTINUED | OUTPATIENT
Start: 2024-05-06 | End: 2024-05-06

## 2024-05-06 RX ORDER — HEPARIN SODIUM 5000 [USP'U]/ML
8000 INJECTION INTRAVENOUS; SUBCUTANEOUS EVERY 6 HOURS
Refills: 0 | Status: DISCONTINUED | OUTPATIENT
Start: 2024-05-06 | End: 2024-05-06

## 2024-05-06 RX ORDER — LANOLIN ALCOHOL/MO/W.PET/CERES
3 CREAM (GRAM) TOPICAL AT BEDTIME
Refills: 0 | Status: DISCONTINUED | OUTPATIENT
Start: 2024-05-06 | End: 2024-05-09

## 2024-05-06 RX ORDER — METFORMIN HYDROCHLORIDE 850 MG/1
1 TABLET ORAL
Qty: 0 | Refills: 0 | DISCHARGE

## 2024-05-06 RX ORDER — DIPHENHYDRAMINE HCL 50 MG
25 CAPSULE ORAL ONCE
Refills: 0 | Status: COMPLETED | OUTPATIENT
Start: 2024-05-06 | End: 2024-05-06

## 2024-05-06 RX ORDER — CHLORTHALIDONE 50 MG
1 TABLET ORAL
Qty: 0 | Refills: 0 | DISCHARGE

## 2024-05-06 RX ORDER — SEVELAMER CARBONATE 2400 MG/1
800 POWDER, FOR SUSPENSION ORAL THREE TIMES A DAY
Refills: 0 | Status: DISCONTINUED | OUTPATIENT
Start: 2024-05-06 | End: 2024-05-06

## 2024-05-06 RX ADMIN — Medication 3 MILLILITER(S): at 09:20

## 2024-05-06 RX ADMIN — Medication 3 MILLILITER(S): at 03:53

## 2024-05-06 RX ADMIN — Medication 25 MILLIGRAM(S): at 02:00

## 2024-05-06 RX ADMIN — SEVELAMER CARBONATE 800 MILLIGRAM(S): 2400 POWDER, FOR SUSPENSION ORAL at 06:12

## 2024-05-06 RX ADMIN — Medication 100 GRAM(S): at 15:08

## 2024-05-06 RX ADMIN — CEFTRIAXONE 100 MILLIGRAM(S): 500 INJECTION, POWDER, FOR SOLUTION INTRAMUSCULAR; INTRAVENOUS at 00:31

## 2024-05-06 RX ADMIN — CARVEDILOL PHOSPHATE 25 MILLIGRAM(S): 80 CAPSULE, EXTENDED RELEASE ORAL at 06:12

## 2024-05-06 RX ADMIN — CARVEDILOL PHOSPHATE 25 MILLIGRAM(S): 80 CAPSULE, EXTENDED RELEASE ORAL at 17:40

## 2024-05-06 RX ADMIN — AZITHROMYCIN 255 MILLIGRAM(S): 500 TABLET, FILM COATED ORAL at 02:02

## 2024-05-06 RX ADMIN — Medication 180 MILLIGRAM(S): at 06:12

## 2024-05-06 RX ADMIN — HEPARIN SODIUM 5000 UNIT(S): 5000 INJECTION INTRAVENOUS; SUBCUTANEOUS at 14:43

## 2024-05-06 RX ADMIN — Medication 25 MILLIGRAM(S): at 22:14

## 2024-05-06 RX ADMIN — Medication 3 MILLILITER(S): at 02:45

## 2024-05-06 RX ADMIN — HEPARIN SODIUM 1800 UNIT(S)/HR: 5000 INJECTION INTRAVENOUS; SUBCUTANEOUS at 07:37

## 2024-05-06 RX ADMIN — HEPARIN SODIUM 1800 UNIT(S)/HR: 5000 INJECTION INTRAVENOUS; SUBCUTANEOUS at 06:21

## 2024-05-06 RX ADMIN — HEPARIN SODIUM 5000 UNIT(S): 5000 INJECTION INTRAVENOUS; SUBCUTANEOUS at 22:08

## 2024-05-06 RX ADMIN — ROSUVASTATIN CALCIUM 5 MILLIGRAM(S): 5 TABLET ORAL at 22:08

## 2024-05-06 RX ADMIN — Medication 40 MILLIGRAM(S): at 06:11

## 2024-05-06 RX ADMIN — SEVELAMER CARBONATE 800 MILLIGRAM(S): 2400 POWDER, FOR SUSPENSION ORAL at 14:43

## 2024-05-06 RX ADMIN — Medication 40 MILLIGRAM(S): at 14:42

## 2024-05-06 RX ADMIN — Medication 0.4 MILLIGRAM(S): at 00:17

## 2024-05-06 NOTE — PATIENT PROFILE ADULT - FUNCTIONAL ASSESSMENT - DAILY ACTIVITY ASSESSMENT TYPE
Medication:   Requested Prescriptions     Pending Prescriptions Disp Refills    pramipexole (MIRAPEX) 0.25 MG tablet [Pharmacy Med Name: PRAMIPEXOLE 0.25 MG TABLET] 180 tablet 1     Sig: TAKE 1 TO 2 TABLETS BY MOUTH AT BEDTIME     Last Filled:  12/15/21    Last appt: 08/12/21   Next appt: Visit date not found    Last OARRS:   RX Monitoring 6/8/2018   Attestation The Prescription Monitoring Report for this patient was reviewed today. Acute Pain Prescriptions Severe pain not adequately treated with lower dose. Periodic Controlled Substance Monitoring No signs of potential drug abuse or diversion identified. Patient doesn't have any futures appts, does he needs an appt or his next appt will be in a year?
Admission

## 2024-05-06 NOTE — PROGRESS NOTE ADULT - SUBJECTIVE AND OBJECTIVE BOX
Patient is a 79-year-old female with PMHx of type 2 diabetes, HTN, HLD, CKD, anemia, presenting with SOB, admitted for CHF exacerbation vs PNA.    Overnight Events: None  Interval HPI: Today is hospital day 0. Patient seen and examined at bedside.     REVIEW OF SYSTEMS:  CONSTITUTIONAL: (-) weakness, (-) fevers, (-) chills  EYES/ENT: (-) visual changes,  (-) vertigo,  (-) throat pain   NECK:  (-) pain, (-) stiffness  RESPIRATORY:  (-) shortness of breath, (-) cough,  (-) wheezing,  (-) hemoptysis   CARDIOVASCULAR:  (-) chest pain, (-) palpitations  GASTROINTESTINAL:  (-) abdominal or epigastric pain, (-) nausea, (-) vomiting, (-) diarrhea, (-) constipation, (-) melena,  (-) hematemesis,  (-) hematochezia  GENITOURINARY: (-) dysuria, (-) frequency, (-) hematuria  NEUROLOGICAL: (-) numbness, (-) weakness  SKIN: (-) itching, (-) rashes, (-) lesions    Vital Signs Last 24 Hrs  T(C): 36.8 (06 May 2024 05:10), Max: 36.8 (06 May 2024 02:00)  T(F): 98.2 (06 May 2024 05:10), Max: 98.2 (06 May 2024 02:00)  HR: 66 (06 May 2024 05:10) (64 - 85)  BP: 166/77 (06 May 2024 05:10) (159/74 - 205/101)  BP(mean): --  RR: 17 (06 May 2024 05:10) (16 - 24)  SpO2: 95% (06 May 2024 05:10) (95% - 100%)    Parameters below as of 06 May 2024 05:10  Patient On (Oxygen Delivery Method): nasal cannula  O2 Flow (L/min): 2    PHYSICAL EXAM:  GENERAL: NAD, lying in bed comfortably  HEAD:  Atraumatic, Normocephalic  EYES: EOMI, conjunctiva and sclera clear  ENT: Moist mucous membranes  NECK: Supple, No JVD  CHEST/LUNG: Clear to auscultation bilaterally, good air entry bilaterally; No wheezing, rales, or rhonchi. Unlabored respirations  HEART: Regular rate and rhythm. S1 and S2. No murmurs, rubs, or gallops  ABDOMEN: Soft, Nontender, Nondistended. Bowel sounds present.   EXTREMITIES:  2+ Peripheral Pulses. No clubbing, cyanosis, or edema  NERVOUS SYSTEM:  Alert & Oriented X3, speech clear. No deficits.  MSK: FROM all 4 extremities, full and equal strength  SKIN: No rashes, bruises, or other lesions    LABS:   All Labs Personally Reviewed                         9.1    12.56 )-----------( 272      ( 05 May 2024 23:20 )             31.4     05-06    x   |  x   |  64<H>  ----------------------------<  x   x    |  x   |  2.21<H>    Ca    7.1<L>      05 May 2024 23:20  Phos  6.3     05-06  Mg     1.9     05-06    TPro  7.9  /  Alb  3.2<L>  /  TBili  0.1<L>  /  DBili  x   /  AST  21  /  ALT  25  /  AlkPhos  97  05-05    PT/INR - ( 05 May 2024 23:20 )   PT: 11.7 sec;   INR: 1.00 ratio         PTT - ( 05 May 2024 23:20 )  PTT:26.6 sec      Blood Culture:   I&O's Summary    05 May 2024 07:01  -  06 May 2024 07:00  --------------------------------------------------------  IN: 0 mL / OUT: 600 mL / NET: -600 mL      CAPILLARY BLOOD GLUCOSE    RADIOLOGY/EKG:  All Imaging and EKGs Personally Reviewed     MEDICATIONS:  MEDICATIONS  (STANDING):  albuterol/ipratropium for Nebulization 3 milliLiter(s) Nebulizer every 6 hours  azithromycin  IVPB 500 milliGRAM(s) IV Intermittent every 24 hours  carvedilol 25 milliGRAM(s) Oral every 12 hours  cefTRIAXone   IVPB 1000 milliGRAM(s) IV Intermittent every 24 hours  dextrose 10% Bolus 125 milliLiter(s) IV Bolus once  dextrose 5%. 1000 milliLiter(s) (50 mL/Hr) IV Continuous <Continuous>  dextrose 5%. 1000 milliLiter(s) (100 mL/Hr) IV Continuous <Continuous>  dextrose 50% Injectable 25 Gram(s) IV Push once  dextrose 50% Injectable 12.5 Gram(s) IV Push once  diltiazem    milliGRAM(s) Oral daily  furosemide   Injectable 40 milliGRAM(s) IV Push two times a day  glucagon  Injectable 1 milliGRAM(s) IntraMuscular once  heparin   Injectable 8000 Unit(s) IV Push once  heparin  Infusion.  Unit(s)/Hr (18 mL/Hr) IV Continuous <Continuous>  insulin lispro (ADMELOG) corrective regimen sliding scale   SubCutaneous three times a day before meals  insulin lispro (ADMELOG) corrective regimen sliding scale   SubCutaneous at bedtime  magnesium sulfate  IVPB 1 Gram(s) IV Intermittent once  rosuvastatin 5 milliGRAM(s) Oral at bedtime  sevelamer carbonate 800 milliGRAM(s) Oral three times a day         Patient is a 79-year-old female with PMHx of type 2 diabetes, HTN, HLD, CKD, anemia, presenting with SOB, admitted for CHF exacerbation vs PNA.    Overnight Events: None  Interval HPI: Today is hospital day 0. Patient seen and examined at bedside. Aware US, TTE, NM VQ scan plan for today. Will c/w diuresis, d/c heparin if VQ negative. No further questions or concerns at this time. Declines wanting this provider to update family, will do it on her own.     REVIEW OF SYSTEMS:  CONSTITUTIONAL: (-) weakness, (-) fevers, (-) chills  EYES/ENT: (-) visual changes,  (-) vertigo,  (-) throat pain   NECK:  (-) pain, (-) stiffness  RESPIRATORY:  (-) shortness of breath, (-) cough,  (-) wheezing,  (-) hemoptysis   CARDIOVASCULAR:  (-) chest pain, (-) palpitations  GASTROINTESTINAL:  (-) abdominal or epigastric pain, (-) nausea, (-) vomiting, (-) diarrhea, (-) constipation, (-) melena,  (-) hematemesis,  (-) hematochezia  GENITOURINARY: (-) dysuria, (-) frequency, (-) hematuria  NEUROLOGICAL: (-) numbness, (-) weakness  SKIN: (-) itching, (-) rashes, (-) lesions    Vital Signs Last 24 Hrs  T(C): 36.8 (06 May 2024 05:10), Max: 36.8 (06 May 2024 02:00)  T(F): 98.2 (06 May 2024 05:10), Max: 98.2 (06 May 2024 02:00)  HR: 66 (06 May 2024 05:10) (64 - 85)  BP: 166/77 (06 May 2024 05:10) (159/74 - 205/101)  BP(mean): --  RR: 17 (06 May 2024 05:10) (16 - 24)  SpO2: 95% (06 May 2024 05:10) (95% - 100%)    Parameters below as of 06 May 2024 05:10  Patient On (Oxygen Delivery Method): nasal cannula  O2 Flow (L/min): 2    PHYSICAL EXAM:  GENERAL: NAD, lying in bed comfortably  HEAD:  Atraumatic, Normocephalic  EYES: EOMI, conjunctiva and sclera clear  ENT: Dry mucous membranes  NECK: Supple  CHEST/LUNG: Clear to auscultation bilaterally, good air entry bilaterally; No wheezing, rales, or rhonchi. Unlabored respirations  HEART: Regular rate and rhythm. S1 and S2. No murmurs, rubs, or gallops  ABDOMEN: Soft, Nontender, Nondistended. Bowel sounds present.   EXTREMITIES:  2+ Peripheral Pulses. b/l chronic venous stasis discoloration. No clubbing, cyanosis, or edema  NERVOUS SYSTEM:  Alert & Oriented X3, speech clear. No deficits.  SKIN: No rashes, bruises, or other lesions    LABS:   All Labs Personally Reviewed                         8.8    10.16 )-----------( 308      ( 06 May 2024 06:32 )             29.4     06 May 2024 06:32    142    |  107    |  65     ----------------------------<  154    4.5     |  21     |  2.13     Ca    7.4        06 May 2024 06:32  Phos  6.3       06 May 2024 02:00  Mg     1.9       06 May 2024 02:00    TPro  7.7    /  Alb  3.1    /  TBili  0.2    /  DBili  x      /  AST  12     /  ALT  26     /  AlkPhos  94     06 May 2024 06:32    PT/INR - ( 05 May 2024 23:20 )   PT: 11.7 sec;   INR: 1.00 ratio         PTT - ( 06 May 2024 05:20 )  PTT:24.4 sec  CAPILLARY BLOOD GLUCOSE      POCT Blood Glucose.: 118 mg/dL (06 May 2024 08:52)    LIVER FUNCTIONS - ( 06 May 2024 06:32 )  Alb: 3.1 g/dL / Pro: 7.7 g/dL / ALK PHOS: 94 U/L / ALT: 26 U/L / AST: 12 U/L / GGT: x           Urinalysis Basic - ( 06 May 2024 06:32 )    Color: x / Appearance: x / SG: x / pH: x  Gluc: 154 mg/dL / Ketone: x  / Bili: x / Urobili: x   Blood: x / Protein: x / Nitrite: x   Leuk Esterase: x / RBC: x / WBC x   Sq Epi: x / Non Sq Epi: x / Bacteria: x    RADIOLOGY/EKG:  All Imaging and EKGs Personally Reviewed     US Duplex Venous Lower Ext Complete, Bilateral (05.06.24 @ 08:59)   IMPRESSION:  No evidence of deep venous thrombosis in either lower extremity.      US Renal (05.06.24 @ 09:04)   IMPRESSION:  Trace bilateral pelvic fullness versus extrarenal pelves.  If indicated,   CT could be obtained for further evaluation.      MEDICATIONS:  MEDICATIONS  (STANDING):  albuterol/ipratropium for Nebulization 3 milliLiter(s) Nebulizer every 6 hours  azithromycin  IVPB 500 milliGRAM(s) IV Intermittent every 24 hours  carvedilol 25 milliGRAM(s) Oral every 12 hours  cefTRIAXone   IVPB 1000 milliGRAM(s) IV Intermittent every 24 hours  dextrose 10% Bolus 125 milliLiter(s) IV Bolus once  dextrose 5%. 1000 milliLiter(s) (50 mL/Hr) IV Continuous <Continuous>  dextrose 5%. 1000 milliLiter(s) (100 mL/Hr) IV Continuous <Continuous>  dextrose 50% Injectable 25 Gram(s) IV Push once  dextrose 50% Injectable 12.5 Gram(s) IV Push once  diltiazem    milliGRAM(s) Oral daily  furosemide   Injectable 40 milliGRAM(s) IV Push two times a day  glucagon  Injectable 1 milliGRAM(s) IntraMuscular once  heparin   Injectable 8000 Unit(s) IV Push once  heparin  Infusion.  Unit(s)/Hr (18 mL/Hr) IV Continuous <Continuous>  insulin lispro (ADMELOG) corrective regimen sliding scale   SubCutaneous three times a day before meals  insulin lispro (ADMELOG) corrective regimen sliding scale   SubCutaneous at bedtime  magnesium sulfate  IVPB 1 Gram(s) IV Intermittent once  rosuvastatin 5 milliGRAM(s) Oral at bedtime  sevelamer carbonate 800 milliGRAM(s) Oral three times a day    MEDICATIONS  (PRN):  acetaminophen     Tablet .. 650 milliGRAM(s) Oral every 6 hours PRN Temp greater or equal to 38C (100.4F), Mild Pain (1 - 3)  aluminum hydroxide/magnesium hydroxide/simethicone Suspension 30 milliLiter(s) Oral every 4 hours PRN Dyspepsia  dextrose Oral Gel 15 Gram(s) Oral once PRN Blood Glucose LESS THAN 70 milliGRAM(s)/deciliter  heparin   Injectable 8000 Unit(s) IV Push every 6 hours PRN For aPTT less than 40  heparin   Injectable 4000 Unit(s) IV Push every 6 hours PRN For aPTT between 40 - 57  melatonin 3 milliGRAM(s) Oral at bedtime PRN Insomnia  ondansetron Injectable 4 milliGRAM(s) IV Push every 8 hours PRN Nausea and/or Vomiting

## 2024-05-06 NOTE — CONSULT NOTE ADULT - ASSESSMENT
Assessment: 79-year-old female history DM, HTN, HLD, and CKD admitted with acute onset SOB x 1 day.  Patient denies any chest pain, fevers, chills, or palpitations.  Was recently taken off chlorthalidone recently after improvement in edema.  Cardiology consulted for shortness of breath    Recommendations  EKG sinus rhythm with first-degree block, no ischemia  Troponins negative x 2, doubt ACS  Labs notable for elevated dimer and proBNP  Ultrasound negative for DVT, creatinine elevated reasonable to pursue VQ scan to rule out PE  X-ray concerning for CHF, continue IV diuresis, monitor kidney function and electrolytes   Assessment: 79-year-old female with history of DM, HTN, HLD, and CKD admitted with acute onset shortness of breath x 1 day.  Patient denies any chest pain, fevers, chills, or palpitations.  Was recently taken off chlorthalidone recently after improvement in edema.  Cardiology consulted for shortness of breath    Recommendations  EKG sinus rhythm with first-degree block, no ischemia  Troponins negative x 2, doubt ACS  Labs notable for elevated dimer and proBNP  Ultrasound negative for DVT, creatinine elevated reasonable to pursue VQ scan to rule out PE  X-ray concerning for CHF, continue IV diuresis, monitor kidney function and electrolytes

## 2024-05-06 NOTE — H&P ADULT - ASSESSMENT
79-year-old female with history of T2DM, HTN, anemia, CKD, presents to ED for c/o acute onset SOB, admitted for possible CHF vs underlying PNA.    #Possible CHF exacerbation vs underlying PNA  Nuclear stress testing normal 2/2024   Echo 2/2024 w/ grade 2 diastolic dysfunction   WBC 12.56, BNP 1476  s/p azithro and rocephin in ED  -Admit to telemetry  -c/w Rocephin and azithro  -f/u RVP  -Lasix 40 IV BID  -d-dimer ordered  -duonebs q4 [id number]  -f/u CXR  -echo ordered  -A1c, lipids, TSH ordered  -cardiology consult  -I&O  -daily weight    #ZORAIDA on CKD  #Hypocalcemia  Cr 1.74 in 8/2023, 1.6 in June  Cr 2.24, Ca 7.1 today  -renal US  -bladder scan  -FE urea  -PTH, vit D 1 25, phos, Mg, d-dimer  -Nephro consult    #Chronic Anemia  Monthly home procrit  Hg 9.1 on admission  -monitor Hg    #Steroid intolerance  -IV benadryl 25 x1 stat  -hold home methylprednisolone   -hold home flonase    #T2DM  A1c ordered  -hold home farxiga and glucotrol  -ISS  -hypoglycemic protocol    #HTN  c/w home diltiazem 180  c/w home coreg 25BID    #HLD  -c/w home rosuvastatin    #DVT ppx: heparin  #Diet: consistent carb  #Code: Full    *Case d/w Dr. Ac 79-year-old female with history of T2DM, HTN, anemia, CKD, presents to ED for c/o acute onset SOB, admitted for possible CHF vs underlying PNA.    #Possible CHF exacerbation vs underlying PNA  Nuclear stress testing normal 2/2024   Echo 2/2024 w/ grade 2 diastolic dysfunction   WBC 12.56, BNP 1476  s/p azithro and rocephin in ED  -Admit to telemetry  -c/w Rocephin and azithro  -f/u RVP  -Lasix 40 IV BID  -d-dimer ordered  -duonebs q4 [id number]  -f/u CXR  -echo ordered  -A1c, lipids, TSH ordered  -cardiology consult  -I&O  -daily weight    #ZORAIDA on CKD4 vs worsening CKD  #Hypocalcemia  Cr 1.74 in 8/2023, 1.6 in June  Cr 2.24, Ca 7.1 today  -renal US  -bladder scan  -FE urea  -PTH, vit D 1 25, phos, Mg, d-dimer  -Nephro consult    #Chronic Anemia  Monthly home procrit  Hg 9.1 on admission  -monitor Hg    #Steroid intolerance  -IV benadryl 25 x1 stat  -hold home methylprednisolone   -hold home flonase    #T2DM  A1c ordered  -hold home farxiga and glucotrol  -ISS  -hypoglycemic protocol    #HTN  c/w home diltiazem 180  c/w home coreg 25BID    #HLD  -c/w home rosuvastatin    #DVT ppx: heparin  #Diet: consistent carb  #Code: Full    *Case d/w Dr. Ac

## 2024-05-06 NOTE — CONSULT NOTE ADULT - SUBJECTIVE AND OBJECTIVE BOX
NEPHROLOGY CONSULTATION    CHIEF COMPLAINT: SOB    HPI:  Pt is 80 yo f with hx of T2 DM, HTN, HLD, anemia, CKD 3, presents to ED for c/o acute onset SOB. No fever, chills, chest pain, palpitations, abdominal pain, nausea, or vomiting. She was taken off chlorthalidone 25mg about a month ago by her nephrologist because of improved edema, however since then, LE edema has worsened. In ED noted to have ZORAIDA/CKD 3 and uncontrolled HTN. V/Q scan low prob for PE. Was on ARB, farxiga as op.     ROS:  as above    Allergies:  statins (Muscle Pain)  ACE inhibitors (Angioedema)    Intolerances:  predniSONE (Other)    PAST MEDICAL & SURGICAL HISTORY:  Hypertension  Diabetes  type 2  Thyroid disease  Anemia  Hypercholesteremia  Myelodysplasia (myelodysplastic syndrome)  Thyromegaly  s/p partial thyroidectomy long time ago  Other giant cell arteritis  Arthritis  Kidney insufficiency  PVD (peripheral vascular disease)  Mass of right knee  Urinary incontinence  H/O partial thyroidectomy  H/O: hysterectomy  History of cataract surgery  History of vascular surgery    SOCIAL HISTORY:  negative    FAMILY HISTORY:  diabetes mellitus  hypertension  renal failure    MEDICATIONS  (STANDING):  carvedilol 25 milliGRAM(s) Oral every 12 hours  dextrose 10% Bolus 125 milliLiter(s) IV Bolus once  dextrose 5%. 1000 milliLiter(s) (50 mL/Hr) IV Continuous <Continuous>  dextrose 5%. 1000 milliLiter(s) (100 mL/Hr) IV Continuous <Continuous>  dextrose 50% Injectable 25 Gram(s) IV Push once  dextrose 50% Injectable 12.5 Gram(s) IV Push once  diltiazem    milliGRAM(s) Oral daily  furosemide   Injectable 40 milliGRAM(s) IV Push two times a day  glucagon  Injectable 1 milliGRAM(s) IntraMuscular once  heparin   Injectable 5000 Unit(s) SubCutaneous every 8 hours  hydrALAZINE 10 milliGRAM(s) Oral three times a day  insulin lispro (ADMELOG) corrective regimen sliding scale   SubCutaneous three times a day before meals  insulin lispro (ADMELOG) corrective regimen sliding scale   SubCutaneous at bedtime  rosuvastatin 5 milliGRAM(s) Oral at bedtime  sevelamer carbonate 800 milliGRAM(s) Oral three times a day    Home Medications:  carvedilol 25 mg oral tablet: 1 tab(s) orally 2 times a day (06 May 2024 01:08)  DilTIAZem (Eqv-Tiazac) 180 mg/24 hours oral capsule, extended release: 1 cap(s) orally once a day (06 May 2024 01:04)  ezetimibe 10 mg oral tablet: 1 tab(s) orally once a day (06 May 2024 01:03)  Farxiga 5 mg oral tablet: 1 tab(s) orally once a day (06 May 2024 01:05)  Flonase 50 mcg/inh nasal spray: 2 spray(s) in each nostril once a day (06 May 2024 01:13)  Glucotrol 5 mg oral tablet: 1 tab(s) orally 2 times a day (06 May 2024 01:07)  methylPREDNISolone 4 mg oral tablet: 4 milligram(s) orally once a day (06 May 2024 01:18)  Procrit 40,000 units/mL preservative-free injectable solution: 3,000 intravenously once a month (06 May 2024 01:19)  rosuvastatin 5 mg oral tablet: 1 orally once a day (at bedtime) (06 May 2024 01:19)  telmisartan 80 mg oral tablet: 1 tab(s) orally once a day (at bedtime) (06 May 2024 01:19)    Vital Signs Last 24 Hrs  T(C): 36.8 (05-06-24 @ 05:10), Max: 36.8 (05-06-24 @ 02:00)  T(F): 98.2 (05-06-24 @ 05:10), Max: 98.2 (05-06-24 @ 02:00)  HR: 77 (05-06-24 @ 09:20) (64 - 85)  BP: 166/77 (05-06-24 @ 05:10) (159/74 - 205/101)  RR: 17 (05-06-24 @ 05:10) (16 - 24)  SpO2: 97% (05-06-24 @ 09:20) (95% - 100%)    I&O's Detail    05 May 2024 07:01  -  06 May 2024 07:00  --------------------------------------------------------  OUT:    Voided (mL): 600 mL  Total OUT: 600 mL    06 May 2024 07:01  -  06 May 2024 20:04  --------------------------------------------------------  OUT:    Voided (mL): 700 mL  Total OUT: 700 mL    s1s2  b/l air entry  soft, ND  tr edema     LABS:                        8.8    9.48  )-----------( 286      ( 06 May 2024 13:35 )             28.7     05-06    142  |  107  |  65<H>  ----------------------------<  154<H>  4.5   |  21<L>  |  2.13<H>    Ca    7.4<L>      06 May 2024 06:32  Phos  6.3     05-06  Mg     1.9     05-06    TPro  7.7  /  Alb  3.1<L>  /  TBili  0.2  /  DBili  x   /  AST  12  /  ALT  26  /  AlkPhos  94  05-06    LIVER FUNCTIONS - ( 06 May 2024 06:32 )  Alb: 3.1 g/dL / Pro: 7.7 g/dL / ALK PHOS: 94 U/L / ALT: 26 U/L / AST: 12 U/L / GGT: x           PT/INR - ( 05 May 2024 23:20 )   PT: 11.7 sec;   INR: 1.00 ratio       PTT - ( 06 May 2024 13:35 )  PTT:48.5 sec    A/P:    DM, HTN, CKD 3 (Cr 1.74 - 8/14/23)  Adm w/CHF  Suspect hemodynamic, cardio-renal ZORAIDA/CKD 3 vs DM CKD progression   Agree w/holding ACE/ARB, Farxiga for now   Agree w/IV Laskevin VARGAS noted, will check CT A/P no contrast   Phoslo for high Phos   Epoetin once BP better controlled  Will f/u CBC, BMP, anemia studies     [phone]

## 2024-05-06 NOTE — PATIENT PROFILE ADULT - PATIENT'S SEXUAL ORIENTATION
HPI   Left low back pain.  PT, was slightly better.  Still bothersome, shooting down left buttock, down to calf.  No weakness in legs.  No B/B dysfunction.  \"daily debilitating.\"    Review of Systems   Constitutional: Negative.    Respiratory: Negative.    Cardiovascular: Negative.        Past Medical History:   Diagnosis Date   • Abnormal mammogram        Past Surgical History:   Procedure Laterality Date   •  section, classic         Family History   Problem Relation Age of Onset   • Cancer, Colon Mother    • Cancer, Prostate Father        Social History     Socioeconomic History   • Marital status: /Civil Union     Spouse name: Not on file   • Number of children: Not on file   • Years of education: Not on file   • Highest education level: Not on file   Social Needs   • Financial resource strain: Not on file   • Food insecurity - worry: Not on file   • Food insecurity - inability: Not on file   • Transportation needs - medical: Not on file   • Transportation needs - non-medical: Not on file   Occupational History   • Not on file   Tobacco Use   • Smoking status: Former Smoker   • Smokeless tobacco: Never Used   Substance and Sexual Activity   • Alcohol use: Not on file   • Drug use: Not on file   • Sexual activity: Not on file   Other Topics Concern   • Not on file   Social History Narrative   • Not on file       Current Outpatient Medications   Medication Sig Dispense Refill   • albuterol (PROAIR HFA) 108 (90 Base) MCG/ACT inhaler Inhale 2 puffs into the lungs every 4 hours as needed for Shortness of Breath or Wheezing.     • Fluticasone-Umeclidin-Vilant (TRELEGY ELLIPTA) 100-62.5-25 MCG/INH AEROSOL POWDER, BREATH ACTIVATED Inhale 1 Dose into the lungs daily.       No current facility-administered medications for this visit.        Visit Vitals  /80 (BP Location: [facility] [place], Patient Position: Sitting)   Pulse 95   Temp 98.7 °F (37.1 °C)   Resp 16   Ht 5' 8\" (1.727 m)   Wt 78.3 kg (172 lb 9.9 oz)    SpO2 95%   BMI 26.25 kg/m²       Physical Exam   Constitutional: She appears well-developed and well-nourished.   Cardiovascular: Normal rate, regular rhythm and normal heart sounds.   Pulmonary/Chest: Effort normal and breath sounds normal.   Abdominal: Soft. There is no tenderness.   Musculoskeletal:   +TTP over left PSIS.  Neg straight leg raise test bilaterally.  Normal strengths/sensation and dtr bilateral LE       Assessment and Plan  (M54.32) Sciatica of left side  (primary encounter diagnosis)  Comment:  See rx.  Plan: MRI LUMBAR SPINE WO CONTRAST, SERVICE TO         ORTHOPEDICS, methylPREDNISolone (MEDROL         DOSEPAK) 4 MG tablet               Heterosexual

## 2024-05-06 NOTE — PROGRESS NOTE ADULT - ASSESSMENT
Patient is a 79-year-old female with PMHx of type 2 diabetes, HTN, HLD, CKD, anemia, presenting with SOB, admitted for CHF exacerbation vs PNA.    FULL NOTE PENDING    #Possible CHF exacerbation vs underlying PNA  Nuclear stress testing normal 2/2024   Echo 2/2024 w/ grade 2 diastolic dysfunction   WBC 12.56, BNP 1476  s/p azithro and rocephin in ED  -Admit to telemetry  -c/w Rocephin and azithro  -f/u RVP  -Lasix 40 IV BID  -d-dimer ordered  -duonebs q4 [id number]  -f/u CXR  -echo ordered  -A1c, lipids, TSH ordered  -cardiology consult  -I&O  -daily weight    #ZORAIDA on CKD4 vs worsening CKD  #Hypocalcemia  Cr 1.74 in 8/2023, 1.6 in June  Cr 2.24, Ca 7.1 today  -renal US  -bladder scan  -FE urea  -PTH, vit D 1 25, phos, Mg, d-dimer  -Nephro consult    #Chronic Anemia  -Monthly home procrit  -Hg 9.1 on admission  -monitor Hg    #Steroid intolerance  -IV benadryl 25 x1 stat  -hold home methylprednisolone   -hold home flonase    #T2DM  -A1c ordered  -hold home farxiga and glucotrol  -ISS  -hypoglycemic protocol    #HTN  c/w home diltiazem 180  c/w home coreg 25BID    #HLD  -c/w home rosuvastatin    #DVT ppx: heparin  #Diet: consistent carb  #Code: Full        Discussed with attending, Dr. Fuentes  Patient is a 79-year-old female with PMHx of type 2 diabetes, HTN, HLD, CKD, anemia, presenting with SOB, admitted for CHF exacerbation.    #CHF Exacerbation  -Presented with SOB, MORGAN  -Nuclear stress testing normal 2/2024   -TTE 2/2024 w/ grade 2 diastolic dysfunction   -BNP 1476, trops neg x 2, DDimer 1564 (age corrected 395)  -LE dopplers negative   -WBC 12.56, procal 0.14, RVP neg   -CXR w/ congestive features, no consolidations  -S/p azitho/CTX in ED, will D/C abx and duonebs  -Lasix 40 IV BID, will decrease to QD tomorrow as appears euvolemic  -Strict I&Os, daily weights  -F/U TTE and VQ   -Cardiology recs appreciated    #ZORAIDA on CKD4 vs worsening CKD  #CKD w/ MBD  -Cr 1.74 in 8/2023, 1.6 in June, 2.24 on admit  -Cr 2.13 today, Ca 7.4 today, Phosp 6.3 yesterday  -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)  -Renal US w/ trace bilateral pelvic fullness versus extrarenal pelves  -C/w renvela 800mg TID  -Monitor BMP  -F/U FE urea  -Nephro consult appreciated     #Chronic Anemia 2/2 CKD  -Monthly home procrit  -Hg 9.1 on admission, 8.8 today  -Monitor Hgb    #T2DM  -Chronic  -Hold home farxiga and glucotrol  -C/w ISS  -Hypoglycemic protocol  -Consistent carb diet   -F/U a1c    #HTN  -Chronic  -C/w home diltiazem 180  -C/w home coreg 25BID  -Monitor vitals    #HLD  -Chronic  -C/w home rosuvastatin    #DVT ppx: heparin, will D/C as long as NM VQ scan negative   #Code: Full    Discussed with attending, Dr. Fuentes

## 2024-05-06 NOTE — H&P ADULT - HISTORY OF PRESENT ILLNESS
79-year-old female with history of diabetes, hypertension, anemia presented to ED for c/o acute onset SOB at about 9 PM today after dinner.  Denies chest pain, palpitations, abdominal pain, nausea, or vomiting.  Pt was feeling well earlier in the day. No recent cough congestion sore throat rhinorrhea congestion.  No fever or chills.  Has chronic leg swelling, no change.  Patient states she was taken off a diuretic about a month ago by her kidney doctor    In ED, afebrile, HR 85, /101.  WBC 12.56, Hg 9.1, BUN 65, Cr 2.24, GFR 22, BNP 1476. CXR wet read with pulm edema.  Given azithromycin and Rocephin, Lasix 40 IV, nitroglycerin, sublingual 0.4 x2  base 79-year-old female with history of T2DM, HTN, HLD, anemia, CKD, presents to ED for c/o acute onset SOB at about 9 PM today after dinner. Pt was feeling well earlier in the day. Recently felt ears were popping and went to ENT who told her it was as sinus infection, prescribed methylprednisolone which she has taken for 2days. Denies fever, chills, chest pain, palpitations, abdominal pain, nausea, or vomiting. Pt states she is allergic to steroids. Additionally, pt states that she was taken off chlorthalidone 25mg about a month ago by her nephrologist because reportedly improved edema. However since then, LE edema has worsened.     In ED, afebrile, HR 85, /101. WBC 12.56, Hg 9.1, BUN 65, Cr 2.24, GFR 22, BNP 1476. CXR wet read with pulmonary edema. Given azithromycin and Rocephin, Lasix 40 IV, nitroglycerin, sublingual 0.4 x2.  79-year-old female with history of T2DM, HTN, HLD, anemia, CKD, presents to ED for c/o acute onset SOB at about 9 PM today after dinner. Pt was feeling well earlier in the day. Pt states recently felt that her ears were popping and went to ENT who told her that she had a sinus infection, in which she was then prescribed flonase and methylprednisolone which she has taken for 2days. Denies fever, chills, chest pain, palpitations, abdominal pain, nausea, or vomiting. Pt states she is allergic to steroids. Additionally, pt states that she was taken off chlorthalidone 25mg about a month ago by her nephrologist because of improved edema. However since then, LE edema has worsened.     In ED, afebrile, HR 85, /101. WBC 12.56, Hg 9.1, BUN 65, Cr 2.24, GFR 22, BNP 1476. CXR wet read with pulmonary edema. Given azithromycin and Rocephin, Lasix 40 IV, nitroglycerin, sublingual 0.4 x2.     Of note, echo 2/28/2024 with EF 55-60%, LVH, grade 2 diastolic dysfunction. Nu stress from 2/28/2024 unremarkable. Myocardial perfusion on 2/28/2024 unremarkable, post-stress EF 62%. 79-year-old female with history of T2DM, HTN, HLD, anemia, CKD, presents to ED for c/o acute onset SOB at about 9 PM today after dinner. Pt was feeling well earlier in the day. Pt states recently felt that her ears were popping and went to ENT who told her that she had a sinus infection, in which she was then prescribed flonase and methylprednisolone which she has taken for 2days. Denies fever, chills, chest pain, palpitations, abdominal pain, nausea, or vomiting. Pt states she is allergic to steroids. Additionally, pt states that she was taken off chlorthalidone 25mg about a month ago by her nephrologist because of improved edema. However since then, LE edema has worsened.     In ED, afebrile, HR 85, /101. WBC 12.56, Hg 9.1, BUN 65, Cr 2.24, GFR 22, BNP 1476. CXR wet read with pulmonary edema. Given azithromycin and Rocephin, Lasix 40 IV, nitroglycerin, sublingual 0.4 x2. Pt states improvement in breathing s/p lasix.    Of note, echo 2/28/2024 with EF 55-60%, LVH, grade 2 diastolic dysfunction. Nu stress from 2/28/2024 unremarkable. Myocardial perfusion on 2/28/2024 unremarkable, post-stress EF 62%.

## 2024-05-06 NOTE — H&P ADULT - NSHPREVIEWOFSYSTEMS_GEN_ALL_CORE
Cr 1.74 in 8/2023.    Patient with mildly labored breathing, rales lower third lung fields with mildly decreased breath sounds, hypertensive, 205/101.  Most likely acute pulmonary edema/CHF.  Partial DDx: CHF, ACS, pneumonia, PE.  Will check EKG, labs, chest x-ray.  Give IV Lasix.  Nitroglycerin.  Close reassessment.  Will need admission.  Consider BiPAP and ICU admission if not improving    #CHF exacerbation    #PNA CONSTITUTIONAL: No weakness, fevers or chills  EYES/ENT: No visual changes;  No vertigo or throat pain   NECK: No pain or stiffness  RESPIRATORY: No cough, wheezing, hemoptysis; +shortness of breath  CARDIOVASCULAR: No chest pain or palpitations  GASTROINTESTINAL: No abdominal or epigastric pain. No nausea or vomiting.  GENITOURINARY: No dysuria, frequency or hematuria  NEUROLOGICAL: No numbness or weakness  SKIN: No itching, rashes CONSTITUTIONAL: No weakness, fevers or chills  EYES/ENT: No visual changes;  No vertigo or throat pain   NECK: No pain or stiffness  RESPIRATORY: No cough. +wheezing, +shortness of breath  CARDIOVASCULAR: No chest pain or palpitations  GASTROINTESTINAL: No abdominal or epigastric pain. No nausea or vomiting.  GENITOURINARY: No dysuria, frequency or hematuria  NEUROLOGICAL: No numbness or weakness  SKIN: No itching, rashes

## 2024-05-06 NOTE — PATIENT PROFILE ADULT - FALL HARM RISK - RISK INTERVENTIONS

## 2024-05-06 NOTE — H&P ADULT - ATTENDING COMMENTS
# Possible CHF exacerbation and underlying PNA   - Patient has stopped taking her chlorthalidone a month ago because she no longer had LE edema and has started taking prednisone  - Nuclear stress testing normal 2/2024   - Echo 2/2024 w/ grade 2 diastolic dysfunction   - C/w lasix 40IV BID  - Ceftriaxone 1g daily  - Azithromycin 500mg IV 3 days  - Echocardiogram  - RVP  - Duonebs q4hr for 3 doses  - A1c, Lipid, TSH  - Daily weights  - I&Os  - Bladder scan q6hr  - Benadryl 25mg IV once (intolerance to steroids that she recently took)  - Cards consult    #ZORAIDA on CKD vs progression of CKD  #Hypocalcemia  - Renal sonogram  - Urine lytes FEUrea  - PTH, Vitamin D 1,25, Phosphorous, Magnesium, D-dimer  - Renal consult    Heparin dvt ppx  Full code  DASH diet # Possible CHF exacerbation and underlying PNA   - Patient has stopped taking her chlorthalidone a month ago because she no longer had LE edema and has started taking prednisone  - Nuclear stress testing normal 2/2024   - Echo 2/2024 w/ grade 2 diastolic dysfunction   - C/w lasix 40IV BID  - Ceftriaxone 1g daily  - Azithromycin 500mg IV 3 days  - Echocardiogram  - RVP  - Duonebs q4hr for 3 doses  - A1c, Lipid, TSH  - Daily weights  - I&Os  - Bladder scan q6hr  - Benadryl 25mg IV once (intolerance to steroids that she recently took)  - Cards consult    #ZORAIDA on CKD stage 4 vs progression of CKD  #Hypocalcemia  - Renal sonogram  - Urine lytes FEUrea  - PTH, Vitamin D 1,25, Phosphorous, Magnesium, D-dimer  - Renal consult    Heparin dvt ppx  Full code  DASH diet

## 2024-05-06 NOTE — H&P ADULT - NSHPPHYSICALEXAM_GEN_ALL_CORE
General: Mildly distressed, mildly labored breathing  HEENT: EOMI, nose normal, OP no erythema/exudate/swelling  CV: RRR  Lungs: Mildly labored breathing.  Rales at b/l lung bases.  Mildly decreased breath sounds  Abd: Soft, NT/ND   Neuro: A&O x3, 5/5 strength c nl sensation all extremities, nl coordination.   MSK: B/l mild lower extremity edema  Skin: normal, no rash General: Mildly distressed, mildly labored breathing  HEENT: EOMI, nose normal, OP no erythema/exudate/swelling  CV: RRR  Lungs: Mildly labored breathing.  Rales at b/l lung bases.  Mildly decreased breath sounds. +Wheezing  Abd: Soft, NT/ND   Neuro: A&O x3, strength 5/5  MSK: B/l mild lower extremity edema  Skin: normal, no rash

## 2024-05-06 NOTE — CONSULT NOTE ADULT - SUBJECTIVE AND OBJECTIVE BOX
BURKE ABARCA  [id number]      HPI:  79-year-old female with history of T2DM, HTN, HLD, anemia, CKD, presents to ED for c/o acute onset SOB Sunday evening. Pt was feeling well earlier in the day. Pt states recently felt that her ears were popping and went to ENT who told her that she had a sinus infection, in which she was then prescribed flonase and methylprednisolone which she has taken for 2days. Denies fever, chills, chest pain, palpitations, abdominal pain, nausea, or vomiting. Pt states she is allergic to steroids. Additionally, pt states that she was taken off chlorthalidone 25mg about a month ago by her nephrologist because of improved edema. However since then, LE edema has worsened.     In ED, afebrile, HR 85, /101. WBC 12.56, Hg 9.1, BUN 65, Cr 2.24, GFR 22, BNP 1476. CXR wet read with pulmonary edema. Given azithromycin and Rocephin, Lasix 40 IV, nitroglycerin, sublingual 0.4 x2. Pt states improvement in breathing s/p lasix.    Of note, echo 2/28/2024 with EF 55-60%, LVH, grade 2 diastolic dysfunction. Nu stress from 2/28/2024 unremarkable. Myocardial perfusion on 2/28/2024 unremarkable, post-stress EF 62%. (06 May 2024 00:23)        ALLERGIES:  predniSONE (Other)  statins (Muscle Pain)  ACE inhibitors (Angioedema)      PAST MEDICAL & SURGICAL HISTORY:  Hypertension      Diabetes  type 2      Thyroid disease      Anemia      Hypercholesteremia      Myelodysplasia (myelodysplastic syndrome)      Thyromegaly  s/p partial thyroidectomy long time ago      Other giant cell arteritis      Arthritis      Kidney insufficiency      PVD (peripheral vascular disease)      Mass of right knee      Urinary incontinence      H/O partial thyroidectomy      H/O: hysterectomy      History of cataract surgery      History of vascular surgery            CURRENT MEDICATIONS:  MEDICATIONS  (STANDING):  albuterol/ipratropium for Nebulization 3 milliLiter(s) Nebulizer every 6 hours  azithromycin  IVPB 500 milliGRAM(s) IV Intermittent every 24 hours  carvedilol 25 milliGRAM(s) Oral every 12 hours  cefTRIAXone   IVPB 1000 milliGRAM(s) IV Intermittent every 24 hours  dextrose 10% Bolus 125 milliLiter(s) IV Bolus once  dextrose 5%. 1000 milliLiter(s) (50 mL/Hr) IV Continuous <Continuous>  dextrose 5%. 1000 milliLiter(s) (100 mL/Hr) IV Continuous <Continuous>  dextrose 50% Injectable 25 Gram(s) IV Push once  dextrose 50% Injectable 12.5 Gram(s) IV Push once  diltiazem    milliGRAM(s) Oral daily  furosemide   Injectable 40 milliGRAM(s) IV Push two times a day  glucagon  Injectable 1 milliGRAM(s) IntraMuscular once  heparin   Injectable 8000 Unit(s) IV Push once  heparin  Infusion.  Unit(s)/Hr (18 mL/Hr) IV Continuous <Continuous>  insulin lispro (ADMELOG) corrective regimen sliding scale   SubCutaneous three times a day before meals  insulin lispro (ADMELOG) corrective regimen sliding scale   SubCutaneous at bedtime  magnesium sulfate  IVPB 1 Gram(s) IV Intermittent once  rosuvastatin 5 milliGRAM(s) Oral at bedtime  sevelamer carbonate 800 milliGRAM(s) Oral three times a day    MEDICATIONS  (PRN):  acetaminophen     Tablet .. 650 milliGRAM(s) Oral every 6 hours PRN Temp greater or equal to 38C (100.4F), Mild Pain (1 - 3)  aluminum hydroxide/magnesium hydroxide/simethicone Suspension 30 milliLiter(s) Oral every 4 hours PRN Dyspepsia  dextrose Oral Gel 15 Gram(s) Oral once PRN Blood Glucose LESS THAN 70 milliGRAM(s)/deciliter  heparin   Injectable 4000 Unit(s) IV Push every 6 hours PRN For aPTT between 40 - 57  heparin   Injectable 8000 Unit(s) IV Push every 6 hours PRN For aPTT less than 40  melatonin 3 milliGRAM(s) Oral at bedtime PRN Insomnia  ondansetron Injectable 4 milliGRAM(s) IV Push every 8 hours PRN Nausea and/or Vomiting      SOCIAL HISTORY:  denies    FAMILY HISTORY:  Family history of diabetes mellitus    Family history of hypertension    Family history of acute renal failure        ROS:  All 10 systems reviewed and positives noted in HPI    OBJECTIVE:    VITAL SIGNS:  Vital Signs Last 24 Hrs  T(C): 36.8 (06 May 2024 05:10), Max: 36.8 (06 May 2024 02:00)  T(F): 98.2 (06 May 2024 05:10), Max: 98.2 (06 May 2024 02:00)  HR: 77 (06 May 2024 09:20) (64 - 85)  BP: 166/77 (06 May 2024 05:10) (159/74 - 205/101)  BP(mean): --  RR: 17 (06 May 2024 05:10) (16 - 24)  SpO2: 97% (06 May 2024 09:20) (95% - 100%)    Parameters below as of 06 May 2024 09:20  Patient On (Oxygen Delivery Method): nasal cannula, 2L        PHYSICAL EXAM:  General:  no distress  HEENT: sclera anicteric  Neck: supple, no carotid bruits b/l  CVS: JVP ~ 7 cm H20, irregular, no murmurs/rubs/gallops  Chest: unlabored respirations, clear to auscultation anteriorly  Abdomen: non-distended  Extremities: no lower extremity edema b/l  Neuro: awake, alert & oriented x 3    LABS:                        8.8    10.16 )-----------( 308      ( 06 May 2024 06:32 )             29.4     05-06    142  |  107  |  65<H>  ----------------------------<  154<H>  4.5   |  21<L>  |  2.13<H>    Ca    7.4<L>      06 May 2024 06:32  Phos  6.3     05-06  Mg     1.9     05-06    TPro  7.7  /  Alb  3.1<L>  /  TBili  0.2  /  DBili  x   /  AST  12  /  ALT  26  /  AlkPhos  94  05-06        PT/INR - ( 05 May 2024 23:20 )   PT: 11.7 sec;   INR: 1.00 ratio         PTT - ( 06 May 2024 05:20 )  PTT:24.4 sec      ECG: Sinus rhythm with 1st degree block      Stress 3/2018  The left ventricle was normal in size. Normal myocardial  perfusion scan, with no evidence of infarction or  inducible ischemia.     BURKE GUPTA  [id number]      HPI:    Burke Gupta is a 79 year old woman with past medical history of Hypertension, Hyperlipidemia, Type II Diabetes Mellitus, Chronic kidney disease and Anemia presents with acute onset of shortness of breath.    The patient reports that she woke up earlier with acute shortness of breath and it was severe which prompted her to call EMS. She denies chest tightness or palpitations. Denies prior history of CAD.      ALLERGIES:  predniSONE (Other)  statins (Muscle Pain)  ACE inhibitors (Angioedema)      CURRENT MEDICATIONS:  MEDICATIONS  (STANDING):  albuterol/ipratropium for Nebulization 3 milliLiter(s) Nebulizer every 6 hours  azithromycin  IVPB 500 milliGRAM(s) IV Intermittent every 24 hours  carvedilol 25 milliGRAM(s) Oral every 12 hours  cefTRIAXone   IVPB 1000 milliGRAM(s) IV Intermittent every 24 hours  dextrose 10% Bolus 125 milliLiter(s) IV Bolus once  dextrose 5%. 1000 milliLiter(s) (50 mL/Hr) IV Continuous <Continuous>  dextrose 5%. 1000 milliLiter(s) (100 mL/Hr) IV Continuous <Continuous>  dextrose 50% Injectable 25 Gram(s) IV Push once  dextrose 50% Injectable 12.5 Gram(s) IV Push once  diltiazem    milliGRAM(s) Oral daily  furosemide   Injectable 40 milliGRAM(s) IV Push two times a day  glucagon  Injectable 1 milliGRAM(s) IntraMuscular once  heparin   Injectable 8000 Unit(s) IV Push once  heparin  Infusion.  Unit(s)/Hr (18 mL/Hr) IV Continuous <Continuous>  insulin lispro (ADMELOG) corrective regimen sliding scale   SubCutaneous three times a day before meals  insulin lispro (ADMELOG) corrective regimen sliding scale   SubCutaneous at bedtime  magnesium sulfate  IVPB 1 Gram(s) IV Intermittent once  rosuvastatin 5 milliGRAM(s) Oral at bedtime  sevelamer carbonate 800 milliGRAM(s) Oral three times a day    ROS:  All 10 systems reviewed and positives noted in HPI    OBJECTIVE:    VITAL SIGNS:  Vital Signs Last 24 Hrs  T(C): 36.8 (06 May 2024 05:10), Max: 36.8 (06 May 2024 02:00)  T(F): 98.2 (06 May 2024 05:10), Max: 98.2 (06 May 2024 02:00)  HR: 77 (06 May 2024 09:20) (64 - 85)  BP: 166/77 (06 May 2024 05:10) (159/74 - 205/101)  BP(mean): --  RR: 17 (06 May 2024 05:10) (16 - 24)  SpO2: 97% (06 May 2024 09:20) (95% - 100%)    Parameters below as of 06 May 2024 09:20  Patient On (Oxygen Delivery Method): nasal cannula, 2L        PHYSICAL EXAM:  General:  no distress  HEENT: sclera anicteric  Neck: supple  CVS: JVP ~ 7 cm H20, RRR, s1, s2, no murmurs/rubs  Chest: unlabored respirations, decreased breath sounds  Abdomen: non-distended  Extremities: no lower extremity edema b/l  Neuro: awake, alert & oriented    LABS:                        8.8    10.16 )-----------( 308      ( 06 May 2024 06:32 )             29.4     05-06    142  |  107  |  65<H>  ----------------------------<  154<H>  4.5   |  21<L>  |  2.13<H>    Ca    7.4<L>      06 May 2024 06:32  Phos  6.3     05-06  Mg     1.9     05-06    TPro  7.7  /  Alb  3.1<L>  /  TBili  0.2  /  DBili  x   /  AST  12  /  ALT  26  /  AlkPhos  94  05-06        PT/INR - ( 05 May 2024 23:20 )   PT: 11.7 sec;   INR: 1.00 ratio         PTT - ( 06 May 2024 05:20 )  PTT:24.4 sec      ECG: Sinus rhythm with 1st degree block      Stress 3/2018  The left ventricle was normal in size. Normal myocardial  perfusion scan, with no evidence of infarction or  inducible ischemia.

## 2024-05-06 NOTE — CONSULT NOTE ADULT - NS ATTEND AMEND GEN_ALL_CORE FT
I have seen and examined the patient. I have discussed case with CORNEL Washburn.    Serenity Gupta is a 79 year old woman with past medical history of Hypertension, Hyperlipidemia, Type II Diabetes Mellitus, Obesity, Chronic kidney disease and Anemia presents with acute onset of shortness of breath, found to have hypertensive emergency with flash pulmonary edema.     ECG consistent with sinus rhythm and first degree AV block, no acute ischemic ST abnormalities. Troponins negative x 2. Echo consistent with normal LVEF 55-60%, moderate-severe LVH, severe pulmonary hypertension with elevation in filling pressures. VQ scan with very low probability of pulmonary embolism. Leg US negative for DVT. CXR suggestive of CHF, pro BNP elevated. Overall, appears to have HFpEF based on echo findings and symptoms, would continue IV diuresis with consultation with Nephrology due to CKD. The patient does not appear to be candidate for SGLT2i or MRA due to CKD. Recommend outpatient evaluation for restrictive or infiltrative cardiomyopathy due to significant LVH. Continue to monitor on telemetry. May need to consider Hydralazine for additional BP control, continue Coreg and Diltiazem.    Jovan Jain MD  Cardiology

## 2024-05-07 LAB
ALBUMIN SERPL ELPH-MCNC: 2.8 G/DL — LOW (ref 3.3–5)
ALP SERPL-CCNC: 87 U/L — SIGNIFICANT CHANGE UP (ref 40–120)
ALT FLD-CCNC: 17 U/L — SIGNIFICANT CHANGE UP (ref 10–45)
ANION GAP SERPL CALC-SCNC: 9 MMOL/L — SIGNIFICANT CHANGE UP (ref 5–17)
AST SERPL-CCNC: 7 U/L — LOW (ref 10–40)
BILIRUB SERPL-MCNC: 0.3 MG/DL — SIGNIFICANT CHANGE UP (ref 0.2–1.2)
BUN SERPL-MCNC: 70 MG/DL — HIGH (ref 7–23)
CALCIUM SERPL-MCNC: 7.1 MG/DL — LOW (ref 8.4–10.5)
CHLORIDE SERPL-SCNC: 106 MMOL/L — SIGNIFICANT CHANGE UP (ref 96–108)
CO2 SERPL-SCNC: 25 MMOL/L — SIGNIFICANT CHANGE UP (ref 22–31)
CREAT SERPL-MCNC: 2.27 MG/DL — HIGH (ref 0.5–1.3)
CULTURE RESULTS: SIGNIFICANT CHANGE UP
EGFR: 21 ML/MIN/1.73M2 — LOW
FOLATE SERPL-MCNC: 18.4 NG/ML — SIGNIFICANT CHANGE UP
GLUCOSE BLDC GLUCOMTR-MCNC: 115 MG/DL — HIGH (ref 70–99)
GLUCOSE BLDC GLUCOMTR-MCNC: 140 MG/DL — HIGH (ref 70–99)
GLUCOSE BLDC GLUCOMTR-MCNC: 169 MG/DL — HIGH (ref 70–99)
GLUCOSE BLDC GLUCOMTR-MCNC: 211 MG/DL — HIGH (ref 70–99)
GLUCOSE SERPL-MCNC: 118 MG/DL — HIGH (ref 70–99)
HCT VFR BLD CALC: 28.9 % — LOW (ref 34.5–45)
HGB BLD-MCNC: 8.8 G/DL — LOW (ref 11.5–15.5)
MAGNESIUM SERPL-MCNC: 1.8 MG/DL — SIGNIFICANT CHANGE UP (ref 1.6–2.6)
MCHC RBC-ENTMCNC: 26.4 PG — LOW (ref 27–34)
MCHC RBC-ENTMCNC: 30.4 GM/DL — LOW (ref 32–36)
MCV RBC AUTO: 86.8 FL — SIGNIFICANT CHANGE UP (ref 80–100)
NRBC # BLD: 0 /100 WBCS — SIGNIFICANT CHANGE UP (ref 0–0)
PHOSPHATE SERPL-MCNC: 5.4 MG/DL — HIGH (ref 2.5–4.5)
PLATELET # BLD AUTO: 278 K/UL — SIGNIFICANT CHANGE UP (ref 150–400)
POTASSIUM SERPL-MCNC: 4 MMOL/L — SIGNIFICANT CHANGE UP (ref 3.5–5.3)
POTASSIUM SERPL-SCNC: 4 MMOL/L — SIGNIFICANT CHANGE UP (ref 3.5–5.3)
PROT SERPL-MCNC: 6.3 G/DL — SIGNIFICANT CHANGE UP (ref 6–8.3)
PROT SERPL-MCNC: 7 G/DL — SIGNIFICANT CHANGE UP (ref 6–8.3)
RBC # BLD: 3.33 M/UL — LOW (ref 3.8–5.2)
RBC # FLD: 16.5 % — HIGH (ref 10.3–14.5)
SODIUM SERPL-SCNC: 140 MMOL/L — SIGNIFICANT CHANGE UP (ref 135–145)
SPECIMEN SOURCE: SIGNIFICANT CHANGE UP
VIT B12 SERPL-MCNC: 909 PG/ML — SIGNIFICANT CHANGE UP (ref 232–1245)
WBC # BLD: 8.45 K/UL — SIGNIFICANT CHANGE UP (ref 3.8–10.5)
WBC # FLD AUTO: 8.45 K/UL — SIGNIFICANT CHANGE UP (ref 3.8–10.5)

## 2024-05-07 PROCEDURE — 99233 SBSQ HOSP IP/OBS HIGH 50: CPT | Mod: GC

## 2024-05-07 PROCEDURE — 74176 CT ABD & PELVIS W/O CONTRAST: CPT | Mod: 26

## 2024-05-07 PROCEDURE — 99232 SBSQ HOSP IP/OBS MODERATE 35: CPT

## 2024-05-07 PROCEDURE — 93010 ELECTROCARDIOGRAM REPORT: CPT

## 2024-05-07 RX ORDER — SENNA PLUS 8.6 MG/1
2 TABLET ORAL AT BEDTIME
Refills: 0 | Status: DISCONTINUED | OUTPATIENT
Start: 2024-05-07 | End: 2024-05-09

## 2024-05-07 RX ORDER — POLYETHYLENE GLYCOL 3350 17 G/17G
17 POWDER, FOR SOLUTION ORAL DAILY
Refills: 0 | Status: DISCONTINUED | OUTPATIENT
Start: 2024-05-07 | End: 2024-05-09

## 2024-05-07 RX ORDER — HYDRALAZINE HCL 50 MG
50 TABLET ORAL EVERY 8 HOURS
Refills: 0 | Status: DISCONTINUED | OUTPATIENT
Start: 2024-05-07 | End: 2024-05-09

## 2024-05-07 RX ORDER — FUROSEMIDE 40 MG
40 TABLET ORAL DAILY
Refills: 0 | Status: DISCONTINUED | OUTPATIENT
Start: 2024-05-08 | End: 2024-05-08

## 2024-05-07 RX ADMIN — Medication 40 MILLIGRAM(S): at 06:36

## 2024-05-07 RX ADMIN — Medication 180 MILLIGRAM(S): at 06:24

## 2024-05-07 RX ADMIN — POLYETHYLENE GLYCOL 3350 17 GRAM(S): 17 POWDER, FOR SOLUTION ORAL at 12:19

## 2024-05-07 RX ADMIN — CARVEDILOL PHOSPHATE 25 MILLIGRAM(S): 80 CAPSULE, EXTENDED RELEASE ORAL at 17:34

## 2024-05-07 RX ADMIN — Medication 25 MILLIGRAM(S): at 15:01

## 2024-05-07 RX ADMIN — Medication 25 MILLIGRAM(S): at 06:23

## 2024-05-07 RX ADMIN — HEPARIN SODIUM 5000 UNIT(S): 5000 INJECTION INTRAVENOUS; SUBCUTANEOUS at 22:00

## 2024-05-07 RX ADMIN — ROSUVASTATIN CALCIUM 5 MILLIGRAM(S): 5 TABLET ORAL at 21:44

## 2024-05-07 RX ADMIN — HEPARIN SODIUM 5000 UNIT(S): 5000 INJECTION INTRAVENOUS; SUBCUTANEOUS at 06:23

## 2024-05-07 RX ADMIN — CARVEDILOL PHOSPHATE 25 MILLIGRAM(S): 80 CAPSULE, EXTENDED RELEASE ORAL at 06:24

## 2024-05-07 RX ADMIN — HEPARIN SODIUM 5000 UNIT(S): 5000 INJECTION INTRAVENOUS; SUBCUTANEOUS at 15:01

## 2024-05-07 RX ADMIN — Medication 667 MILLIGRAM(S): at 11:31

## 2024-05-07 RX ADMIN — Medication 50 MILLIGRAM(S): at 21:53

## 2024-05-07 RX ADMIN — Medication 667 MILLIGRAM(S): at 17:34

## 2024-05-07 RX ADMIN — Medication 2: at 12:19

## 2024-05-07 NOTE — PHYSICAL THERAPY INITIAL EVALUATION ADULT - ADDITIONAL COMMENTS
pt lives w/son and his family in private house, 1 lisa, 1 flt to br. pt uses cane to ambulate, has RW, independent w/ADL's, +

## 2024-05-07 NOTE — PROGRESS NOTE ADULT - SUBJECTIVE AND OBJECTIVE BOX
Patient is a 79-year-old female with PMHx of type 2 diabetes, HTN, HLD, CKD, anemia, presenting with SOB, admitted for CHF exacerbation vs PNA.    Overnight Events: None  Interval HPI: Today is hospital day 1. Patient seen and examined at bedside.     REVIEW OF SYSTEMS:  CONSTITUTIONAL: (-) weakness, (-) fevers, (-) chills  EYES/ENT: (-) visual changes,  (-) vertigo,  (-) throat pain   NECK:  (-) pain, (-) stiffness  RESPIRATORY:  (-) shortness of breath, (-) cough,  (-) wheezing,  (-) hemoptysis   CARDIOVASCULAR:  (-) chest pain, (-) palpitations  GASTROINTESTINAL:  (-) abdominal or epigastric pain, (-) nausea, (-) vomiting, (-) diarrhea, (-) constipation, (-) melena,  (-) hematemesis,  (-) hematochezia  GENITOURINARY: (-) dysuria, (-) frequency, (-) hematuria  NEUROLOGICAL: (-) numbness, (-) weakness  SKIN: (-) itching, (-) rashes, (-) lesions    Vital Signs Last 24 Hrs  T(C): 36.9 (07 May 2024 05:07), Max: 37.1 (06 May 2024 20:26)  T(F): 98.5 (07 May 2024 05:07), Max: 98.7 (06 May 2024 20:26)  HR: 68 (07 May 2024 05:07) (68 - 77)  BP: 172/74 (07 May 2024 05:07) (165/73 - 172/74)  BP(mean): --  RR: 16 (07 May 2024 05:07) (16 - 17)  SpO2: 96% (07 May 2024 05:07) (96% - 98%)    Parameters below as of 07 May 2024 05:07  Patient On (Oxygen Delivery Method): nasal cannula  O2 Flow (L/min): 2      PHYSICAL EXAM:  GENERAL: NAD, lying in bed comfortably  HEAD:  Atraumatic, Normocephalic  EYES: EOMI, conjunctiva and sclera clear  ENT: Dry mucous membranes  NECK: Supple  CHEST/LUNG: Clear to auscultation bilaterally, good air entry bilaterally; No wheezing, rales, or rhonchi. Unlabored respirations  HEART: Regular rate and rhythm. S1 and S2. No murmurs, rubs, or gallops  ABDOMEN: Soft, Nontender, Nondistended. Bowel sounds present.   EXTREMITIES:  2+ Peripheral Pulses. b/l chronic venous stasis discoloration. No clubbing, cyanosis, or edema  NERVOUS SYSTEM:  Alert & Oriented X3, speech clear. No deficits.  SKIN: No rashes, bruises, or other lesions    LABS:   All Labs Personally Reviewed                         8.8    8.45  )-----------( 278      ( 07 May 2024 06:08 )             28.9     06 May 2024 06:32    142    |  107    |  65     ----------------------------<  154    4.5     |  21     |  2.13     Ca    7.4        06 May 2024 06:32  Phos  6.3       06 May 2024 02:00  Mg     1.9       06 May 2024 02:00    TPro  7.7    /  Alb  3.1    /  TBili  0.2    /  DBili  x      /  AST  12     /  ALT  26     /  AlkPhos  94     06 May 2024 06:32    PT/INR - ( 05 May 2024 23:20 )   PT: 11.7 sec;   INR: 1.00 ratio         PTT - ( 06 May 2024 13:35 )  PTT:48.5 sec  CAPILLARY BLOOD GLUCOSE      POCT Blood Glucose.: 218 mg/dL (06 May 2024 22:04)  POCT Blood Glucose.: 104 mg/dL (06 May 2024 17:35)  POCT Blood Glucose.: 147 mg/dL (06 May 2024 14:21)  POCT Blood Glucose.: 118 mg/dL (06 May 2024 08:52)    LIVER FUNCTIONS - ( 06 May 2024 06:32 )  Alb: 3.1 g/dL / Pro: 7.7 g/dL / ALK PHOS: 94 U/L / ALT: 26 U/L / AST: 12 U/L / GGT: x           Urinalysis Basic - ( 06 May 2024 06:32 )    Color: x / Appearance: x / SG: x / pH: x  Gluc: 154 mg/dL / Ketone: x  / Bili: x / Urobili: x   Blood: x / Protein: x / Nitrite: x   Leuk Esterase: x / RBC: x / WBC x   Sq Epi: x / Non Sq Epi: x / Bacteria: x    RADIOLOGY/EKG:  All Imaging and EKGs Personally Reviewed     No new imaging to review at this time       MEDICATIONS:  MEDICATIONS  (STANDING):  calcium acetate 667 milliGRAM(s) Oral three times a day with meals  carvedilol 25 milliGRAM(s) Oral every 12 hours  dextrose 10% Bolus 125 milliLiter(s) IV Bolus once  dextrose 5%. 1000 milliLiter(s) (50 mL/Hr) IV Continuous <Continuous>  dextrose 5%. 1000 milliLiter(s) (100 mL/Hr) IV Continuous <Continuous>  dextrose 50% Injectable 25 Gram(s) IV Push once  dextrose 50% Injectable 12.5 Gram(s) IV Push once  diltiazem    milliGRAM(s) Oral daily  furosemide   Injectable 40 milliGRAM(s) IV Push two times a day  glucagon  Injectable 1 milliGRAM(s) IntraMuscular once  heparin   Injectable 5000 Unit(s) SubCutaneous every 8 hours  hydrALAZINE 25 milliGRAM(s) Oral every 8 hours  insulin lispro (ADMELOG) corrective regimen sliding scale   SubCutaneous three times a day before meals  insulin lispro (ADMELOG) corrective regimen sliding scale   SubCutaneous at bedtime  rosuvastatin 5 milliGRAM(s) Oral at bedtime    MEDICATIONS  (PRN):  acetaminophen     Tablet .. 650 milliGRAM(s) Oral every 6 hours PRN Temp greater or equal to 38C (100.4F), Mild Pain (1 - 3)  aluminum hydroxide/magnesium hydroxide/simethicone Suspension 30 milliLiter(s) Oral every 4 hours PRN Dyspepsia  dextrose Oral Gel 15 Gram(s) Oral once PRN Blood Glucose LESS THAN 70 milliGRAM(s)/deciliter  melatonin 3 milliGRAM(s) Oral at bedtime PRN Insomnia  ondansetron Injectable 4 milliGRAM(s) IV Push every 8 hours PRN Nausea and/or Vomiting         Patient is a 79-year-old female with PMHx of type 2 diabetes, HTN, HLD, CKD, anemia, presenting with SOB, admitted for CHF exacerbation vs PNA.    Overnight Events: None  Interval HPI: Today is hospital day 1. Patient seen and examined at bedside. Patient feeling well, states she felt SOB last night and it awoke her from her sleep, unsure if she snores at night, otherwise breathing fine this morning. Tolerating diet, voiding appropriately. Saturating ~ 96% at 2L. Plan to taper down as tolerated, decrease diuresis. No further questions at this time.     REVIEW OF SYSTEMS:  CONSTITUTIONAL: (-) weakness, (-) fevers, (-) chills  EYES/ENT: (-) visual changes,  (-) vertigo,  (-) throat pain   NECK:  (-) pain, (-) stiffness  RESPIRATORY:  (-) shortness of breath, (-) cough,  (-) wheezing,  (-) hemoptysis   CARDIOVASCULAR:  (-) chest pain, (-) palpitations  GASTROINTESTINAL:  (-) abdominal or epigastric pain, (-) nausea, (-) vomiting, (-) diarrhea, (-) constipation, (-) melena,  (-) hematemesis,  (-) hematochezia  GENITOURINARY: (-) dysuria, (-) frequency, (-) hematuria  NEUROLOGICAL: (-) numbness, (-) weakness  SKIN: (-) itching, (-) rashes, (-) lesions    Vital Signs Last 24 Hrs  T(C): 36.9 (07 May 2024 05:07), Max: 37.1 (06 May 2024 20:26)  T(F): 98.5 (07 May 2024 05:07), Max: 98.7 (06 May 2024 20:26)  HR: 68 (07 May 2024 05:07) (68 - 77)  BP: 172/74 (07 May 2024 05:07) (165/73 - 172/74)  BP(mean): --  RR: 16 (07 May 2024 05:07) (16 - 17)  SpO2: 96% (07 May 2024 05:07) (96% - 98%)    Parameters below as of 07 May 2024 05:07  Patient On (Oxygen Delivery Method): nasal cannula  O2 Flow (L/min): 2      PHYSICAL EXAM:  GENERAL: NAD, lying in bed comfortably  HEAD:  Atraumatic, Normocephalic  EYES: EOMI, conjunctiva and sclera clear  ENT: Dry mucous membranes  NECK: Supple  CHEST/LUNG: Clear to auscultation bilaterally, good air entry bilaterally; No wheezing, rales, or rhonchi. Unlabored respirations  HEART: Regular rate and rhythm. S1 and S2. No murmurs, rubs, or gallops  ABDOMEN: Soft, Nontender, Nondistended. Bowel sounds present.   EXTREMITIES:  2+ Peripheral Pulses. b/l chronic venous stasis discoloration. No clubbing, cyanosis, or edema  NERVOUS SYSTEM:  Alert & Oriented X3, speech clear. No deficits.  SKIN: No rashes, bruises, or other lesions    LABS:   All Labs Personally Reviewed                         8.8    8.45  )-----------( 278      ( 07 May 2024 06:08 )             28.9     06 May 2024 06:32    142    |  107    |  65     ----------------------------<  154    4.5     |  21     |  2.13     Ca    7.4        06 May 2024 06:32  Phos  6.3       06 May 2024 02:00  Mg     1.9       06 May 2024 02:00    TPro  7.7    /  Alb  3.1    /  TBili  0.2    /  DBili  x      /  AST  12     /  ALT  26     /  AlkPhos  94     06 May 2024 06:32    PT/INR - ( 05 May 2024 23:20 )   PT: 11.7 sec;   INR: 1.00 ratio         PTT - ( 06 May 2024 13:35 )  PTT:48.5 sec  CAPILLARY BLOOD GLUCOSE      POCT Blood Glucose.: 218 mg/dL (06 May 2024 22:04)  POCT Blood Glucose.: 104 mg/dL (06 May 2024 17:35)  POCT Blood Glucose.: 147 mg/dL (06 May 2024 14:21)  POCT Blood Glucose.: 118 mg/dL (06 May 2024 08:52)    LIVER FUNCTIONS - ( 06 May 2024 06:32 )  Alb: 3.1 g/dL / Pro: 7.7 g/dL / ALK PHOS: 94 U/L / ALT: 26 U/L / AST: 12 U/L / GGT: x           Urinalysis Basic - ( 06 May 2024 06:32 )    Color: x / Appearance: x / SG: x / pH: x  Gluc: 154 mg/dL / Ketone: x  / Bili: x / Urobili: x   Blood: x / Protein: x / Nitrite: x   Leuk Esterase: x / RBC: x / WBC x   Sq Epi: x / Non Sq Epi: x / Bacteria: x    RADIOLOGY/EKG:  All Imaging and EKGs Personally Reviewed     No new imaging to review at this time       MEDICATIONS:  MEDICATIONS  (STANDING):  calcium acetate 667 milliGRAM(s) Oral three times a day with meals  carvedilol 25 milliGRAM(s) Oral every 12 hours  dextrose 10% Bolus 125 milliLiter(s) IV Bolus once  dextrose 5%. 1000 milliLiter(s) (50 mL/Hr) IV Continuous <Continuous>  dextrose 5%. 1000 milliLiter(s) (100 mL/Hr) IV Continuous <Continuous>  dextrose 50% Injectable 25 Gram(s) IV Push once  dextrose 50% Injectable 12.5 Gram(s) IV Push once  diltiazem    milliGRAM(s) Oral daily  furosemide   Injectable 40 milliGRAM(s) IV Push daily  glucagon  Injectable 1 milliGRAM(s) IntraMuscular once  heparin   Injectable 5000 Unit(s) SubCutaneous every 8 hours  hydrALAZINE 25 milliGRAM(s) Oral every 8 hours  insulin lispro (ADMELOG) corrective regimen sliding scale   SubCutaneous at bedtime  insulin lispro (ADMELOG) corrective regimen sliding scale   SubCutaneous three times a day before meals  polyethylene glycol 3350 17 Gram(s) Oral daily  rosuvastatin 5 milliGRAM(s) Oral at bedtime  senna 2 Tablet(s) Oral at bedtime    MEDICATIONS  (PRN):  acetaminophen     Tablet .. 650 milliGRAM(s) Oral every 6 hours PRN Temp greater or equal to 38C (100.4F), Mild Pain (1 - 3)  aluminum hydroxide/magnesium hydroxide/simethicone Suspension 30 milliLiter(s) Oral every 4 hours PRN Dyspepsia  dextrose Oral Gel 15 Gram(s) Oral once PRN Blood Glucose LESS THAN 70 milliGRAM(s)/deciliter  melatonin 3 milliGRAM(s) Oral at bedtime PRN Insomnia  ondansetron Injectable 4 milliGRAM(s) IV Push every 8 hours PRN Nausea and/or Vomiting

## 2024-05-07 NOTE — PHYSICAL THERAPY INITIAL EVALUATION ADULT - PERTINENT HX OF CURRENT PROBLEM, REHAB EVAL
79-year-old female with history of T2DM, HTN, HLD, anemia, CKD, presents to ED for c/o acute onset SOB at about 9 PM today after dinner. Pt was feeling well earlier in the day. Pt states recently felt that her ears were popping and went to ENT who told her that she had a sinus infection, in which she was then prescribed flonase and methylprednisolone which she has taken for 2days. Denies fever, chills, chest pain, palpitations, abdominal pain, nausea, or vomiting. Pt states she is allergic to steroids. Additionally, pt states that she was taken off chlorthalidone 25mg about a month ago by her nephrologist because of improved edema. However since then, LE edema has worsened.

## 2024-05-07 NOTE — PROGRESS NOTE ADULT - ASSESSMENT
Patient is a 79-year-old female with PMHx of type 2 diabetes, HTN, HLD, CKD, anemia, presenting with SOB, admitted for CHF exacerbation.    FULL NOTE PENDING    #CHF Exacerbation, HFpEF  -Presented with SOB, MORGAN  -Nuclear stress testing normal 2/2024   -TTE 2/2024 w/ grade 2 diastolic dysfunction   -BNP 1476, trops neg x 2, DDimer 1564 (age corrected 395)  -LE dopplers negative/VQ negative   -WBC 12.56, procal 0.14, RVP neg   -CXR w/ congestive features, no consolidations  -S/p azitho/CTX in ED, will D/C abx and duonebs  -TTE EF 55-60%, severely increased septal wall thickness, severe pHTN  -C/w lasix 40 IV BID, decrease to QD today   -Strict I&Os, daily weights  -Cardiology recs appreciated - hydralazine for BP control, c/w coreg and dilt, outpt workup for cardiomyopathy    #ZORAIDA on CKD4 vs worsening CKD  #CKD w/ MBD  -Cr 1.74 in 8/2023, 1.6 in June, 2.24 on admit  -Cr 2.27 today from 2.13 yesterday  -Ca decreased, 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)   -Renal US w/ trace bilateral pelvic fullness versus extrarenal pelves  -Phosp 5.4 today  -C/w renvela 800mg TID  -Monitor BMP  -Nephro consult appreciated - c/w lasix, F/U CTAP w/o cont    #Chronic Anemia 2/2 CKD  -Monthly home procrit  -Hg 9.1 on admission, 8.8 today  -Monitor Hgb    #T2DM  -Chronic, a1c 6.8  -Hold home farxiga and glucotrol  -C/w moderate ISS  -Hypoglycemic protocol  -Consistent carb diet     #HTN  -Chronic  -C/w home diltiazem 180  -C/w home coreg 25BID  -C/w hydralazine 25mg Q8hrs  -Monitor vitals    #HLD  -Chronic  -C/w home rosuvastatin    #DVT ppx: heparin sc 5000u Q8  #Code: Full    Discussed with attending, Dr. Fuentes  Patient is a 79-year-old female with PMHx of type 2 diabetes, HTN, HLD, CKD, anemia, presenting with SOB, admitted for CHF exacerbation.    #CHF Exacerbation, HFpEF  -Presented with SOB, MORGAN  -Nuclear stress testing normal 2/2024   -TTE 2/2024 w/ grade 2 diastolic dysfunction   -BNP 1476, trops neg x 2, DDimer 1564 (age corrected 395)  -LE dopplers negative/VQ negative   -WBC 12.56, procal 0.14, RVP neg   -CXR w/ congestive features, no consolidations  -S/p azitho/CTX in ED, will D/C abx and duonebs  -TTE EF 55-60%, severely increased septal wall thickness, severe pHTN  -Decrease lasix 40mg IV to QD today  -Strict I&Os, daily weights  -Cardiology recs appreciated - hydralazine for BP control, c/w coreg and dilt, outpt workup for cardiomyopathy    #ZORAIDA on CKD4 vs worsening CKD  #CKD w/ MBD  -Cr 1.74 in 8/2023, 1.6 in June, 2.24 on admit  -Cr 2.27 today from 2.13 yesterday  -Ca decreased, Phosph/PTH elevated at 129 (2ndary hyperPTH)  -Renal US w/ trace bilateral pelvic fullness versus extrarenal pelves  -Phosp 5.4 today  -Monitor BMP  -Nephro consult appreciated - c/w lasix, F/U CTAP w/o cont, d/c renvela, start phoslo 667mg TID, epo once BP better controlled    #Chronic Anemia 2/2 CKD  -Monthly home procrit  -Hg 9.1 on admission, 8.8 today  -Monitor Hgb    #T2DM  -Chronic, a1c 6.8  -Hold home farxiga and glucotrol  -C/w moderate ISS  -Hypoglycemic protocol  -Consistent carb diet     #HTN  -Chronic  -C/w home diltiazem 180  -C/w home coreg 25BID  -C/w hydralazine 25mg Q8hrs  -Monitor vitals    #HLD  -Chronic  -C/w home rosuvastatin    #DVT ppx: heparin sc 5000u Q8  #Code: Full    Discussed with attending, Dr. Fuentes

## 2024-05-07 NOTE — PROGRESS NOTE ADULT - SUBJECTIVE AND OBJECTIVE BOX
Feels better, comfortable on RA    Vital Signs Last 24 Hrs  T(C): 36.8 (05-07-24 @ 12:53), Max: 37.1 (05-06-24 @ 20:26)  T(F): 98.3 (05-07-24 @ 12:53), Max: 98.7 (05-06-24 @ 20:26)  HR: 69 (05-07-24 @ 12:53) (68 - 77)  BP: 150/79 (05-07-24 @ 12:53) (150/79 - 172/74)  RR: 16 (05-07-24 @ 12:53) (16 - 17)  SpO2: 95% (05-07-24 @ 12:53) (95% - 98%)    I&O's Detail    06 May 2024 07:01  -  07 May 2024 07:00  --------------------------------------------------------  OUT:    Voided (mL): 3000 mL  Total OUT: 3000 mL    07 May 2024 07:01  -  07 May 2024 18:18  --------------------------------------------------------  IN:    Oral Fluid: 400 mL  Total IN: 400 mL    OUT:    Voided (mL): 1300 mL  Total OUT: 1300 mL    s1s2  b/l air entry  soft, ND  sm edema                         8.8    8.45  )-----------( 278      ( 07 May 2024 06:08 )             28.9     07 May 2024 06:08    140    |  106    |  70     ----------------------------<  118    4.0     |  25     |  2.27     Ca    7.1        07 May 2024 06:08  Phos  5.4       07 May 2024 06:08  Mg     1.8       07 May 2024 06:08    TPro  7.0    /  Alb  2.8    /  TBili  0.3    /  DBili  x      /  AST  7      /  ALT  17     /  AlkPhos  87     07 May 2024 06:08    LIVER FUNCTIONS - ( 07 May 2024 06:08 )  Alb: 2.8 g/dL / Pro: 7.0 g/dL / ALK PHOS: 87 U/L / ALT: 17 U/L / AST: 7 U/L / GGT: x           PT/INR - ( 05 May 2024 23:20 )   PT: 11.7 sec;   INR: 1.00 ratio      Culture - Urine (collected 06 May 2024 06:00)  Source: Clean Catch Clean Catch (Midstream)  Final Report (07 May 2024 07:53):    <10,000 CFU/mL Normal Urogenital Cherri    Culture - Blood (collected 06 May 2024 00:30)  Source: .Blood Blood  Preliminary Report (07 May 2024 12:02):    No growth at 24 hours    Culture - Blood (collected 06 May 2024 00:20)  Source: .Blood Blood  Preliminary Report (07 May 2024 12:02):    No growth at 24 hours    acetaminophen     Tablet .. 650 milliGRAM(s) Oral every 6 hours PRN  aluminum hydroxide/magnesium hydroxide/simethicone Suspension 30 milliLiter(s) Oral every 4 hours PRN  calcium acetate 667 milliGRAM(s) Oral three times a day with meals  carvedilol 25 milliGRAM(s) Oral every 12 hours  dextrose 10% Bolus 125 milliLiter(s) IV Bolus once  dextrose 5%. 1000 milliLiter(s) IV Continuous <Continuous>  dextrose 5%. 1000 milliLiter(s) IV Continuous <Continuous>  dextrose 50% Injectable 25 Gram(s) IV Push once  dextrose 50% Injectable 12.5 Gram(s) IV Push once  dextrose Oral Gel 15 Gram(s) Oral once PRN  diltiazem    milliGRAM(s) Oral daily  furosemide   Injectable 40 milliGRAM(s) IV Push daily  glucagon  Injectable 1 milliGRAM(s) IntraMuscular once  heparin   Injectable 5000 Unit(s) SubCutaneous every 8 hours  hydrALAZINE 25 milliGRAM(s) Oral every 8 hours  insulin lispro (ADMELOG) corrective regimen sliding scale   SubCutaneous three times a day before meals  insulin lispro (ADMELOG) corrective regimen sliding scale   SubCutaneous at bedtime  melatonin 3 milliGRAM(s) Oral at bedtime PRN  ondansetron Injectable 4 milliGRAM(s) IV Push every 8 hours PRN  polyethylene glycol 3350 17 Gram(s) Oral daily  rosuvastatin 5 milliGRAM(s) Oral at bedtime  senna 2 Tablet(s) Oral at bedtime    A/P:    DM, HTN, CKD 3 (Cr 1.74 - 8/14/23)  Adm w/CHF, b/l pl effusions   Suspect hemodynamic, cardio-renal ZORAIDA/CKD 3 vs DM CKD progression   Agree w/holding ACE/ARB, Farxiga for now   Agree w/IV Lasix   Cr is fairly stable   Phoslo for high Phos   Epoetin once BP better controlled  Will f/u CBC, BMP    [phone]

## 2024-05-07 NOTE — PROGRESS NOTE ADULT - SUBJECTIVE AND OBJECTIVE BOX
BURKE ABARCA  [id number]    Chief Complaint:SOB  Interval events: pt seen and examined, labs and chart reviewed. reports improvement in breathing. Sinus with PACs 60-70s    ALLERGIES:  predniSONE (Other)  statins (Muscle Pain)  ACE inhibitors (Angioedema)      CURRENT MEDICATIONS:  MEDICATIONS  (STANDING):  albuterol/ipratropium for Nebulization 3 milliLiter(s) Nebulizer every 6 hours  azithromycin  IVPB 500 milliGRAM(s) IV Intermittent every 24 hours  carvedilol 25 milliGRAM(s) Oral every 12 hours  cefTRIAXone   IVPB 1000 milliGRAM(s) IV Intermittent every 24 hours  dextrose 10% Bolus 125 milliLiter(s) IV Bolus once  dextrose 5%. 1000 milliLiter(s) (50 mL/Hr) IV Continuous <Continuous>  dextrose 5%. 1000 milliLiter(s) (100 mL/Hr) IV Continuous <Continuous>  dextrose 50% Injectable 25 Gram(s) IV Push once  dextrose 50% Injectable 12.5 Gram(s) IV Push once  diltiazem    milliGRAM(s) Oral daily  furosemide   Injectable 40 milliGRAM(s) IV Push two times a day  glucagon  Injectable 1 milliGRAM(s) IntraMuscular once  heparin   Injectable 8000 Unit(s) IV Push once  heparin  Infusion.  Unit(s)/Hr (18 mL/Hr) IV Continuous <Continuous>  insulin lispro (ADMELOG) corrective regimen sliding scale   SubCutaneous three times a day before meals  insulin lispro (ADMELOG) corrective regimen sliding scale   SubCutaneous at bedtime  magnesium sulfate  IVPB 1 Gram(s) IV Intermittent once  rosuvastatin 5 milliGRAM(s) Oral at bedtime  sevelamer carbonate 800 milliGRAM(s) Oral three times a day    ROS:  All 10 systems reviewed and positives noted in HPI    OBJECTIVE:    VITAL SIGNS:  Vital Signs Last 24 Hrs  T(C): 36.8 (06 May 2024 05:10), Max: 36.8 (06 May 2024 02:00)  T(F): 98.2 (06 May 2024 05:10), Max: 98.2 (06 May 2024 02:00)  HR: 77 (06 May 2024 09:20) (64 - 85)  BP: 166/77 (06 May 2024 05:10) (159/74 - 205/101)  BP(mean): --  RR: 17 (06 May 2024 05:10) (16 - 24)  SpO2: 97% (06 May 2024 09:20) (95% - 100%)    Parameters below as of 06 May 2024 09:20  Patient On (Oxygen Delivery Method): nasal cannula, 2L        PHYSICAL EXAM:  General:  no distress  HEENT: sclera anicteric  Neck: supple  CVS: JVP ~ 7 cm H20, RRR, s1, s2, no murmurs/rubs  Chest: unlabored respirations, clear breath sounds  Abdomen: non-distended  Extremities: no lower extremity edema b/l  Neuro: awake, alert & oriented    LABS:                        8.8    10.16 )-----------( 308      ( 06 May 2024 06:32 )             29.4     05-06    142  |  107  |  65<H>  ----------------------------<  154<H>  4.5   |  21<L>  |  2.13<H>    Ca    7.4<L>      06 May 2024 06:32  Phos  6.3     05-06  Mg     1.9     05-06    TPro  7.7  /  Alb  3.1<L>  /  TBili  0.2  /  DBili  x   /  AST  12  /  ALT  26  /  AlkPhos  94  05-06        PT/INR - ( 05 May 2024 23:20 )   PT: 11.7 sec;   INR: 1.00 ratio         PTT - ( 06 May 2024 05:20 )  PTT:24.4 sec      ECG: Sinus rhythm with 1st degree block      Stress 3/2018  The left ventricle was normal in size. Normal myocardial  perfusion scan, with no evidence of infarction or  inducible ischemia.      < from: TTE Echo Complete w/o Contrast w/ Doppler (05.06.24 @ 10:01) >   1. Normal global left ventricular systolic function.   2. Left ventricular ejection fraction, by visual estimation, is 55 to   60%.   3. Moderately increased posterior wall thickness. Severely increased   septal wall thickness.   4. The right ventricle is not well visualized, appears normal in size   with normal systolic function.   5. The left atrium is not well visualized, appears normal in size.   6. The right atrium is not well visualized, appears top normal in size.   7. Mild mitral valve regurgitation.   8. Mild tricuspid regurgitation.   9. No aortic valve stenosis.  10. Estimated pulmonary artery systolic pressure is 61.0 mmHg assuming a   right atrial pressure of 8 mmHg, which is consistent with severe   pulmonary hypertension.  11. There is no evidence of pericardial effusion.       BURKE ABARCA  [id number]    Chief Complaint: HTN/Pulmonary edema    Interval events: Patient seen and examined, labs and chart reviewed. Reports improvement in breathing. Sinus with PACs 60-70s    ALLERGIES:  predniSONE (Other)  statins (Muscle Pain)  ACE inhibitors (Angioedema)      CURRENT MEDICATIONS:  MEDICATIONS  (STANDING):  albuterol/ipratropium for Nebulization 3 milliLiter(s) Nebulizer every 6 hours  azithromycin  IVPB 500 milliGRAM(s) IV Intermittent every 24 hours  carvedilol 25 milliGRAM(s) Oral every 12 hours  cefTRIAXone   IVPB 1000 milliGRAM(s) IV Intermittent every 24 hours  dextrose 10% Bolus 125 milliLiter(s) IV Bolus once  dextrose 5%. 1000 milliLiter(s) (50 mL/Hr) IV Continuous <Continuous>  dextrose 5%. 1000 milliLiter(s) (100 mL/Hr) IV Continuous <Continuous>  dextrose 50% Injectable 25 Gram(s) IV Push once  dextrose 50% Injectable 12.5 Gram(s) IV Push once  diltiazem    milliGRAM(s) Oral daily  furosemide   Injectable 40 milliGRAM(s) IV Push two times a day  glucagon  Injectable 1 milliGRAM(s) IntraMuscular once  heparin   Injectable 8000 Unit(s) IV Push once  heparin  Infusion.  Unit(s)/Hr (18 mL/Hr) IV Continuous <Continuous>  insulin lispro (ADMELOG) corrective regimen sliding scale   SubCutaneous three times a day before meals  insulin lispro (ADMELOG) corrective regimen sliding scale   SubCutaneous at bedtime  magnesium sulfate  IVPB 1 Gram(s) IV Intermittent once  rosuvastatin 5 milliGRAM(s) Oral at bedtime  sevelamer carbonate 800 milliGRAM(s) Oral three times a day    ROS:  All 10 systems reviewed and positives noted in HPI    OBJECTIVE:    VITAL SIGNS:  Vital Signs Last 24 Hrs  T(C): 36.8 (06 May 2024 05:10), Max: 36.8 (06 May 2024 02:00)  T(F): 98.2 (06 May 2024 05:10), Max: 98.2 (06 May 2024 02:00)  HR: 77 (06 May 2024 09:20) (64 - 85)  BP: 166/77 (06 May 2024 05:10) (159/74 - 205/101)  BP(mean): --  RR: 17 (06 May 2024 05:10) (16 - 24)  SpO2: 97% (06 May 2024 09:20) (95% - 100%)    Parameters below as of 06 May 2024 09:20  Patient On (Oxygen Delivery Method): nasal cannula, 2L        PHYSICAL EXAM:  General:  no distress  HEENT: sclera anicteric  Neck: supple  CVS: JVP ~ 7 cm H20, RRR, s1, s2, no murmurs/rubs  Chest: unlabored respirations, clear breath sounds  Abdomen: non-distended  Extremities: no lower extremity edema b/l  Neuro: awake, alert & oriented    LABS:                        8.8    10.16 )-----------( 308      ( 06 May 2024 06:32 )             29.4     05-06    142  |  107  |  65<H>  ----------------------------<  154<H>  4.5   |  21<L>  |  2.13<H>    Ca    7.4<L>      06 May 2024 06:32  Phos  6.3     05-06  Mg     1.9     05-06    TPro  7.7  /  Alb  3.1<L>  /  TBili  0.2  /  DBili  x   /  AST  12  /  ALT  26  /  AlkPhos  94  05-06        PT/INR - ( 05 May 2024 23:20 )   PT: 11.7 sec;   INR: 1.00 ratio         PTT - ( 06 May 2024 05:20 )  PTT:24.4 sec      ECG: Sinus rhythm with 1st degree block      Stress 3/2018  The left ventricle was normal in size. Normal myocardial  perfusion scan, with no evidence of infarction or  inducible ischemia.      < from: TTE Echo Complete w/o Contrast w/ Doppler (05.06.24 @ 10:01) >   1. Normal global left ventricular systolic function.   2. Left ventricular ejection fraction, by visual estimation, is 55 to 60%.   3. Moderately increased posterior wall thickness. Severely increased   septal wall thickness.   4. The right ventricle is not well visualized, appears normal in size   with normal systolic function.   5. The left atrium is not well visualized, appears normal in size.   6. The right atrium is not well visualized, appears top normal in size.   7. Mild mitral valve regurgitation.   8. Mild tricuspid regurgitation.   9. No aortic valve stenosis.  10. Estimated pulmonary artery systolic pressure is 61.0 mmHg assuming a   right atrial pressure of 8 mmHg, which is consistent with severe   pulmonary hypertension.  11. There is no evidence of pericardial effusion.

## 2024-05-07 NOTE — PROGRESS NOTE ADULT - ASSESSMENT
Assessment: 79-year-old female with history of DM, HTN, HLD, and CKD admitted with acute onset shortness of breath x 1 day.  Patient denies any chest pain, fevers, chills, or palpitations.  Was recently taken off chlorthalidone recently after improvement in edema.  Cardiology consulted for shortness of breath    Recommendations  EKG sinus rhythm with first-degree block, no ischemia  Troponins negative x 2, doubt ACS  Labs notable for elevated dimer and proBNP  TTE withEF 55-60, severely increased septal wall and no severe valvular disease  Ultrasound negative for DVT, creatinine elevated reasonable to pursue VQ scan to rule out PE> very low probabality of PE 5/6/24  X-ray concerning for CHF, continue IV diuresis, monitor kidney function and electrolytes and wean O2 as tolerated   Assessment: 79-year-old female with history of DM, HTN, HLD, and CKD admitted with acute onset shortness of breath x 1 day.  Patient denies any chest pain, fevers, chills, or palpitations.  Was recently taken off chlorthalidone recently after improvement in edema.  Cardiology consulted for shortness of breath    Recommendations  EKG sinus rhythm with first-degree block, no ischemia  Troponins negative x 2, doubt ACS  Labs notable for elevated dimer and proBNP  TTE withEF 55-60, severely increased septal wall and no severe valvular disease  Ultrasound negative for DVT, creatinine elevated reasonable to pursue VQ scan to rule out PE> very low probability of PE 5/6/24  X-ray concerning for CHF, continue IV diuresis, monitor kidney function and electrolytes and wean O2 as tolerated

## 2024-05-08 LAB
ALBUMIN SERPL ELPH-MCNC: 2.5 G/DL — LOW (ref 3.3–5)
ALP SERPL-CCNC: 80 U/L — SIGNIFICANT CHANGE UP (ref 40–120)
ALT FLD-CCNC: 16 U/L — SIGNIFICANT CHANGE UP (ref 10–45)
ANA TITR SER: NEGATIVE — SIGNIFICANT CHANGE UP
ANION GAP SERPL CALC-SCNC: 12 MMOL/L — SIGNIFICANT CHANGE UP (ref 5–17)
AST SERPL-CCNC: 9 U/L — LOW (ref 10–40)
BILIRUB SERPL-MCNC: 0.2 MG/DL — SIGNIFICANT CHANGE UP (ref 0.2–1.2)
BUN SERPL-MCNC: 63 MG/DL — HIGH (ref 7–23)
CALCIUM SERPL-MCNC: 7.3 MG/DL — LOW (ref 8.4–10.5)
CHLORIDE SERPL-SCNC: 105 MMOL/L — SIGNIFICANT CHANGE UP (ref 96–108)
CO2 SERPL-SCNC: 24 MMOL/L — SIGNIFICANT CHANGE UP (ref 22–31)
CREAT SERPL-MCNC: 2.07 MG/DL — HIGH (ref 0.5–1.3)
EGFR: 24 ML/MIN/1.73M2 — LOW
GLUCOSE BLDC GLUCOMTR-MCNC: 127 MG/DL — HIGH (ref 70–99)
GLUCOSE BLDC GLUCOMTR-MCNC: 169 MG/DL — HIGH (ref 70–99)
GLUCOSE BLDC GLUCOMTR-MCNC: 172 MG/DL — HIGH (ref 70–99)
GLUCOSE BLDC GLUCOMTR-MCNC: 225 MG/DL — HIGH (ref 70–99)
GLUCOSE SERPL-MCNC: 114 MG/DL — HIGH (ref 70–99)
HCT VFR BLD CALC: 28.1 % — LOW (ref 34.5–45)
HGB BLD-MCNC: 8.6 G/DL — LOW (ref 11.5–15.5)
MAGNESIUM SERPL-MCNC: 1.9 MG/DL — SIGNIFICANT CHANGE UP (ref 1.6–2.6)
MCHC RBC-ENTMCNC: 26.3 PG — LOW (ref 27–34)
MCHC RBC-ENTMCNC: 30.6 GM/DL — LOW (ref 32–36)
MCV RBC AUTO: 85.9 FL — SIGNIFICANT CHANGE UP (ref 80–100)
NRBC # BLD: 0 /100 WBCS — SIGNIFICANT CHANGE UP (ref 0–0)
PHOSPHATE SERPL-MCNC: 4.8 MG/DL — HIGH (ref 2.5–4.5)
PLATELET # BLD AUTO: 292 K/UL — SIGNIFICANT CHANGE UP (ref 150–400)
POTASSIUM SERPL-MCNC: 4.3 MMOL/L — SIGNIFICANT CHANGE UP (ref 3.5–5.3)
POTASSIUM SERPL-SCNC: 4.3 MMOL/L — SIGNIFICANT CHANGE UP (ref 3.5–5.3)
PROT SERPL-MCNC: 6.7 G/DL — SIGNIFICANT CHANGE UP (ref 6–8.3)
RBC # BLD: 3.27 M/UL — LOW (ref 3.8–5.2)
RBC # FLD: 16.3 % — HIGH (ref 10.3–14.5)
SODIUM SERPL-SCNC: 141 MMOL/L — SIGNIFICANT CHANGE UP (ref 135–145)
WBC # BLD: 7.61 K/UL — SIGNIFICANT CHANGE UP (ref 3.8–10.5)
WBC # FLD AUTO: 7.61 K/UL — SIGNIFICANT CHANGE UP (ref 3.8–10.5)

## 2024-05-08 PROCEDURE — 99232 SBSQ HOSP IP/OBS MODERATE 35: CPT

## 2024-05-08 PROCEDURE — 99233 SBSQ HOSP IP/OBS HIGH 50: CPT | Mod: GC

## 2024-05-08 RX ORDER — ERYTHROPOIETIN 10000 [IU]/ML
10000 INJECTION, SOLUTION INTRAVENOUS; SUBCUTANEOUS
Refills: 0 | Status: DISCONTINUED | OUTPATIENT
Start: 2024-05-08 | End: 2024-05-08

## 2024-05-08 RX ORDER — FUROSEMIDE 40 MG
40 TABLET ORAL DAILY
Refills: 0 | Status: DISCONTINUED | OUTPATIENT
Start: 2024-05-09 | End: 2024-05-09

## 2024-05-08 RX ORDER — ERYTHROPOIETIN 10000 [IU]/ML
10000 INJECTION, SOLUTION INTRAVENOUS; SUBCUTANEOUS
Refills: 0 | Status: DISCONTINUED | OUTPATIENT
Start: 2024-05-08 | End: 2024-05-09

## 2024-05-08 RX ORDER — CALCITRIOL 0.5 UG/1
0.25 CAPSULE ORAL DAILY
Refills: 0 | Status: DISCONTINUED | OUTPATIENT
Start: 2024-05-08 | End: 2024-05-09

## 2024-05-08 RX ADMIN — HEPARIN SODIUM 5000 UNIT(S): 5000 INJECTION INTRAVENOUS; SUBCUTANEOUS at 14:40

## 2024-05-08 RX ADMIN — Medication 50 MILLIGRAM(S): at 21:17

## 2024-05-08 RX ADMIN — Medication 50 MILLIGRAM(S): at 14:40

## 2024-05-08 RX ADMIN — Medication 667 MILLIGRAM(S): at 08:01

## 2024-05-08 RX ADMIN — CARVEDILOL PHOSPHATE 25 MILLIGRAM(S): 80 CAPSULE, EXTENDED RELEASE ORAL at 17:09

## 2024-05-08 RX ADMIN — Medication 40 MILLIGRAM(S): at 05:31

## 2024-05-08 RX ADMIN — CARVEDILOL PHOSPHATE 25 MILLIGRAM(S): 80 CAPSULE, EXTENDED RELEASE ORAL at 05:27

## 2024-05-08 RX ADMIN — Medication 4: at 12:13

## 2024-05-08 RX ADMIN — Medication 50 MILLIGRAM(S): at 05:27

## 2024-05-08 RX ADMIN — Medication 180 MILLIGRAM(S): at 05:28

## 2024-05-08 RX ADMIN — Medication 2: at 17:09

## 2024-05-08 RX ADMIN — Medication 667 MILLIGRAM(S): at 11:24

## 2024-05-08 RX ADMIN — Medication 667 MILLIGRAM(S): at 17:09

## 2024-05-08 RX ADMIN — ROSUVASTATIN CALCIUM 5 MILLIGRAM(S): 5 TABLET ORAL at 21:16

## 2024-05-08 RX ADMIN — Medication 650 MILLIGRAM(S): at 08:01

## 2024-05-08 RX ADMIN — Medication 650 MILLIGRAM(S): at 08:31

## 2024-05-08 RX ADMIN — HEPARIN SODIUM 5000 UNIT(S): 5000 INJECTION INTRAVENOUS; SUBCUTANEOUS at 05:27

## 2024-05-08 RX ADMIN — HEPARIN SODIUM 5000 UNIT(S): 5000 INJECTION INTRAVENOUS; SUBCUTANEOUS at 21:16

## 2024-05-08 NOTE — PROGRESS NOTE ADULT - ASSESSMENT
Patient is a 79-year-old female with PMHx of type 2 diabetes, HTN, HLD, CKD, anemia, presenting with SOB, admitted for CHF exacerbation.    #CHF Exacerbation, HFpEF  -Presented with SOB, MORGAN  -Nuclear stress testing normal 2/2024   -TTE 2/2024 w/ grade 2 diastolic dysfunction   -BNP 1476, trops neg x 2, DDimer 1564 (age corrected 395)  -LE dopplers negative/VQ negative   -WBC 12.56, procal 0.14, RVP neg   -CXR w/ congestive features, no consolidations  -S/p azitho/CTX in ED, will D/C abx and duonebs  -TTE EF 55-60%, severely increased septal wall thickness, severe pHTN  -CTAP w/ small b/l pleural effusions   -Lasix 40mg IV QD, decrease to oral tomorrow  -Incentive spirometer  -Strict I&Os, daily weights  -Cardiology recs appreciated - hydralazine for BP control, c/w coreg and dilt, outpt workup for cardiomyopathy    #ZORAIDA on CKD4 vs worsening CKD  #CKD w/ MBD  -Cr 1.74 in 8/2023, 1.6 in June, 2.24 on admit  -Cr 2.07 today from 2.27 yesterday  -Ca decreased, Phosph/PTH elevated at 129 (2ndary hyperPTH)  -Renal US w/ trace bilateral pelvic fullness versus extrarenal pelves  -CTAP w/ small b/l pleural effusions   -Phosp 4.8 today  -Monitor BMP  -Nephro consult appreciated - c/w lasix, c/w phoslo 667mg TID, epo once BP better controlled    #Chronic Anemia 2/2 CKD  -Monthly home procrit  -Hg 9.1 on admission, 8.6 today  -Monitor Hgb    #T2DM  -Chronic, a1c 6.8  -Hold home farxiga and glucotrol  -C/w moderate ISS  -Hypoglycemic protocol  -Consistent carb diet     #HTN  -Chronic  -C/w home diltiazem 180  -C/w home coreg 25BID  -C/w hydralazine 50mg Q8hrs  -Monitor vitals    #HLD  -Chronic  -C/w home rosuvastatin    #DVT ppx: heparin sc 5000u Q8  #Code: Full    Discussed with attending, Dr. Fuentes  Patient is a 79-year-old female with PMHx of type 2 diabetes, HTN, HLD, CKD, anemia, presenting with SOB, admitted for CHF exacerbation.    #Cardiorenal Syndrome  #HFpEF  -Presented with SOB, MORGAN  -Nuclear stress testing normal 2/2024   -TTE 2/2024 w/ grade 2 diastolic dysfunction   -BNP 1476, trops neg x 2, DDimer 1564 (age corrected 395)  -LE dopplers negative/VQ negative   -WBC 12.56, procal 0.14, RVP neg   -CXR w/ congestive features, no consolidations  -S/p azitho/CTX in ED, will D/C abx and duonebs  -TTE EF 55-60%, severely increased septal wall thickness, severe pHTN  -CTAP w/ small b/l pleural effusions   -Lasix 40mg IV QD, decrease to oral tomorrow  -Incentive spirometer  -Strict I&Os, daily weights  -Cardiology recs appreciated - hydralazine for BP control, c/w coreg and dilt, outpt workup for cardiomyopathy    #ZORAIDA on CKD4 vs worsening CKD  #CKD w/ MBD  -Cr 1.74 in 8/2023, 1.6 in June, 2.24 on admit  -Cr 2.07 today from 2.27 yesterday  -Ca decreased, Phosph/PTH elevated at 129 (2ndary hyperPTH)  -Renal US w/ trace bilateral pelvic fullness versus extrarenal pelves  -CTAP w/ small b/l pleural effusions   -Phosp 4.8 today  -Monitor BMP  -Nephro recs appreciated - c/w lasix, c/w phoslo 667mg TID, epo once BP better controlled    #Chronic Anemia 2/2 CKD  -Monthly home procrit  -Hg 9.1 on admission, 8.6 today  -Monitor Hgb    #T2DM  -Chronic, a1c 6.8  -Hold home farxiga and glucotrol  -C/w moderate ISS  -Hypoglycemic protocol  -Consistent carb diet     #HTN  -Chronic  -C/w home diltiazem 180  -C/w home coreg 25BID  -C/w hydralazine 50mg Q8hrs  -Monitor vitals    #HLD  -Chronic  -C/w home rosuvastatin    #DVT ppx: heparin sc 5000u Q8  #Code: Full    Discussed with attending, Dr. Fuentes

## 2024-05-08 NOTE — PROGRESS NOTE ADULT - ASSESSMENT
Assessment: 79-year-old female with history of DM, HTN, HLD, and CKD admitted with acute onset shortness of breath x 1 day.  Patient denies any chest pain, fevers, chills, or palpitations.  Was recently taken off chlorthalidone recently after improvement in edema.  Cardiology consulted for shortness of breath    Recommendations  EKG sinus rhythm with first-degree block, no ischemia  Troponins negative x 2, doubt ACS  Labs notable for elevated dimer and proBNP  TTE with EF 55-60, severely increased septal wall and no severe valvular disease  Ultrasound negative for DVT, creatinine elevated reasonable to pursue VQ scan to rule out PE> very low probability of PE 5/6/24  X-ray was concerning for CHF. pt with IV diuresis, monitor kidney function and electrolytes and wean O2 as tolerated>> pt off n/c and reports improvement in breathing. continue iv diuresis for today and likely transition to PO tomorrow

## 2024-05-08 NOTE — PROGRESS NOTE ADULT - SUBJECTIVE AND OBJECTIVE BOX
BURKE ABARCA  [id number]    Chief Complaint: HTN/Pulmonary edema    Interval events: Patient seen and examined, labs and chart reviewed. Reports improvement in breathing, off n/c. No tele monitoring in place    ALLERGIES:  predniSONE (Other)  statins (Muscle Pain)  ACE inhibitors (Angioedema)      CURRENT MEDICATIONS:  MEDICATIONS  (STANDING):  albuterol/ipratropium for Nebulization 3 milliLiter(s) Nebulizer every 6 hours  azithromycin  IVPB 500 milliGRAM(s) IV Intermittent every 24 hours  carvedilol 25 milliGRAM(s) Oral every 12 hours  cefTRIAXone   IVPB 1000 milliGRAM(s) IV Intermittent every 24 hours  dextrose 10% Bolus 125 milliLiter(s) IV Bolus once  dextrose 5%. 1000 milliLiter(s) (50 mL/Hr) IV Continuous <Continuous>  dextrose 5%. 1000 milliLiter(s) (100 mL/Hr) IV Continuous <Continuous>  dextrose 50% Injectable 25 Gram(s) IV Push once  dextrose 50% Injectable 12.5 Gram(s) IV Push once  diltiazem    milliGRAM(s) Oral daily  furosemide   Injectable 40 milliGRAM(s) IV Push two times a day  glucagon  Injectable 1 milliGRAM(s) IntraMuscular once  heparin   Injectable 8000 Unit(s) IV Push once  heparin  Infusion.  Unit(s)/Hr (18 mL/Hr) IV Continuous <Continuous>  insulin lispro (ADMELOG) corrective regimen sliding scale   SubCutaneous three times a day before meals  insulin lispro (ADMELOG) corrective regimen sliding scale   SubCutaneous at bedtime  magnesium sulfate  IVPB 1 Gram(s) IV Intermittent once  rosuvastatin 5 milliGRAM(s) Oral at bedtime  sevelamer carbonate 800 milliGRAM(s) Oral three times a day    ROS:  All 10 systems reviewed and positives noted in HPI    OBJECTIVE:    VITAL SIGNS:  Vital Signs Last 24 Hrs  T(C): 36.8 (06 May 2024 05:10), Max: 36.8 (06 May 2024 02:00)  T(F): 98.2 (06 May 2024 05:10), Max: 98.2 (06 May 2024 02:00)  HR: 77 (06 May 2024 09:20) (64 - 85)  BP: 166/77 (06 May 2024 05:10) (159/74 - 205/101)  BP(mean): --  RR: 17 (06 May 2024 05:10) (16 - 24)  SpO2: 97% (06 May 2024 09:20) (95% - 100%)    Parameters below as of 06 May 2024 09:20  Patient On (Oxygen Delivery Method): nasal cannula, 2L        PHYSICAL EXAM:  General:  no distress  HEENT: sclera anicteric  Neck: supple  CVS: JVP ~ 7 cm H20, RRR, s1, s2, no murmurs/rubs  Chest: unlabored respirations, clear breath sounds  Abdomen: non-distended  Extremities: no lower extremity edema b/l  Neuro: awake, alert & oriented    LABS:                        8.8    10.16 )-----------( 308      ( 06 May 2024 06:32 )             29.4     05-06    142  |  107  |  65<H>  ----------------------------<  154<H>  4.5   |  21<L>  |  2.13<H>    Ca    7.4<L>      06 May 2024 06:32  Phos  6.3     05-06  Mg     1.9     05-06    TPro  7.7  /  Alb  3.1<L>  /  TBili  0.2  /  DBili  x   /  AST  12  /  ALT  26  /  AlkPhos  94  05-06        PT/INR - ( 05 May 2024 23:20 )   PT: 11.7 sec;   INR: 1.00 ratio         PTT - ( 06 May 2024 05:20 )  PTT:24.4 sec      ECG: Sinus rhythm with 1st degree block      Stress 3/2018  The left ventricle was normal in size. Normal myocardial  perfusion scan, with no evidence of infarction or  inducible ischemia.      < from: TTE Echo Complete w/o Contrast w/ Doppler (05.06.24 @ 10:01) >   1. Normal global left ventricular systolic function.   2. Left ventricular ejection fraction, by visual estimation, is 55 to 60%.   3. Moderately increased posterior wall thickness. Severely increased   septal wall thickness.   4. The right ventricle is not well visualized, appears normal in size   with normal systolic function.   5. The left atrium is not well visualized, appears normal in size.   6. The right atrium is not well visualized, appears top normal in size.   7. Mild mitral valve regurgitation.   8. Mild tricuspid regurgitation.   9. No aortic valve stenosis.  10. Estimated pulmonary artery systolic pressure is 61.0 mmHg assuming a   right atrial pressure of 8 mmHg, which is consistent with severe   pulmonary hypertension.  11. There is no evidence of pericardial effusion.

## 2024-05-08 NOTE — PROGRESS NOTE ADULT - SUBJECTIVE AND OBJECTIVE BOX
Comfortable on RA    Vital Signs Last 24 Hrs  T(C): 36.9 (05-08-24 @ 20:19), Max: 36.9 (05-08-24 @ 20:19)  T(F): 98.4 (05-08-24 @ 20:19), Max: 98.4 (05-08-24 @ 20:19)  HR: 74 (05-08-24 @ 21:09) (74 - 84)  BP: 144/75 (05-08-24 @ 21:09) (119/71 - 177/87)  RR: 16 (05-08-24 @ 20:19) (16 - 17)  SpO2: 97% (05-08-24 @ 20:19) (95% - 98%)    I&O's Detail    07 May 2024 07:01  -  08 May 2024 07:00  --------------------------------------------------------  OUT:    Voided (mL): 3900 mL  Total OUT: 3900 mL    08 May 2024 07:01  -  08 May 2024 22:28  --------------------------------------------------------  OUT:    Voided (mL): 1400 mL  Total OUT: 1400 mL    s1s2  b/l air entry  soft, ND  sm edema                                 8.6    7.61  )-----------( 292      ( 08 May 2024 05:56 )             28.1     08 May 2024 05:56    141    |  105    |  63     ----------------------------<  114    4.3     |  24     |  2.07     Ca    7.3        08 May 2024 05:56  Phos  4.8       08 May 2024 05:56  Mg     1.9       08 May 2024 05:56    TPro  6.7    /  Alb  2.5    /  TBili  0.2    /  DBili  x      /  AST  9      /  ALT  16     /  AlkPhos  80     08 May 2024 05:56    LIVER FUNCTIONS - ( 08 May 2024 05:56 )  Alb: 2.5 g/dL / Pro: 6.7 g/dL / ALK PHOS: 80 U/L / ALT: 16 U/L / AST: 9 U/L / GGT: x           Culture - Urine (collected 06 May 2024 06:00)  Source: Clean Catch Clean Catch (Midstream)  Final Report (07 May 2024 07:53):    <10,000 CFU/mL Normal Urogenital Cherri    Culture - Blood (collected 06 May 2024 00:30)  Source: .Blood Blood  Preliminary Report (08 May 2024 12:01):    No growth at 48 Hours    Culture - Blood (collected 06 May 2024 00:20)  Source: .Blood Blood  Preliminary Report (08 May 2024 12:01):    No growth at 48 Hours    acetaminophen     Tablet .. 650 milliGRAM(s) Oral every 6 hours PRN  aluminum hydroxide/magnesium hydroxide/simethicone Suspension 30 milliLiter(s) Oral every 4 hours PRN  calcitriol   Capsule 0.25 MICROGram(s) Oral daily  calcium acetate 667 milliGRAM(s) Oral three times a day with meals  carvedilol 25 milliGRAM(s) Oral every 12 hours  dextrose 10% Bolus 125 milliLiter(s) IV Bolus once  dextrose 5%. 1000 milliLiter(s) IV Continuous <Continuous>  dextrose 5%. 1000 milliLiter(s) IV Continuous <Continuous>  dextrose 50% Injectable 25 Gram(s) IV Push once  dextrose 50% Injectable 12.5 Gram(s) IV Push once  dextrose Oral Gel 15 Gram(s) Oral once PRN  diltiazem    milliGRAM(s) Oral daily  epoetin jacobo-epbx (RETACRIT) Injectable 77101 Unit(s) SubCutaneous every 7 days  glucagon  Injectable 1 milliGRAM(s) IntraMuscular once  heparin   Injectable 5000 Unit(s) SubCutaneous every 8 hours  hydrALAZINE 50 milliGRAM(s) Oral every 8 hours  insulin lispro (ADMELOG) corrective regimen sliding scale   SubCutaneous three times a day before meals  insulin lispro (ADMELOG) corrective regimen sliding scale   SubCutaneous at bedtime  melatonin 3 milliGRAM(s) Oral at bedtime PRN  ondansetron Injectable 4 milliGRAM(s) IV Push every 8 hours PRN  polyethylene glycol 3350 17 Gram(s) Oral daily  rosuvastatin 5 milliGRAM(s) Oral at bedtime  senna 2 Tablet(s) Oral at bedtime    A/P:    DM, HTN, CKD 3 (Cr 1.74 - 8/14/23)  Adm w/CHF, b/l pl effusions   S/p hemodynamic, cardio-renal ZORAIDA/CKD 3 vs DM CKD progression   Agree w/holding ACE/ARB, Farxiga for now   Agree w/change to po Lasix   Cr is stable   Phoslo for high Phos   Epoetin for anemia   Will f/u CBC, BMP    [phone]

## 2024-05-08 NOTE — PROGRESS NOTE ADULT - SUBJECTIVE AND OBJECTIVE BOX
Patient is a 79-year-old female with PMHx of type 2 diabetes, HTN, HLD, CKD, anemia, presenting with SOB, admitted for CHF exacerbation vs PNA.    Overnight Events: None  Interval HPI: Today is hospital day 2. Patient seen and examined at bedside.     REVIEW OF SYSTEMS:  CONSTITUTIONAL: (-) weakness, (-) fevers, (-) chills  EYES/ENT: (-) visual changes,  (-) vertigo,  (-) throat pain   NECK:  (-) pain, (-) stiffness  RESPIRATORY:  (-) shortness of breath, (-) cough,  (-) wheezing,  (-) hemoptysis   CARDIOVASCULAR:  (-) chest pain, (-) palpitations  GASTROINTESTINAL:  (-) abdominal or epigastric pain, (-) nausea, (-) vomiting, (-) diarrhea, (-) constipation, (-) melena,  (-) hematemesis,  (-) hematochezia  GENITOURINARY: (-) dysuria, (-) frequency, (-) hematuria  NEUROLOGICAL: (-) numbness, (-) weakness  SKIN: (-) itching, (-) rashes, (-) lesions    Vital Signs Last 24 Hrs  T(C): 36.8 (08 May 2024 05:36), Max: 36.8 (07 May 2024 12:53)  T(F): 98.2 (08 May 2024 05:36), Max: 98.3 (07 May 2024 12:53)  HR: 83 (08 May 2024 05:36) (69 - 83)  BP: 177/87 (08 May 2024 05:36) (150/79 - 177/87)  BP(mean): --  RR: 17 (08 May 2024 05:36) (16 - 17)  SpO2: 95% (08 May 2024 05:36) (95% - 95%)    Parameters below as of 08 May 2024 05:36  Patient On (Oxygen Delivery Method): room air      PHYSICAL EXAM:  GENERAL: NAD, lying in bed comfortably  HEAD:  Atraumatic, Normocephalic  EYES: EOMI, conjunctiva and sclera clear  ENT: Dry mucous membranes  NECK: Supple  CHEST/LUNG: Clear to auscultation bilaterally, good air entry bilaterally; No wheezing, rales, or rhonchi. Unlabored respirations  HEART: Regular rate and rhythm. S1 and S2. No murmurs, rubs, or gallops  ABDOMEN: Soft, Nontender, Nondistended. Bowel sounds present.   EXTREMITIES:  2+ Peripheral Pulses. b/l chronic venous stasis discoloration. No clubbing, cyanosis, or edema  NERVOUS SYSTEM:  Alert & Oriented X3, speech clear. No deficits.  SKIN: No rashes, bruises, or other lesions    LABS:   All Labs Personally Reviewed                                        8.6    7.61  )-----------( 292      ( 08 May 2024 05:56 )             28.1     08 May 2024 05:56    141    |  105    |  63     ----------------------------<  114    4.3     |  24     |  2.07     Ca    7.3        08 May 2024 05:56  Phos  4.8       08 May 2024 05:56  Mg     1.9       08 May 2024 05:56    TPro  6.7    /  Alb  2.5    /  TBili  0.2    /  DBili  x      /  AST  9      /  ALT  16     /  AlkPhos  80     08 May 2024 05:56    PTT - ( 06 May 2024 13:35 )  PTT:48.5 sec  CAPILLARY BLOOD GLUCOSE      POCT Blood Glucose.: 211 mg/dL (07 May 2024 21:40)  POCT Blood Glucose.: 140 mg/dL (07 May 2024 17:32)  POCT Blood Glucose.: 169 mg/dL (07 May 2024 12:18)  POCT Blood Glucose.: 115 mg/dL (07 May 2024 08:36)    LIVER FUNCTIONS - ( 08 May 2024 05:56 )  Alb: 2.5 g/dL / Pro: 6.7 g/dL / ALK PHOS: 80 U/L / ALT: 16 U/L / AST: 9 U/L / GGT: x           Urinalysis Basic - ( 08 May 2024 05:56 )    Color: x / Appearance: x / SG: x / pH: x  Gluc: 114 mg/dL / Ketone: x  / Bili: x / Urobili: x   Blood: x / Protein: x / Nitrite: x   Leuk Esterase: x / RBC: x / WBC x   Sq Epi: x / Non Sq Epi: x / Bacteria: x      RADIOLOGY/EKG:  All Imaging and EKGs Personally Reviewed     CT Abdomen and Pelvis No Cont (05.07.24 @ 11:14)   IMPRESSION:  No acute pathology in the abdomen or pelvis. Small pleural effusions with   bibasilar atelectasis, right greater than left      MEDICATIONS:  MEDICATIONS  (STANDING):  calcium acetate 667 milliGRAM(s) Oral three times a day with meals  carvedilol 25 milliGRAM(s) Oral every 12 hours  dextrose 10% Bolus 125 milliLiter(s) IV Bolus once  dextrose 5%. 1000 milliLiter(s) (100 mL/Hr) IV Continuous <Continuous>  dextrose 5%. 1000 milliLiter(s) (50 mL/Hr) IV Continuous <Continuous>  dextrose 50% Injectable 25 Gram(s) IV Push once  dextrose 50% Injectable 12.5 Gram(s) IV Push once  diltiazem    milliGRAM(s) Oral daily  furosemide   Injectable 40 milliGRAM(s) IV Push daily  glucagon  Injectable 1 milliGRAM(s) IntraMuscular once  heparin   Injectable 5000 Unit(s) SubCutaneous every 8 hours  hydrALAZINE 50 milliGRAM(s) Oral every 8 hours  insulin lispro (ADMELOG) corrective regimen sliding scale   SubCutaneous three times a day before meals  insulin lispro (ADMELOG) corrective regimen sliding scale   SubCutaneous at bedtime  polyethylene glycol 3350 17 Gram(s) Oral daily  rosuvastatin 5 milliGRAM(s) Oral at bedtime  senna 2 Tablet(s) Oral at bedtime    MEDICATIONS  (PRN):  acetaminophen     Tablet .. 650 milliGRAM(s) Oral every 6 hours PRN Temp greater or equal to 38C (100.4F), Mild Pain (1 - 3)  aluminum hydroxide/magnesium hydroxide/simethicone Suspension 30 milliLiter(s) Oral every 4 hours PRN Dyspepsia  dextrose Oral Gel 15 Gram(s) Oral once PRN Blood Glucose LESS THAN 70 milliGRAM(s)/deciliter  melatonin 3 milliGRAM(s) Oral at bedtime PRN Insomnia  ondansetron Injectable 4 milliGRAM(s) IV Push every 8 hours PRN Nausea and/or Vomiting             Patient is a 79-year-old female with PMHx of type 2 diabetes, HTN, HLD, CKD, anemia, presenting with SOB, admitted for CHF exacerbation vs PNA.    Overnight Events: None  Interval HPI: Today is hospital day 2. Patient seen and examined at bedside. States her breathing is better, not on oxygen, tolerating diet and voiding appropriately. Plan to transition to oral diuretics tomorrow with possible discharge pending Nephrology. No further questions or concerns at this time.     REVIEW OF SYSTEMS:  CONSTITUTIONAL: (-) weakness, (-) fevers, (-) chills  EYES/ENT: (-) visual changes,  (-) vertigo,  (-) throat pain   NECK:  (-) pain, (-) stiffness  RESPIRATORY:  (-) shortness of breath, (-) cough,  (-) wheezing,  (-) hemoptysis   CARDIOVASCULAR:  (-) chest pain, (-) palpitations  GASTROINTESTINAL:  (-) abdominal or epigastric pain, (-) nausea, (-) vomiting, (-) diarrhea, (-) constipation, (-) melena,  (-) hematemesis,  (-) hematochezia  GENITOURINARY: (-) dysuria, (-) frequency, (-) hematuria  NEUROLOGICAL: (-) numbness, (-) weakness  SKIN: (-) itching, (-) rashes, (-) lesions    Vital Signs Last 24 Hrs  T(C): 36.8 (08 May 2024 05:36), Max: 36.8 (07 May 2024 12:53)  T(F): 98.2 (08 May 2024 05:36), Max: 98.3 (07 May 2024 12:53)  HR: 83 (08 May 2024 05:36) (69 - 83)  BP: 177/87 (08 May 2024 05:36) (150/79 - 177/87)  BP(mean): --  RR: 17 (08 May 2024 05:36) (16 - 17)  SpO2: 95% (08 May 2024 05:36) (95% - 95%)    Parameters below as of 08 May 2024 05:36  Patient On (Oxygen Delivery Method): room air      PHYSICAL EXAM:  GENERAL: NAD, lying in bed comfortably  HEAD:  Atraumatic, Normocephalic  EYES: EOMI, conjunctiva and sclera clear  ENT: Dry mucous membranes  NECK: Supple  CHEST/LUNG: Clear to auscultation bilaterally, good air entry bilaterally; No wheezing, rales, or rhonchi. Unlabored respirations  HEART: Regular rate and rhythm. S1 and S2. No murmurs, rubs, or gallops  ABDOMEN: Soft, Nontender, Nondistended. Bowel sounds present.   EXTREMITIES:  2+ Peripheral Pulses. b/l chronic venous stasis discoloration. No clubbing, cyanosis, or edema  NERVOUS SYSTEM:  Alert & Oriented X3, speech clear. No deficits.  SKIN: No rashes, bruises, or other lesions    LABS:   All Labs Personally Reviewed                                  8.6    7.61  )-----------( 292      ( 08 May 2024 05:56 )             28.1     08 May 2024 05:56    141    |  105    |  63     ----------------------------<  114    4.3     |  24     |  2.07     Ca    7.3        08 May 2024 05:56  Phos  4.8       08 May 2024 05:56  Mg     1.9       08 May 2024 05:56    TPro  6.7    /  Alb  2.5    /  TBili  0.2    /  DBili  x      /  AST  9      /  ALT  16     /  AlkPhos  80     08 May 2024 05:56    PTT - ( 06 May 2024 13:35 )  PTT:48.5 sec  CAPILLARY BLOOD GLUCOSE      POCT Blood Glucose.: 211 mg/dL (07 May 2024 21:40)  POCT Blood Glucose.: 140 mg/dL (07 May 2024 17:32)  POCT Blood Glucose.: 169 mg/dL (07 May 2024 12:18)  POCT Blood Glucose.: 115 mg/dL (07 May 2024 08:36)    LIVER FUNCTIONS - ( 08 May 2024 05:56 )  Alb: 2.5 g/dL / Pro: 6.7 g/dL / ALK PHOS: 80 U/L / ALT: 16 U/L / AST: 9 U/L / GGT: x           Urinalysis Basic - ( 08 May 2024 05:56 )    Color: x / Appearance: x / SG: x / pH: x  Gluc: 114 mg/dL / Ketone: x  / Bili: x / Urobili: x   Blood: x / Protein: x / Nitrite: x   Leuk Esterase: x / RBC: x / WBC x   Sq Epi: x / Non Sq Epi: x / Bacteria: x      RADIOLOGY/EKG:  All Imaging and EKGs Personally Reviewed     CT Abdomen and Pelvis No Cont (05.07.24 @ 11:14)   IMPRESSION:  No acute pathology in the abdomen or pelvis. Small pleural effusions with   bibasilar atelectasis, right greater than left      MEDICATIONS:  MEDICATIONS  (STANDING):  calcium acetate 667 milliGRAM(s) Oral three times a day with meals  carvedilol 25 milliGRAM(s) Oral every 12 hours  dextrose 10% Bolus 125 milliLiter(s) IV Bolus once  dextrose 5%. 1000 milliLiter(s) (100 mL/Hr) IV Continuous <Continuous>  dextrose 5%. 1000 milliLiter(s) (50 mL/Hr) IV Continuous <Continuous>  dextrose 50% Injectable 25 Gram(s) IV Push once  dextrose 50% Injectable 12.5 Gram(s) IV Push once  diltiazem    milliGRAM(s) Oral daily  furosemide   Injectable 40 milliGRAM(s) IV Push daily  glucagon  Injectable 1 milliGRAM(s) IntraMuscular once  heparin   Injectable 5000 Unit(s) SubCutaneous every 8 hours  hydrALAZINE 50 milliGRAM(s) Oral every 8 hours  insulin lispro (ADMELOG) corrective regimen sliding scale   SubCutaneous three times a day before meals  insulin lispro (ADMELOG) corrective regimen sliding scale   SubCutaneous at bedtime  polyethylene glycol 3350 17 Gram(s) Oral daily  rosuvastatin 5 milliGRAM(s) Oral at bedtime  senna 2 Tablet(s) Oral at bedtime    MEDICATIONS  (PRN):  acetaminophen     Tablet .. 650 milliGRAM(s) Oral every 6 hours PRN Temp greater or equal to 38C (100.4F), Mild Pain (1 - 3)  aluminum hydroxide/magnesium hydroxide/simethicone Suspension 30 milliLiter(s) Oral every 4 hours PRN Dyspepsia  dextrose Oral Gel 15 Gram(s) Oral once PRN Blood Glucose LESS THAN 70 milliGRAM(s)/deciliter  melatonin 3 milliGRAM(s) Oral at bedtime PRN Insomnia  ondansetron Injectable 4 milliGRAM(s) IV Push every 8 hours PRN Nausea and/or Vomiting

## 2024-05-09 ENCOUNTER — TRANSCRIPTION ENCOUNTER (OUTPATIENT)
Age: 79
End: 2024-05-09

## 2024-05-09 VITALS
TEMPERATURE: 98 F | HEART RATE: 80 BPM | RESPIRATION RATE: 17 BRPM | DIASTOLIC BLOOD PRESSURE: 70 MMHG | SYSTOLIC BLOOD PRESSURE: 150 MMHG | OXYGEN SATURATION: 94 %

## 2024-05-09 LAB
ALBUMIN SERPL ELPH-MCNC: 2.7 G/DL — LOW (ref 3.3–5)
ALP SERPL-CCNC: 83 U/L — SIGNIFICANT CHANGE UP (ref 40–120)
ALT FLD-CCNC: 17 U/L — SIGNIFICANT CHANGE UP (ref 10–45)
ANION GAP SERPL CALC-SCNC: 11 MMOL/L — SIGNIFICANT CHANGE UP (ref 5–17)
AST SERPL-CCNC: 9 U/L — LOW (ref 10–40)
BILIRUB SERPL-MCNC: 0.2 MG/DL — SIGNIFICANT CHANGE UP (ref 0.2–1.2)
BUN SERPL-MCNC: 72 MG/DL — HIGH (ref 7–23)
CALCIUM SERPL-MCNC: 7.6 MG/DL — LOW (ref 8.4–10.5)
CHLORIDE SERPL-SCNC: 104 MMOL/L — SIGNIFICANT CHANGE UP (ref 96–108)
CO2 SERPL-SCNC: 25 MMOL/L — SIGNIFICANT CHANGE UP (ref 22–31)
CREAT SERPL-MCNC: 2.24 MG/DL — HIGH (ref 0.5–1.3)
EGFR: 22 ML/MIN/1.73M2 — LOW
GLUCOSE BLDC GLUCOMTR-MCNC: 136 MG/DL — HIGH (ref 70–99)
GLUCOSE BLDC GLUCOMTR-MCNC: 168 MG/DL — HIGH (ref 70–99)
GLUCOSE BLDC GLUCOMTR-MCNC: 199 MG/DL — HIGH (ref 70–99)
GLUCOSE SERPL-MCNC: 131 MG/DL — HIGH (ref 70–99)
HCT VFR BLD CALC: 28.3 % — LOW (ref 34.5–45)
HGB BLD-MCNC: 8.7 G/DL — LOW (ref 11.5–15.5)
MAGNESIUM SERPL-MCNC: 1.8 MG/DL — SIGNIFICANT CHANGE UP (ref 1.6–2.6)
MCHC RBC-ENTMCNC: 26.3 PG — LOW (ref 27–34)
MCHC RBC-ENTMCNC: 30.7 GM/DL — LOW (ref 32–36)
MCV RBC AUTO: 85.5 FL — SIGNIFICANT CHANGE UP (ref 80–100)
NRBC # BLD: 0 /100 WBCS — SIGNIFICANT CHANGE UP (ref 0–0)
PHOSPHATE SERPL-MCNC: 5.7 MG/DL — HIGH (ref 2.5–4.5)
PLATELET # BLD AUTO: 278 K/UL — SIGNIFICANT CHANGE UP (ref 150–400)
POTASSIUM SERPL-MCNC: 4.5 MMOL/L — SIGNIFICANT CHANGE UP (ref 3.5–5.3)
POTASSIUM SERPL-SCNC: 4.5 MMOL/L — SIGNIFICANT CHANGE UP (ref 3.5–5.3)
PROT SERPL-MCNC: 7 G/DL — SIGNIFICANT CHANGE UP (ref 6–8.3)
RBC # BLD: 3.31 M/UL — LOW (ref 3.8–5.2)
RBC # FLD: 15.9 % — HIGH (ref 10.3–14.5)
SODIUM SERPL-SCNC: 140 MMOL/L — SIGNIFICANT CHANGE UP (ref 135–145)
WBC # BLD: 8.26 K/UL — SIGNIFICANT CHANGE UP (ref 3.8–10.5)
WBC # FLD AUTO: 8.26 K/UL — SIGNIFICANT CHANGE UP (ref 3.8–10.5)

## 2024-05-09 PROCEDURE — 85027 COMPLETE CBC AUTOMATED: CPT

## 2024-05-09 PROCEDURE — 85025 COMPLETE CBC W/AUTO DIFF WBC: CPT

## 2024-05-09 PROCEDURE — 93970 EXTREMITY STUDY: CPT

## 2024-05-09 PROCEDURE — 85379 FIBRIN DEGRADATION QUANT: CPT

## 2024-05-09 PROCEDURE — 36415 COLL VENOUS BLD VENIPUNCTURE: CPT

## 2024-05-09 PROCEDURE — 84520 ASSAY OF UREA NITROGEN: CPT

## 2024-05-09 PROCEDURE — 84540 ASSAY OF URINE/UREA-N: CPT

## 2024-05-09 PROCEDURE — 80061 LIPID PANEL: CPT

## 2024-05-09 PROCEDURE — 82565 ASSAY OF CREATININE: CPT

## 2024-05-09 PROCEDURE — 97162 PT EVAL MOD COMPLEX 30 MIN: CPT

## 2024-05-09 PROCEDURE — 0225U NFCT DS DNA&RNA 21 SARSCOV2: CPT

## 2024-05-09 PROCEDURE — 76775 US EXAM ABDO BACK WALL LIM: CPT

## 2024-05-09 PROCEDURE — 82746 ASSAY OF FOLIC ACID SERUM: CPT

## 2024-05-09 PROCEDURE — 82310 ASSAY OF CALCIUM: CPT

## 2024-05-09 PROCEDURE — A9540: CPT

## 2024-05-09 PROCEDURE — 84443 ASSAY THYROID STIM HORMONE: CPT

## 2024-05-09 PROCEDURE — 93306 TTE W/DOPPLER COMPLETE: CPT

## 2024-05-09 PROCEDURE — 74176 CT ABD & PELVIS W/O CONTRAST: CPT | Mod: MC

## 2024-05-09 PROCEDURE — 83735 ASSAY OF MAGNESIUM: CPT

## 2024-05-09 PROCEDURE — 86038 ANTINUCLEAR ANTIBODIES: CPT

## 2024-05-09 PROCEDURE — 78582 LUNG VENTILAT&PERFUS IMAGING: CPT | Mod: MC

## 2024-05-09 PROCEDURE — A9539: CPT

## 2024-05-09 PROCEDURE — 84100 ASSAY OF PHOSPHORUS: CPT

## 2024-05-09 PROCEDURE — 96374 THER/PROPH/DIAG INJ IV PUSH: CPT

## 2024-05-09 PROCEDURE — 82728 ASSAY OF FERRITIN: CPT

## 2024-05-09 PROCEDURE — 84145 PROCALCITONIN (PCT): CPT

## 2024-05-09 PROCEDURE — 82962 GLUCOSE BLOOD TEST: CPT

## 2024-05-09 PROCEDURE — 83550 IRON BINDING TEST: CPT

## 2024-05-09 PROCEDURE — 83970 ASSAY OF PARATHORMONE: CPT

## 2024-05-09 PROCEDURE — 83540 ASSAY OF IRON: CPT

## 2024-05-09 PROCEDURE — 94640 AIRWAY INHALATION TREATMENT: CPT

## 2024-05-09 PROCEDURE — 99239 HOSP IP/OBS DSCHRG MGMT >30: CPT | Mod: GC

## 2024-05-09 PROCEDURE — 93005 ELECTROCARDIOGRAM TRACING: CPT

## 2024-05-09 PROCEDURE — 83036 HEMOGLOBIN GLYCOSYLATED A1C: CPT

## 2024-05-09 PROCEDURE — 99232 SBSQ HOSP IP/OBS MODERATE 35: CPT

## 2024-05-09 PROCEDURE — 82570 ASSAY OF URINE CREATININE: CPT

## 2024-05-09 PROCEDURE — 86334 IMMUNOFIX E-PHORESIS SERUM: CPT

## 2024-05-09 PROCEDURE — 84165 PROTEIN E-PHORESIS SERUM: CPT

## 2024-05-09 PROCEDURE — 84300 ASSAY OF URINE SODIUM: CPT

## 2024-05-09 PROCEDURE — 99285 EMERGENCY DEPT VISIT HI MDM: CPT | Mod: 25

## 2024-05-09 PROCEDURE — 85730 THROMBOPLASTIN TIME PARTIAL: CPT

## 2024-05-09 PROCEDURE — 85610 PROTHROMBIN TIME: CPT

## 2024-05-09 PROCEDURE — 83880 ASSAY OF NATRIURETIC PEPTIDE: CPT

## 2024-05-09 PROCEDURE — 81001 URINALYSIS AUTO W/SCOPE: CPT

## 2024-05-09 PROCEDURE — 87040 BLOOD CULTURE FOR BACTERIA: CPT

## 2024-05-09 PROCEDURE — 82607 VITAMIN B-12: CPT

## 2024-05-09 PROCEDURE — 84484 ASSAY OF TROPONIN QUANT: CPT

## 2024-05-09 PROCEDURE — 82652 VIT D 1 25-DIHYDROXY: CPT

## 2024-05-09 PROCEDURE — 84155 ASSAY OF PROTEIN SERUM: CPT

## 2024-05-09 PROCEDURE — 94760 N-INVAS EAR/PLS OXIMETRY 1: CPT

## 2024-05-09 PROCEDURE — 71045 X-RAY EXAM CHEST 1 VIEW: CPT

## 2024-05-09 PROCEDURE — 87086 URINE CULTURE/COLONY COUNT: CPT

## 2024-05-09 PROCEDURE — 80053 COMPREHEN METABOLIC PANEL: CPT

## 2024-05-09 RX ORDER — TELMISARTAN 20 MG/1
1 TABLET ORAL
Qty: 0 | Refills: 0 | DISCHARGE

## 2024-05-09 RX ORDER — DAPAGLIFLOZIN 10 MG/1
1 TABLET, FILM COATED ORAL
Refills: 0 | DISCHARGE

## 2024-05-09 RX ORDER — HYDRALAZINE HCL 50 MG
1 TABLET ORAL
Qty: 30 | Refills: 0
Start: 2024-05-09

## 2024-05-09 RX ORDER — FUROSEMIDE 40 MG
1 TABLET ORAL
Qty: 10 | Refills: 0
Start: 2024-05-09

## 2024-05-09 RX ORDER — CALCITRIOL 0.5 UG/1
1 CAPSULE ORAL
Qty: 14 | Refills: 0
Start: 2024-05-09

## 2024-05-09 RX ORDER — CALCIUM ACETATE 667 MG
1 TABLET ORAL
Qty: 30 | Refills: 0
Start: 2024-05-09

## 2024-05-09 RX ADMIN — Medication 667 MILLIGRAM(S): at 08:05

## 2024-05-09 RX ADMIN — Medication 50 MILLIGRAM(S): at 17:16

## 2024-05-09 RX ADMIN — Medication 650 MILLIGRAM(S): at 11:36

## 2024-05-09 RX ADMIN — Medication 40 MILLIGRAM(S): at 05:11

## 2024-05-09 RX ADMIN — CALCITRIOL 0.25 MICROGRAM(S): 0.5 CAPSULE ORAL at 11:35

## 2024-05-09 RX ADMIN — Medication 2: at 17:19

## 2024-05-09 RX ADMIN — ERYTHROPOIETIN 10000 UNIT(S): 10000 INJECTION, SOLUTION INTRAVENOUS; SUBCUTANEOUS at 11:35

## 2024-05-09 RX ADMIN — Medication 667 MILLIGRAM(S): at 18:31

## 2024-05-09 RX ADMIN — Medication 180 MILLIGRAM(S): at 05:12

## 2024-05-09 RX ADMIN — Medication 650 MILLIGRAM(S): at 12:00

## 2024-05-09 RX ADMIN — CARVEDILOL PHOSPHATE 25 MILLIGRAM(S): 80 CAPSULE, EXTENDED RELEASE ORAL at 18:27

## 2024-05-09 RX ADMIN — Medication 667 MILLIGRAM(S): at 11:35

## 2024-05-09 RX ADMIN — CARVEDILOL PHOSPHATE 25 MILLIGRAM(S): 80 CAPSULE, EXTENDED RELEASE ORAL at 05:12

## 2024-05-09 RX ADMIN — Medication 50 MILLIGRAM(S): at 05:12

## 2024-05-09 RX ADMIN — HEPARIN SODIUM 5000 UNIT(S): 5000 INJECTION INTRAVENOUS; SUBCUTANEOUS at 05:11

## 2024-05-09 RX ADMIN — Medication 2: at 12:34

## 2024-05-09 NOTE — DISCHARGE NOTE PROVIDER - NSDCFUADDINST_GEN_ALL_CORE_FT
maintain a 1.5 L fluid limit daily as well as 2 gram sodium restriction daily, obtain daily weight if more than 3 pound gain in 48hrs please call your cardiologist and/or primary medical provider right away for med adjustments

## 2024-05-09 NOTE — DISCHARGE NOTE PROVIDER - ATTENDING DISCHARGE PHYSICAL EXAMINATION:
GENERAL: NAD, lying in bed comfortably  HEAD:  Atraumatic, Normocephalic  EYES: EOMI, conjunctiva and sclera clear  ENT: Dry mucous membranes  NECK: Supple  CHEST/LUNG: Clear to auscultation bilaterally, good air entry bilaterally; No wheezing, rales, or rhonchi. Unlabored respirations  HEART: Regular rate and rhythm. S1 and S2. No murmurs, rubs, or gallops  ABDOMEN: Soft, Nontender, Nondistended. Bowel sounds present.   EXTREMITIES:  2+ Peripheral Pulses. b/l chronic venous stasis discoloration. No clubbing, cyanosis, or edema  NERVOUS SYSTEM:  Alert & Oriented X3, speech clear. No deficits.  SKIN: No rashes, bruises, or other lesions

## 2024-05-09 NOTE — DISCHARGE NOTE PROVIDER - NSDCACTIVITY_GEN_ALL_CORE
Bathing allowed/Activity as tolerated Bathing allowed/Do not drive or operate machinery/No heavy lifting/straining/Activity as tolerated

## 2024-05-09 NOTE — PROGRESS NOTE ADULT - ASSESSMENT
Patient is a 79-year-old female with PMHx of type 2 diabetes, HTN, HLD, CKD, anemia, presenting with SOB, admitted for CHF exacerbation.    #Cardiorenal Syndrome  #HFpEF  -Presented with SOB, MORGAN  -Nuclear stress testing normal 2/2024   -TTE 2/2024 w/ grade 2 diastolic dysfunction   -BNP 1476, trops neg x 2, DDimer 1564 (age corrected 395)  -LE dopplers negative/VQ negative   -WBC 12.56, procal 0.14, RVP neg   -CXR w/ congestive features, no consolidations  -S/p azitho/CTX in ED, will D/C abx and duonebs  -TTE EF 55-60%, severely increased septal wall thickness, severe pHTN  -CTAP w/ small b/l pleural effusions   -Lasix 40mg IV QD, decrease to oral tomorrow  -Incentive spirometer  -Strict I&Os, daily weights  -Cardiology recs appreciated - hydralazine for BP control, c/w coreg and dilt, outpt workup for cardiomyopathy    #ZORAIDA on CKD4 vs worsening CKD  #CKD w/ MBD  -Cr 1.74 in 8/2023, 1.6 in June, 2.24 on admit  -Cr 2.07 today from 2.27 yesterday  -Ca decreased, Phosph/PTH elevated at 129 (2ndary hyperPTH)  -Renal US w/ trace bilateral pelvic fullness versus extrarenal pelves  -CTAP w/ small b/l pleural effusions   -Phosp 4.8 today  -Monitor BMP  -Nephro recs appreciated - c/w lasix, c/w phoslo 667mg TID, epo once BP better controlled    #Chronic Anemia 2/2 CKD  -Monthly home procrit  -Hg 9.1 on admission, 8.6 today  -Monitor Hgb    #T2DM  -Chronic, a1c 6.8  -Hold home farxiga and glucotrol  -C/w moderate ISS  -Hypoglycemic protocol  -Consistent carb diet     #HTN  -Chronic  -C/w home diltiazem 180  -C/w home coreg 25BID  -C/w hydralazine 50mg Q8hrs  -Monitor vitals    #HLD  -Chronic  -C/w home rosuvastatin    #DVT ppx: heparin sc 5000u Q8  #Code: Full    Discussed with attending, Dr. Fuentes  Patient is a 79-year-old female with PMHx of type 2 diabetes, HTN, HLD, CKD, anemia, presenting with SOB, admitted for CHF exacerbation, pending possible D/C today w/ cardio/neph approval.     #Acute Cardiorenal Syndrome  #HFpEF  -Presented with SOB, MORGAN  -Nuclear stress testing normal 2/2024   -TTE 2/2024 w/ grade 2 diastolic dysfunction   -BNP 1476, trops neg x 2, DDimer 1564 (age corrected 395)  -LE dopplers negative/VQ negative   -WBC 12.56, procal 0.14, RVP neg   -CXR w/ congestive features, no consolidations  -S/p azitho/CTX in ED, will D/C abx and duonebs  -TTE EF 55-60%, severely increased septal wall thickness, severe pHTN  -CTAP w/ small b/l pleural effusions   -Lasix 40mg PO QD  -Incentive spirometer  -Strict I&Os, daily weights  -Cardiology recs appreciated - hydralazine for BP control, c/w coreg and dilt, outpt workup for cardiomyopathy    #ZORAIDA on CKD4 vs worsening CKD  #CKD w/ MBD  -Cr 1.74 in 8/2023, 1.6 in June, 2.24 on admit  -Cr 2.24 today  -Ca decreased, Phosph/PTH elevated at 129 (2ndary hyperPTH)  -Renal US w/ trace bilateral pelvic fullness versus extrarenal pelves  -CTAP w/ small b/l pleural effusions   -Phosp 5.7 today  -Monitor BMP  -Nephro recs appreciated - c/w lasix, c/w phoslo 667mg TID, epo today    #Chronic Anemia 2/2 CKD  -Monthly home procrit  -Hg 9.1 on admission, 8.7 today  -S/p epo today  -Monitor Hgb    #T2DM  -Chronic, a1c 6.8  -Hold home farxiga and glucotrol  -C/w moderate ISS  -Hypoglycemic protocol  -Consistent carb diet     #HTN  -Chronic  -C/w home diltiazem 180  -C/w home coreg 25BID  -C/w hydralazine 50mg Q8hrs  -Monitor vitals    #HLD  -Chronic  -C/w home rosuvastatin    #DVT ppx: heparin sc 5000u Q8  #Code: Full    Discussed with attending, Dr. Dowling

## 2024-05-09 NOTE — DISCHARGE NOTE PROVIDER - NSDCCAREPROVSEEN_GEN_ALL_CORE_FT
Abdi, Nam Ac, Bee Gore, Cori Raya, Patricia Fuentes, Gauri Washburn Ma. Floyd Dowling, Chas Valles, Loco Jain, [facility]  C[place]elia, Jyoti Bah, Gabriela Singh, [place]

## 2024-05-09 NOTE — PROGRESS NOTE ADULT - SUBJECTIVE AND OBJECTIVE BOX
Comfortable on RA    Vital Signs Last 24 Hrs  T(C): 36.8 (05-09-24 @ 11:25), Max: 36.9 (05-08-24 @ 20:19)  T(F): 98.2 (05-09-24 @ 11:25), Max: 98.4 (05-08-24 @ 20:19)  HR: 80 (05-09-24 @ 11:25) (74 - 84)  BP: 150/70 (05-09-24 @ 11:25) (128/77 - 166/78)  RR: 17 (05-09-24 @ 11:25) (16 - 17)  SpO2: 94% (05-09-24 @ 11:25) (94% - 97%)    I&O's Detail    08 May 2024 07:01  -  09 May 2024 07:00  --------------------------------------------------------  OUT:    Voided (mL): 2200 mL  Total OUT: 2200 mL    09 May 2024 07:01  -  09 May 2024 18:48  --------------------------------------------------------  OUT:    Voided (mL): 1000 mL  Total OUT: 1000 mL    s1s2  b/l air entry  soft, ND  sm edema                                          8.7    8.26  )-----------( 278      ( 09 May 2024 07:10 )             28.3     09 May 2024 07:10    140    |  104    |  72     ----------------------------<  131    4.5     |  25     |  2.24     Ca    7.6        09 May 2024 07:10  Phos  5.7       09 May 2024 07:10  Mg     1.8       09 May 2024 07:10    TPro  7.0    /  Alb  2.7    /  TBili  0.2    /  DBili  x      /  AST  9      /  ALT  17     /  AlkPhos  83     09 May 2024 07:10    LIVER FUNCTIONS - ( 09 May 2024 07:10 )  Alb: 2.7 g/dL / Pro: 7.0 g/dL / ALK PHOS: 83 U/L / ALT: 17 U/L / AST: 9 U/L / GGT: x           acetaminophen     Tablet .. 650 milliGRAM(s) Oral every 6 hours PRN  aluminum hydroxide/magnesium hydroxide/simethicone Suspension 30 milliLiter(s) Oral every 4 hours PRN  calcitriol   Capsule 0.25 MICROGram(s) Oral daily  calcium acetate 667 milliGRAM(s) Oral three times a day with meals  carvedilol 25 milliGRAM(s) Oral every 12 hours  dextrose 10% Bolus 125 milliLiter(s) IV Bolus once  dextrose 5%. 1000 milliLiter(s) IV Continuous <Continuous>  dextrose 5%. 1000 milliLiter(s) IV Continuous <Continuous>  dextrose 50% Injectable 25 Gram(s) IV Push once  dextrose 50% Injectable 12.5 Gram(s) IV Push once  dextrose Oral Gel 15 Gram(s) Oral once PRN  diltiazem    milliGRAM(s) Oral daily  epoetin jacobo-epbx (RETACRIT) Injectable 98524 Unit(s) SubCutaneous every 7 days  furosemide    Tablet 40 milliGRAM(s) Oral daily  glucagon  Injectable 1 milliGRAM(s) IntraMuscular once  heparin   Injectable 5000 Unit(s) SubCutaneous every 8 hours  hydrALAZINE 50 milliGRAM(s) Oral every 8 hours  insulin lispro (ADMELOG) corrective regimen sliding scale   SubCutaneous at bedtime  insulin lispro (ADMELOG) corrective regimen sliding scale   SubCutaneous three times a day before meals  melatonin 3 milliGRAM(s) Oral at bedtime PRN  ondansetron Injectable 4 milliGRAM(s) IV Push every 8 hours PRN  polyethylene glycol 3350 17 Gram(s) Oral daily  rosuvastatin 5 milliGRAM(s) Oral at bedtime  senna 2 Tablet(s) Oral at bedtime    A/P:    DM, HTN, CKD 3/4 (Cr 1.74 - 8/14/23)  Adm w/CHF, b/l pl effusions   S/p hemodynamic, cardio-renal ZORAIDA/CKD 3 vs DM CKD progression   Agree w/holding ACE/ARB, Farxiga for now   Continue po Lasix   Cr is stable   Phoslo for high Phos   Epoetin for anemia   No NSAID's  Renal f/u as op   D/w family at bedside     [phone]

## 2024-05-09 NOTE — DISCHARGE NOTE PROVIDER - NSDCMRMEDTOKEN_GEN_ALL_CORE_FT
Calphron 667 mg oral tablet: 1 tab(s) orally 3 times a day Take 1 tablet by mouth three times a day with meals  carvedilol 25 mg oral tablet: 1 tab(s) orally 2 times a day  DilTIAZem (Eqv-Tiazac) 180 mg/24 hours oral capsule, extended release: 1 cap(s) orally once a day  ezetimibe 10 mg oral tablet: 1 tab(s) orally once a day  Flonase 50 mcg/inh nasal spray: 2 spray(s) in each nostril once a day  furosemide 40 mg oral tablet: 1 tab(s) orally once a day  Glucotrol 5 mg oral tablet: 1 tab(s) orally 2 times a day  hydrALAZINE 50 mg oral tablet: 1 tab(s) orally every 8 hours  methylPREDNISolone 4 mg oral tablet: 4 milligram(s) orally once a day  Procrit 40,000 units/mL preservative-free injectable solution: 3,000 intravenously once a month  Rocaltrol 0.25 mcg oral capsule: 1 cap(s) orally once a day  rosuvastatin 5 mg oral tablet: 1 orally once a day (at bedtime)

## 2024-05-09 NOTE — DISCHARGE NOTE PROVIDER - HOSPITAL COURSE
Patient is a 79-year-old female with PMHx of type 2 diabetes, HTN, HLD, CKD stage 4, anemia of chronic disease, presenting with SOB, admitted for CHF exacerbation vs PNA. In the ED patient found to have WBC 12.56 and BNP of 1476, she was treated with IV abx and IV diuretics. Antibiotics were discontinued due to negative CXR and procal. Her Iv diuretics were decreased to oral and she will be discharged on oral diuretics with follow-up from cardiology. She was found to have worsening renal function and hypocalcemia as well as anemia, she was followed by nephrology with recommendations for the start of hydralazine for better BP control, epo injections Qweek and phoslo for high phosphate. Patient to have Cardiology and Nephrology follow-up. Patient is medically optimized for discharge home.     CONSULTS: Cardiology, Nephrology, Physical Therapy     IMAGING:  TTE Echo Complete w/o Contrast w/ Doppler (05.06.24 @ 10:01)  Summary:   1. Normal global left ventricular systolic function.   2. Left ventricular ejection fraction, by visual estimation, is 55 to   60%.   3. Moderately increased posterior wall thickness. Severely increased   septal wall thickness.   4. The right ventricle is not well visualized, appears normal in size   with normal systolic function.   5. The left atrium is not well visualized, appears normal in size.   6. The right atrium is not well visualized, appears top normal in size.   7. Mild mitral valve regurgitation.   8. Mild tricuspid regurgitation.   9. No aortic valve stenosis.  10. Estimated pulmonary artery systolic pressure is 61.0 mmHg assuming a   right atrial pressure of 8 mmHg, which is consistent with severe   pulmonary hypertension.  11. There is no evidence of pericardial effusion.    Xray Chest 1 View- PORTABLE-Urgent (05.05.24 @ 23:32)   IMPRESSION:   Constellation of findings suggestive of CHF.    US Duplex Venous Lower Ext Complete, Bilateral (05.06.24 @ 08:59)   IMPRESSION:  No evidence of deep venous thrombosis in either lower extremity.    US Renal (05.06.24 @ 09:04)   IMPRESSION:  Trace bilateral pelvic fullness versus extrarenal pelves.  If indicated,   CT could be obtained forfurther evaluation.    NM Pulmonary Ventilation/Perfusion Scan (05.06.24 @ 13:14)   IMPRESSION: Very low probability of pulmonary embolus.  No significant intervalchange since the previous lung scan of 3/8/2024.    CT Abdomen and Pelvis No Cont (05.07.24 @ 11:14)   IMPRESSION:  No acute pathology in the abdomen or pelvis. Small pleural effusions with   bibasilar atelectasis, right greater than left      VITALS:  Vital Signs Last 24 Hrs  T(C): 36.9 (09 May 2024 05:07), Max: 36.9 (08 May 2024 20:19)  T(F): 98.4 (09 May 2024 05:07), Max: 98.4 (08 May 2024 20:19)  HR: 84 (09 May 2024 05:07) (74 - 84)  BP: 166/78 (09 May 2024 05:07) (128/77 - 166/78)  BP(mean): --  RR: 16 (09 May 2024 05:07) (16 - 16)  SpO2: 96% (09 May 2024 05:07) (96% - 97%)    Parameters below as of 09 May 2024 05:07  Patient On (Oxygen Delivery Method): room air      PHYSICAL EXAM:  GENERAL: NAD, lying in bed comfortably  HEAD:  Atraumatic, Normocephalic  EYES: EOMI, conjunctiva and sclera clear  ENT: Dry mucous membranes  NECK: Supple  CHEST/LUNG: Clear to auscultation bilaterally, good air entry bilaterally; No wheezing, rales, or rhonchi. Unlabored respirations  HEART: Regular rate and rhythm. S1 and S2. No murmurs, rubs, or gallops  ABDOMEN: Soft, Nontender, Nondistended. Bowel sounds present.   EXTREMITIES:  2+ Peripheral Pulses. b/l chronic venous stasis discoloration. No clubbing, cyanosis, or edema  NERVOUS SYSTEM:  Alert & Oriented X3, speech clear. No deficits.  SKIN: No rashes, bruises, or other lesions   Patient is a 79-year-old female with PMHx of type 2 diabetes, HTN, HLD, CKD stage 4, anemia of chronic disease, presenting with SOB, admitted for CHF exacerbation vs PNA. In the ED patient found to have WBC 12.56 and BNP of 1476, she was treated with IV abx and IV diuretics. Antibiotics were discontinued due to negative CXR and procal, PNA ruled out. Her Iv diuretics were transitioned to oral and she will be discharged on oral diuretics with follow-up from cardiology. She was found to have worsening renal function and hypocalcemia as well as anemia, she was followed by nephrology with recommendations for the start of hydralazine for better BP control, epo injections Qweek and phoslo for high phosphate. [facility] was discussed this admission.  Patient to have Cardiology and Nephrology follow-up. Patient is medically optimized for discharge home.     CONSULTS: Cardiology, Nephrology, Physical Therapy     IMAGING:  TTE Echo Complete w/o Contrast w/ Doppler (05.06.24 @ 10:01)  Summary:   1. Normal global left ventricular systolic function.   2. Left ventricular ejection fraction, by visual estimation, is 55 to   60%.   3. Moderately increased posterior wall thickness. Severely increased   septal wall thickness.   4. The right ventricle is not well visualized, appears normal in size   with normal systolic function.   5. The left atrium is not well visualized, appears normal in size.   6. The right atrium is not well visualized, appears top normal in size.   7. Mild mitral valve regurgitation.   8. Mild tricuspid regurgitation.   9. No aortic valve stenosis.  10. Estimated pulmonary artery systolic pressure is 61.0 mmHg assuming a   right atrial pressure of 8 mmHg, which is consistent with severe   pulmonary hypertension.  11. There is no evidence of pericardial effusion.    Xray Chest 1 View- PORTABLE-Urgent (05.05.24 @ 23:32)   IMPRESSION:   Constellation of findings suggestive of CHF.    US Duplex Venous Lower Ext Complete, Bilateral (05.06.24 @ 08:59)   IMPRESSION:  No evidence of deep venous thrombosis in either lower extremity.    US Renal (05.06.24 @ 09:04)   IMPRESSION:  Trace bilateral pelvic fullness versus extrarenal pelves.  If indicated,   CT could be obtained forfurther evaluation.    NM Pulmonary Ventilation/Perfusion Scan (05.06.24 @ 13:14)   IMPRESSION: Very low probability of pulmonary embolus.  No significant intervalchange since the previous lung scan of 3/8/2024.    CT Abdomen and Pelvis No Cont (05.07.24 @ 11:14)   IMPRESSION:  No acute pathology in the abdomen or pelvis. Small pleural effusions with   bibasilar atelectasis, right greater than left      VITALS:  Vital Signs Last 24 Hrs  T(C): 36.9 (09 May 2024 05:07), Max: 36.9 (08 May 2024 20:19)  T(F): 98.4 (09 May 2024 05:07), Max: 98.4 (08 May 2024 20:19)  HR: 84 (09 May 2024 05:07) (74 - 84)  BP: 166/78 (09 May 2024 05:07) (128/77 - 166/78)  BP(mean): --  RR: 16 (09 May 2024 05:07) (16 - 16)  SpO2: 96% (09 May 2024 05:07) (96% - 97%)    Parameters below as of 09 May 2024 05:07  Patient On (Oxygen Delivery Method): room air      PHYSICAL EXAM:  GENERAL: NAD, lying in bed comfortably  HEAD:  Atraumatic, Normocephalic  EYES: EOMI, conjunctiva and sclera clear  ENT: Dry mucous membranes  NECK: Supple  CHEST/LUNG: Clear to auscultation bilaterally, good air entry bilaterally; No wheezing, rales, or rhonchi. Unlabored respirations  HEART: Regular rate and rhythm. S1 and S2. No murmurs, rubs, or gallops  ABDOMEN: Soft, Nontender, Nondistended. Bowel sounds present.   EXTREMITIES:  2+ Peripheral Pulses. b/l chronic venous stasis discoloration. No clubbing, cyanosis, or edema  NERVOUS SYSTEM:  Alert & Oriented X3, speech clear. No deficits.  SKIN: No rashes, bruises, or other lesions      PMD: PCP: Dr. Andre Hernandez Olumiant Pregnancy And Lactation Text: Based on animal studies, Olumiant may cause embryo-fetal harm when administered to pregnant women.  The medication should not be used in pregnancy.  Breastfeeding is not recommended during treatment.

## 2024-05-09 NOTE — PROGRESS NOTE ADULT - NS ATTEND AMEND GEN_ALL_CORE FT
-  -  79-year-old admitted with complaints of shortness of breath.  Medical history close diabetes, hypertension hyperlipidemia and CKD.  Dyspnea is since resolved, patient breathing comfortable on room air.  Blood work notable for elevated D-dimer and BNP.  Echocardiogram with normal left ventricular systolic function, severely increased septal wall thickness.  No valvular disease noted.  VQ scan consistent with very low probability for PE.  Lower extremity Doppler study negative for DVT.  Not a candidate for SGL 2 inhibitor or spironolactone due to  CKD.  Outpatient follow-up for further evaluation of restrictive and infiltrative cardiomyopathy due to significant LVH.      Recommendations  -Discharge planning in progress.  -Outpatient cardiology follow-up.  -She will require close monitoring of renal status postdischarge.  -Discussed with patient and with primary team.
I have seen and examined the patient. I have discussed case with CORNEL Washburn.    Serenity Gupta is a 79 year old woman with past medical history of Hypertension, Hyperlipidemia, Type II Diabetes Mellitus, Obesity, Chronic kidney disease and Anemia presents with acute onset of shortness of breath, found to have hypertensive emergency with flash pulmonary edema.     ECG consistent with sinus rhythm and first degree AV block, no acute ischemic ST abnormalities. Troponins negative x 2. Echo consistent with normal LVEF 55-60%, moderate-severe LVH, severe pulmonary hypertension with elevation in filling pressures. VQ scan with very low probability of pulmonary embolism. Leg US negative for DVT. CXR suggestive of CHF, pro BNP elevated.     Overall, appears to have HFpEF based on echo findings and symptoms, symptoms improved with IV diuresis, likely additional day today of IV diuresis and transition to low dose oral diuretic if ok with Renal tomorrow. Follow up Nephrology due to CKD. The patient does not appear to be candidate for SGLT2i or MRA due to CKD. Recommend outpatient evaluation for restrictive or infiltrative cardiomyopathy due to significant LVH. May need to increase dose of Hydralazine for BP control, continue home Coreg and Diltiazem.    Will sign out this case to cardiologist to follow along tomorrow.    Jovan Jain MD  Cardiology
I have seen and examined the patient. I have discussed case with CORNEL Washburn.    Serenity Gupta is a 79 year old woman with past medical history of Hypertension, Hyperlipidemia, Type II Diabetes Mellitus, Obesity, Chronic kidney disease and Anemia presents with acute onset of shortness of breath, found to have hypertensive emergency with flash pulmonary edema.     ECG consistent with sinus rhythm and first degree AV block, no acute ischemic ST abnormalities. Troponins negative x 2. Echo consistent with normal LVEF 55-60%, moderate-severe LVH, severe pulmonary hypertension with elevation in filling pressures. VQ scan with very low probability of pulmonary embolism. Leg US negative for DVT. CXR suggestive of CHF, pro BNP elevated. Overall, appears to have HFpEF based on echo findings and symptoms, would continue IV diuresis, follow up Nephrology due to CKD. The patient does not appear to be candidate for SGLT2i or MRA due to CKD. Recommend outpatient evaluation for restrictive or infiltrative cardiomyopathy due to significant LVH. Continue to monitor on telemetry. May need to increase dose of Hydralazine for BP control, continue home Coreg and Diltiazem.    Jovan Jain MD  Cardiology

## 2024-05-09 NOTE — DISCHARGE NOTE PROVIDER - CARE PROVIDERS DIRECT ADDRESSES
[email or web address]

## 2024-05-09 NOTE — PROGRESS NOTE ADULT - SUBJECTIVE AND OBJECTIVE BOX
Patient is a 79-year-old female with PMHx of type 2 diabetes, HTN, HLD, CKD, anemia, presenting with SOB, admitted for CHF exacerbation vs PNA.    Overnight Events: None  Interval HPI: Today is hospital day 3. Patient seen and examined at bedside. States her breathing is better, not on oxygen, tolerating diet and voiding appropriately. Plan to transition to oral diuretics tomorrow with possible discharge pending Nephrology. No further questions or concerns at this time.     REVIEW OF SYSTEMS:  CONSTITUTIONAL: (-) weakness, (-) fevers, (-) chills  EYES/ENT: (-) visual changes,  (-) vertigo,  (-) throat pain   NECK:  (-) pain, (-) stiffness  RESPIRATORY:  (-) shortness of breath, (-) cough,  (-) wheezing,  (-) hemoptysis   CARDIOVASCULAR:  (-) chest pain, (-) palpitations  GASTROINTESTINAL:  (-) abdominal or epigastric pain, (-) nausea, (-) vomiting, (-) diarrhea, (-) constipation, (-) melena,  (-) hematemesis,  (-) hematochezia  GENITOURINARY: (-) dysuria, (-) frequency, (-) hematuria  NEUROLOGICAL: (-) numbness, (-) weakness  SKIN: (-) itching, (-) rashes, (-) lesions    Vital Signs Last 24 Hrs  T(C): 36.8 (08 May 2024 05:36), Max: 36.8 (07 May 2024 12:53)  T(F): 98.2 (08 May 2024 05:36), Max: 98.3 (07 May 2024 12:53)  HR: 83 (08 May 2024 05:36) (69 - 83)  BP: 177/87 (08 May 2024 05:36) (150/79 - 177/87)  BP(mean): --  RR: 17 (08 May 2024 05:36) (16 - 17)  SpO2: 95% (08 May 2024 05:36) (95% - 95%)    Parameters below as of 08 May 2024 05:36  Patient On (Oxygen Delivery Method): room air      PHYSICAL EXAM:  GENERAL: NAD, lying in bed comfortably  HEAD:  Atraumatic, Normocephalic  EYES: EOMI, conjunctiva and sclera clear  ENT: Dry mucous membranes  NECK: Supple  CHEST/LUNG: Clear to auscultation bilaterally, good air entry bilaterally; No wheezing, rales, or rhonchi. Unlabored respirations  HEART: Regular rate and rhythm. S1 and S2. No murmurs, rubs, or gallops  ABDOMEN: Soft, Nontender, Nondistended. Bowel sounds present.   EXTREMITIES:  2+ Peripheral Pulses. b/l chronic venous stasis discoloration. No clubbing, cyanosis, or edema  NERVOUS SYSTEM:  Alert & Oriented X3, speech clear. No deficits.  SKIN: No rashes, bruises, or other lesions    LABS:   All Labs Personally Reviewed                                  8.6    7.61  )-----------( 292      ( 08 May 2024 05:56 )             28.1     08 May 2024 05:56    141    |  105    |  63     ----------------------------<  114    4.3     |  24     |  2.07     Ca    7.3        08 May 2024 05:56  Phos  4.8       08 May 2024 05:56  Mg     1.9       08 May 2024 05:56    TPro  6.7    /  Alb  2.5    /  TBili  0.2    /  DBili  x      /  AST  9      /  ALT  16     /  AlkPhos  80     08 May 2024 05:56    PTT - ( 06 May 2024 13:35 )  PTT:48.5 sec  CAPILLARY BLOOD GLUCOSE      POCT Blood Glucose.: 211 mg/dL (07 May 2024 21:40)  POCT Blood Glucose.: 140 mg/dL (07 May 2024 17:32)  POCT Blood Glucose.: 169 mg/dL (07 May 2024 12:18)  POCT Blood Glucose.: 115 mg/dL (07 May 2024 08:36)    LIVER FUNCTIONS - ( 08 May 2024 05:56 )  Alb: 2.5 g/dL / Pro: 6.7 g/dL / ALK PHOS: 80 U/L / ALT: 16 U/L / AST: 9 U/L / GGT: x           Urinalysis Basic - ( 08 May 2024 05:56 )    Color: x / Appearance: x / SG: x / pH: x  Gluc: 114 mg/dL / Ketone: x  / Bili: x / Urobili: x   Blood: x / Protein: x / Nitrite: x   Leuk Esterase: x / RBC: x / WBC x   Sq Epi: x / Non Sq Epi: x / Bacteria: x      RADIOLOGY/EKG:  All Imaging and EKGs Personally Reviewed     CT Abdomen and Pelvis No Cont (05.07.24 @ 11:14)   IMPRESSION:  No acute pathology in the abdomen or pelvis. Small pleural effusions with   bibasilar atelectasis, right greater than left      MEDICATIONS:  MEDICATIONS  (STANDING):  calcium acetate 667 milliGRAM(s) Oral three times a day with meals  carvedilol 25 milliGRAM(s) Oral every 12 hours  dextrose 10% Bolus 125 milliLiter(s) IV Bolus once  dextrose 5%. 1000 milliLiter(s) (100 mL/Hr) IV Continuous <Continuous>  dextrose 5%. 1000 milliLiter(s) (50 mL/Hr) IV Continuous <Continuous>  dextrose 50% Injectable 25 Gram(s) IV Push once  dextrose 50% Injectable 12.5 Gram(s) IV Push once  diltiazem    milliGRAM(s) Oral daily  furosemide   Injectable 40 milliGRAM(s) IV Push daily  glucagon  Injectable 1 milliGRAM(s) IntraMuscular once  heparin   Injectable 5000 Unit(s) SubCutaneous every 8 hours  hydrALAZINE 50 milliGRAM(s) Oral every 8 hours  insulin lispro (ADMELOG) corrective regimen sliding scale   SubCutaneous three times a day before meals  insulin lispro (ADMELOG) corrective regimen sliding scale   SubCutaneous at bedtime  polyethylene glycol 3350 17 Gram(s) Oral daily  rosuvastatin 5 milliGRAM(s) Oral at bedtime  senna 2 Tablet(s) Oral at bedtime    MEDICATIONS  (PRN):  acetaminophen     Tablet .. 650 milliGRAM(s) Oral every 6 hours PRN Temp greater or equal to 38C (100.4F), Mild Pain (1 - 3)  aluminum hydroxide/magnesium hydroxide/simethicone Suspension 30 milliLiter(s) Oral every 4 hours PRN Dyspepsia  dextrose Oral Gel 15 Gram(s) Oral once PRN Blood Glucose LESS THAN 70 milliGRAM(s)/deciliter  melatonin 3 milliGRAM(s) Oral at bedtime PRN Insomnia  ondansetron Injectable 4 milliGRAM(s) IV Push every 8 hours PRN Nausea and/or Vomiting             Patient is a 79-year-old female with PMHx of type 2 diabetes, HTN, HLD, CKD, anemia, presenting with SOB, admitted for CHF exacerbation vs PNA.    Overnight Events: None  Interval HPI: Today is hospital day 3. Patient seen and examined at bedside. States her breathing is at baseline, tolerating diet and voiding appropriately. Plan to discharge today to home on oral lasix after receives epo pending Neph/Card approval. No further questions or concerns at this time.     REVIEW OF SYSTEMS:  CONSTITUTIONAL: (-) weakness, (-) fevers, (-) chills  EYES/ENT: (-) visual changes,  (-) vertigo,  (-) throat pain   NECK:  (-) pain, (-) stiffness  RESPIRATORY:  (-) shortness of breath, (-) cough,  (-) wheezing,  (-) hemoptysis   CARDIOVASCULAR:  (-) chest pain, (-) palpitations  GASTROINTESTINAL:  (-) abdominal or epigastric pain, (-) nausea, (-) vomiting, (-) diarrhea, (-) constipation, (-) melena,  (-) hematemesis,  (-) hematochezia  GENITOURINARY: (-) dysuria, (-) frequency, (-) hematuria  NEUROLOGICAL: (-) numbness, (-) weakness  SKIN: (-) itching, (-) rashes, (-) lesions    Vital Signs Last 24 Hrs  T(C): 36.9 (09 May 2024 05:07), Max: 36.9 (08 May 2024 20:19)  T(F): 98.4 (09 May 2024 05:07), Max: 98.4 (08 May 2024 20:19)  HR: 84 (09 May 2024 05:07) (74 - 84)  BP: 166/78 (09 May 2024 05:07) (128/77 - 166/78)  BP(mean): --  RR: 16 (09 May 2024 05:07) (16 - 16)  SpO2: 96% (09 May 2024 05:07) (96% - 97%)    Parameters below as of 09 May 2024 05:07  Patient On (Oxygen Delivery Method): room air    PHYSICAL EXAM:  GENERAL: NAD, lying in bed comfortably  HEAD:  Atraumatic, Normocephalic  EYES: EOMI, conjunctiva and sclera clear  ENT: Dry mucous membranes  NECK: Supple  CHEST/LUNG: Clear to auscultation bilaterally, good air entry bilaterally; No wheezing, rales, or rhonchi. Unlabored respirations  HEART: Regular rate and rhythm. S1 and S2. No murmurs, rubs, or gallops  ABDOMEN: Soft, Nontender, Nondistended. Bowel sounds present.   EXTREMITIES:  2+ Peripheral Pulses. b/l chronic venous stasis discoloration. No clubbing, cyanosis, or edema  NERVOUS SYSTEM:  Alert & Oriented X3, speech clear. No deficits.  SKIN: No rashes, bruises, or other lesions    LABS:   All Labs Personally Reviewed                               8.7    8.26  )-----------( 278      ( 09 May 2024 07:10 )             28.3     09 May 2024 07:10    140    |  104    |  72     ----------------------------<  131    4.5     |  25     |  2.24     Ca    7.6        09 May 2024 07:10  Phos  5.7       09 May 2024 07:10  Mg     1.8       09 May 2024 07:10    TPro  7.0    /  Alb  2.7    /  TBili  0.2    /  DBili  x      /  AST  9      /  ALT  17     /  AlkPhos  83     09 May 2024 07:10      CAPILLARY BLOOD GLUCOSE      POCT Blood Glucose.: 168 mg/dL (09 May 2024 12:32)  POCT Blood Glucose.: 136 mg/dL (09 May 2024 08:10)  POCT Blood Glucose.: 172 mg/dL (08 May 2024 21:13)  POCT Blood Glucose.: 169 mg/dL (08 May 2024 17:08)    LIVER FUNCTIONS - ( 09 May 2024 07:10 )  Alb: 2.7 g/dL / Pro: 7.0 g/dL / ALK PHOS: 83 U/L / ALT: 17 U/L / AST: 9 U/L / GGT: x           Urinalysis Basic - ( 09 May 2024 07:10 )    Color: x / Appearance: x / SG: x / pH: x  Gluc: 131 mg/dL / Ketone: x  / Bili: x / Urobili: x   Blood: x / Protein: x / Nitrite: x   Leuk Esterase: x / RBC: x / WBC x   Sq Epi: x / Non Sq Epi: x / Bacteria: x          RADIOLOGY/EKG:  All Imaging and EKGs Personally Reviewed     No new imaging at this time     MEDICATIONS:  MEDICATIONS  (STANDING):  calcitriol   Capsule 0.25 MICROGram(s) Oral daily  calcium acetate 667 milliGRAM(s) Oral three times a day with meals  carvedilol 25 milliGRAM(s) Oral every 12 hours  dextrose 10% Bolus 125 milliLiter(s) IV Bolus once  dextrose 5%. 1000 milliLiter(s) (50 mL/Hr) IV Continuous <Continuous>  dextrose 5%. 1000 milliLiter(s) (100 mL/Hr) IV Continuous <Continuous>  dextrose 50% Injectable 25 Gram(s) IV Push once  dextrose 50% Injectable 12.5 Gram(s) IV Push once  diltiazem    milliGRAM(s) Oral daily  epoetin [id number]-epbx (RETACRIT) Injectable 50180 Unit(s) SubCutaneous every 7 days  furosemide    Tablet 40 milliGRAM(s) Oral daily  glucagon  Injectable 1 milliGRAM(s) IntraMuscular once  heparin   Injectable 5000 Unit(s) SubCutaneous every 8 hours  hydrALAZINE 50 milliGRAM(s) Oral every 8 hours  insulin lispro (ADMELOG) corrective regimen sliding scale   SubCutaneous three times a day before meals  insulin lispro (ADMELOG) corrective regimen sliding scale   SubCutaneous at bedtime  polyethylene glycol 3350 17 Gram(s) Oral daily  rosuvastatin 5 milliGRAM(s) Oral at bedtime  senna 2 Tablet(s) Oral at bedtime    MEDICATIONS  (PRN):  acetaminophen     Tablet .. 650 milliGRAM(s) Oral every 6 hours PRN Temp greater or equal to 38C (100.4F), Mild Pain (1 - 3)  aluminum hydroxide/magnesium hydroxide/simethicone Suspension 30 milliLiter(s) Oral every 4 hours PRN Dyspepsia  dextrose Oral Gel 15 Gram(s) Oral once PRN Blood Glucose LESS THAN 70 milliGRAM(s)/deciliter  melatonin 3 milliGRAM(s) Oral at bedtime PRN Insomnia  ondansetron Injectable 4 milliGRAM(s) IV Push every 8 hours PRN Nausea and/or Vomiting

## 2024-05-09 NOTE — DISCHARGE NOTE PROVIDER - NSDCCPCAREPLAN_GEN_ALL_CORE_FT
PRINCIPAL DISCHARGE DIAGNOSIS  Diagnosis: Acute on chronic heart failure with preserved ejection fraction (HFpEF)  Assessment and Plan of Treatment: Heart failure (HF) is a condition that does not allow your heart to fill or pump properly. Not enough oxygen in your blood gets to your organs and tissues. HF can occur in the right side, the left side, or both lower chambers of your heart. HF is often caused by damage or injury to your heart. The damage may be caused by heart attack, other heart conditions, or high blood pressure. HF is a long-term condition that tends to get worse over time. It is important to manage your health to improve your quality of life. HF can be worsened by heavy alcohol use, smoking, diabetes that is not controlled, or obesity.  Call 911 for:  Squeezing, pressure, or pain in your chest that lasts longer than 5 minutes or returns  Discomfort or pain in your back, neck, jaw, stomach, or arm  Trouble breathing  Nausea or vomiting  Lightheadedness or a sudden cold sweat, especially with chest pain or trouble breathing.  Seek care immediately if:  You gain 3 or more pounds (1.4 kg) in a day  Your heartbeat is fast, slow, or uneven all the time.  Do not smoke. Nicotine and other chemicals in cigarettes and cigars can cause lung damage and make HF difficult to manage.   Do not drink alcohol or take illegal drugs. . Weigh yourself every morning. Use the same scale, in the same spot. Do this after you use the bathroom, but before you eat or drink anything. Record your weight each day so you will notice any sudden weight gain. Swelling and weight gain are signs of fluid retention.Manage any chronic health conditions you have. These include high blood pressure, diabetes, obesity, high cholesterol, metabolic syndrome, and COPD. Stay active. If you are not active, your symptoms are likely to worsen quickly. Get a flu shot every year. You may also need the pneumonia vaccine. The flu and pneumonia can be severe for a person who has HF.   Please follow-up with Cardiology and your primary care provider in the next 7 days for further management of this condition.      SECONDARY DISCHARGE DIAGNOSES  Diagnosis: Stage 4 chronic kidney disease  Assessment and Plan of Treatment: You were noted to have worsening renal function during this hospitalization.  Chronic kidney disease (CKD) is the gradual and permanent loss of kidney function. It is also called chronic kidney failure, or chronic renal insufficiency. Normally, the kidneys remove fluid, chemicals, and waste from your blood. These wastes are turned into urine by your kidneys. CKD may worsen over time and lead to kidney failure.  Get plenty of rest and get more sleep at night.  Eat foods that are low in sodium (salt), potassium, and phosphorus.  Move around and bend your legs to avoid getting blood clots when you rest for a long period of time.  Weigh yourself every day. Do this at the same time of day and in the same kind of clothes. Keep a record of your daily weights.  Take your medicines exactly as directed.  Call your doctor right away and seek medical attention if you have any of the following:Trouble eating or drinking, Weight loss of more than 2 pounds in 24 hours or more than 5 pounds in 7 days. Little or no urine output. Trouble breathing, Muscle aches, Fever of 100.4°F or higher, or chills, Blood, in your urine or stool, Bloody discharge from your nose, mouth, or ears, Severe headache or a seizure, Vomiting, Swelling of legs or ankles, Chest pain.  Please follow-up with Nephrology in the next 7 days for further managment of this condition.

## 2024-05-09 NOTE — DISCHARGE NOTE PROVIDER - PROVIDER TOKENS
PROVIDER:[TOKEN:[[id number]],FOLLOWUP:[1 week],ESTABLISHEDPATIENT:[T]],PROVIDER:[TOKEN:[[id number]],FOLLOWUP:[1 week]]

## 2024-05-09 NOTE — PROGRESS NOTE ADULT - ASSESSMENT
Assessment: 79-year-old female with history of DM, HTN, HLD, and CKD admitted with acute onset shortness of breath x 1 day.  Patient denies any chest pain, fevers, chills, or palpitations.  Was recently taken off chlorthalidone recently after improvement in edema.  Cardiology consulted for shortness of breath    Recommendations  EKG sinus rhythm with first-degree block, no ischemia  Troponins negative x 2, doubt ACS  Labs notable for elevated dimer and proBNP  TTE with EF 55-60, severely increased septal wall and no severe valvular disease  Ultrasound negative for DVT, creatinine elevated reasonable to pursue VQ scan to rule out PE> very low probability of PE 5/6/24  X-ray was concerning for CHF. pt with IV diuresis, monitor kidney function and electrolytes and wean O2 as tolerated>> pt off n/c and reports improvement in breathing. may transition to PO diuretics    Cardiology will sign off, please reconsult as needed

## 2024-05-09 NOTE — PROGRESS NOTE ADULT - SUBJECTIVE AND OBJECTIVE BOX
BURKE ABARCA  [id number]    Chief Complaint: sob  Interval events: pt seen and examined, labs and chart reviewed. improved breathing. no cp.     ALLERGIES:  predniSONE (Other)  statins (Muscle Pain)  ACE inhibitors (Angioedema)      CURRENT MEDICATIONS:  MEDICATIONS  (STANDING):  albuterol/ipratropium for Nebulization 3 milliLiter(s) Nebulizer every 6 hours  azithromycin  IVPB 500 milliGRAM(s) IV Intermittent every 24 hours  carvedilol 25 milliGRAM(s) Oral every 12 hours  cefTRIAXone   IVPB 1000 milliGRAM(s) IV Intermittent every 24 hours  dextrose 10% Bolus 125 milliLiter(s) IV Bolus once  dextrose 5%. 1000 milliLiter(s) (50 mL/Hr) IV Continuous <Continuous>  dextrose 5%. 1000 milliLiter(s) (100 mL/Hr) IV Continuous <Continuous>  dextrose 50% Injectable 25 Gram(s) IV Push once  dextrose 50% Injectable 12.5 Gram(s) IV Push once  diltiazem    milliGRAM(s) Oral daily  furosemide   Injectable 40 milliGRAM(s) IV Push two times a day  glucagon  Injectable 1 milliGRAM(s) IntraMuscular once  heparin   Injectable 8000 Unit(s) IV Push once  heparin  Infusion.  Unit(s)/Hr (18 mL/Hr) IV Continuous <Continuous>  insulin lispro (ADMELOG) corrective regimen sliding scale   SubCutaneous three times a day before meals  insulin lispro (ADMELOG) corrective regimen sliding scale   SubCutaneous at bedtime  magnesium sulfate  IVPB 1 Gram(s) IV Intermittent once  rosuvastatin 5 milliGRAM(s) Oral at bedtime  sevelamer carbonate 800 milliGRAM(s) Oral three times a day    ROS:  All 10 systems reviewed and positives noted in HPI    OBJECTIVE:    VITAL SIGNS:  Vital Signs Last 24 Hrs  T(C): 36.8 (06 May 2024 05:10), Max: 36.8 (06 May 2024 02:00)  T(F): 98.2 (06 May 2024 05:10), Max: 98.2 (06 May 2024 02:00)  HR: 77 (06 May 2024 09:20) (64 - 85)  BP: 166/77 (06 May 2024 05:10) (159/74 - 205/101)  BP(mean): --  RR: 17 (06 May 2024 05:10) (16 - 24)  SpO2: 97% (06 May 2024 09:20) (95% - 100%)    Parameters below as of 06 May 2024 09:20  Patient On (Oxygen Delivery Method): nasal cannula, 2L        PHYSICAL EXAM:  General:  no distress  HEENT: sclera anicteric  Neck: supple  CVS: JVP ~ 7 cm H20, RRR, s1, s2, no murmurs/rubs  Chest: unlabored respirations, CTA  Abdomen: non-distended  Extremities: no lower extremity edema b/l  Neuro: awake, alert & oriented    LABS:                        8.8    10.16 )-----------( 308      ( 06 May 2024 06:32 )             29.4     05-06    142  |  107  |  65<H>  ----------------------------<  154<H>  4.5   |  21<L>  |  2.13<H>    Ca    7.4<L>      06 May 2024 06:32  Phos  6.3     05-06  Mg     1.9     05-06    TPro  7.7  /  Alb  3.1<L>  /  TBili  0.2  /  DBili  x   /  AST  12  /  ALT  26  /  AlkPhos  94  05-06        PT/INR - ( 05 May 2024 23:20 )   PT: 11.7 sec;   INR: 1.00 ratio         PTT - ( 06 May 2024 05:20 )  PTT:24.4 sec      ECG: Sinus rhythm with 1st degree block      Stress 3/2018  The left ventricle was normal in size. Normal myocardial  perfusion scan, with no evidence of infarction or  inducible ischemia.    < from: TTE Echo Complete w/o Contrast w/ Doppler (05.06.24 @ 10:01) >   1. Normal global left ventricular systolic function.   2. Left ventricular ejection fraction, by visual estimation, is 55 to   60%.   3. Moderately increased posterior wall thickness. Severely increased   septal wall thickness.   4. The right ventricle is not well visualized, appears normal in size   with normal systolic function.   5. The left atrium is not well visualized, appears normal in size.   6. The right atrium is not well visualized, appears top normal in size.   7. Mild mitral valve regurgitation.   8. Mild tricuspid regurgitation.   9. No aortic valve stenosis.  10. Estimated pulmonary artery systolic pressure is 61.0 mmHg assuming a   right atrial pressure of 8 mmHg, which is consistent with severe   pulmonary hypertension.  11. There is no evidence of pericardial effusion.

## 2024-05-09 NOTE — PROGRESS NOTE ADULT - PROVIDER SPECIALTY LIST ADULT
Cardiology
Family Medicine
Family Medicine
Nephrology
Family Medicine
Cardiology
Cardiology
Family Medicine
Nephrology
Nephrology

## 2024-05-09 NOTE — DISCHARGE NOTE PROVIDER - CARE PROVIDER_API CALL
Andre Hernandez [facility]  Internal Medicine  [address] [place] [address]  Phone: [phone]  Fax: [phone]  Established Patient  Follow Up Time: 1 week    Jyoti Storey  Nephrology  [address] [facility], [address] [place] [id number]  Phone: [phone]  Fax: [phone]  Follow Up Time: 1 week

## 2024-05-09 NOTE — DISCHARGE NOTE NURSING/CASE MANAGEMENT/SOCIAL WORK - NSDCPEFALRISK_GEN_ALL_CORE
For information on Fall & Injury Prevention, visit: [email or web address] OR  [email or web address] OR  https://www.cdc.gov/steadi/patient.html

## 2024-05-09 NOTE — DISCHARGE NOTE NURSING/CASE MANAGEMENT/SOCIAL WORK - PATIENT PORTAL LINK FT
You can access the FollowMyHealth Patient Portal offered by [facility] by registering at the following website: [email or web address]. By joining [employer]’s FollowMyHealth portal, you will also be able to view your health information using other applications (apps) compatible with our system.

## 2024-05-10 LAB
% ALBUMIN: 48.1 % — SIGNIFICANT CHANGE UP
% ALPHA 1: 5.5 % — SIGNIFICANT CHANGE UP
% ALPHA 2: 17.7 % — SIGNIFICANT CHANGE UP
% BETA: 12.4 % — SIGNIFICANT CHANGE UP
% GAMMA: 16.3 % — SIGNIFICANT CHANGE UP
ALBUMIN SERPL ELPH-MCNC: 3 G/DL — LOW (ref 3.6–5.5)
ALBUMIN/GLOB SERPL ELPH: 0.9 RATIO — SIGNIFICANT CHANGE UP
ALPHA1 GLOB SERPL ELPH-MCNC: 0.3 G/DL — SIGNIFICANT CHANGE UP (ref 0.1–0.4)
ALPHA2 GLOB SERPL ELPH-MCNC: 1.1 G/DL — HIGH (ref 0.5–1)
B-GLOBULIN SERPL ELPH-MCNC: 0.8 G/DL — SIGNIFICANT CHANGE UP (ref 0.5–1)
GAMMA GLOBULIN: 1 G/DL — SIGNIFICANT CHANGE UP (ref 0.6–1.6)
INTERPRETATION SERPL IFE-IMP: SIGNIFICANT CHANGE UP
PROT PATTERN SERPL ELPH-IMP: SIGNIFICANT CHANGE UP
PROT SERPL-MCNC: 6.3 G/DL — SIGNIFICANT CHANGE UP (ref 6–8.3)

## 2024-05-11 LAB
CULTURE RESULTS: SIGNIFICANT CHANGE UP
CULTURE RESULTS: SIGNIFICANT CHANGE UP
SPECIMEN SOURCE: SIGNIFICANT CHANGE UP
SPECIMEN SOURCE: SIGNIFICANT CHANGE UP

## 2024-06-22 ENCOUNTER — INPATIENT (INPATIENT)
Facility: HOSPITAL | Age: 79
LOS: 5 days | Discharge: NURSING, REHAB OR LONG-TERM CARE | DRG: 291 | End: 2024-06-28
Attending: HOSPITALIST | Admitting: INTERNAL MEDICINE
Payer: COMMERCIAL

## 2024-06-22 VITALS
HEIGHT: 64 IN | RESPIRATION RATE: 19 BRPM | WEIGHT: 207.9 LBS | TEMPERATURE: 98 F | OXYGEN SATURATION: 96 % | SYSTOLIC BLOOD PRESSURE: 179 MMHG | DIASTOLIC BLOOD PRESSURE: 78 MMHG | HEART RATE: 103 BPM

## 2024-06-22 DIAGNOSIS — Z98.890 OTHER SPECIFIED POSTPROCEDURAL STATES: Chronic | ICD-10-CM

## 2024-06-22 DIAGNOSIS — E89.0 POSTPROCEDURAL HYPOTHYROIDISM: Chronic | ICD-10-CM

## 2024-06-22 DIAGNOSIS — I24.9 ACUTE ISCHEMIC HEART DISEASE, UNSPECIFIED: ICD-10-CM

## 2024-06-22 DIAGNOSIS — Z90.710 ACQUIRED ABSENCE OF BOTH CERVIX AND UTERUS: Chronic | ICD-10-CM

## 2024-06-22 DIAGNOSIS — Z98.49 CATARACT EXTRACTION STATUS, UNSPECIFIED EYE: Chronic | ICD-10-CM

## 2024-06-22 LAB
ALBUMIN SERPL ELPH-MCNC: 3.2 G/DL — LOW (ref 3.3–5)
ALP SERPL-CCNC: 81 U/L — SIGNIFICANT CHANGE UP (ref 40–120)
ALT FLD-CCNC: 19 U/L — SIGNIFICANT CHANGE UP (ref 10–45)
ANION GAP SERPL CALC-SCNC: 12 MMOL/L — SIGNIFICANT CHANGE UP (ref 5–17)
AST SERPL-CCNC: 14 U/L — SIGNIFICANT CHANGE UP (ref 10–40)
BASOPHILS # BLD AUTO: 0.02 K/UL — SIGNIFICANT CHANGE UP (ref 0–0.2)
BASOPHILS NFR BLD AUTO: 0.3 % — SIGNIFICANT CHANGE UP (ref 0–2)
BILIRUB SERPL-MCNC: 0.3 MG/DL — SIGNIFICANT CHANGE UP (ref 0.2–1.2)
BUN SERPL-MCNC: 69 MG/DL — HIGH (ref 7–23)
CALCIUM SERPL-MCNC: 7.8 MG/DL — LOW (ref 8.4–10.5)
CHLORIDE SERPL-SCNC: 105 MMOL/L — SIGNIFICANT CHANGE UP (ref 96–108)
CO2 SERPL-SCNC: 22 MMOL/L — SIGNIFICANT CHANGE UP (ref 22–31)
CREAT SERPL-MCNC: 2.11 MG/DL — HIGH (ref 0.5–1.3)
EGFR: 23 ML/MIN/1.73M2 — LOW
EOSINOPHIL # BLD AUTO: 0.18 K/UL — SIGNIFICANT CHANGE UP (ref 0–0.5)
EOSINOPHIL NFR BLD AUTO: 2.4 % — SIGNIFICANT CHANGE UP (ref 0–6)
GLUCOSE BLDC GLUCOMTR-MCNC: 88 MG/DL — SIGNIFICANT CHANGE UP (ref 70–99)
GLUCOSE SERPL-MCNC: 109 MG/DL — HIGH (ref 70–99)
HCT VFR BLD CALC: 25.7 % — LOW (ref 34.5–45)
HGB BLD-MCNC: 7.5 G/DL — LOW (ref 11.5–15.5)
IMM GRANULOCYTES NFR BLD AUTO: 0.4 % — SIGNIFICANT CHANGE UP (ref 0–0.9)
LIDOCAIN IGE QN: 82 U/L — HIGH (ref 16–77)
LYMPHOCYTES # BLD AUTO: 0.85 K/UL — LOW (ref 1–3.3)
LYMPHOCYTES # BLD AUTO: 11.1 % — LOW (ref 13–44)
MAGNESIUM SERPL-MCNC: 1.9 MG/DL — SIGNIFICANT CHANGE UP (ref 1.6–2.6)
MCHC RBC-ENTMCNC: 26.9 PG — LOW (ref 27–34)
MCHC RBC-ENTMCNC: 29.2 GM/DL — LOW (ref 32–36)
MCV RBC AUTO: 92.1 FL — SIGNIFICANT CHANGE UP (ref 80–100)
MONOCYTES # BLD AUTO: 0.75 K/UL — SIGNIFICANT CHANGE UP (ref 0–0.9)
MONOCYTES NFR BLD AUTO: 9.8 % — SIGNIFICANT CHANGE UP (ref 2–14)
NEUTROPHILS # BLD AUTO: 5.8 K/UL — SIGNIFICANT CHANGE UP (ref 1.8–7.4)
NEUTROPHILS NFR BLD AUTO: 76 % — SIGNIFICANT CHANGE UP (ref 43–77)
NRBC # BLD: 0 /100 WBCS — SIGNIFICANT CHANGE UP (ref 0–0)
NT-PROBNP SERPL-SCNC: 2125 PG/ML — HIGH (ref 0–300)
PLATELET # BLD AUTO: 218 K/UL — SIGNIFICANT CHANGE UP (ref 150–400)
POTASSIUM SERPL-MCNC: 4.3 MMOL/L — SIGNIFICANT CHANGE UP (ref 3.5–5.3)
POTASSIUM SERPL-SCNC: 4.3 MMOL/L — SIGNIFICANT CHANGE UP (ref 3.5–5.3)
PROT SERPL-MCNC: 7.3 G/DL — SIGNIFICANT CHANGE UP (ref 6–8.3)
RBC # BLD: 2.79 M/UL — LOW (ref 3.8–5.2)
RBC # FLD: 17.9 % — HIGH (ref 10.3–14.5)
SODIUM SERPL-SCNC: 139 MMOL/L — SIGNIFICANT CHANGE UP (ref 135–145)
TROPONIN I, HIGH SENSITIVITY RESULT: 41.3 NG/L — SIGNIFICANT CHANGE UP
TROPONIN I, HIGH SENSITIVITY RESULT: 46.8 NG/L — SIGNIFICANT CHANGE UP
WBC # BLD: 7.63 K/UL — SIGNIFICANT CHANGE UP (ref 3.8–10.5)
WBC # FLD AUTO: 7.63 K/UL — SIGNIFICANT CHANGE UP (ref 3.8–10.5)

## 2024-06-22 PROCEDURE — 99223 1ST HOSP IP/OBS HIGH 75: CPT

## 2024-06-22 PROCEDURE — 71045 X-RAY EXAM CHEST 1 VIEW: CPT | Mod: 26

## 2024-06-22 PROCEDURE — 93010 ELECTROCARDIOGRAM REPORT: CPT

## 2024-06-22 PROCEDURE — 99285 EMERGENCY DEPT VISIT HI MDM: CPT

## 2024-06-22 RX ORDER — EZETIMIBE 10 MG/1
1 TABLET ORAL
Refills: 0 | DISCHARGE

## 2024-06-22 RX ORDER — ACETAMINOPHEN 325 MG
650 TABLET ORAL EVERY 6 HOURS
Refills: 0 | Status: DISCONTINUED | OUTPATIENT
Start: 2024-06-22 | End: 2024-06-28

## 2024-06-22 RX ORDER — DEXTROSE 30 % IN WATER 30 %
25 VIAL (ML) INTRAVENOUS ONCE
Refills: 0 | Status: DISCONTINUED | OUTPATIENT
Start: 2024-06-22 | End: 2024-06-28

## 2024-06-22 RX ORDER — GLUCAGON HYDROCHLORIDE 1 MG/ML
1 INJECTION, POWDER, FOR SOLUTION INTRAMUSCULAR; INTRAVENOUS; SUBCUTANEOUS ONCE
Refills: 0 | Status: DISCONTINUED | OUTPATIENT
Start: 2024-06-22 | End: 2024-06-28

## 2024-06-22 RX ORDER — DEXTROSE 30 % IN WATER 30 %
12.5 VIAL (ML) INTRAVENOUS ONCE
Refills: 0 | Status: DISCONTINUED | OUTPATIENT
Start: 2024-06-22 | End: 2024-06-28

## 2024-06-22 RX ORDER — FLUTICASONE PROPIONATE 50 MCG
2 SPRAY, SUSPENSION NASAL
Refills: 0 | DISCHARGE

## 2024-06-22 RX ORDER — DILTIAZEM HCL 120 MG
1 CAPSULE, EXT RELEASE 24 HR ORAL
Refills: 0 | DISCHARGE

## 2024-06-22 RX ORDER — CARVEDILOL PHOSPHATE 80 MG/1
1 CAPSULE, EXTENDED RELEASE ORAL
Refills: 0 | DISCHARGE

## 2024-06-22 RX ORDER — ATORVASTATIN CALCIUM 20 MG/1
20 TABLET, FILM COATED ORAL AT BEDTIME
Refills: 0 | Status: DISCONTINUED | OUTPATIENT
Start: 2024-06-22 | End: 2024-06-28

## 2024-06-22 RX ORDER — HEPARIN SODIUM 50 [USP'U]/ML
5000 INJECTION, SOLUTION INTRAVENOUS EVERY 8 HOURS
Refills: 0 | Status: DISCONTINUED | OUTPATIENT
Start: 2024-06-22 | End: 2024-06-28

## 2024-06-22 RX ORDER — INSULIN LISPRO 100 [IU]/ML
INJECTION, SOLUTION SUBCUTANEOUS
Refills: 0 | Status: DISCONTINUED | OUTPATIENT
Start: 2024-06-22 | End: 2024-06-28

## 2024-06-22 RX ORDER — DEXTROSE MONOHYDRATE AND SODIUM CHLORIDE 5; .3 G/100ML; G/100ML
1000 INJECTION, SOLUTION INTRAVENOUS
Refills: 0 | Status: DISCONTINUED | OUTPATIENT
Start: 2024-06-22 | End: 2024-06-28

## 2024-06-22 RX ORDER — FUROSEMIDE 10 MG/ML
40 INJECTION, SOLUTION INTRAMUSCULAR; INTRAVENOUS ONCE
Refills: 0 | Status: COMPLETED | OUTPATIENT
Start: 2024-06-22 | End: 2024-06-22

## 2024-06-22 RX ORDER — DEXTROSE 30 % IN WATER 30 %
15 VIAL (ML) INTRAVENOUS ONCE
Refills: 0 | Status: DISCONTINUED | OUTPATIENT
Start: 2024-06-22 | End: 2024-06-28

## 2024-06-22 RX ORDER — DEXTROSE MONOHYDRATE 100 MG/ML
125 INJECTION, SOLUTION INTRAVENOUS ONCE
Refills: 0 | Status: DISCONTINUED | OUTPATIENT
Start: 2024-06-22 | End: 2024-06-28

## 2024-06-22 RX ORDER — INSULIN LISPRO 100 [IU]/ML
INJECTION, SOLUTION SUBCUTANEOUS AT BEDTIME
Refills: 0 | Status: DISCONTINUED | OUTPATIENT
Start: 2024-06-22 | End: 2024-06-28

## 2024-06-22 RX ORDER — ONDANSETRON HYDROCHLORIDE 2 MG/ML
4 INJECTION INTRAMUSCULAR; INTRAVENOUS EVERY 8 HOURS
Refills: 0 | Status: DISCONTINUED | OUTPATIENT
Start: 2024-06-22 | End: 2024-06-28

## 2024-06-22 RX ORDER — DILTIAZEM HYDROCHLORIDE 240 MG/1
180 CAPSULE, EXTENDED RELEASE ORAL DAILY
Refills: 0 | Status: DISCONTINUED | OUTPATIENT
Start: 2024-06-22 | End: 2024-06-28

## 2024-06-22 RX ORDER — HYDRALAZINE HYDROCHLORIDE 50 MG/1
50 TABLET ORAL EVERY 8 HOURS
Refills: 0 | Status: DISCONTINUED | OUTPATIENT
Start: 2024-06-22 | End: 2024-06-28

## 2024-06-22 RX ORDER — ASPIRIN 325 MG/1
324 TABLET, FILM COATED ORAL ONCE
Refills: 0 | Status: COMPLETED | OUTPATIENT
Start: 2024-06-22 | End: 2024-06-22

## 2024-06-22 RX ORDER — ROSUVASTATIN CALCIUM 5 MG/1
1 TABLET ORAL
Refills: 0 | DISCHARGE

## 2024-06-22 RX ORDER — FUROSEMIDE 10 MG/ML
40 INJECTION, SOLUTION INTRAMUSCULAR; INTRAVENOUS DAILY
Refills: 0 | Status: DISCONTINUED | OUTPATIENT
Start: 2024-06-23 | End: 2024-06-25

## 2024-06-22 RX ORDER — MAGNESIUM, ALUMINUM HYDROXIDE 400-400
30 TABLET,CHEWABLE ORAL EVERY 4 HOURS
Refills: 0 | Status: DISCONTINUED | OUTPATIENT
Start: 2024-06-22 | End: 2024-06-28

## 2024-06-22 RX ORDER — CALCITRIOL 0.25 UG/1
0.25 CAPSULE, LIQUID FILLED ORAL DAILY
Refills: 0 | Status: DISCONTINUED | OUTPATIENT
Start: 2024-06-22 | End: 2024-06-28

## 2024-06-22 RX ORDER — CARVEDILOL PHOSPHATE 80 MG/1
25 CAPSULE, EXTENDED RELEASE ORAL EVERY 12 HOURS
Refills: 0 | Status: DISCONTINUED | OUTPATIENT
Start: 2024-06-22 | End: 2024-06-28

## 2024-06-22 RX ADMIN — ASPIRIN 324 MILLIGRAM(S): 325 TABLET, FILM COATED ORAL at 17:42

## 2024-06-22 RX ADMIN — HEPARIN SODIUM 5000 UNIT(S): 50 INJECTION, SOLUTION INTRAVENOUS at 21:04

## 2024-06-22 RX ADMIN — ATORVASTATIN CALCIUM 20 MILLIGRAM(S): 20 TABLET, FILM COATED ORAL at 21:05

## 2024-06-22 RX ADMIN — HYDRALAZINE HYDROCHLORIDE 50 MILLIGRAM(S): 50 TABLET ORAL at 21:04

## 2024-06-22 RX ADMIN — FUROSEMIDE 40 MILLIGRAM(S): 10 INJECTION, SOLUTION INTRAMUSCULAR; INTRAVENOUS at 17:41

## 2024-06-23 LAB
ANION GAP SERPL CALC-SCNC: 10 MMOL/L — SIGNIFICANT CHANGE UP (ref 5–17)
BLD GP AB SCN SERPL QL: SIGNIFICANT CHANGE UP
BUN SERPL-MCNC: 70 MG/DL — HIGH (ref 7–23)
CALCIUM SERPL-MCNC: 7.6 MG/DL — LOW (ref 8.4–10.5)
CHLORIDE SERPL-SCNC: 107 MMOL/L — SIGNIFICANT CHANGE UP (ref 96–108)
CHOLEST SERPL-MCNC: 114 MG/DL — SIGNIFICANT CHANGE UP
CO2 SERPL-SCNC: 25 MMOL/L — SIGNIFICANT CHANGE UP (ref 22–31)
CREAT SERPL-MCNC: 2.13 MG/DL — HIGH (ref 0.5–1.3)
EGFR: 23 ML/MIN/1.73M2 — LOW
GLUCOSE BLDC GLUCOMTR-MCNC: 100 MG/DL — HIGH (ref 70–99)
GLUCOSE BLDC GLUCOMTR-MCNC: 118 MG/DL — HIGH (ref 70–99)
GLUCOSE BLDC GLUCOMTR-MCNC: 124 MG/DL — HIGH (ref 70–99)
GLUCOSE BLDC GLUCOMTR-MCNC: 163 MG/DL — HIGH (ref 70–99)
GLUCOSE BLDC GLUCOMTR-MCNC: 197 MG/DL — HIGH (ref 70–99)
GLUCOSE SERPL-MCNC: 97 MG/DL — SIGNIFICANT CHANGE UP (ref 70–99)
HCT VFR BLD CALC: 23.9 % — LOW (ref 34.5–45)
HCT VFR BLD CALC: 24.1 % — LOW (ref 34.5–45)
HDLC SERPL-MCNC: 40 MG/DL — LOW
HGB BLD-MCNC: 7.2 G/DL — LOW (ref 11.5–15.5)
HGB BLD-MCNC: 7.3 G/DL — LOW (ref 11.5–15.5)
LIPID PNL WITH DIRECT LDL SERPL: 54 MG/DL — SIGNIFICANT CHANGE UP
MCHC RBC-ENTMCNC: 27.4 PG — SIGNIFICANT CHANGE UP (ref 27–34)
MCHC RBC-ENTMCNC: 30.5 GM/DL — LOW (ref 32–36)
MCV RBC AUTO: 89.8 FL — SIGNIFICANT CHANGE UP (ref 80–100)
NON HDL CHOLESTEROL: 73 MG/DL — SIGNIFICANT CHANGE UP
NRBC # BLD: 0 /100 WBCS — SIGNIFICANT CHANGE UP (ref 0–0)
PLATELET # BLD AUTO: 218 K/UL — SIGNIFICANT CHANGE UP (ref 150–400)
POTASSIUM SERPL-MCNC: 4.2 MMOL/L — SIGNIFICANT CHANGE UP (ref 3.5–5.3)
POTASSIUM SERPL-SCNC: 4.2 MMOL/L — SIGNIFICANT CHANGE UP (ref 3.5–5.3)
RBC # BLD: 2.66 M/UL — LOW (ref 3.8–5.2)
RBC # FLD: 17.7 % — HIGH (ref 10.3–14.5)
SODIUM SERPL-SCNC: 142 MMOL/L — SIGNIFICANT CHANGE UP (ref 135–145)
TRIGL SERPL-MCNC: 103 MG/DL — SIGNIFICANT CHANGE UP
TROPONIN I, HIGH SENSITIVITY RESULT: 38.1 NG/L — SIGNIFICANT CHANGE UP
WBC # BLD: 6.26 K/UL — SIGNIFICANT CHANGE UP (ref 3.8–10.5)
WBC # FLD AUTO: 6.26 K/UL — SIGNIFICANT CHANGE UP (ref 3.8–10.5)

## 2024-06-23 PROCEDURE — 93010 ELECTROCARDIOGRAM REPORT: CPT | Mod: 77

## 2024-06-23 PROCEDURE — 99233 SBSQ HOSP IP/OBS HIGH 50: CPT

## 2024-06-23 PROCEDURE — 99222 1ST HOSP IP/OBS MODERATE 55: CPT

## 2024-06-23 PROCEDURE — 93010 ELECTROCARDIOGRAM REPORT: CPT

## 2024-06-23 RX ADMIN — HYDRALAZINE HYDROCHLORIDE 50 MILLIGRAM(S): 50 TABLET ORAL at 21:08

## 2024-06-23 RX ADMIN — FUROSEMIDE 40 MILLIGRAM(S): 10 INJECTION, SOLUTION INTRAMUSCULAR; INTRAVENOUS at 05:25

## 2024-06-23 RX ADMIN — HEPARIN SODIUM 5000 UNIT(S): 50 INJECTION, SOLUTION INTRAVENOUS at 21:07

## 2024-06-23 RX ADMIN — HYDRALAZINE HYDROCHLORIDE 50 MILLIGRAM(S): 50 TABLET ORAL at 05:23

## 2024-06-23 RX ADMIN — INSULIN LISPRO 2: 100 INJECTION, SOLUTION SUBCUTANEOUS at 13:19

## 2024-06-23 RX ADMIN — CARVEDILOL PHOSPHATE 25 MILLIGRAM(S): 80 CAPSULE, EXTENDED RELEASE ORAL at 05:22

## 2024-06-23 RX ADMIN — HEPARIN SODIUM 5000 UNIT(S): 50 INJECTION, SOLUTION INTRAVENOUS at 05:23

## 2024-06-23 RX ADMIN — CALCITRIOL 0.25 MICROGRAM(S): 0.25 CAPSULE, LIQUID FILLED ORAL at 11:35

## 2024-06-23 RX ADMIN — HEPARIN SODIUM 5000 UNIT(S): 50 INJECTION, SOLUTION INTRAVENOUS at 13:19

## 2024-06-23 RX ADMIN — ATORVASTATIN CALCIUM 20 MILLIGRAM(S): 20 TABLET, FILM COATED ORAL at 21:08

## 2024-06-23 RX ADMIN — CARVEDILOL PHOSPHATE 25 MILLIGRAM(S): 80 CAPSULE, EXTENDED RELEASE ORAL at 17:10

## 2024-06-23 RX ADMIN — DILTIAZEM HYDROCHLORIDE 180 MILLIGRAM(S): 240 CAPSULE, EXTENDED RELEASE ORAL at 05:22

## 2024-06-23 RX ADMIN — HYDRALAZINE HYDROCHLORIDE 50 MILLIGRAM(S): 50 TABLET ORAL at 13:19

## 2024-06-24 LAB
A1C WITH ESTIMATED AVERAGE GLUCOSE RESULT: 6.5 % — HIGH (ref 4–5.6)
ANION GAP SERPL CALC-SCNC: 11 MMOL/L — SIGNIFICANT CHANGE UP (ref 5–17)
BUN SERPL-MCNC: 71 MG/DL — HIGH (ref 7–23)
CALCIUM SERPL-MCNC: 7.8 MG/DL — LOW (ref 8.4–10.5)
CHLORIDE SERPL-SCNC: 108 MMOL/L — SIGNIFICANT CHANGE UP (ref 96–108)
CO2 SERPL-SCNC: 24 MMOL/L — SIGNIFICANT CHANGE UP (ref 22–31)
CREAT SERPL-MCNC: 2.05 MG/DL — HIGH (ref 0.5–1.3)
EGFR: 24 ML/MIN/1.73M2 — LOW
ESTIMATED AVERAGE GLUCOSE: 140 MG/DL — HIGH (ref 68–114)
FERRITIN SERPL-MCNC: 784 NG/ML — HIGH (ref 13–330)
FOLATE SERPL-MCNC: 18.2 NG/ML — SIGNIFICANT CHANGE UP
GLUCOSE BLDC GLUCOMTR-MCNC: 122 MG/DL — HIGH (ref 70–99)
GLUCOSE BLDC GLUCOMTR-MCNC: 132 MG/DL — HIGH (ref 70–99)
GLUCOSE BLDC GLUCOMTR-MCNC: 138 MG/DL — HIGH (ref 70–99)
GLUCOSE BLDC GLUCOMTR-MCNC: 171 MG/DL — HIGH (ref 70–99)
GLUCOSE SERPL-MCNC: 112 MG/DL — HIGH (ref 70–99)
HCT VFR BLD CALC: 24.1 % — LOW (ref 34.5–45)
HGB BLD-MCNC: 7.3 G/DL — LOW (ref 11.5–15.5)
IRON SATN MFR SERPL: 15 % — SIGNIFICANT CHANGE UP (ref 14–50)
IRON SATN MFR SERPL: 29 UG/DL — LOW (ref 30–160)
MCHC RBC-ENTMCNC: 27.3 PG — SIGNIFICANT CHANGE UP (ref 27–34)
MCHC RBC-ENTMCNC: 30.3 GM/DL — LOW (ref 32–36)
MCV RBC AUTO: 90.3 FL — SIGNIFICANT CHANGE UP (ref 80–100)
NRBC # BLD: 0 /100 WBCS — SIGNIFICANT CHANGE UP (ref 0–0)
PLATELET # BLD AUTO: 205 K/UL — SIGNIFICANT CHANGE UP (ref 150–400)
POTASSIUM SERPL-MCNC: 4 MMOL/L — SIGNIFICANT CHANGE UP (ref 3.5–5.3)
POTASSIUM SERPL-SCNC: 4 MMOL/L — SIGNIFICANT CHANGE UP (ref 3.5–5.3)
RBC # BLD: 2.67 M/UL — LOW (ref 3.8–5.2)
RBC # FLD: 18 % — HIGH (ref 10.3–14.5)
SODIUM SERPL-SCNC: 143 MMOL/L — SIGNIFICANT CHANGE UP (ref 135–145)
TIBC SERPL-MCNC: 188 UG/DL — LOW (ref 220–430)
UIBC SERPL-MCNC: 160 UG/DL — SIGNIFICANT CHANGE UP (ref 110–370)
VIT B12 SERPL-MCNC: 938 PG/ML — SIGNIFICANT CHANGE UP (ref 232–1245)
WBC # BLD: 6.47 K/UL — SIGNIFICANT CHANGE UP (ref 3.8–10.5)
WBC # FLD AUTO: 6.47 K/UL — SIGNIFICANT CHANGE UP (ref 3.8–10.5)

## 2024-06-24 PROCEDURE — 71045 X-RAY EXAM CHEST 1 VIEW: CPT | Mod: 26

## 2024-06-24 PROCEDURE — 99232 SBSQ HOSP IP/OBS MODERATE 35: CPT

## 2024-06-24 PROCEDURE — 99233 SBSQ HOSP IP/OBS HIGH 50: CPT

## 2024-06-24 PROCEDURE — 93970 EXTREMITY STUDY: CPT | Mod: 26

## 2024-06-24 RX ORDER — SENNOSIDES 8.6 MG
2 TABLET ORAL AT BEDTIME
Refills: 0 | Status: DISCONTINUED | OUTPATIENT
Start: 2024-06-24 | End: 2024-06-28

## 2024-06-24 RX ORDER — ERYTHROPOIETIN 4000 [IU]/ML
10000 INJECTION, SOLUTION INTRAVENOUS; SUBCUTANEOUS
Refills: 0 | Status: DISCONTINUED | OUTPATIENT
Start: 2024-06-24 | End: 2024-06-28

## 2024-06-24 RX ORDER — POLYETHYLENE GLYCOL 3350 1 G/G
17 POWDER ORAL DAILY
Refills: 0 | Status: DISCONTINUED | OUTPATIENT
Start: 2024-06-24 | End: 2024-06-28

## 2024-06-24 RX ADMIN — CARVEDILOL PHOSPHATE 25 MILLIGRAM(S): 80 CAPSULE, EXTENDED RELEASE ORAL at 17:59

## 2024-06-24 RX ADMIN — HYDRALAZINE HYDROCHLORIDE 50 MILLIGRAM(S): 50 TABLET ORAL at 15:31

## 2024-06-24 RX ADMIN — CALCITRIOL 0.25 MICROGRAM(S): 0.25 CAPSULE, LIQUID FILLED ORAL at 11:24

## 2024-06-24 RX ADMIN — ATORVASTATIN CALCIUM 20 MILLIGRAM(S): 20 TABLET, FILM COATED ORAL at 22:19

## 2024-06-24 RX ADMIN — HEPARIN SODIUM 5000 UNIT(S): 50 INJECTION, SOLUTION INTRAVENOUS at 22:19

## 2024-06-24 RX ADMIN — DILTIAZEM HYDROCHLORIDE 180 MILLIGRAM(S): 240 CAPSULE, EXTENDED RELEASE ORAL at 05:17

## 2024-06-24 RX ADMIN — Medication 2 TABLET(S): at 22:19

## 2024-06-24 RX ADMIN — HEPARIN SODIUM 5000 UNIT(S): 50 INJECTION, SOLUTION INTRAVENOUS at 15:32

## 2024-06-24 RX ADMIN — CARVEDILOL PHOSPHATE 25 MILLIGRAM(S): 80 CAPSULE, EXTENDED RELEASE ORAL at 05:17

## 2024-06-24 RX ADMIN — POLYETHYLENE GLYCOL 3350 17 GRAM(S): 1 POWDER ORAL at 15:40

## 2024-06-24 RX ADMIN — FUROSEMIDE 40 MILLIGRAM(S): 10 INJECTION, SOLUTION INTRAMUSCULAR; INTRAVENOUS at 05:20

## 2024-06-24 RX ADMIN — HEPARIN SODIUM 5000 UNIT(S): 50 INJECTION, SOLUTION INTRAVENOUS at 05:17

## 2024-06-24 RX ADMIN — HYDRALAZINE HYDROCHLORIDE 50 MILLIGRAM(S): 50 TABLET ORAL at 05:17

## 2024-06-24 RX ADMIN — HYDRALAZINE HYDROCHLORIDE 50 MILLIGRAM(S): 50 TABLET ORAL at 22:19

## 2024-06-25 LAB
ANION GAP SERPL CALC-SCNC: 11 MMOL/L — SIGNIFICANT CHANGE UP (ref 5–17)
BUN SERPL-MCNC: 72 MG/DL — HIGH (ref 7–23)
CALCIUM SERPL-MCNC: 7.8 MG/DL — LOW (ref 8.4–10.5)
CHLORIDE SERPL-SCNC: 107 MMOL/L — SIGNIFICANT CHANGE UP (ref 96–108)
CO2 SERPL-SCNC: 25 MMOL/L — SIGNIFICANT CHANGE UP (ref 22–31)
CREAT SERPL-MCNC: 2.01 MG/DL — HIGH (ref 0.5–1.3)
EGFR: 25 ML/MIN/1.73M2 — LOW
GLUCOSE BLDC GLUCOMTR-MCNC: 113 MG/DL — HIGH (ref 70–99)
GLUCOSE BLDC GLUCOMTR-MCNC: 128 MG/DL — HIGH (ref 70–99)
GLUCOSE BLDC GLUCOMTR-MCNC: 139 MG/DL — HIGH (ref 70–99)
GLUCOSE BLDC GLUCOMTR-MCNC: 176 MG/DL — HIGH (ref 70–99)
GLUCOSE SERPL-MCNC: 117 MG/DL — HIGH (ref 70–99)
HCT VFR BLD CALC: 25.4 % — LOW (ref 34.5–45)
HGB BLD-MCNC: 7.7 G/DL — LOW (ref 11.5–15.5)
POTASSIUM SERPL-MCNC: 4.3 MMOL/L — SIGNIFICANT CHANGE UP (ref 3.5–5.3)
POTASSIUM SERPL-SCNC: 4.3 MMOL/L — SIGNIFICANT CHANGE UP (ref 3.5–5.3)
SODIUM SERPL-SCNC: 143 MMOL/L — SIGNIFICANT CHANGE UP (ref 135–145)

## 2024-06-25 PROCEDURE — 99232 SBSQ HOSP IP/OBS MODERATE 35: CPT

## 2024-06-25 RX ORDER — FUROSEMIDE 10 MG/ML
60 INJECTION, SOLUTION INTRAMUSCULAR; INTRAVENOUS DAILY
Refills: 0 | Status: DISCONTINUED | OUTPATIENT
Start: 2024-06-26 | End: 2024-06-28

## 2024-06-25 RX ORDER — POLYETHYLENE GLYCOL 3350 1 G/G
17 POWDER ORAL ONCE
Refills: 0 | Status: COMPLETED | OUTPATIENT
Start: 2024-06-25 | End: 2024-06-25

## 2024-06-25 RX ADMIN — HEPARIN SODIUM 5000 UNIT(S): 50 INJECTION, SOLUTION INTRAVENOUS at 06:10

## 2024-06-25 RX ADMIN — ATORVASTATIN CALCIUM 20 MILLIGRAM(S): 20 TABLET, FILM COATED ORAL at 21:26

## 2024-06-25 RX ADMIN — HEPARIN SODIUM 5000 UNIT(S): 50 INJECTION, SOLUTION INTRAVENOUS at 15:54

## 2024-06-25 RX ADMIN — HYDRALAZINE HYDROCHLORIDE 50 MILLIGRAM(S): 50 TABLET ORAL at 21:26

## 2024-06-25 RX ADMIN — FUROSEMIDE 40 MILLIGRAM(S): 10 INJECTION, SOLUTION INTRAMUSCULAR; INTRAVENOUS at 06:09

## 2024-06-25 RX ADMIN — HEPARIN SODIUM 5000 UNIT(S): 50 INJECTION, SOLUTION INTRAVENOUS at 21:26

## 2024-06-25 RX ADMIN — HYDRALAZINE HYDROCHLORIDE 50 MILLIGRAM(S): 50 TABLET ORAL at 06:09

## 2024-06-25 RX ADMIN — CARVEDILOL PHOSPHATE 25 MILLIGRAM(S): 80 CAPSULE, EXTENDED RELEASE ORAL at 17:01

## 2024-06-25 RX ADMIN — Medication 2 TABLET(S): at 21:26

## 2024-06-25 RX ADMIN — CALCITRIOL 0.25 MICROGRAM(S): 0.25 CAPSULE, LIQUID FILLED ORAL at 11:58

## 2024-06-25 RX ADMIN — CARVEDILOL PHOSPHATE 25 MILLIGRAM(S): 80 CAPSULE, EXTENDED RELEASE ORAL at 06:09

## 2024-06-25 RX ADMIN — ERYTHROPOIETIN 10000 UNIT(S): 4000 INJECTION, SOLUTION INTRAVENOUS; SUBCUTANEOUS at 11:57

## 2024-06-25 RX ADMIN — POLYETHYLENE GLYCOL 3350 17 GRAM(S): 1 POWDER ORAL at 16:57

## 2024-06-25 RX ADMIN — INSULIN LISPRO 2: 100 INJECTION, SOLUTION SUBCUTANEOUS at 12:02

## 2024-06-25 RX ADMIN — DILTIAZEM HYDROCHLORIDE 180 MILLIGRAM(S): 240 CAPSULE, EXTENDED RELEASE ORAL at 06:09

## 2024-06-25 RX ADMIN — HYDRALAZINE HYDROCHLORIDE 50 MILLIGRAM(S): 50 TABLET ORAL at 15:54

## 2024-06-26 ENCOUNTER — TRANSCRIPTION ENCOUNTER (OUTPATIENT)
Age: 79
End: 2024-06-26

## 2024-06-26 LAB
ANION GAP SERPL CALC-SCNC: 11 MMOL/L — SIGNIFICANT CHANGE UP (ref 5–17)
BUN SERPL-MCNC: 77 MG/DL — HIGH (ref 7–23)
CALCIUM SERPL-MCNC: 7.9 MG/DL — LOW (ref 8.4–10.5)
CHLORIDE SERPL-SCNC: 106 MMOL/L — SIGNIFICANT CHANGE UP (ref 96–108)
CO2 SERPL-SCNC: 26 MMOL/L — SIGNIFICANT CHANGE UP (ref 22–31)
CREAT SERPL-MCNC: 2.09 MG/DL — HIGH (ref 0.5–1.3)
EGFR: 24 ML/MIN/1.73M2 — LOW
GLUCOSE BLDC GLUCOMTR-MCNC: 114 MG/DL — HIGH (ref 70–99)
GLUCOSE BLDC GLUCOMTR-MCNC: 183 MG/DL — HIGH (ref 70–99)
GLUCOSE BLDC GLUCOMTR-MCNC: 191 MG/DL — HIGH (ref 70–99)
GLUCOSE BLDC GLUCOMTR-MCNC: 236 MG/DL — HIGH (ref 70–99)
GLUCOSE SERPL-MCNC: 117 MG/DL — HIGH (ref 70–99)
HCT VFR BLD CALC: 26.9 % — LOW (ref 34.5–45)
HGB BLD-MCNC: 7.9 G/DL — LOW (ref 11.5–15.5)
MCHC RBC-ENTMCNC: 26.9 PG — LOW (ref 27–34)
MCHC RBC-ENTMCNC: 29.4 GM/DL — LOW (ref 32–36)
MCV RBC AUTO: 91.5 FL — SIGNIFICANT CHANGE UP (ref 80–100)
NRBC # BLD: 0 /100 WBCS — SIGNIFICANT CHANGE UP (ref 0–0)
OB PNL STL: NEGATIVE — SIGNIFICANT CHANGE UP
PLATELET # BLD AUTO: 186 K/UL — SIGNIFICANT CHANGE UP (ref 150–400)
POTASSIUM SERPL-MCNC: 4.6 MMOL/L — SIGNIFICANT CHANGE UP (ref 3.5–5.3)
POTASSIUM SERPL-SCNC: 4.6 MMOL/L — SIGNIFICANT CHANGE UP (ref 3.5–5.3)
RBC # BLD: 2.94 M/UL — LOW (ref 3.8–5.2)
RBC # FLD: 17.7 % — HIGH (ref 10.3–14.5)
SODIUM SERPL-SCNC: 143 MMOL/L — SIGNIFICANT CHANGE UP (ref 135–145)
WBC # BLD: 6.38 K/UL — SIGNIFICANT CHANGE UP (ref 3.8–10.5)
WBC # FLD AUTO: 6.38 K/UL — SIGNIFICANT CHANGE UP (ref 3.8–10.5)

## 2024-06-26 PROCEDURE — 99232 SBSQ HOSP IP/OBS MODERATE 35: CPT

## 2024-06-26 RX ORDER — BENZOCAINE/MENTHOL 6 MG-10 MG
1 LOZENGE MUCOUS MEMBRANE ONCE
Refills: 0 | Status: COMPLETED | OUTPATIENT
Start: 2024-06-26 | End: 2024-06-26

## 2024-06-26 RX ADMIN — CARVEDILOL PHOSPHATE 25 MILLIGRAM(S): 80 CAPSULE, EXTENDED RELEASE ORAL at 17:37

## 2024-06-26 RX ADMIN — INSULIN LISPRO 2: 100 INJECTION, SOLUTION SUBCUTANEOUS at 17:37

## 2024-06-26 RX ADMIN — HYDRALAZINE HYDROCHLORIDE 50 MILLIGRAM(S): 50 TABLET ORAL at 06:15

## 2024-06-26 RX ADMIN — DILTIAZEM HYDROCHLORIDE 180 MILLIGRAM(S): 240 CAPSULE, EXTENDED RELEASE ORAL at 06:15

## 2024-06-26 RX ADMIN — CALCITRIOL 0.25 MICROGRAM(S): 0.25 CAPSULE, LIQUID FILLED ORAL at 12:36

## 2024-06-26 RX ADMIN — INSULIN LISPRO 2: 100 INJECTION, SOLUTION SUBCUTANEOUS at 12:36

## 2024-06-26 RX ADMIN — HYDRALAZINE HYDROCHLORIDE 50 MILLIGRAM(S): 50 TABLET ORAL at 21:08

## 2024-06-26 RX ADMIN — FUROSEMIDE 60 MILLIGRAM(S): 10 INJECTION, SOLUTION INTRAMUSCULAR; INTRAVENOUS at 06:43

## 2024-06-26 RX ADMIN — ATORVASTATIN CALCIUM 20 MILLIGRAM(S): 20 TABLET, FILM COATED ORAL at 21:07

## 2024-06-26 RX ADMIN — HEPARIN SODIUM 5000 UNIT(S): 50 INJECTION, SOLUTION INTRAVENOUS at 06:15

## 2024-06-26 RX ADMIN — HEPARIN SODIUM 5000 UNIT(S): 50 INJECTION, SOLUTION INTRAVENOUS at 15:22

## 2024-06-26 RX ADMIN — CARVEDILOL PHOSPHATE 25 MILLIGRAM(S): 80 CAPSULE, EXTENDED RELEASE ORAL at 06:44

## 2024-06-26 RX ADMIN — HEPARIN SODIUM 5000 UNIT(S): 50 INJECTION, SOLUTION INTRAVENOUS at 21:07

## 2024-06-26 RX ADMIN — Medication 1 ENEMA: at 10:00

## 2024-06-26 RX ADMIN — HYDRALAZINE HYDROCHLORIDE 50 MILLIGRAM(S): 50 TABLET ORAL at 15:20

## 2024-06-27 LAB
ANION GAP SERPL CALC-SCNC: 12 MMOL/L — SIGNIFICANT CHANGE UP (ref 5–17)
BUN SERPL-MCNC: 82 MG/DL — HIGH (ref 7–23)
CALCIUM SERPL-MCNC: 7.7 MG/DL — LOW (ref 8.4–10.5)
CHLORIDE SERPL-SCNC: 104 MMOL/L — SIGNIFICANT CHANGE UP (ref 96–108)
CO2 SERPL-SCNC: 23 MMOL/L — SIGNIFICANT CHANGE UP (ref 22–31)
CREAT SERPL-MCNC: 2.11 MG/DL — HIGH (ref 0.5–1.3)
EGFR: 23 ML/MIN/1.73M2 — LOW
GLUCOSE BLDC GLUCOMTR-MCNC: 115 MG/DL — HIGH (ref 70–99)
GLUCOSE BLDC GLUCOMTR-MCNC: 153 MG/DL — HIGH (ref 70–99)
GLUCOSE BLDC GLUCOMTR-MCNC: 176 MG/DL — HIGH (ref 70–99)
GLUCOSE BLDC GLUCOMTR-MCNC: 188 MG/DL — HIGH (ref 70–99)
GLUCOSE SERPL-MCNC: 106 MG/DL — HIGH (ref 70–99)
HCT VFR BLD CALC: 25.8 % — LOW (ref 34.5–45)
HGB BLD-MCNC: 7.7 G/DL — LOW (ref 11.5–15.5)
MCHC RBC-ENTMCNC: 27.4 PG — SIGNIFICANT CHANGE UP (ref 27–34)
MCHC RBC-ENTMCNC: 29.8 GM/DL — LOW (ref 32–36)
MCV RBC AUTO: 91.8 FL — SIGNIFICANT CHANGE UP (ref 80–100)
NRBC # BLD: 0 /100 WBCS — SIGNIFICANT CHANGE UP (ref 0–0)
PLATELET # BLD AUTO: 189 K/UL — SIGNIFICANT CHANGE UP (ref 150–400)
POTASSIUM SERPL-MCNC: 4.6 MMOL/L — SIGNIFICANT CHANGE UP (ref 3.5–5.3)
POTASSIUM SERPL-SCNC: 4.6 MMOL/L — SIGNIFICANT CHANGE UP (ref 3.5–5.3)
RBC # BLD: 2.81 M/UL — LOW (ref 3.8–5.2)
RBC # FLD: 17.2 % — HIGH (ref 10.3–14.5)
SODIUM SERPL-SCNC: 139 MMOL/L — SIGNIFICANT CHANGE UP (ref 135–145)
WBC # BLD: 6.34 K/UL — SIGNIFICANT CHANGE UP (ref 3.8–10.5)
WBC # FLD AUTO: 6.34 K/UL — SIGNIFICANT CHANGE UP (ref 3.8–10.5)

## 2024-06-27 PROCEDURE — 99232 SBSQ HOSP IP/OBS MODERATE 35: CPT

## 2024-06-27 RX ADMIN — CARVEDILOL PHOSPHATE 25 MILLIGRAM(S): 80 CAPSULE, EXTENDED RELEASE ORAL at 17:25

## 2024-06-27 RX ADMIN — DILTIAZEM HYDROCHLORIDE 180 MILLIGRAM(S): 240 CAPSULE, EXTENDED RELEASE ORAL at 06:07

## 2024-06-27 RX ADMIN — HYDRALAZINE HYDROCHLORIDE 50 MILLIGRAM(S): 50 TABLET ORAL at 21:36

## 2024-06-27 RX ADMIN — HEPARIN SODIUM 5000 UNIT(S): 50 INJECTION, SOLUTION INTRAVENOUS at 21:36

## 2024-06-27 RX ADMIN — ATORVASTATIN CALCIUM 20 MILLIGRAM(S): 20 TABLET, FILM COATED ORAL at 21:36

## 2024-06-27 RX ADMIN — Medication 650 MILLIGRAM(S): at 09:28

## 2024-06-27 RX ADMIN — INSULIN LISPRO 2: 100 INJECTION, SOLUTION SUBCUTANEOUS at 12:14

## 2024-06-27 RX ADMIN — FUROSEMIDE 60 MILLIGRAM(S): 10 INJECTION, SOLUTION INTRAMUSCULAR; INTRAVENOUS at 06:07

## 2024-06-27 RX ADMIN — HEPARIN SODIUM 5000 UNIT(S): 50 INJECTION, SOLUTION INTRAVENOUS at 06:08

## 2024-06-27 RX ADMIN — CALCITRIOL 0.25 MICROGRAM(S): 0.25 CAPSULE, LIQUID FILLED ORAL at 12:11

## 2024-06-27 RX ADMIN — Medication 650 MILLIGRAM(S): at 09:58

## 2024-06-27 RX ADMIN — HEPARIN SODIUM 5000 UNIT(S): 50 INJECTION, SOLUTION INTRAVENOUS at 13:50

## 2024-06-27 RX ADMIN — HYDRALAZINE HYDROCHLORIDE 50 MILLIGRAM(S): 50 TABLET ORAL at 13:50

## 2024-06-27 RX ADMIN — HYDRALAZINE HYDROCHLORIDE 50 MILLIGRAM(S): 50 TABLET ORAL at 06:07

## 2024-06-27 RX ADMIN — CARVEDILOL PHOSPHATE 25 MILLIGRAM(S): 80 CAPSULE, EXTENDED RELEASE ORAL at 06:08

## 2024-06-27 RX ADMIN — INSULIN LISPRO 2: 100 INJECTION, SOLUTION SUBCUTANEOUS at 17:23

## 2024-06-28 ENCOUNTER — TRANSCRIPTION ENCOUNTER (OUTPATIENT)
Age: 79
End: 2024-06-28

## 2024-06-28 VITALS — SYSTOLIC BLOOD PRESSURE: 148 MMHG | HEART RATE: 83 BPM | DIASTOLIC BLOOD PRESSURE: 62 MMHG

## 2024-06-28 LAB
GLUCOSE BLDC GLUCOMTR-MCNC: 134 MG/DL — HIGH (ref 70–99)
GLUCOSE BLDC GLUCOMTR-MCNC: 136 MG/DL — HIGH (ref 70–99)
GLUCOSE BLDC GLUCOMTR-MCNC: 153 MG/DL — HIGH (ref 70–99)

## 2024-06-28 PROCEDURE — 82272 OCCULT BLD FECES 1-3 TESTS: CPT

## 2024-06-28 PROCEDURE — 82607 VITAMIN B-12: CPT

## 2024-06-28 PROCEDURE — 82746 ASSAY OF FOLIC ACID SERUM: CPT

## 2024-06-28 PROCEDURE — 86900 BLOOD TYPING SEROLOGIC ABO: CPT

## 2024-06-28 PROCEDURE — 83540 ASSAY OF IRON: CPT

## 2024-06-28 PROCEDURE — 84484 ASSAY OF TROPONIN QUANT: CPT

## 2024-06-28 PROCEDURE — 80048 BASIC METABOLIC PNL TOTAL CA: CPT

## 2024-06-28 PROCEDURE — 36415 COLL VENOUS BLD VENIPUNCTURE: CPT

## 2024-06-28 PROCEDURE — 80061 LIPID PANEL: CPT

## 2024-06-28 PROCEDURE — 82962 GLUCOSE BLOOD TEST: CPT

## 2024-06-28 PROCEDURE — 85027 COMPLETE CBC AUTOMATED: CPT

## 2024-06-28 PROCEDURE — 99232 SBSQ HOSP IP/OBS MODERATE 35: CPT

## 2024-06-28 PROCEDURE — 93970 EXTREMITY STUDY: CPT

## 2024-06-28 PROCEDURE — 83550 IRON BINDING TEST: CPT

## 2024-06-28 PROCEDURE — 93005 ELECTROCARDIOGRAM TRACING: CPT

## 2024-06-28 PROCEDURE — 85014 HEMATOCRIT: CPT

## 2024-06-28 PROCEDURE — 99285 EMERGENCY DEPT VISIT HI MDM: CPT | Mod: 25

## 2024-06-28 PROCEDURE — 83735 ASSAY OF MAGNESIUM: CPT

## 2024-06-28 PROCEDURE — 80053 COMPREHEN METABOLIC PANEL: CPT

## 2024-06-28 PROCEDURE — 82728 ASSAY OF FERRITIN: CPT

## 2024-06-28 PROCEDURE — 83880 ASSAY OF NATRIURETIC PEPTIDE: CPT

## 2024-06-28 PROCEDURE — 85025 COMPLETE CBC W/AUTO DIFF WBC: CPT

## 2024-06-28 PROCEDURE — 85018 HEMOGLOBIN: CPT

## 2024-06-28 PROCEDURE — 71045 X-RAY EXAM CHEST 1 VIEW: CPT

## 2024-06-28 PROCEDURE — 86850 RBC ANTIBODY SCREEN: CPT

## 2024-06-28 PROCEDURE — 86901 BLOOD TYPING SEROLOGIC RH(D): CPT

## 2024-06-28 PROCEDURE — 83690 ASSAY OF LIPASE: CPT

## 2024-06-28 PROCEDURE — 83036 HEMOGLOBIN GLYCOSYLATED A1C: CPT

## 2024-06-28 RX ORDER — ACETAMINOPHEN 325 MG
2 TABLET ORAL
Qty: 0 | Refills: 0 | DISCHARGE
Start: 2024-06-28

## 2024-06-28 RX ORDER — ERYTHROPOIETIN 4000 [IU]/ML
10000 INJECTION, SOLUTION INTRAVENOUS; SUBCUTANEOUS
Qty: 0 | Refills: 0 | DISCHARGE
Start: 2024-06-28

## 2024-06-28 RX ORDER — SENNOSIDES 8.6 MG
2 TABLET ORAL
Qty: 0 | Refills: 0 | DISCHARGE
Start: 2024-06-28

## 2024-06-28 RX ORDER — FUROSEMIDE 10 MG/ML
3 INJECTION, SOLUTION INTRAMUSCULAR; INTRAVENOUS
Qty: 0 | Refills: 0 | DISCHARGE
Start: 2024-06-28

## 2024-06-28 RX ORDER — POLYETHYLENE GLYCOL 3350 1 G/G
17 POWDER ORAL
Qty: 0 | Refills: 0 | DISCHARGE
Start: 2024-06-28

## 2024-06-28 RX ORDER — ERYTHROPOIETIN 10000 [IU]/ML
3000 INJECTION, SOLUTION INTRAVENOUS; SUBCUTANEOUS
Refills: 0 | DISCHARGE

## 2024-06-28 RX ADMIN — DILTIAZEM HYDROCHLORIDE 180 MILLIGRAM(S): 240 CAPSULE, EXTENDED RELEASE ORAL at 06:01

## 2024-06-28 RX ADMIN — INSULIN LISPRO 2: 100 INJECTION, SOLUTION SUBCUTANEOUS at 12:49

## 2024-06-28 RX ADMIN — CARVEDILOL PHOSPHATE 25 MILLIGRAM(S): 80 CAPSULE, EXTENDED RELEASE ORAL at 06:01

## 2024-06-28 RX ADMIN — HEPARIN SODIUM 5000 UNIT(S): 50 INJECTION, SOLUTION INTRAVENOUS at 13:57

## 2024-06-28 RX ADMIN — HYDRALAZINE HYDROCHLORIDE 50 MILLIGRAM(S): 50 TABLET ORAL at 06:01

## 2024-06-28 RX ADMIN — HYDRALAZINE HYDROCHLORIDE 50 MILLIGRAM(S): 50 TABLET ORAL at 13:57

## 2024-06-28 RX ADMIN — CARVEDILOL PHOSPHATE 25 MILLIGRAM(S): 80 CAPSULE, EXTENDED RELEASE ORAL at 17:55

## 2024-06-28 RX ADMIN — FUROSEMIDE 60 MILLIGRAM(S): 10 INJECTION, SOLUTION INTRAMUSCULAR; INTRAVENOUS at 06:01

## 2024-06-28 RX ADMIN — CALCITRIOL 0.25 MICROGRAM(S): 0.25 CAPSULE, LIQUID FILLED ORAL at 12:47

## 2024-06-28 RX ADMIN — HEPARIN SODIUM 5000 UNIT(S): 50 INJECTION, SOLUTION INTRAVENOUS at 06:01

## 2024-07-24 NOTE — PHYSICAL THERAPY INITIAL EVALUATION ADULT - FUNCTIONAL LIMITATIONS, PT EVAL
[Follow-up] : a follow-up of an existing diagnosis [Home] : at home, [unfilled] , at the time of the visit. [Medical Office: ([facility])___] : at the medical office located in  [Patient] : the patient [FreeTextEntry2] : Rosalio Camargo [FreeTextEntry1] : [facility]

## 2024-08-21 ENCOUNTER — APPOINTMENT (OUTPATIENT)
Dept: VASCULAR SURGERY | Facility: CLINIC | Age: 79
End: 2024-08-21
Payer: MEDICARE

## 2024-08-21 VITALS
DIASTOLIC BLOOD PRESSURE: 78 MMHG | HEART RATE: 86 BPM | BODY MASS INDEX: 32.95 KG/M2 | TEMPERATURE: 97.9 F | WEIGHT: 193 LBS | HEIGHT: 64 IN | SYSTOLIC BLOOD PRESSURE: 165 MMHG

## 2024-08-21 PROCEDURE — 99213 OFFICE O/P EST LOW 20 MIN: CPT

## 2024-08-21 PROCEDURE — 93970 EXTREMITY STUDY: CPT

## 2024-08-21 RX ORDER — DARBEPOETIN ALFA 200 UG/.4ML
200 INJECTION, SOLUTION INTRAVENOUS; SUBCUTANEOUS
Refills: 0 | Status: ACTIVE | COMMUNITY

## 2024-08-21 RX ORDER — HYDRALAZINE HYDROCHLORIDE 100 MG/1
100 TABLET ORAL
Refills: 0 | Status: ACTIVE | COMMUNITY

## 2024-08-21 RX ORDER — CALCIUM ACETATE 667 MG/1
667 CAPSULE ORAL
Refills: 0 | Status: ACTIVE | COMMUNITY

## 2024-08-21 RX ORDER — CALCITRIOL 0.25 UG/1
0.25 CAPSULE, LIQUID FILLED ORAL
Refills: 0 | Status: ACTIVE | COMMUNITY

## 2024-08-21 RX ORDER — APIXABAN 5 MG/1
5 TABLET, FILM COATED ORAL
Refills: 0 | Status: ACTIVE | COMMUNITY

## 2024-08-21 RX ORDER — BUMETANIDE 2 MG/1
TABLET ORAL
Refills: 0 | Status: ACTIVE | COMMUNITY

## 2024-08-21 RX ORDER — CARVEDILOL 25 MG/1
25 TABLET, FILM COATED ORAL
Refills: 0 | Status: ACTIVE | COMMUNITY

## 2024-08-21 RX ORDER — ERYTHROPOIETIN 40000 [IU]/ML
40000 INJECTION, SOLUTION INTRAVENOUS; SUBCUTANEOUS
Refills: 0 | Status: ACTIVE | COMMUNITY

## 2024-08-21 RX ORDER — EZETIMIBE 10 MG/1
10 TABLET ORAL
Refills: 0 | Status: ACTIVE | COMMUNITY

## 2024-08-21 RX ORDER — FERRIC CARBOXYMALTOSE INJECTION 50 MG/ML
750 INJECTION, SOLUTION INTRAVENOUS
Refills: 0 | Status: ACTIVE | COMMUNITY

## 2024-08-21 RX ORDER — CHLORHEXIDINE GLUCONATE 4 %
325 (65 FE) LIQUID (ML) TOPICAL
Refills: 0 | Status: ACTIVE | COMMUNITY

## 2024-08-21 RX ORDER — DILTIAZEM HYDROCHLORIDE 180 MG/1
180 CAPSULE, COATED, EXTENDED RELEASE ORAL
Refills: 0 | Status: ACTIVE | COMMUNITY

## 2024-08-22 NOTE — PHYSICAL EXAM
[Respiratory Effort] : normal respiratory effort [2+] : left 2+ [Ankle Swelling (On Exam)] : present [Ankle Swelling On The Right] : mild [] : present [Ankle Swelling Bilaterally] : severe [No Rash or Lesion] : No rash or lesion [Alert] : alert [Oriented to Person] : oriented to person [Oriented to Place] : oriented to place [Oriented to Time] : oriented to time [JVD] : no jugular venous distention  [de-identified] : NAD

## 2024-08-22 NOTE — HISTORY OF PRESENT ILLNESS
[FreeTextEntry1] : 80 yo F who presents for evaluation of bilateral lower extremity venous stasis.   Patient previous underwent R saphenous vein ablation and stab phlebectomy in 2020 with Dr. 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   Patient w/ persistent bilateral leg swelling, heaviness, and skin darkening. Denies symptoms of claudication, rest pain, non-healing wounds

## 2024-08-22 NOTE — PHYSICAL EXAM
[Respiratory Effort] : normal respiratory effort [2+] : left 2+ [Ankle Swelling (On Exam)] : present [Ankle Swelling On The Right] : mild [] : present [Ankle Swelling Bilaterally] : severe [No Rash or Lesion] : No rash or lesion [Alert] : alert [Oriented to Person] : oriented to person [Oriented to Place] : oriented to place [Oriented to Time] : oriented to time [JVD] : no jugular venous distention  [de-identified] : NAD

## 2024-08-22 NOTE — ASSESSMENT
[FreeTextEntry1] : 80 yo F who presents for evaluation of bilateral lower extremity venous stasis. Previously underwent R saphenous vein ablation and stab phlebectomy in 2020 with Dr. Calle  - B/L LE venous duplex demonstrating ablated R GSV. Reflux in R SSV and L GSV. No DVT/SVT - CEAP classification C4 no signs of PVD - Discussed w/ patient option for endovenous treatment of L sided reflux. Patient however would like to continue with conservative management and monitor symptoms  - Continue compression stocking therapy and leg elevation  - Patient may follow up as needed

## 2024-08-22 NOTE — END OF VISIT
[Time Spent: ___ minutes] : I have spent [unfilled] minutes of time on the encounter which excludes teaching and separately reported services.
[Time Spent: ___ minutes] : I have spent [unfilled] minutes of time on the encounter which excludes teaching and separately reported services.
1-1.9 cm

## 2024-08-22 NOTE — CONSULT LETTER
[Dear  ___] : Dear  [unfilled], [Consult Letter:] : I had the pleasure of evaluating your patient, [unfilled]. [Please see my note below.] : Please see my note below. [Consult Closing:] : Thank you very much for allowing me to participate in the care of this patient.  If you have any questions, please do not hesitate to contact me. [Sincerely,] : Sincerely, [FreeTextEntry3] : Jasmine Cevallos MD, FSVS, FACS Associate Chief, Vascular & Endovascular Surgery , Vascular Surgery Fellowship & Residency  Associate Professor of Surgery, [facility] [employer] [facility]  Division of Vascular & Endovascular Surgery [facility] [address], [address] [place] [id number] Tel: [phone]

## 2024-08-22 NOTE — CONSULT LETTER
[Dear  ___] : Dear  [unfilled], [Consult Letter:] : I had the pleasure of evaluating your patient, [unfilled]. [Please see my note below.] : Please see my note below. [Consult Closing:] : Thank you very much for allowing me to participate in the care of this patient.  If you have any questions, please do not hesitate to contact me. [Sincerely,] : Sincerely, [FreeTextEntry3] : Jasmine Cevallos MD, FSVS, FACS Associate Chief, Vascular & Endovascular Surgery , Vascular Surgery Fellowship & Residency  Associate Professor of Surgery, [employer] [facility] [employer] at \[facility]\""/[facility]  Division of Vascular & Endovascular Surgery [facility] [address], [address] [place] [id number] Tel: [phone]

## 2024-08-23 NOTE — PACU DISCHARGE NOTE - PAIN:
[facility] 22-24)       Key: Controlled with current regime

## 2024-10-19 ENCOUNTER — INPATIENT (INPATIENT)
Facility: HOSPITAL | Age: 79
LOS: 17 days | Discharge: NURSING, REHAB OR LONG-TERM CARE | DRG: 291 | End: 2024-11-06
Attending: STUDENT IN AN ORGANIZED HEALTH CARE EDUCATION/TRAINING PROGRAM | Admitting: STUDENT IN AN ORGANIZED HEALTH CARE EDUCATION/TRAINING PROGRAM
Payer: COMMERCIAL

## 2024-10-19 VITALS
HEIGHT: 64 IN | OXYGEN SATURATION: 100 % | SYSTOLIC BLOOD PRESSURE: 131 MMHG | HEART RATE: 64 BPM | DIASTOLIC BLOOD PRESSURE: 57 MMHG | RESPIRATION RATE: 18 BRPM | WEIGHT: 205.03 LBS | TEMPERATURE: 98 F

## 2024-10-19 DIAGNOSIS — Z98.890 OTHER SPECIFIED POSTPROCEDURAL STATES: Chronic | ICD-10-CM

## 2024-10-19 DIAGNOSIS — E89.0 POSTPROCEDURAL HYPOTHYROIDISM: Chronic | ICD-10-CM

## 2024-10-19 DIAGNOSIS — Z90.710 ACQUIRED ABSENCE OF BOTH CERVIX AND UTERUS: Chronic | ICD-10-CM

## 2024-10-19 DIAGNOSIS — Z98.49 CATARACT EXTRACTION STATUS, UNSPECIFIED EYE: Chronic | ICD-10-CM

## 2024-10-19 DIAGNOSIS — I50.23 ACUTE ON CHRONIC SYSTOLIC (CONGESTIVE) HEART FAILURE: ICD-10-CM

## 2024-10-19 LAB
ALBUMIN SERPL ELPH-MCNC: 3.5 G/DL — SIGNIFICANT CHANGE UP (ref 3.3–5)
ALP SERPL-CCNC: 75 U/L — SIGNIFICANT CHANGE UP (ref 40–120)
ALT FLD-CCNC: 20 U/L — SIGNIFICANT CHANGE UP (ref 10–45)
ANION GAP SERPL CALC-SCNC: 13 MMOL/L — SIGNIFICANT CHANGE UP (ref 5–17)
AST SERPL-CCNC: 10 U/L — SIGNIFICANT CHANGE UP (ref 10–40)
BASE EXCESS BLDV CALC-SCNC: -4.8 MMOL/L — LOW (ref -2–3)
BASOPHILS # BLD AUTO: 0.04 K/UL — SIGNIFICANT CHANGE UP (ref 0–0.2)
BASOPHILS NFR BLD AUTO: 0.5 % — SIGNIFICANT CHANGE UP (ref 0–2)
BILIRUB SERPL-MCNC: 0.4 MG/DL — SIGNIFICANT CHANGE UP (ref 0.2–1.2)
BUN SERPL-MCNC: 127 MG/DL — HIGH (ref 7–23)
CALCIUM SERPL-MCNC: 8.1 MG/DL — LOW (ref 8.4–10.5)
CHLORIDE SERPL-SCNC: 103 MMOL/L — SIGNIFICANT CHANGE UP (ref 96–108)
CO2 BLDV-SCNC: 22 MMOL/L — SIGNIFICANT CHANGE UP (ref 22–26)
CO2 SERPL-SCNC: 24 MMOL/L — SIGNIFICANT CHANGE UP (ref 22–31)
CREAT SERPL-MCNC: 3.29 MG/DL — HIGH (ref 0.5–1.3)
EGFR: 14 ML/MIN/1.73M2 — LOW
EOSINOPHIL # BLD AUTO: 0.37 K/UL — SIGNIFICANT CHANGE UP (ref 0–0.5)
EOSINOPHIL NFR BLD AUTO: 4.8 % — SIGNIFICANT CHANGE UP (ref 0–6)
FLUAV AG NPH QL: SIGNIFICANT CHANGE UP
FLUBV AG NPH QL: SIGNIFICANT CHANGE UP
GAS PNL BLDV: SIGNIFICANT CHANGE UP
GLUCOSE BLDC GLUCOMTR-MCNC: 169 MG/DL — HIGH (ref 70–99)
GLUCOSE BLDC GLUCOMTR-MCNC: 185 MG/DL — HIGH (ref 70–99)
GLUCOSE SERPL-MCNC: 118 MG/DL — HIGH (ref 70–99)
HCO3 BLDV-SCNC: 21 MMOL/L — LOW (ref 22–29)
HCT VFR BLD CALC: 22.5 % — LOW (ref 34.5–45)
HGB BLD-MCNC: 7.1 G/DL — LOW (ref 11.5–15.5)
IMM GRANULOCYTES NFR BLD AUTO: 0.6 % — SIGNIFICANT CHANGE UP (ref 0–0.9)
LYMPHOCYTES # BLD AUTO: 0.9 K/UL — LOW (ref 1–3.3)
LYMPHOCYTES # BLD AUTO: 11.6 % — LOW (ref 13–44)
MCHC RBC-ENTMCNC: 26.8 PG — LOW (ref 27–34)
MCHC RBC-ENTMCNC: 31.6 GM/DL — LOW (ref 32–36)
MCV RBC AUTO: 84.9 FL — SIGNIFICANT CHANGE UP (ref 80–100)
MONOCYTES # BLD AUTO: 0.51 K/UL — SIGNIFICANT CHANGE UP (ref 0–0.9)
MONOCYTES NFR BLD AUTO: 6.6 % — SIGNIFICANT CHANGE UP (ref 2–14)
NEUTROPHILS # BLD AUTO: 5.9 K/UL — SIGNIFICANT CHANGE UP (ref 1.8–7.4)
NEUTROPHILS NFR BLD AUTO: 75.9 % — SIGNIFICANT CHANGE UP (ref 43–77)
NRBC # BLD: 0 /100 WBCS — SIGNIFICANT CHANGE UP (ref 0–0)
NT-PROBNP SERPL-SCNC: 3792 PG/ML — HIGH (ref 0–300)
PCO2 BLDV: 40 MMHG — SIGNIFICANT CHANGE UP (ref 39–42)
PH BLDV: 7.33 — SIGNIFICANT CHANGE UP (ref 7.32–7.43)
PLATELET # BLD AUTO: 193 K/UL — SIGNIFICANT CHANGE UP (ref 150–400)
PO2 BLDV: 51 MMHG — HIGH (ref 25–45)
POTASSIUM SERPL-MCNC: 4.1 MMOL/L — SIGNIFICANT CHANGE UP (ref 3.5–5.3)
POTASSIUM SERPL-SCNC: 4.1 MMOL/L — SIGNIFICANT CHANGE UP (ref 3.5–5.3)
PROT SERPL-MCNC: 7.6 G/DL — SIGNIFICANT CHANGE UP (ref 6–8.3)
RBC # BLD: 2.65 M/UL — LOW (ref 3.8–5.2)
RBC # FLD: 18 % — HIGH (ref 10.3–14.5)
RSV RNA NPH QL NAA+NON-PROBE: SIGNIFICANT CHANGE UP
SAO2 % BLDV: 85.4 % — SIGNIFICANT CHANGE UP (ref 67–88)
SARS-COV-2 RNA SPEC QL NAA+PROBE: SIGNIFICANT CHANGE UP
SODIUM SERPL-SCNC: 140 MMOL/L — SIGNIFICANT CHANGE UP (ref 135–145)
TROPONIN I, HIGH SENSITIVITY RESULT: 30.7 NG/L — SIGNIFICANT CHANGE UP
WBC # BLD: 7.77 K/UL — SIGNIFICANT CHANGE UP (ref 3.8–10.5)
WBC # FLD AUTO: 7.77 K/UL — SIGNIFICANT CHANGE UP (ref 3.8–10.5)

## 2024-10-19 PROCEDURE — 99285 EMERGENCY DEPT VISIT HI MDM: CPT

## 2024-10-19 PROCEDURE — 99223 1ST HOSP IP/OBS HIGH 75: CPT

## 2024-10-19 PROCEDURE — 71045 X-RAY EXAM CHEST 1 VIEW: CPT | Mod: 26

## 2024-10-19 RX ORDER — GLUCAGON INJECTION, SOLUTION 1 MG/.2ML
1 INJECTION, SOLUTION SUBCUTANEOUS ONCE
Refills: 0 | Status: DISCONTINUED | OUTPATIENT
Start: 2024-10-19 | End: 2024-11-06

## 2024-10-19 RX ORDER — MAGNESIUM, ALUMINUM HYDROXIDE 200-200 MG
30 TABLET,CHEWABLE ORAL EVERY 4 HOURS
Refills: 0 | Status: DISCONTINUED | OUTPATIENT
Start: 2024-10-19 | End: 2024-10-22

## 2024-10-19 RX ORDER — INSULIN LISPRO 100/ML
VIAL (ML) SUBCUTANEOUS AT BEDTIME
Refills: 0 | Status: DISCONTINUED | OUTPATIENT
Start: 2024-10-19 | End: 2024-11-06

## 2024-10-19 RX ORDER — ACETAMINOPHEN 500 MG
650 TABLET ORAL EVERY 6 HOURS
Refills: 0 | Status: DISCONTINUED | OUTPATIENT
Start: 2024-10-19 | End: 2024-11-06

## 2024-10-19 RX ORDER — MELATONIN 5 MG
3 TABLET ORAL AT BEDTIME
Refills: 0 | Status: DISCONTINUED | OUTPATIENT
Start: 2024-10-19 | End: 2024-11-06

## 2024-10-19 RX ORDER — INSULIN LISPRO 100/ML
VIAL (ML) SUBCUTANEOUS
Refills: 0 | Status: DISCONTINUED | OUTPATIENT
Start: 2024-10-19 | End: 2024-11-06

## 2024-10-19 RX ORDER — ONDANSETRON HYDROCHLORIDE 2 MG/ML
4 INJECTION, SOLUTION INTRAMUSCULAR; INTRAVENOUS EVERY 8 HOURS
Refills: 0 | Status: DISCONTINUED | OUTPATIENT
Start: 2024-10-19 | End: 2024-11-06

## 2024-10-19 RX ORDER — INFLUENZ VIR VAC TV P-SURF2003 15MCG/.5ML
0.5 SYRINGE (ML) INTRAMUSCULAR ONCE
Refills: 0 | Status: DISCONTINUED | OUTPATIENT
Start: 2024-10-19 | End: 2024-11-06

## 2024-10-19 RX ORDER — HYDRALAZINE HYDROCHLORIDE 50 MG/1
100 TABLET, FILM COATED ORAL THREE TIMES A DAY
Refills: 0 | Status: DISCONTINUED | OUTPATIENT
Start: 2024-10-19 | End: 2024-11-06

## 2024-10-19 RX ORDER — DILTIAZEM HCL 5 MG/ML
180 VIAL (ML) INTRAVENOUS DAILY
Refills: 0 | Status: DISCONTINUED | OUTPATIENT
Start: 2024-10-19 | End: 2024-11-06

## 2024-10-19 RX ORDER — ROSUVASTATIN CALCIUM 10 MG
5 TABLET ORAL AT BEDTIME
Refills: 0 | Status: DISCONTINUED | OUTPATIENT
Start: 2024-10-19 | End: 2024-11-06

## 2024-10-19 RX ORDER — CALCIUM ACETATE 667 MG/1
667 CAPSULE ORAL
Refills: 0 | Status: DISCONTINUED | OUTPATIENT
Start: 2024-10-19 | End: 2024-10-22

## 2024-10-19 RX ORDER — FERROUS SULFATE 325(65) MG
325 TABLET ORAL DAILY
Refills: 0 | Status: DISCONTINUED | OUTPATIENT
Start: 2024-10-19 | End: 2024-11-06

## 2024-10-19 RX ORDER — APIXABAN 5 MG/1
2.5 TABLET, FILM COATED ORAL
Refills: 0 | Status: DISCONTINUED | OUTPATIENT
Start: 2024-10-19 | End: 2024-10-30

## 2024-10-19 RX ORDER — FUROSEMIDE 40 MG
80 TABLET ORAL ONCE
Refills: 0 | Status: COMPLETED | OUTPATIENT
Start: 2024-10-19 | End: 2024-10-19

## 2024-10-19 RX ORDER — LABETALOL HCL 200 MG
600 TABLET ORAL
Refills: 0 | Status: DISCONTINUED | OUTPATIENT
Start: 2024-10-19 | End: 2024-11-06

## 2024-10-19 RX ORDER — CALCITRIOL 0.25 UG/1
0.25 CAPSULE, LIQUID FILLED ORAL DAILY
Refills: 0 | Status: DISCONTINUED | OUTPATIENT
Start: 2024-10-19 | End: 2024-10-21

## 2024-10-19 RX ORDER — EZETIMIBE 10 MG/1
10 TABLET ORAL DAILY
Refills: 0 | Status: DISCONTINUED | OUTPATIENT
Start: 2024-10-19 | End: 2024-11-06

## 2024-10-19 RX ADMIN — Medication 600 MILLIGRAM(S): at 18:22

## 2024-10-19 RX ADMIN — HYDRALAZINE HYDROCHLORIDE 100 MILLIGRAM(S): 50 TABLET, FILM COATED ORAL at 21:43

## 2024-10-19 RX ADMIN — APIXABAN 2.5 MILLIGRAM(S): 5 TABLET, FILM COATED ORAL at 18:22

## 2024-10-19 RX ADMIN — Medication 5 MILLIGRAM(S): at 21:43

## 2024-10-19 RX ADMIN — Medication 80 MILLIGRAM(S): at 13:13

## 2024-10-19 RX ADMIN — Medication 650 MILLIGRAM(S): at 22:15

## 2024-10-19 RX ADMIN — Medication 1: at 18:15

## 2024-10-19 RX ADMIN — Medication 650 MILLIGRAM(S): at 21:43

## 2024-10-19 RX ADMIN — CALCIUM ACETATE 667 MILLIGRAM(S): 667 CAPSULE ORAL at 18:25

## 2024-10-19 NOTE — H&P ADULT - CONVERSATION/DISCUSSION
[place]ia is here for a nonfasting med check.    Pre-Visit Screening:  Immunizations:informed of covid and shingles  Colonoscopy:2018  Mammogram:Needs  Asthma Action Test/Plan:today  PHQ9:Na  GAD7:NA  Questioned patient about current smoking habits Pt.never smoked  OK to leave a detailed message on voice mail for today's visit yes, phone # [phone]      
Diagnosis/Prognosis/Treatment Options

## 2024-10-19 NOTE — PATIENT PROFILE ADULT - FALL HARM RISK - ATTEMPT OOB
Writer paged on call hospitalist regarding pt having 3 second pause on tele and HR dropping to the 30s intermittently for brief periods of time. Pt asymptomatic. Dr. Vera Khan returned page and advised writer to page cardiology fellow since pt went to cath lab today. Writer paged Dr. Antonia Lopez and updated him on the situation and he stated that the pauses were most likely related to the pt's sleep apnea and to continue to monitor patient. Nursing will continue to monitor. No

## 2024-10-19 NOTE — H&P ADULT - NSHPPHYSICALEXAM_GEN_ALL_CORE
Wt(kg): --Vital Signs Last 24 Hrs  T(C): 36.8 (19 Oct 2024 12:07), Max: 36.8 (19 Oct 2024 12:07)  T(F): 98.2 (19 Oct 2024 12:07), Max: 98.2 (19 Oct 2024 12:07)  HR: 76 (19 Oct 2024 14:51) (64 - 76)  BP: 133/63 (19 Oct 2024 14:51) (131/57 - 135/55)  BP(mean): --  RR: 17 (19 Oct 2024 14:51) (17 - 18)  SpO2: 100% (19 Oct 2024 14:51) (100% - 100%)    Parameters below as of 19 Oct 2024 14:51  Patient On (Oxygen Delivery Method): nasal cannula  O2 Flow (L/min): 2    PHYSICAL EXAM:  GENERAL: NAD, well-groomed, well-developed  NERVOUS SYSTEM:  Alert & Oriented X3, Good concentration; Motor Strength 5/5 B/L upper and lower extremities; DTRs 2+ intact and symmetric  HEAD:  Atraumatic, Normocephalic  EYES: EOMI, PERRLA, conjunctiva and sclera clear  ENMT: No tonsillar erythema, exudates, or enlargement; Moist mucous membranes, Good dentition, No lesions  NECK: Supple, No JVD, Normal thyroid  CHEST/LUNG: good air entry bilaterally. faint bibasilar crackles   HEART: Regular rate and rhythm; No murmurs, rubs, or gallops  ABDOMEN: Soft, Nontender, Nondistended; Bowel sounds present  EXTREMITIES:  2+ Peripheral Pulses, No clubbing, cyanosis, or edema  LYMPH: No lymphadenopathy noted  SKIN: No rashes or lesions

## 2024-10-19 NOTE — ED ADULT TRIAGE NOTE - CHIEF COMPLAINT QUOTE
Pt c/o worsening SOB x 2 days O2 sat on room air 94% per EMT improved to 100% 0n 2LPM, h/o CHF/Afib on blod thinner, recently diagnosed with sinus infection by PMD and was started on antibiotics, c/o pain on rib areas increased with deep breathing

## 2024-10-19 NOTE — PATIENT PROFILE ADULT - FALL HARM RISK - HARM RISK INTERVENTIONS

## 2024-10-19 NOTE — H&P ADULT - ASSESSMENT
79y year old Female with PMH of HFpEF, Chronic Kidney Disease 4, DVT (on eliquis) Hypertension, Hyperlipidemia, Type 2 Diabetes Mellitus, Anemia, OA who presents to the ER complaining of shortness of breath x2 days.     Shortness of breath - HFpEF exacerbation   Acute Kidney Injury on Chronic Kidney Disease 4  Anemia, likely chronic disease   - telemetry monitoring. cardio eval (left message for service). repeat TTE  - s/p lasix 80mg ivp in ER, was on bumex 2mg bid since august per patient. will hold off on diuretic at this time. dose based on renal function/volume  - Monitor renal function  - Avoid nephrotoxic medications   - monitor Hg, likely due to ckd    Hypertension  - continue home diltiazem, hydralazine, labetalol    Hyperlipidemia  - continue home crestor, ezetemibe    Type 2 Diabetes Mellitus   - hold home glipizide. Would dc all together given age and renal dysfunction  - FS and DANGELO    Hx of DVT  - per patient she had RUE DVT while at [facility], has been on eliquis 2.5mg bid for nearly 3 months     DVT Prophylaxis:  - Eliquis    Patient and granddaughter aware and in agreement with plan of care.  The opportunity for questions was provided and all questions asked were answered.     IMPROVE VTE Individual Risk Assessment          RISK                                                          Points  [ X ] Previous VTE                                                3  [  ] Thrombophilia                                             2  [  ] Lower limb paralysis                                   2        (unable to hold up >15 seconds)    [  ] Current Cancer                                             2         (within 6 months)  [  ] Immobilization > 24 hrs                              1  [  ] ICU/CCU stay > 24 hours                             1  [ X ] Age > 60                                                         1    IMPROVE VTE Score:         [    4     ]

## 2024-10-19 NOTE — H&P ADULT - HISTORY OF PRESENT ILLNESS
BURKE ABARCA is a 79y year old Female with PMH of HFpEF, Chronic Kidney Disease 4, DVT (on eliquis) Hypertension, Hyperlipidemia, Type 2 Diabetes Mellitus, Anemia, OA who presents to the ER complaining of shortness of breath x2 days.     Patient states she has been progressively short of breath since yesterday, this morning was having difficulty dressing herself, felt winded and called granddaughter who brought her to ER. Denies chest pain, fever, chills. Patient reports medication compliance,    On ER arrival: /57; HR 64; RR 18; T 98.2; sat 100% on room air  Given lasix 80mg ivp in ER

## 2024-10-19 NOTE — ED PROVIDER NOTE - PHYSICAL EXAMINATION
PHYSICAL EXAM:  GENERAL: NAD, well-developed  HEAD:  Atraumatic, Normocephalic  EYES: EOMI, PERRLA, conjunctiva and sclera clear  NECK: Supple, No JVD  CHEST/LUNG: Rhonchi present b/l  HEART: tachycardic and irregular  ABDOMEN: Soft, Nontender, Nondistended; Bowel sounds present  EXTREMITIES:  2+ Peripheral Pulses, 1+ pitting edema b/l with chronic changes  PSYCH: AAOx3  NEUROLOGY: non-focal  Ultrasound: B/L B-lines in all lung fields. Small pleural effusions. EF of roughly 45%; no significant pericardial effusion. IVC >2.5cm with non-variability

## 2024-10-19 NOTE — ED PROVIDER NOTE - PROGRESS NOTE DETAILS
Jaskaran Jacob MD: Endorses that her shortness of breath is slightly better. Labwork and ultrasound suggestive of CHF exacerbation. Will admit.

## 2024-10-19 NOTE — PATIENT PROFILE ADULT - NSPROHMDIABETMGMTSTRAT_GEN_A_NUR
The patient called this RN to reschedule the vein procedures ordered by HARJINDER Marques. The patient had some issues leaving work to have the procedure done, but it has been resolved.     Procedure: Left lower extremity radiofrequency ablation, sclerotherapy, and/or phlebectomy  Date: Friday December 20th 2019  Time: 13:00 pm  Arrival Time: 12:30 pm  Arrival Place: Front entrance  NPO: Not necessary  Medications: No hold necessary. Please take 2 Aleve tablets or 600 mg Ibuprofen the morning of the procedure.   Sedation: PO Valium   Needed: Yes  Lab: Not necessary  Pathology: Not necessary    Please bring your thigh high 20-30 mmHg compression stockings the day of the procedure.     This RN provided a brief explanation of the procedure with the opportunity for questions. Post procedure expectations reviewed with the patient. [facility] phone number provided for any follow up questions or concerns.      blood glucose testing

## 2024-10-19 NOTE — ED ADULT NURSE NOTE - NSFALLHARMRISKINTERV_ED_ALL_ED

## 2024-10-19 NOTE — ED PROVIDER NOTE - OBJECTIVE STATEMENT
80yo F with PMHx of CHF, afib, upper extremity DVT on eliquis, DM p/w shortness of breath. Patient endorses that she has been feeling worse for the past few days and started to feel much worse starting last night. Now feeling more short of breath. Denies any fevers, but endorses chills. Is currently on azithromycin for sinusitis. Endorses orthopnea. Denies any travel or sick contacts.

## 2024-10-19 NOTE — ED PROVIDER NOTE - CLINICAL SUMMARY MEDICAL DECISION MAKING FREE TEXT BOX
78yo F with likely acute on chronic CHF exacerbation. Possible URI or PNA as well, but less likely. Unlikely PE given that she is already on eliquis. Will obtain labs, CXR, give diuresis and will require admission. On 2L NC currently; unlikely to require CPAP at this time.

## 2024-10-19 NOTE — ED ADULT NURSE NOTE - OBJECTIVE STATEMENT
reports SOB on exertion partially relieved with rest reports SOB on exertion partially relieved with rest and at present time relieved by O2 nasal canula

## 2024-10-20 LAB
ANION GAP SERPL CALC-SCNC: 13 MMOL/L — SIGNIFICANT CHANGE UP (ref 5–17)
APPEARANCE UR: CLEAR — SIGNIFICANT CHANGE UP
BACTERIA # UR AUTO: ABNORMAL /HPF
BILIRUB UR-MCNC: NEGATIVE — SIGNIFICANT CHANGE UP
BLD GP AB SCN SERPL QL: SIGNIFICANT CHANGE UP
BUN SERPL-MCNC: 128 MG/DL — HIGH (ref 7–23)
CALCIUM SERPL-MCNC: 7.8 MG/DL — LOW (ref 8.4–10.5)
CHLORIDE SERPL-SCNC: 101 MMOL/L — SIGNIFICANT CHANGE UP (ref 96–108)
CO2 SERPL-SCNC: 23 MMOL/L — SIGNIFICANT CHANGE UP (ref 22–31)
COLOR SPEC: YELLOW — SIGNIFICANT CHANGE UP
CREAT SERPL-MCNC: 3.46 MG/DL — HIGH (ref 0.5–1.3)
DIFF PNL FLD: NEGATIVE — SIGNIFICANT CHANGE UP
EGFR: 13 ML/MIN/1.73M2 — LOW
EPI CELLS # UR: PRESENT
FERRITIN SERPL-MCNC: 554 NG/ML — HIGH (ref 13–330)
GLUCOSE BLDC GLUCOMTR-MCNC: 131 MG/DL — HIGH (ref 70–99)
GLUCOSE BLDC GLUCOMTR-MCNC: 131 MG/DL — HIGH (ref 70–99)
GLUCOSE BLDC GLUCOMTR-MCNC: 201 MG/DL — HIGH (ref 70–99)
GLUCOSE BLDC GLUCOMTR-MCNC: 241 MG/DL — HIGH (ref 70–99)
GLUCOSE SERPL-MCNC: 233 MG/DL — HIGH (ref 70–99)
GLUCOSE UR QL: NEGATIVE MG/DL — SIGNIFICANT CHANGE UP
HCT VFR BLD CALC: 21.1 % — LOW (ref 34.5–45)
HCT VFR BLD CALC: 21.8 % — LOW (ref 34.5–45)
HGB BLD-MCNC: 6.5 G/DL — CRITICAL LOW (ref 11.5–15.5)
HGB BLD-MCNC: 6.8 G/DL — CRITICAL LOW (ref 11.5–15.5)
IRON SATN MFR SERPL: 23 % — SIGNIFICANT CHANGE UP (ref 14–50)
IRON SATN MFR SERPL: 62 UG/DL — SIGNIFICANT CHANGE UP (ref 30–160)
KETONES UR-MCNC: NEGATIVE MG/DL — SIGNIFICANT CHANGE UP
LEUKOCYTE ESTERASE UR-ACNC: ABNORMAL
MCHC RBC-ENTMCNC: 26.6 PG — LOW (ref 27–34)
MCHC RBC-ENTMCNC: 26.7 PG — LOW (ref 27–34)
MCHC RBC-ENTMCNC: 30.8 GM/DL — LOW (ref 32–36)
MCHC RBC-ENTMCNC: 31.2 GM/DL — LOW (ref 32–36)
MCV RBC AUTO: 85.2 FL — SIGNIFICANT CHANGE UP (ref 80–100)
MCV RBC AUTO: 86.8 FL — SIGNIFICANT CHANGE UP (ref 80–100)
NITRITE UR-MCNC: NEGATIVE — SIGNIFICANT CHANGE UP
NRBC # BLD: 0 /100 WBCS — SIGNIFICANT CHANGE UP (ref 0–0)
NRBC # BLD: 0 /100 WBCS — SIGNIFICANT CHANGE UP (ref 0–0)
PH UR: 5 — SIGNIFICANT CHANGE UP (ref 5–8)
PLATELET # BLD AUTO: 173 K/UL — SIGNIFICANT CHANGE UP (ref 150–400)
PLATELET # BLD AUTO: 184 K/UL — SIGNIFICANT CHANGE UP (ref 150–400)
POTASSIUM SERPL-MCNC: 4.4 MMOL/L — SIGNIFICANT CHANGE UP (ref 3.5–5.3)
POTASSIUM SERPL-SCNC: 4.4 MMOL/L — SIGNIFICANT CHANGE UP (ref 3.5–5.3)
PROT UR-MCNC: 30 MG/DL
RBC # BLD: 2.43 M/UL — LOW (ref 3.8–5.2)
RBC # BLD: 2.56 M/UL — LOW (ref 3.8–5.2)
RBC # FLD: 18.2 % — HIGH (ref 10.3–14.5)
RBC # FLD: 18.3 % — HIGH (ref 10.3–14.5)
RBC CASTS # UR COMP ASSIST: 0 /HPF — SIGNIFICANT CHANGE UP (ref 0–4)
SODIUM SERPL-SCNC: 137 MMOL/L — SIGNIFICANT CHANGE UP (ref 135–145)
SP GR SPEC: 1.01 — SIGNIFICANT CHANGE UP (ref 1–1.03)
TIBC SERPL-MCNC: 266 UG/DL — SIGNIFICANT CHANGE UP (ref 220–430)
TRANSFERRIN SERPL-MCNC: 186 MG/DL — LOW (ref 200–360)
UIBC SERPL-MCNC: 204 UG/DL — SIGNIFICANT CHANGE UP (ref 110–370)
UROBILINOGEN FLD QL: 0.2 MG/DL — SIGNIFICANT CHANGE UP (ref 0.2–1)
WBC # BLD: 7.89 K/UL — SIGNIFICANT CHANGE UP (ref 3.8–10.5)
WBC # BLD: 8.43 K/UL — SIGNIFICANT CHANGE UP (ref 3.8–10.5)
WBC # FLD AUTO: 7.89 K/UL — SIGNIFICANT CHANGE UP (ref 3.8–10.5)
WBC # FLD AUTO: 8.43 K/UL — SIGNIFICANT CHANGE UP (ref 3.8–10.5)
WBC UR QL: 4 /HPF — SIGNIFICANT CHANGE UP (ref 0–5)

## 2024-10-20 PROCEDURE — 99222 1ST HOSP IP/OBS MODERATE 55: CPT

## 2024-10-20 PROCEDURE — 93306 TTE W/DOPPLER COMPLETE: CPT | Mod: 26

## 2024-10-20 PROCEDURE — 99233 SBSQ HOSP IP/OBS HIGH 50: CPT | Mod: GC

## 2024-10-20 RX ORDER — ERYTHROPOIETIN 10000 [IU]/ML
10000 INJECTION, SOLUTION INTRAVENOUS; SUBCUTANEOUS
Refills: 0 | Status: DISCONTINUED | OUTPATIENT
Start: 2024-10-20 | End: 2024-10-21

## 2024-10-20 RX ORDER — FUROSEMIDE 40 MG
40 TABLET ORAL DAILY
Refills: 0 | Status: DISCONTINUED | OUTPATIENT
Start: 2024-10-20 | End: 2024-10-21

## 2024-10-20 RX ORDER — ERYTHROPOIETIN 10000 [IU]/ML
10000 INJECTION, SOLUTION INTRAVENOUS; SUBCUTANEOUS
Refills: 0 | Status: DISCONTINUED | OUTPATIENT
Start: 2024-10-20 | End: 2024-10-20

## 2024-10-20 RX ORDER — SODIUM PHOSPHATE, DIBASIC AND SODIUM PHOSPHATE, MONOBASIC, UNSPECIFIED FORM 7; 19 G/118ML; G/118ML
1 ENEMA RECTAL ONCE
Refills: 0 | Status: COMPLETED | OUTPATIENT
Start: 2024-10-20 | End: 2024-10-21

## 2024-10-20 RX ADMIN — CALCIUM ACETATE 667 MILLIGRAM(S): 667 CAPSULE ORAL at 18:48

## 2024-10-20 RX ADMIN — Medication 600 MILLIGRAM(S): at 05:57

## 2024-10-20 RX ADMIN — Medication 600 MILLIGRAM(S): at 18:49

## 2024-10-20 RX ADMIN — Medication 325 MILLIGRAM(S): at 11:53

## 2024-10-20 RX ADMIN — CALCIUM ACETATE 667 MILLIGRAM(S): 667 CAPSULE ORAL at 11:52

## 2024-10-20 RX ADMIN — EZETIMIBE 10 MILLIGRAM(S): 10 TABLET ORAL at 11:53

## 2024-10-20 RX ADMIN — Medication 40 MILLIGRAM(S): at 13:14

## 2024-10-20 RX ADMIN — APIXABAN 2.5 MILLIGRAM(S): 5 TABLET, FILM COATED ORAL at 18:49

## 2024-10-20 RX ADMIN — Medication 5 MILLIGRAM(S): at 21:14

## 2024-10-20 RX ADMIN — HYDRALAZINE HYDROCHLORIDE 100 MILLIGRAM(S): 50 TABLET, FILM COATED ORAL at 21:14

## 2024-10-20 RX ADMIN — HYDRALAZINE HYDROCHLORIDE 100 MILLIGRAM(S): 50 TABLET, FILM COATED ORAL at 05:57

## 2024-10-20 RX ADMIN — Medication 180 MILLIGRAM(S): at 05:57

## 2024-10-20 RX ADMIN — APIXABAN 2.5 MILLIGRAM(S): 5 TABLET, FILM COATED ORAL at 05:57

## 2024-10-20 RX ADMIN — CALCITRIOL 0.25 MICROGRAM(S): 0.25 CAPSULE, LIQUID FILLED ORAL at 11:53

## 2024-10-20 RX ADMIN — Medication 2: at 13:18

## 2024-10-20 RX ADMIN — CALCIUM ACETATE 667 MILLIGRAM(S): 667 CAPSULE ORAL at 13:17

## 2024-10-20 RX ADMIN — HYDRALAZINE HYDROCHLORIDE 100 MILLIGRAM(S): 50 TABLET, FILM COATED ORAL at 13:17

## 2024-10-20 NOTE — PROGRESS NOTE ADULT - SUBJECTIVE AND OBJECTIVE BOX
HPI:  BURKE ABARCA is a 79y year old Female with PMH of HFpEF, Chronic Kidney Disease 4, DVT (on eliquis) Hypertension, Hyperlipidemia, Type 2 Diabetes Mellitus, Anemia, OA who presents to the ER complaining of shortness of breath x2 days.     Patient states she has been progressively short of breath since yesterday, this morning was having difficulty dressing herself, felt winded and called granddaughter who brought her to ER. Denies chest pain, fever, chills. Patient reports medication compliance,    On ER arrival: /57; HR 64; RR 18; T 98.2; sat 100% on room air  Given lasix 80mg ivp in ER (19 Oct 2024 15:48)      Overnight events: None  Interval Events: Patient seen and examined at bedside. States that she is still having shortness of breath with wheezing. Also endorses no bowel movement since Friday and is requesting fleet enema since it has helped her in the past. No other complaints at this time.     REVIEW OF SYSTEMS:  CONSTITUTIONAL: No weakness, fevers or chills  EYES/ENT: No visual changes;  No vertigo or throat pain   NECK: No pain or stiffness  RESPIRATORY: No cough, wheezing, hemoptysis; No shortness of breath  CARDIOVASCULAR: No chest pain or palpitations  GASTROINTESTINAL: No abdominal or epigastric pain. No nausea, vomiting, or hematemesis; No diarrhea or constipation. No melena or hematochezia.  GENITOURINARY: No dysuria, frequency or hematuria  NEUROLOGICAL: No numbness or weakness  SKIN: No itching, burning, rashes, or lesions   All other review of systems is negative unless indicated above.    OBJECTIVE:  Vital Signs Last 24 Hrs  T(C): 36.4 (20 Oct 2024 11:37), Max: 37 (20 Oct 2024 05:00)  T(F): 97.6 (20 Oct 2024 11:37), Max: 98.6 (20 Oct 2024 05:00)  HR: 64 (20 Oct 2024 11:37) (64 - 87)  BP: 138/72 (20 Oct 2024 11:37) (115/63 - 168/62)  BP(mean): 94 (20 Oct 2024 11:37) (94 - 94)  RR: 18 (20 Oct 2024 11:37) (17 - 18)  SpO2: 98% (20 Oct 2024 11:37) (97% - 100%)    Parameters below as of 20 Oct 2024 11:37  Patient On (Oxygen Delivery Method): nasal cannula  O2 Flow (L/min): 2    POCT Blood Glucose.: 241 mg/dL (20 Oct 2024 12:24)      PHYSICAL EXAM:  General: NAD, morbidly obese female  Respiratory: B/L crackles throughout with poor inspiratory effort and air entry  CV: RRR, +S1/S2, no murmurs, rubs or gallops  Abdominal: Soft, Non tender, Nondistended +BS  Extremities: +chronic skin changes with b/l hyperpigmentation, no ulcers noted. No edema, 2+ peripheral pulses    HOSPITAL MEDICATIONS:  MEDICATIONS  (STANDING):  apixaban 2.5 milliGRAM(s) Oral two times a day  calcitriol   Capsule 0.25 MICROGram(s) Oral daily  calcium acetate 667 milliGRAM(s) Oral three times a day with meals  dextrose 5%. 1000 milliLiter(s) (100 mL/Hr) IV Continuous <Continuous>  dextrose 5%. 1000 milliLiter(s) (50 mL/Hr) IV Continuous <Continuous>  dextrose 50% Injectable 12.5 Gram(s) IV Push once  dextrose 50% Injectable 25 Gram(s) IV Push once  dextrose 50% Injectable 25 Gram(s) IV Push once  diltiazem    milliGRAM(s) Oral daily  epoetin jacobo (PROCRIT) Injectable 74411 Unit(s) SubCutaneous every 7 days  ezetimibe 10 milliGRAM(s) Oral daily  ferrous    sulfate 325 milliGRAM(s) Oral daily  furosemide   Injectable 40 milliGRAM(s) IV Push daily  glucagon  Injectable 1 milliGRAM(s) IntraMuscular once  hydrALAZINE 100 milliGRAM(s) Oral three times a day  influenza  Vaccine (HIGH DOSE) 0.5 milliLiter(s) IntraMuscular once  insulin lispro (ADMELOG) corrective regimen sliding scale   SubCutaneous at bedtime  insulin lispro (ADMELOG) corrective regimen sliding scale   SubCutaneous three times a day before meals  labetalol 600 milliGRAM(s) Oral two times a day  rosuvastatin 5 milliGRAM(s) Oral at bedtime    MEDICATIONS  (PRN):  acetaminophen     Tablet .. 650 milliGRAM(s) Oral every 6 hours PRN Temp greater or equal to 38C (100.4F), Mild Pain (1 - 3)  aluminum hydroxide/magnesium hydroxide/simethicone Suspension 30 milliLiter(s) Oral every 4 hours PRN Dyspepsia  dextrose Oral Gel 15 Gram(s) Oral once PRN Blood Glucose LESS THAN 70 milliGRAM(s)/deciliter  melatonin 3 milliGRAM(s) Oral at bedtime PRN Insomnia  ondansetron Injectable 4 milliGRAM(s) IV Push every 8 hours PRN Nausea and/or Vomiting  saline laxative (FLEET) Rectal Enema 1 Enema Rectal once PRN constipation      LABS:                        6.5    7.89  )-----------( 173      ( 20 Oct 2024 12:12 )             21.1     Hgb Trend: 6.5<--, 7.1<--  10-20    137  |  101  |  128[H]  ----------------------------<  233[H]  4.4   |  23  |  3.46[H]    Ca    7.8[L]      20 Oct 2024 12:12    TPro  7.6  /  Alb  3.5  /  TBili  0.4  /  DBili  x   /  AST  10  /  ALT  20  /  AlkPhos  75  10-19    Creatinine Trend: 3.46<--, 3.29<--    Urinalysis Basic - ( 20 Oct 2024 12:12 )    Color: x / Appearance: x / SG: x / pH: x  Gluc: 233 mg/dL / Ketone: x  / Bili: x / Urobili: x   Blood: x / Protein: x / Nitrite: x   Leuk Esterase: x / RBC: x / WBC x   Sq Epi: x / Non Sq Epi: x / Bacteria: x      Venous Blood Gas:  10-19 @ 12:50  7.33/40/51/21/85.4  VBG Lactate: --      IMAGING  EXAM:  XR CHEST PORTABLE   PROCEDURE DATE:  10/19/2024    IMPRESSION:  HEART:  Enlarged.  LUNGS: Interstitial edema. Mild congestive heart failure.  BONES: degenerative changes.

## 2024-10-20 NOTE — PROGRESS NOTE ADULT - ASSESSMENT
BURKE ABARCA is a 79y year old Female with PMH of HFpEF, Chronic Kidney Disease 4, DVT (on eliquis) Hypertension, Hyperlipidemia, Type 2 Diabetes Mellitus, Anemia, OA who presents to the ER complaining of shortness of breath x2 days. Admitted for further evaluation of CHF exacerbation.     #Shortness of breath - HFpEF exacerbation   #Acute Kidney Injury on Chronic Kidney Disease 4  #Anemia, likely chronic disease   - tele  - repeat TTE  - s/p lasix 80mg ivp in ER, was on bumex 2mg bid since august per patient. will hold off on diuretic at this time. dose based on renal function/volume  - Monitor renal function  - Avoid nephrotoxic medications   - monitor Hg, likely due to ckd    #Hypertension  - continue home diltiazem, hydralazine, labetalol    #Hyperlipidemia  - continue home crestor, ezetemibe    #Type II Diabetes Mellitus  - hold home glipizide. Would dc all together given age and renal dysfunction  - FS and DANGELO    #history of DVT  - per patient she had RUE DVT while at [facility]  - has been on eliquis 2.5mg bid for nearly 3 months   -c/w eliquis 2.5mg    #DVT ppx  - Eliquis    #GOC  -pending conversations with family, full code for now    #Diet  -DASH/TLC, consistent carbs, fluid restriction 1200mL    *Discussed with Dr. Jeong.    BURKE ABARCA is a 79y year old Female with PMH of HFpEF, Chronic Kidney Disease 4, DVT (on eliquis) Hypertension, Hyperlipidemia, Type 2 Diabetes Mellitus, Anemia, OA who presents to the ER complaining of shortness of breath x2 days. Admitted for further evaluation of CHF exacerbation.     #Shortness of breath - HFpEF exacerbation   #Acute Kidney Injury on Chronic Kidney Disease 4  #Anemia, likely chronic disease   - CXR with HEART:  Enlarged. LUNGS: Interstitial edema. Mild congestive heart failure.  - remote tele  - repeat TTE  - s/p lasix 80mg ivp in ER, was on bumex 2mg bid since august per patient. will hold off on diuretic at this time. dose based on renal function/volume  - Monitor renal function  - Avoid nephrotoxic medications   - Hgb 6.5 from 7.1   - repeat Hgb at 6.8  - transfuse if <7  - f/u AM labs  - f/u nephro consult    #Hypertension  - continue home diltiazem, hydralazine, labetalol    #Hyperlipidemia  - continue home crestor, ezetemibe    #Type II Diabetes Mellitus  - hold home meds  - FS and DANGELO    #history of DVT  - per patient she had RUE DVT while at [facility]  - has been on eliquis 2.5mg bid for nearly 3 months   -c/w eliquis 2.5mg    #DVT ppx  - Eliquis    #GOC  -pending conversations with family, full code for now    #Diet  -DASH/TLC, consistent carbs, fluid restriction 1200mL    *Discussed with Dr. Jeong.

## 2024-10-20 NOTE — CONSULT NOTE ADULT - SUBJECTIVE AND OBJECTIVE BOX
NEPHROLOGY CONSULTATION    CHIEF COMPLAINT: SOB    HPI:  Pt is 80 yo F with PMH of HFpEF, Chronic Kidney Disease 4, DVT (on eliquis), Hypertension, Hyperlipidemia, Type 2 Diabetes Mellitus, Anemia, OA who presents to the ER complaining of shortness of breath for few days. No chest pain, fever, chills, N/V/D/C. Found to have severe anemia and ZORAIDA/CKD. Was on Bumex as op.    ROS:  as above    Allergies:  ACE inhibitors (Angioedema)  statins (Muscle Pain)    Intolerances:  predniSONE (Other)    PAST MEDICAL & SURGICAL HISTORY:  Hypertension  Diabetes  type 2  Thyroid disease  Anemia  Hypercholesteremia  Myelodysplasia (myelodysplastic syndrome)  Thyromegaly  s/p partial thyroidectomy long time ago  Other giant cell arteritis  Arthritis  Kidney insufficiency  PVD (peripheral vascular disease)  Mass of right knee  Urinary incontinence  H/O partial thyroidectomy  H/O: hysterectomy  History of cataract surgery  History of vascular surgery    SOCIAL HISTORY:  negative    FAMILY HISTORY:  history of diabetes mellitus  history of hypertension    MEDICATIONS  (STANDING):  apixaban 2.5 milliGRAM(s) Oral two times a day  calcitriol   Capsule 0.25 MICROGram(s) Oral daily  calcium acetate 667 milliGRAM(s) Oral three times a day with meals  dextrose 5%. 1000 milliLiter(s) (100 mL/Hr) IV Continuous <Continuous>  dextrose 5%. 1000 milliLiter(s) (50 mL/Hr) IV Continuous <Continuous>  dextrose 50% Injectable 12.5 Gram(s) IV Push once  dextrose 50% Injectable 25 Gram(s) IV Push once  dextrose 50% Injectable 25 Gram(s) IV Push once  diltiazem    milliGRAM(s) Oral daily  ezetimibe 10 milliGRAM(s) Oral daily  ferrous    sulfate 325 milliGRAM(s) Oral daily  glucagon  Injectable 1 milliGRAM(s) IntraMuscular once  hydrALAZINE 100 milliGRAM(s) Oral three times a day  influenza  Vaccine (HIGH DOSE) 0.5 milliLiter(s) IntraMuscular once  insulin lispro (ADMELOG) corrective regimen sliding scale   SubCutaneous three times a day before meals  insulin lispro (ADMELOG) corrective regimen sliding scale   SubCutaneous at bedtime  labetalol 600 milliGRAM(s) Oral two times a day  rosuvastatin 5 milliGRAM(s) Oral at bedtime    Home Medications:  Apresoline 100 mg oral tablet: 1 tab(s) orally 3 times a day (19 Oct 2024 15:45)  Bumex 2 mg oral tablet: 1 tab(s) orally 2 times a day (19 Oct 2024 15:45)  calcitriol 0.25 mcg oral capsule: 1 cap(s) orally once a day (19 Oct 2024 15:45)  calcium acetate 667 mg oral tablet: 1 tab(s) orally 3 times a day (19 Oct 2024 15:45)  Crestor 5 mg oral tablet: 1 tab(s) orally once a day (at bedtime) (19 Oct 2024 15:45)  DilTIAZem (Eqv-Cardizem CD) 180 mg/24 hours oral capsule, extended release: 1 cap(s) orally once a day (19 Oct 2024 15:46)  Eliquis 2.5 mg oral tablet: 1 tab(s) orally 2 times a day (19 Oct 2024 15:46)  ezetimibe 10 mg oral tablet: 1 tab(s) orally once a day (19 Oct 2024 15:46)  ferrous sulfate 325 mg (65 mg elemental iron) oral delayed release tablet: 1 tab(s) orally once a day (19 Oct 2024 15:46)  glipiZIDE 5 mg oral tablet: 1 tab(s) orally once a day (19 Oct 2024 15:46)  labetalol 300 mg oral tablet: 2 tab(s) orally 2 times a day (19 Oct 2024 15:46)    Vital Signs Last 24 Hrs  T(C): 36.4 (10-20-24 @ 11:37), Max: 37 (10-20-24 @ 05:00)  T(F): 97.6 (10-20-24 @ 11:37), Max: 98.6 (10-20-24 @ 05:00)  HR: 64 (10-20-24 @ 11:37) (64 - 87)  BP: 138/72 (10-20-24 @ 11:37) (115/63 - 168/62)  BP(mean): 94 (10-20-24 @ 11:37) (94 - 94)  RR: 18 (10-20-24 @ 11:37) (17 - 18)  SpO2: 98% (10-20-24 @ 11:37) (97% - 100%)    I&O's Detail    19 Oct 2024 07:01  -  20 Oct 2024 07:00  --------------------------------------------------------  OUT:    Voided (mL): 700 mL  Total OUT: 700 mL      LABS:                        7.1    7.77  )-----------( 193      ( 19 Oct 2024 12:45 )             22.5     10-19    140  |  103  |  127[H]  ----------------------------<  118[H]  4.1   |  24  |  3.29[H]    Ca    8.1[L]      19 Oct 2024 12:45    TPro  7.6  /  Alb  3.5  /  TBili  0.4  /  DBili  x   /  AST  10  /  ALT  20  /  AlkPhos  75  10-19    LIVER FUNCTIONS - ( 19 Oct 2024 12:45 )  Alb: 3.5 g/dL / Pro: 7.6 g/dL / ALK PHOS: 75 U/L / ALT: 20 U/L / AST: 10 U/L / GGT: x           A/P:    full consult to follow    [phone]   NEPHROLOGY CONSULTATION    CHIEF COMPLAINT: SOB    HPI:  Pt is 78 yo F with PMH of HFpEF, Chronic Kidney Disease 4, DVT (on eliquis), Hypertension, Hyperlipidemia, Type 2 Diabetes Mellitus, Anemia, OA who presents to the ER complaining of shortness of breath for few days. No chest pain, fever, chills, N/V/D/C. Found to have severe anemia and ZORAIDA/CKD. Was on Bumex as op.    ROS:  as above    Allergies:  ACE inhibitors (Angioedema)  statins (Muscle Pain)    Intolerances:  predniSONE (Other)    PAST MEDICAL & SURGICAL HISTORY:  Hypertension  Diabetes  type 2  Thyroid disease  Anemia  Hypercholesteremia  Myelodysplasia (myelodysplastic syndrome)  Thyromegaly  s/p partial thyroidectomy long time ago  Other giant cell arteritis  Arthritis  Kidney insufficiency  PVD (peripheral vascular disease)  Mass of right knee  Urinary incontinence  H/O partial thyroidectomy  H/O: hysterectomy  History of cataract surgery  History of vascular surgery    SOCIAL HISTORY:  negative    FAMILY HISTORY:  history of diabetes mellitus  history of hypertension    MEDICATIONS  (STANDING):  apixaban 2.5 milliGRAM(s) Oral two times a day  calcitriol   Capsule 0.25 MICROGram(s) Oral daily  calcium acetate 667 milliGRAM(s) Oral three times a day with meals  dextrose 5%. 1000 milliLiter(s) (100 mL/Hr) IV Continuous <Continuous>  dextrose 5%. 1000 milliLiter(s) (50 mL/Hr) IV Continuous <Continuous>  dextrose 50% Injectable 12.5 Gram(s) IV Push once  dextrose 50% Injectable 25 Gram(s) IV Push once  dextrose 50% Injectable 25 Gram(s) IV Push once  diltiazem    milliGRAM(s) Oral daily  ezetimibe 10 milliGRAM(s) Oral daily  ferrous    sulfate 325 milliGRAM(s) Oral daily  glucagon  Injectable 1 milliGRAM(s) IntraMuscular once  hydrALAZINE 100 milliGRAM(s) Oral three times a day  influenza  Vaccine (HIGH DOSE) 0.5 milliLiter(s) IntraMuscular once  insulin lispro (ADMELOG) corrective regimen sliding scale   SubCutaneous three times a day before meals  insulin lispro (ADMELOG) corrective regimen sliding scale   SubCutaneous at bedtime  labetalol 600 milliGRAM(s) Oral two times a day  rosuvastatin 5 milliGRAM(s) Oral at bedtime    Home Medications:  Apresoline 100 mg oral tablet: 1 tab(s) orally 3 times a day (19 Oct 2024 15:45)  Bumex 2 mg oral tablet: 1 tab(s) orally 2 times a day (19 Oct 2024 15:45)  calcitriol 0.25 mcg oral capsule: 1 cap(s) orally once a day (19 Oct 2024 15:45)  calcium acetate 667 mg oral tablet: 1 tab(s) orally 3 times a day (19 Oct 2024 15:45)  Crestor 5 mg oral tablet: 1 tab(s) orally once a day (at bedtime) (19 Oct 2024 15:45)  DilTIAZem (Eqv-Cardizem CD) 180 mg/24 hours oral capsule, extended release: 1 cap(s) orally once a day (19 Oct 2024 15:46)  Eliquis 2.5 mg oral tablet: 1 tab(s) orally 2 times a day (19 Oct 2024 15:46)  ezetimibe 10 mg oral tablet: 1 tab(s) orally once a day (19 Oct 2024 15:46)  ferrous sulfate 325 mg (65 mg elemental iron) oral delayed release tablet: 1 tab(s) orally once a day (19 Oct 2024 15:46)  glipiZIDE 5 mg oral tablet: 1 tab(s) orally once a day (19 Oct 2024 15:46)  labetalol 300 mg oral tablet: 2 tab(s) orally 2 times a day (19 Oct 2024 15:46)    Vital Signs Last 24 Hrs  T(C): 36.4 (10-20-24 @ 11:37), Max: 37 (10-20-24 @ 05:00)  T(F): 97.6 (10-20-24 @ 11:37), Max: 98.6 (10-20-24 @ 05:00)  HR: 64 (10-20-24 @ 11:37) (64 - 87)  BP: 138/72 (10-20-24 @ 11:37) (115/63 - 168/62)  BP(mean): 94 (10-20-24 @ 11:37) (94 - 94)  RR: 18 (10-20-24 @ 11:37) (17 - 18)  SpO2: 98% (10-20-24 @ 11:37) (97% - 100%)    I&O's Detail    19 Oct 2024 07:01  -  20 Oct 2024 07:00  --------------------------------------------------------  OUT:    Voided (mL): 700 mL  Total OUT: 700 mL    s1s2  b/l air entry  soft, ND  tr edema    LABS:                        7.1    7.77  )-----------( 193      ( 19 Oct 2024 12:45 )             22.5     10-19    140  |  103  |  127[H]  ----------------------------<  118[H]  4.1   |  24  |  3.29[H]    Ca    8.1[L]      19 Oct 2024 12:45    TPro  7.6  /  Alb  3.5  /  TBili  0.4  /  DBili  x   /  AST  10  /  ALT  20  /  AlkPhos  75  10-19    LIVER FUNCTIONS - ( 19 Oct 2024 12:45 )  Alb: 3.5 g/dL / Pro: 7.6 g/dL / ALK PHOS: 75 U/L / ALT: 20 U/L / AST: 10 U/L / GGT: x           A/P:    DM, HTN, CKD 4  Adm w/PVC  SOB, ZORAIDA/CKD exacerbated by anemia  Diuresis as needed  Epoetin, will f/u anemia studies   Bld tx today  No NSAID's  F/u CBC, BMP, UO    [phone]

## 2024-10-20 NOTE — CHART NOTE - NSCHARTNOTEFT_GEN_A_CORE
Spoke with patient's granddaughter, Davida Blackburn, via telephone and updated her regarding patient's hospital course. All questions and concerns addressed.

## 2024-10-20 NOTE — DIETITIAN INITIAL EVALUATION ADULT - NS FNS DIET ORDER
Diet, DASH/TLC:   Sodium & Cholesterol Restricted  1200mL Fluid Restriction ([id number]) (10-19-24 @ 14:59)

## 2024-10-20 NOTE — CHART NOTE - NSCHARTNOTEFT_GEN_A_CORE
Nutrition Brief Note:     Patient was visited by Registered Dietitian for Congestive Heart Failure (CHF) education.   CHF education provided to the patient in detail.   Discussed medical nutrition therapy for CHF, sodium & fluid Intake, importance of diet adherence, and daily weight monitoring w/ patient.   Reinforced importance of weight gain parameters & importance of contacting MD about weight changes.   Diet education material provided on medical nutrition therapy for CHF, reading heart healthy nutrition labels, heart-healthy shopping tips & low sodium food list.   Patient verbalized understanding demonstrated by teach back method.   Registered Dietitian contact information left w/ patient for any future questioning.  Will continue to monitor & follow up as needed.     Registered Dietitian/Nutritionist Remains Available.  Iris Gonzalez RDN also available on TEAMS

## 2024-10-20 NOTE — PROGRESS NOTE ADULT - ATTENDING COMMENTS
79y year old Female with PMH of HFpEF, Chronic Kidney Disease 4, DVT (on eliquis) Hypertension, Hyperlipidemia, Type 2 Diabetes Mellitus, Anemia, OA, admitted with sob x 1 day. no chest pain/ palpitations. No fevers.  feeling better at time of evaluation.  T(C): 36.4 (10-20-24 @ 11:37), Max: 37 (10-20-24 @ 05:00)  T(F): 97.6 (10-20-24 @ 11:37), Max: 98.6 (10-20-24 @ 05:00)  HR: 64 (10-20-24 @ 11:37) (64 - 87)  BP: 138/72 (10-20-24 @ 11:37) (115/63 - 168/62)  ABP: --  ABP(mean): --  RR: 18 (10-20-24 @ 11:37) (17 - 18)  SpO2: 98% (10-20-24 @ 11:37) (97% - 100%)    on exam aaox3, morbid obesity +, lungs with crackles at bases, poor inspiratory effort, s1, s2, regular, abdomen- soft, b/l legs with chronic skin changes.    LABS:                        7.1    7.77  )-----------( 193      ( 19 Oct 2024 12:45 )             22.5     10-20    137  |  101  |  128[H]  ----------------------------<  233[H]  4.4   |  23  |  3.46[H]    Ca    7.8[L]      20 Oct 2024 12:12    TPro  7.6  /  Alb  3.5  /  TBili  0.4  /  DBili  x   /  AST  10  /  ALT  20  /  AlkPhos  75  10-19  Consultant(s) Notes Reviewed:  [x ] YES  [ ] NO  Care Discussed with Consultants/Other Providers [ x] YES  [ ] NO  Imaging Personally Reviewed:  [x ] YES  [ ] NO    a/p:  # acute excerbation of HFpEF  # Acute on chronic anemia of chronic disease  # DM2 with hyperglycemia  # Underlying h/o Chronic Kidney Disease 4, DVT (on eliquis) Hypertension, Hyperlipidemia,  OA  - lasix, monitor pro-bnp levels, daily weights, urine output. recheck h/h, if still <7, will need transfusion , will need extra lasix with transfusion. no evidence of bleeding, patient already on epo. monitor fingersticks, continue insulin. Not a candidate for farxiga/ entrsto/ mra  because of renal function. continue other home meds for chronic conditions.   - goals of care discussion  - rest as per resident note  medically active 48-72 hrs.

## 2024-10-20 NOTE — DIETITIAN INITIAL EVALUATION ADULT - OTHER INFO
Reason for Admission: shortness of breath  History of Present Illness:   BURKE ABARCA is a 79y year old Female with PMH of HFpEF, Chronic Kidney Disease 4, DVT (on eliquis) Hypertension, Hyperlipidemia, Type 2 Diabetes Mellitus, Anemia, OA who presents to the ER complaining of shortness of breath x2 days.     Patient states she has been progressively short of breath since yesterday, this morning was having difficulty dressing herself, felt winded and called granddaughter who brought her to ER. Denies chest pain, fever, chills. Patient reports medication compliance,    On ER arrival: /57; HR 64; RR 18; T 98.2; sat 100% on room air  Given lasix 80mg ivp in ER    At this time patient tolerating diet w/ adequate appetite/intake consuming % of meals. Observed breakfast meal w/ good acceptance. Reviewed preferences and menu alternatives, food preferences obtained and noted on patients file with nutrition office. No issues w/ dentition or chewing and swallowing current diet texture. Denies N/V/C/D, last BM 10/18 per nursing flowsheets. CBW 205lb (10/19). T2DM noted. A1C: 6.5% (6/24). POCT: 169-185 mg/dL in last 24 hours. Insulin regimen reviewed. Addressed with [facility] [place] diet w/ evening snack. Diet education provided on [facility] [place] diet + written material. CHF education provided to the patient in detail.   Discussed medical nutrition therapy for CHF, sodium & fluid Intake, importance of diet adherence, and daily weight monitoring w/ patient.   Reinforced importance of weight gain parameters & importance of contacting MD about weight changes.   Diet education material provided on medical nutrition therapy for CHF, reading heart healthy nutrition labels, heart-healthy shopping tips & low sodium food list.   Patient verbalized understanding demonstrated by teach back method.

## 2024-10-20 NOTE — DIETITIAN INITIAL EVALUATION ADULT - PERTINENT MEDS FT
MEDICATIONS  (STANDING):  apixaban 2.5 milliGRAM(s) Oral two times a day  calcitriol   Capsule 0.25 MICROGram(s) Oral daily  calcium acetate 667 milliGRAM(s) Oral three times a day with meals  dextrose 5%. 1000 milliLiter(s) (50 mL/Hr) IV Continuous <Continuous>  dextrose 5%. 1000 milliLiter(s) (100 mL/Hr) IV Continuous <Continuous>  dextrose 50% Injectable 12.5 Gram(s) IV Push once  dextrose 50% Injectable 25 Gram(s) IV Push once  dextrose 50% Injectable 25 Gram(s) IV Push once  diltiazem    milliGRAM(s) Oral daily  ezetimibe 10 milliGRAM(s) Oral daily  ferrous    sulfate 325 milliGRAM(s) Oral daily  glucagon  Injectable 1 milliGRAM(s) IntraMuscular once  hydrALAZINE 100 milliGRAM(s) Oral three times a day  influenza  Vaccine (HIGH DOSE) 0.5 milliLiter(s) IntraMuscular once  insulin lispro (ADMELOG) corrective regimen sliding scale   SubCutaneous at bedtime  insulin lispro (ADMELOG) corrective regimen sliding scale   SubCutaneous three times a day before meals  labetalol 600 milliGRAM(s) Oral two times a day  rosuvastatin 5 milliGRAM(s) Oral at bedtime    MEDICATIONS  (PRN):  acetaminophen     Tablet .. 650 milliGRAM(s) Oral every 6 hours PRN Temp greater or equal to 38C (100.4F), Mild Pain (1 - 3)  aluminum hydroxide/magnesium hydroxide/simethicone Suspension 30 milliLiter(s) Oral every 4 hours PRN Dyspepsia  dextrose Oral Gel 15 Gram(s) Oral once PRN Blood Glucose LESS THAN 70 milliGRAM(s)/deciliter  melatonin 3 milliGRAM(s) Oral at bedtime PRN Insomnia  ondansetron Injectable 4 milliGRAM(s) IV Push every 8 hours PRN Nausea and/or Vomiting  saline laxative (FLEET) Rectal Enema 1 Enema Rectal once PRN constipation

## 2024-10-20 NOTE — DIETITIAN INITIAL EVALUATION ADULT - PERTINENT LABORATORY DATA
10-19    140  |  103  |  127[H]  ----------------------------<  118[H]  4.1   |  24  |  3.29[H]    Ca    8.1[L]      19 Oct 2024 12:45    TPro  7.6  /  Alb  3.5  /  TBili  0.4  /  DBili  x   /  AST  10  /  ALT  20  /  AlkPhos  75  10-19  POCT Blood Glucose.: 131 mg/dL (10-20-24 @ 08:11)  A1C with Estimated Average Glucose Result: 6.5 % (06-24-24 @ 06:35)  A1C with Estimated Average Glucose Result: 6.8 % (05-06-24 @ 06:32)

## 2024-10-20 NOTE — DIETITIAN INITIAL EVALUATION ADULT - NUTRITIONGOAL OUTCOME1
Patient to show management in glycemic control (100-180mg/dl at most checks) on Consistent Carbohydrate diet.

## 2024-10-20 NOTE — DIETITIAN INITIAL EVALUATION ADULT - PERSON TAUGHT/METHOD
Consistent Carbohydrate Heart-Healthy DASH/TLC/verbal instruction/teach back - (Patient repeats in own words)/patient instructed

## 2024-10-20 NOTE — DIETITIAN INITIAL EVALUATION ADULT - ORAL INTAKE PTA/DIET HISTORY
Pt endorses good appetite and PO intake PTA, meals provided by friend. Follows a low sugar, Na PTA. NKFA nor food intolerances reported

## 2024-10-20 NOTE — DIETITIAN INITIAL EVALUATION ADULT - ADD RECOMMEND
1. Continue Heart-Healthy DASH/TLC recommend adding Consistent Carbohydrate diet.   2. Monitor PO intake, GI tolerance, skin integrity, labs, weight, and bowel movement regularity.   4. Honor food preferences as feasible.   5. Provide ongoing diet education as needed  6. RD remains available upon request and will follow-up per protocol

## 2024-10-21 LAB
ALBUMIN SERPL ELPH-MCNC: 3.1 G/DL — LOW (ref 3.3–5)
ALP SERPL-CCNC: 66 U/L — SIGNIFICANT CHANGE UP (ref 40–120)
ALT FLD-CCNC: 13 U/L — SIGNIFICANT CHANGE UP (ref 10–45)
ANION GAP SERPL CALC-SCNC: 15 MMOL/L — SIGNIFICANT CHANGE UP (ref 5–17)
AST SERPL-CCNC: 7 U/L — LOW (ref 10–40)
BILIRUB SERPL-MCNC: 0.4 MG/DL — SIGNIFICANT CHANGE UP (ref 0.2–1.2)
BUN SERPL-MCNC: 144 MG/DL — HIGH (ref 7–23)
CALCIUM SERPL-MCNC: 7.9 MG/DL — LOW (ref 8.4–10.5)
CHLORIDE SERPL-SCNC: 102 MMOL/L — SIGNIFICANT CHANGE UP (ref 96–108)
CO2 SERPL-SCNC: 21 MMOL/L — LOW (ref 22–31)
CREAT SERPL-MCNC: 4.05 MG/DL — HIGH (ref 0.5–1.3)
EGFR: 11 ML/MIN/1.73M2 — LOW
FOLATE SERPL-MCNC: 11.9 NG/ML — SIGNIFICANT CHANGE UP
GLUCOSE BLDC GLUCOMTR-MCNC: 147 MG/DL — HIGH (ref 70–99)
GLUCOSE BLDC GLUCOMTR-MCNC: 174 MG/DL — HIGH (ref 70–99)
GLUCOSE BLDC GLUCOMTR-MCNC: 185 MG/DL — HIGH (ref 70–99)
GLUCOSE BLDC GLUCOMTR-MCNC: 247 MG/DL — HIGH (ref 70–99)
GLUCOSE SERPL-MCNC: 148 MG/DL — HIGH (ref 70–99)
HCT VFR BLD CALC: 23.2 % — LOW (ref 34.5–45)
HCT VFR BLD CALC: 24 % — LOW (ref 34.5–45)
HGB BLD-MCNC: 7.1 G/DL — LOW (ref 11.5–15.5)
HGB BLD-MCNC: 7.5 G/DL — LOW (ref 11.5–15.5)
MAGNESIUM SERPL-MCNC: 2 MG/DL — SIGNIFICANT CHANGE UP (ref 1.6–2.6)
MCHC RBC-ENTMCNC: 27 PG — SIGNIFICANT CHANGE UP (ref 27–34)
MCHC RBC-ENTMCNC: 27.1 PG — SIGNIFICANT CHANGE UP (ref 27–34)
MCHC RBC-ENTMCNC: 30.6 GM/DL — LOW (ref 32–36)
MCHC RBC-ENTMCNC: 31.3 GM/DL — LOW (ref 32–36)
MCV RBC AUTO: 86.6 FL — SIGNIFICANT CHANGE UP (ref 80–100)
MCV RBC AUTO: 88.2 FL — SIGNIFICANT CHANGE UP (ref 80–100)
NRBC # BLD: 0 /100 WBCS — SIGNIFICANT CHANGE UP (ref 0–0)
NRBC # BLD: 0 /100 WBCS — SIGNIFICANT CHANGE UP (ref 0–0)
OB PNL STL: NEGATIVE — SIGNIFICANT CHANGE UP
PHOSPHATE SERPL-MCNC: 7.2 MG/DL — HIGH (ref 2.5–4.5)
PLATELET # BLD AUTO: 165 K/UL — SIGNIFICANT CHANGE UP (ref 150–400)
PLATELET # BLD AUTO: 170 K/UL — SIGNIFICANT CHANGE UP (ref 150–400)
POTASSIUM SERPL-MCNC: 4.6 MMOL/L — SIGNIFICANT CHANGE UP (ref 3.5–5.3)
POTASSIUM SERPL-SCNC: 4.6 MMOL/L — SIGNIFICANT CHANGE UP (ref 3.5–5.3)
PROT SERPL-MCNC: 7 G/DL — SIGNIFICANT CHANGE UP (ref 6–8.3)
RBC # BLD: 2.63 M/UL — LOW (ref 3.8–5.2)
RBC # BLD: 2.77 M/UL — LOW (ref 3.8–5.2)
RBC # FLD: 17.3 % — HIGH (ref 10.3–14.5)
RBC # FLD: 17.6 % — HIGH (ref 10.3–14.5)
SODIUM SERPL-SCNC: 138 MMOL/L — SIGNIFICANT CHANGE UP (ref 135–145)
VIT B12 SERPL-MCNC: 931 PG/ML — SIGNIFICANT CHANGE UP (ref 232–1245)
WBC # BLD: 7.64 K/UL — SIGNIFICANT CHANGE UP (ref 3.8–10.5)
WBC # BLD: 8.28 K/UL — SIGNIFICANT CHANGE UP (ref 3.8–10.5)
WBC # FLD AUTO: 7.64 K/UL — SIGNIFICANT CHANGE UP (ref 3.8–10.5)
WBC # FLD AUTO: 8.28 K/UL — SIGNIFICANT CHANGE UP (ref 3.8–10.5)

## 2024-10-21 PROCEDURE — 99233 SBSQ HOSP IP/OBS HIGH 50: CPT | Mod: GC

## 2024-10-21 PROCEDURE — 99233 SBSQ HOSP IP/OBS HIGH 50: CPT

## 2024-10-21 PROCEDURE — 76775 US EXAM ABDO BACK WALL LIM: CPT | Mod: 26

## 2024-10-21 RX ORDER — ERYTHROPOIETIN 10000 [IU]/ML
10000 INJECTION, SOLUTION INTRAVENOUS; SUBCUTANEOUS
Refills: 0 | Status: DISCONTINUED | OUTPATIENT
Start: 2024-10-21 | End: 2024-10-25

## 2024-10-21 RX ORDER — FUROSEMIDE 40 MG
40 TABLET ORAL DAILY
Refills: 0 | Status: DISCONTINUED | OUTPATIENT
Start: 2024-10-22 | End: 2024-10-21

## 2024-10-21 RX ADMIN — Medication 40 MILLIGRAM(S): at 05:26

## 2024-10-21 RX ADMIN — SODIUM PHOSPHATE, DIBASIC AND SODIUM PHOSPHATE, MONOBASIC, UNSPECIFIED FORM 1 ENEMA: 7; 19 ENEMA RECTAL at 14:19

## 2024-10-21 RX ADMIN — Medication 600 MILLIGRAM(S): at 17:49

## 2024-10-21 RX ADMIN — Medication 650 MILLIGRAM(S): at 13:02

## 2024-10-21 RX ADMIN — HYDRALAZINE HYDROCHLORIDE 100 MILLIGRAM(S): 50 TABLET, FILM COATED ORAL at 05:26

## 2024-10-21 RX ADMIN — Medication 180 MILLIGRAM(S): at 05:27

## 2024-10-21 RX ADMIN — CALCIUM ACETATE 667 MILLIGRAM(S): 667 CAPSULE ORAL at 09:06

## 2024-10-21 RX ADMIN — Medication 600 MILLIGRAM(S): at 05:27

## 2024-10-21 RX ADMIN — Medication 1: at 12:44

## 2024-10-21 RX ADMIN — EZETIMIBE 10 MILLIGRAM(S): 10 TABLET ORAL at 12:02

## 2024-10-21 RX ADMIN — CALCIUM ACETATE 667 MILLIGRAM(S): 667 CAPSULE ORAL at 17:49

## 2024-10-21 RX ADMIN — Medication 5 MILLIGRAM(S): at 21:27

## 2024-10-21 RX ADMIN — Medication 325 MILLIGRAM(S): at 12:03

## 2024-10-21 RX ADMIN — CALCIUM ACETATE 667 MILLIGRAM(S): 667 CAPSULE ORAL at 12:03

## 2024-10-21 RX ADMIN — ERYTHROPOIETIN 10000 UNIT(S): 10000 INJECTION, SOLUTION INTRAVENOUS; SUBCUTANEOUS at 12:03

## 2024-10-21 RX ADMIN — HYDRALAZINE HYDROCHLORIDE 100 MILLIGRAM(S): 50 TABLET, FILM COATED ORAL at 14:19

## 2024-10-21 RX ADMIN — Medication 650 MILLIGRAM(S): at 12:02

## 2024-10-21 RX ADMIN — APIXABAN 2.5 MILLIGRAM(S): 5 TABLET, FILM COATED ORAL at 17:49

## 2024-10-21 RX ADMIN — APIXABAN 2.5 MILLIGRAM(S): 5 TABLET, FILM COATED ORAL at 05:27

## 2024-10-21 RX ADMIN — HYDRALAZINE HYDROCHLORIDE 100 MILLIGRAM(S): 50 TABLET, FILM COATED ORAL at 21:27

## 2024-10-21 RX ADMIN — Medication 1: at 08:24

## 2024-10-21 NOTE — PROGRESS NOTE ADULT - ASSESSMENT
BURKE ABARCA is a 79y year old Female with PMH of HFpEF, Chronic Kidney Disease 4, DVT (on eliquis) Hypertension, Hyperlipidemia, Type 2 Diabetes Mellitus, Anemia, OA who presents to the ER complaining of shortness of breath x2 days. Admitted for further evaluation of CHF exacerbation.     #Shortness of breath - HFpEF exacerbation   #Acute Kidney Injury on Chronic Kidney Disease 4  #Anemia, likely chronic disease   - CXR with HEART:  Enlarged. LUNGS: Interstitial edema. Mild congestive heart failure.  - remote tele  - s/p lasix 80mg ivp in ER, was on bumex 2mg bid since august per patient. will hold off on diuretic at this time. dose based on renal function/volume  - TTE 10/20/24: LVEF 70%, grade 2 diastolic dysfunction  - Monitor renal function  - Avoid nephrotoxic medications   - Hgb 7.1> 6.5 > 6.8> 7.5> 7.1  - s/p 1 U pRBC 10/20/24, repeat hgb 7.1  - iron studies: low transferrin, high ferritin  - transfuse if <7  - continue weekly epoietin  -appreciate nephro recommendations    #Hypertension  - continue home diltiazem, hydralazine, labetalol    #Hyperlipidemia  - continue home crestor, ezetemibe    #Type II Diabetes Mellitus  - hold home meds  - FS and DANGELO    #history of DVT  - per patient she had RUE DVT while at [facility]  - has been on eliquis 2.5mg bid for nearly 3 months   -c/w eliquis 2.5mg    #DVT ppx  - Eliquis    #GOC  -full code for now    #Diet  -DASH/TLC, consistent carbs, fluid restriction 1200mL    Case seen and discussed with Dr. Jeong.    BURKE ABARCA is a 79y year old Female with PMH of HFpEF, Chronic Kidney Disease 4, DVT (on eliquis) Hypertension, Hyperlipidemia, Type 2 Diabetes Mellitus, Anemia, OA who presents to the ER complaining of shortness of breath x2 days. Admitted for further evaluation of CHF exacerbation.     #Shortness of breath - HFpEF exacerbation   #Acute Kidney Injury on Chronic Kidney Disease 4  #Anemia, likely chronic disease   - CXR with HEART:  Enlarged. LUNGS: Interstitial edema. Mild congestive heart failure.  - remote tele  - s/p lasix 80mg ivp in ER, was on bumex 2mg bid since august per patient.   - started IV lasix 40mg daily today 10/21  - TTE 10/20/24: LVEF 70%, grade 2 diastolic dysfunction  - Monitor renal function  - Avoid nephrotoxic medications   - Hgb 7.1> 6.5 > 6.8> 7.5> 7.1  - s/p 1 U pRBC 10/20/24, repeat hgb 7.1  - iron studies: low transferrin, high ferritin  - transfuse if <7  - continue weekly epoietin  -appreciate nephro recommendations    #Hypertension  - continue home diltiazem, hydralazine, labetalol    #Hyperlipidemia  - continue home crestor, ezetemibe    #Type II Diabetes Mellitus  - hold home meds  - FS and DANGELO    #history of DVT  - per patient she had RUE DVT while at [facility]  - has been on eliquis 2.5mg bid for nearly 3 months   -c/w eliquis 2.5mg    #DVT ppx  - Eliquis    #GOC  -full code for now    #Diet  -DASH/TLC, consistent carbs, fluid restriction 1200mL    Case seen and discussed with Dr. Jeong.

## 2024-10-21 NOTE — PROGRESS NOTE ADULT - SUBJECTIVE AND OBJECTIVE BOX
BURKE ABARCA  [id number]    Chief Complaint: HFpEF exacerbation  Interval events: pt seen and examined, labs and chart reviewed. denies cp, reports sob. utilizing N/C 4 L. sinus 60s on tele    ALLERGIES:  ACE inhibitors (Angioedema)  statins (Muscle Pain)  predniSONE (Other)      PAST MEDICAL & SURGICAL HISTORY:  Hypertension      Diabetes  type 2      Thyroid disease      Anemia      Hypercholesteremia      Myelodysplasia (myelodysplastic syndrome)      Thyromegaly  s/p partial thyroidectomy long time ago      Other giant cell arteritis      Arthritis      Kidney insufficiency      PVD (peripheral vascular disease)      Mass of right knee      Urinary incontinence      H/O partial thyroidectomy      H/O: hysterectomy      History of cataract surgery      History of vascular surgery            CURRENT MEDICATIONS:  MEDICATIONS  (STANDING):  apixaban 2.5 milliGRAM(s) Oral two times a day  calcitriol   Capsule 0.25 MICROGram(s) Oral daily  calcium acetate 667 milliGRAM(s) Oral three times a day with meals  dextrose 5%. 1000 milliLiter(s) (100 mL/Hr) IV Continuous <Continuous>  dextrose 5%. 1000 milliLiter(s) (50 mL/Hr) IV Continuous <Continuous>  dextrose 50% Injectable 12.5 Gram(s) IV Push once  dextrose 50% Injectable 25 Gram(s) IV Push once  dextrose 50% Injectable 25 Gram(s) IV Push once  diltiazem    milliGRAM(s) Oral daily  epoetin jacobo (PROCRIT) Injectable 31672 Unit(s) SubCutaneous every 7 days  ezetimibe 10 milliGRAM(s) Oral daily  ferrous    sulfate 325 milliGRAM(s) Oral daily  furosemide   Injectable 40 milliGRAM(s) IV Push daily  glucagon  Injectable 1 milliGRAM(s) IntraMuscular once  hydrALAZINE 100 milliGRAM(s) Oral three times a day  influenza  Vaccine (HIGH DOSE) 0.5 milliLiter(s) IntraMuscular once  insulin lispro (ADMELOG) corrective regimen sliding scale   SubCutaneous at bedtime  insulin lispro (ADMELOG) corrective regimen sliding scale   SubCutaneous three times a day before meals  labetalol 600 milliGRAM(s) Oral two times a day  rosuvastatin 5 milliGRAM(s) Oral at bedtime    MEDICATIONS  (PRN):  acetaminophen     Tablet .. 650 milliGRAM(s) Oral every 6 hours PRN Temp greater or equal to 38C (100.4F), Mild Pain (1 - 3)  aluminum hydroxide/magnesium hydroxide/simethicone Suspension 30 milliLiter(s) Oral every 4 hours PRN Dyspepsia  dextrose Oral Gel 15 Gram(s) Oral once PRN Blood Glucose LESS THAN 70 milliGRAM(s)/deciliter  melatonin 3 milliGRAM(s) Oral at bedtime PRN Insomnia  ondansetron Injectable 4 milliGRAM(s) IV Push every 8 hours PRN Nausea and/or Vomiting  saline laxative (FLEET) Rectal Enema 1 Enema Rectal once PRN constipation      FAMILY HISTORY:  Family history of diabetes mellitus    Family history of hypertension    Family history of acute renal failure        ROS:  All 10 systems reviewed and positives noted in HPI    OBJECTIVE:    VITAL SIGNS:  Vital Signs Last 24 Hrs  T(C): 37.1 (21 Oct 2024 05:07), Max: 37.1 (21 Oct 2024 05:07)  T(F): 98.7 (21 Oct 2024 05:07), Max: 98.7 (21 Oct 2024 05:07)  HR: 69 (21 Oct 2024 05:07) (64 - 69)  BP: 149/76 (21 Oct 2024 05:07) (122/58 - 149/76)  BP(mean): 94 (20 Oct 2024 11:37) (94 - 94)  RR: 18 (21 Oct 2024 05:07) (17 - 18)  SpO2: 96% (21 Oct 2024 05:07) (96% - 98%)    Parameters below as of 21 Oct 2024 05:07  Patient On (Oxygen Delivery Method): nasal cannula  O2 Flow (L/min): 2      PHYSICAL EXAM:  General:  no distress  HEENT: sclera anicteric  Neck: supple, no carotid bruits b/l  CVS:no jvd, RRR,no murmurs/rubs/gallops  Chest: diminished breath sounds  Abdomen: non-distended  Extremities: no lower extremity edema b/l  Neuro: awake, alert & oriented x 3      LABS:                        7.1    7.64  )-----------( 165      ( 21 Oct 2024 07:00 )             23.2     10-21    138  |  102  |  144[H]  ----------------------------<  148[H]  4.6   |  21[L]  |  4.05[H]    Ca    7.9[L]      21 Oct 2024 07:00  Phos  7.2     10-21  Mg     2.0     10-21    TPro  7.0  /  Alb  3.1[L]  /  TBili  0.4  /  DBili  x   /  AST  7[L]  /  ALT  13  /  AlkPhos  66  10-21        ECG: sinus rhythm, 1st degree av block      TTE: < from: TTE Echo Complete w/o Contrast w/ Doppler (10.20.24 @ 09:26) >   1. Normal global left ventricular systolic function.   2. Spectral Doppler shows pseudonormal pattern of left ventricular   myocardial filling (Grade II diastolic dysfunction).   3. Normal right ventricular size and function.   4. Mildly enlarged left atrium.   5. The right atrium is normal in size.   6. Mild mitral valve regurgitation.   7. Mild thickening and calcification of the anterior and posterior   mitral valve leaflets.   8. Mild tricuspid regurgitation.   9. Estimated pulmonary artery systolic pressure is 57.8 mmHg assuming a   right atrial pressure of 3 mmHg, which is consistent with moderate   pulmonary hypertension.  10. Pulmonary hypertension is present.  LV EF (2D):              70 %       BURKE ABARCA  [id number]    Chief Complaint: HFpEF exacerbation  Interval events: pt seen and examined, labs and chart reviewed. denies cp, reports sob. utilizing N/C 4 L. sinus 60s on tele    ALLERGIES:  ACE inhibitors (Angioedema)  statins (Muscle Pain)  predniSONE (Other)      PAST MEDICAL & SURGICAL HISTORY:  Hypertension      Diabetes  type 2      Thyroid disease      Anemia      Hypercholesteremia      Myelodysplasia (myelodysplastic syndrome)      Thyromegaly  s/p partial thyroidectomy long time ago      Other giant cell arteritis      Arthritis      Kidney insufficiency      PVD (peripheral vascular disease)      Mass of right knee      Urinary incontinence      H/O partial thyroidectomy      H/O: hysterectomy      History of cataract surgery      History of vascular surgery        MEDICATIONS  (STANDING):  apixaban 2.5 milliGRAM(s) Oral two times a day  calcium acetate 667 milliGRAM(s) Oral three times a day with meals  dextrose 5%. 1000 milliLiter(s) (100 mL/Hr) IV Continuous <Continuous>  dextrose 5%. 1000 milliLiter(s) (50 mL/Hr) IV Continuous <Continuous>  dextrose 50% Injectable 12.5 Gram(s) IV Push once  dextrose 50% Injectable 25 Gram(s) IV Push once  dextrose 50% Injectable 25 Gram(s) IV Push once  diltiazem    milliGRAM(s) Oral daily  epoetin jacobo-epbx (RETACRIT) Injectable 99692 Unit(s) SubCutaneous every 7 days  ezetimibe 10 milliGRAM(s) Oral daily  ferrous    sulfate 325 milliGRAM(s) Oral daily  glucagon  Injectable 1 milliGRAM(s) IntraMuscular once  hydrALAZINE 100 milliGRAM(s) Oral three times a day  influenza  Vaccine (HIGH DOSE) 0.5 milliLiter(s) IntraMuscular once  insulin lispro (ADMELOG) corrective regimen sliding scale   SubCutaneous at bedtime  insulin lispro (ADMELOG) corrective regimen sliding scale   SubCutaneous three times a day before meals  labetalol 600 milliGRAM(s) Oral two times a day  rosuvastatin 5 milliGRAM(s) Oral at bedtime    MEDICATIONS  (PRN):  acetaminophen     Tablet .. 650 milliGRAM(s) Oral every 6 hours PRN Temp greater or equal to 38C (100.4F), Mild Pain (1 - 3)  aluminum hydroxide/magnesium hydroxide/simethicone Suspension 30 milliLiter(s) Oral every 4 hours PRN Dyspepsia  dextrose Oral Gel 15 Gram(s) Oral once PRN Blood Glucose LESS THAN 70 milliGRAM(s)/deciliter  melatonin 3 milliGRAM(s) Oral at bedtime PRN Insomnia  ondansetron Injectable 4 milliGRAM(s) IV Push every 8 hours PRN Nausea and/or Vomiting  saline laxative (FLEET) Rectal Enema 1 Enema Rectal once PRN constipation      MEDICATIONS  (PRN):  acetaminophen     Tablet .. 650 milliGRAM(s) Oral every 6 hours PRN Temp greater or equal to 38C (100.4F), Mild Pain (1 - 3)  aluminum hydroxide/magnesium hydroxide/simethicone Suspension 30 milliLiter(s) Oral every 4 hours PRN Dyspepsia  dextrose Oral Gel 15 Gram(s) Oral once PRN Blood Glucose LESS THAN 70 milliGRAM(s)/deciliter  melatonin 3 milliGRAM(s) Oral at bedtime PRN Insomnia  ondansetron Injectable 4 milliGRAM(s) IV Push every 8 hours PRN Nausea and/or Vomiting  saline laxative (FLEET) Rectal Enema 1 Enema Rectal once PRN constipation      FAMILY HISTORY:  Family history of diabetes mellitus    Family history of hypertension    Family history of acute renal failure        ROS:  All 10 systems reviewed and positives noted in HPI    OBJECTIVE:    VITAL SIGNS:  Vital Signs Last 24 Hrs  T(C): 37.1 (21 Oct 2024 05:07), Max: 37.1 (21 Oct 2024 05:07)  T(F): 98.7 (21 Oct 2024 05:07), Max: 98.7 (21 Oct 2024 05:07)  HR: 69 (21 Oct 2024 05:07) (64 - 69)  BP: 149/76 (21 Oct 2024 05:07) (122/58 - 149/76)  BP(mean): 94 (20 Oct 2024 11:37) (94 - 94)  RR: 18 (21 Oct 2024 05:07) (17 - 18)  SpO2: 96% (21 Oct 2024 05:07) (96% - 98%)    Parameters below as of 21 Oct 2024 05:07  Patient On (Oxygen Delivery Method): nasal cannula  O2 Flow (L/min): 2      PHYSICAL EXAM:  General:  no distress  HEENT: sclera anicteric  Neck: supple, no carotid bruits b/l  CVS:no jvd, RRR,no murmurs/rubs/gallops  Chest: diminished breath sounds  Abdomen: non-distended  Extremities: no lower extremity edema b/l  Neuro: awake, alert & oriented x 3      LABS:                        7.1    7.64  )-----------( 165      ( 21 Oct 2024 07:00 )             23.2     10-21    138  |  102  |  144[H]  ----------------------------<  148[H]  4.6   |  21[L]  |  4.05[H]    Ca    7.9[L]      21 Oct 2024 07:00  Phos  7.2     10-21  Mg     2.0     10-21    TPro  7.0  /  Alb  3.1[L]  /  TBili  0.4  /  DBili  x   /  AST  7[L]  /  ALT  13  /  AlkPhos  66  10-21        ECG: sinus rhythm, 1st degree av block      TTE: < from: TTE Echo Complete w/o Contrast w/ Doppler (10.20.24 @ 09:26) >   1. Normal global left ventricular systolic function.   2. Spectral Doppler shows pseudonormal pattern of left ventricular   myocardial filling (Grade II diastolic dysfunction).   3. Normal right ventricular size and function.   4. Mildly enlarged left atrium.   5. The right atrium is normal in size.   6. Mild mitral valve regurgitation.   7. Mild thickening and calcification of the anterior and posterior   mitral valve leaflets.   8. Mild tricuspid regurgitation.   9. Estimated pulmonary artery systolic pressure is 57.8 mmHg assuming a   right atrial pressure of 3 mmHg, which is consistent with moderate   pulmonary hypertension.  10. Pulmonary hypertension is present.  LV EF (2D):              70 %

## 2024-10-21 NOTE — PROGRESS NOTE ADULT - ASSESSMENT
79y year old Female with PMH of HFpEF, Chronic Kidney Disease 4, DVT (on eliquis) Hypertension, Hyperlipidemia, Type 2 Diabetes Mellitus, Anemia, OA who presents to the ER complaining of shortness of breath x2 days admitted for acute on chronic HFpEF    ekg sinus rhythm with 1st degree block  trop neg. probnp eelvated  XR with mild CHF    recommendations  pt with acute on chronic CKD, can continue iv diuresis as per renal  further gdmt contraindicated with gfr 11  TTE with ef 70%, normal lv function, mod pulm htn  continue home cv meds for htn management

## 2024-10-21 NOTE — PROGRESS NOTE ADULT - ATTENDING COMMENTS
79y year old Female with PMH of HFpEF, Chronic Kidney Disease 4, DVT (on eliquis) Hypertension, Hyperlipidemia, Type 2 Diabetes Mellitus, Anemia, OA, admitted with sob x 1 day. no chest pain/ palpitations. No fevers.  feeling better at time of evaluation.  T(C): 36.7 (10-21-24 @ 11:46), Max: 37.1 (10-21-24 @ 05:07)  T(F): 98.1 (10-21-24 @ 11:46), Max: 98.7 (10-21-24 @ 05:07)  HR: 62 (10-21-24 @ 11:46) (62 - 69)  BP: 133/69 (10-21-24 @ 11:46) (122/58 - 149/76)  ABP: --  ABP(mean): --  RR: 18 (10-21-24 @ 11:46) (17 - 18)  SpO2: 98% (10-21-24 @ 11:46) (96% - 98%)    on exam aaox3, morbid obesity +, lungs with crackles at bases, poor inspiratory effort, s1, s2, regular, abdomen- soft, b/l legs with chronic skin changes.  LABS:                        7.1    7.64  )-----------( 165      ( 21 Oct 2024 07:00 )             23.2     10-21    138  |  102  |  144[H]  ----------------------------<  148[H]  4.6   |  21[L]  |  4.05[H]    Ca    7.9[L]      21 Oct 2024 07:00  Phos  7.2     10-21  Mg     2.0     10-21    TPro  7.0  /  Alb  3.1[L]  /  TBili  0.4  /  DBili  x   /  AST  7[L]  /  ALT  13  /  AlkPhos  66  10-21    Consultant(s) Notes Reviewed:  [x ] YES  [ ] NO  Care Discussed with Consultants/Other Providers [ x] YES  [ ] NO  Imaging Personally Reviewed:  [x ] YES  [ ] NO    a/p:  # acute excerbation of HFpEF  # Acute on chronic anemia of chronic disease  # DM2 with hyperglycemia  # Underlying h/o Chronic Kidney Disease 4, DVT (on eliquis) Hypertension, Hyperlipidemia,  OA  - lasix, monitor pro-bnp levels, daily weights, urine output. s/p 1 unit transfusion yesterday. no evidence of bleeding, patient already on epo. monitor fingersticks, continue insulin. Not a candidate for farxiga/ entrsto/ mra  because of renal function. continue other home meds for chronic conditions.   - goals of care discussion  - rest as per resident note  medically active 48-72 hrs.

## 2024-10-21 NOTE — PROGRESS NOTE ADULT - SUBJECTIVE AND OBJECTIVE BOX
Patient is a 79y old  Female who presents with a chief complaint of shortness of breath (20 Oct 2024 11:57)      INTERVAL HPI/OVERNIGHT EVENTS: Patient seen and examined at bedside. No overnight events.      MEDICATIONS  (STANDING):  apixaban 2.5 milliGRAM(s) Oral two times a day  calcitriol   Capsule 0.25 MICROGram(s) Oral daily  calcium acetate 667 milliGRAM(s) Oral three times a day with meals  dextrose 5%. 1000 milliLiter(s) (50 mL/Hr) IV Continuous <Continuous>  dextrose 5%. 1000 milliLiter(s) (100 mL/Hr) IV Continuous <Continuous>  dextrose 50% Injectable 12.5 Gram(s) IV Push once  dextrose 50% Injectable 25 Gram(s) IV Push once  dextrose 50% Injectable 25 Gram(s) IV Push once  diltiazem    milliGRAM(s) Oral daily  epoetin jacobo (PROCRIT) Injectable 41816 Unit(s) SubCutaneous every 7 days  ezetimibe 10 milliGRAM(s) Oral daily  ferrous    sulfate 325 milliGRAM(s) Oral daily  furosemide   Injectable 40 milliGRAM(s) IV Push daily  glucagon  Injectable 1 milliGRAM(s) IntraMuscular once  hydrALAZINE 100 milliGRAM(s) Oral three times a day  influenza  Vaccine (HIGH DOSE) 0.5 milliLiter(s) IntraMuscular once  insulin lispro (ADMELOG) corrective regimen sliding scale   SubCutaneous at bedtime  insulin lispro (ADMELOG) corrective regimen sliding scale   SubCutaneous three times a day before meals  labetalol 600 milliGRAM(s) Oral two times a day  rosuvastatin 5 milliGRAM(s) Oral at bedtime    MEDICATIONS  (PRN):  acetaminophen     Tablet .. 650 milliGRAM(s) Oral every 6 hours PRN Temp greater or equal to 38C (100.4F), Mild Pain (1 - 3)  aluminum hydroxide/magnesium hydroxide/simethicone Suspension 30 milliLiter(s) Oral every 4 hours PRN Dyspepsia  dextrose Oral Gel 15 Gram(s) Oral once PRN Blood Glucose LESS THAN 70 milliGRAM(s)/deciliter  melatonin 3 milliGRAM(s) Oral at bedtime PRN Insomnia  ondansetron Injectable 4 milliGRAM(s) IV Push every 8 hours PRN Nausea and/or Vomiting  saline laxative (FLEET) Rectal Enema 1 Enema Rectal once PRN constipation      Allergies    ACE inhibitors (Angioedema)  statins (Muscle Pain)    Intolerances    predniSONE (Other)      REVIEW OF SYSTEMS:  CONSTITUTIONAL: No fever or chills  HEENT:  No headache, no sore throat  RESPIRATORY: No cough, wheezing, or shortness of breath  CARDIOVASCULAR: No chest pain, palpitations  GASTROINTESTINAL: No abd pain, nausea, vomiting, or diarrhea  GENITOURINARY: No dysuria, frequency, or hematuria  NEUROLOGICAL: no focal weakness or dizziness  MUSCULOSKELETAL: no myalgias     Vital Signs Last 24 Hrs  T(C): 37.1 (21 Oct 2024 05:07), Max: 37.1 (21 Oct 2024 05:07)  T(F): 98.7 (21 Oct 2024 05:07), Max: 98.7 (21 Oct 2024 05:07)  HR: 69 (21 Oct 2024 05:07) (64 - 69)  BP: 149/76 (21 Oct 2024 05:07) (122/58 - 149/76)  BP(mean): 94 (20 Oct 2024 11:37) (94 - 94)  RR: 18 (21 Oct 2024 05:07) (17 - 18)  SpO2: 96% (21 Oct 2024 05:07) (96% - 98%)    Parameters below as of 21 Oct 2024 05:07  Patient On (Oxygen Delivery Method): nasal cannula  O2 Flow (L/min): 2    I&O's Summary    20 Oct 2024 07:01  -  21 Oct 2024 07:00  --------------------------------------------------------  IN: 500 mL / OUT: 300 mL / NET: 200 mL      BMI (kg/m2): 35.2 (10-20-24 @ 11:19)    PHYSICAL EXAM:  General: NAD, morbidly obese female  Respiratory: B/L crackles throughout with poor inspiratory effort and air entry  CV: RRR, +S1/S2, no murmurs, rubs or gallops  Abdominal: Soft, Non tender, Nondistended +BS  Extremities: +chronic skin changes with b/l hyperpigmentation, no ulcers noted. No edema, 2+ peripheral pulses  NERVOUS SYSTEM: answers questions and follows commands appropriately, A&Ox3 grossly moves all extremities   PSYCH: Appropriate affect, Alert & Awake; Good judgement      LABS: Personally reviewed  CBC                        7.1    7.64  )-----------( 165      ( 21 Oct 2024 07:00 )             23.2     CMP  10-21    138  |  102  |  144  ----------------------------<  148  4.6   |  21  |  4.05    Ca    7.9      21 Oct 2024 07:00  Phos  7.2     10-21  Mg     2.0     10-21    TPro  7.0  /  Alb  3.1  /  TBili  0.4  /  DBili  x   /  AST  7   /  ALT  13  /  AlkPhos  66  10-21                CARDIAC MARKERS ( 19 Oct 2024 12:45 )  x     / 30.7 ng/L / x     / x     / x              12:50 - VBG - pH: 7.33  | pCO2: 40    | pO2: 51    | Lactate:                  POCT Blood Glucose.: 174 mg/dL (21 Oct 2024 07:58)  POCT Blood Glucose.: 201 mg/dL (20 Oct 2024 21:13)  POCT Blood Glucose.: 131 mg/dL (20 Oct 2024 16:58)  POCT Blood Glucose.: 241 mg/dL (20 Oct 2024 12:24)      Urinalysis Basic - ( 21 Oct 2024 07:00 )    Color: x / Appearance: x / SG: x / pH: x  Gluc: 148 mg/dL / Ketone: x  / Bili: x / Urobili: x   Blood: x / Protein: x / Nitrite: x   Leuk Esterase: x / RBC: x / WBC x   Sq Epi: x / Non Sq Epi: x / Bacteria: x                RADIOLOGY & ADDITIONAL TESTS: Personally reviewed.     Consultant(s) Notes Reviewed:  [x] YES  [ ] NO   Discussed with JOSE/PATTIE, RN     Patient is a 79y old  Female who presents with a chief complaint of shortness of breath (20 Oct 2024 11:57)      INTERVAL HPI/ OVERNIGHT EVENTS: Patient seen and examined at bedside. No overnight events. Pt feels much better and says SOB has improved a lot. Electrolyte packets seen at bedside and pt was advised to NOT take them as they can exacerbate her symptoms.      MEDICATIONS  (STANDING):  apixaban 2.5 milliGRAM(s) Oral two times a day  calcitriol   Capsule 0.25 MICROGram(s) Oral daily  calcium acetate 667 milliGRAM(s) Oral three times a day with meals  dextrose 5%. 1000 milliLiter(s) (50 mL/Hr) IV Continuous <Continuous>  dextrose 5%. 1000 milliLiter(s) (100 mL/Hr) IV Continuous <Continuous>  dextrose 50% Injectable 12.5 Gram(s) IV Push once  dextrose 50% Injectable 25 Gram(s) IV Push once  dextrose 50% Injectable 25 Gram(s) IV Push once  diltiazem    milliGRAM(s) Oral daily  epoetin jacobo (PROCRIT) Injectable 92695 Unit(s) SubCutaneous every 7 days  ezetimibe 10 milliGRAM(s) Oral daily  ferrous    sulfate 325 milliGRAM(s) Oral daily  furosemide   Injectable 40 milliGRAM(s) IV Push daily  glucagon  Injectable 1 milliGRAM(s) IntraMuscular once  hydrALAZINE 100 milliGRAM(s) Oral three times a day  influenza  Vaccine (HIGH DOSE) 0.5 milliLiter(s) IntraMuscular once  insulin lispro (ADMELOG) corrective regimen sliding scale   SubCutaneous at bedtime  insulin lispro (ADMELOG) corrective regimen sliding scale   SubCutaneous three times a day before meals  labetalol 600 milliGRAM(s) Oral two times a day  rosuvastatin 5 milliGRAM(s) Oral at bedtime    MEDICATIONS  (PRN):  acetaminophen     Tablet .. 650 milliGRAM(s) Oral every 6 hours PRN Temp greater or equal to 38C (100.4F), Mild Pain (1 - 3)  aluminum hydroxide/magnesium hydroxide/simethicone Suspension 30 milliLiter(s) Oral every 4 hours PRN Dyspepsia  dextrose Oral Gel 15 Gram(s) Oral once PRN Blood Glucose LESS THAN 70 milliGRAM(s)/deciliter  melatonin 3 milliGRAM(s) Oral at bedtime PRN Insomnia  ondansetron Injectable 4 milliGRAM(s) IV Push every 8 hours PRN Nausea and/or Vomiting  saline laxative (FLEET) Rectal Enema 1 Enema Rectal once PRN constipation      Allergies    ACE inhibitors (Angioedema)  statins (Muscle Pain)    Intolerances    predniSONE (Other)      REVIEW OF SYSTEMS:  CONSTITUTIONAL: No fever or chills  HEENT:  No headache, no sore throat  RESPIRATORY: No cough, wheezing, or shortness of breath  CARDIOVASCULAR: No chest pain, palpitations  GASTROINTESTINAL: No abd pain, nausea, vomiting, or diarrhea  GENITOURINARY: No dysuria, frequency, or hematuria  NEUROLOGICAL: no focal weakness or dizziness  MUSCULOSKELETAL: no myalgias     Vital Signs Last 24 Hrs  T(C): 37.1 (21 Oct 2024 05:07), Max: 37.1 (21 Oct 2024 05:07)  T(F): 98.7 (21 Oct 2024 05:07), Max: 98.7 (21 Oct 2024 05:07)  HR: 69 (21 Oct 2024 05:07) (64 - 69)  BP: 149/76 (21 Oct 2024 05:07) (122/58 - 149/76)  BP(mean): 94 (20 Oct 2024 11:37) (94 - 94)  RR: 18 (21 Oct 2024 05:07) (17 - 18)  SpO2: 96% (21 Oct 2024 05:07) (96% - 98%)    Parameters below as of 21 Oct 2024 05:07  Patient On (Oxygen Delivery Method): nasal cannula  O2 Flow (L/min): 2    I&O's Summary    20 Oct 2024 07:01  -  21 Oct 2024 07:00  --------------------------------------------------------  IN: 500 mL / OUT: 300 mL / NET: 200 mL      BMI (kg/m2): 35.2 (10-20-24 @ 11:19)    PHYSICAL EXAM:  General: NAD, morbidly obese female  Respiratory: B/L crackles throughout, + expiratory wheezes  CV: RRR, +S1/S2, +systolic murmur  Abdominal: Soft, Non tender, Nondistended +BS  Extremities: +chronic skin changes with b/l hyperpigmentation, no ulcers noted. No edema, 2+ peripheral pulses  NERVOUS SYSTEM: answers questions and follows commands appropriately, A&Ox3 grossly moves all extremities   PSYCH: Appropriate affect, Alert & Awake; Good judgement      LABS: Personally reviewed  CBC                        7.1    7.64  )-----------( 165      ( 21 Oct 2024 07:00 )             23.2     CMP  10-21    138  |  102  |  144  ----------------------------<  148  4.6   |  21  |  4.05    Ca    7.9      21 Oct 2024 07:00  Phos  7.2     10-21  Mg     2.0     10-21    TPro  7.0  /  Alb  3.1  /  TBili  0.4  /  DBili  x   /  AST  7   /  ALT  13  /  AlkPhos  66  10-21                CARDIAC MARKERS ( 19 Oct 2024 12:45 )  x     / 30.7 ng/L / x     / x     / x              12:50 - VBG - pH: 7.33  | pCO2: 40    | pO2: 51    | Lactate:                  POCT Blood Glucose.: 174 mg/dL (21 Oct 2024 07:58)  POCT Blood Glucose.: 201 mg/dL (20 Oct 2024 21:13)  POCT Blood Glucose.: 131 mg/dL (20 Oct 2024 16:58)  POCT Blood Glucose.: 241 mg/dL (20 Oct 2024 12:24)      Urinalysis Basic - ( 21 Oct 2024 07:00 )    Color: x / Appearance: x / SG: x / pH: x  Gluc: 148 mg/dL / Ketone: x  / Bili: x / Urobili: x   Blood: x / Protein: x / Nitrite: x   Leuk Esterase: x / RBC: x / WBC x   Sq Epi: x / Non Sq Epi: x / Bacteria: x        < from: TTE Echo Complete w/o Contrast w/ Doppler (10.20.24 @ 09:26) >    Summary:   1. Normal global left ventricular systolic function.   2. Spectral Doppler shows pseudonormal pattern of left ventricular   myocardial filling (Grade II diastolic dysfunction).   3. Normal right ventricular size and function.   4. Mildly enlarged left atrium.   5. The right atrium is normal in size.   6. Mild mitral valve regurgitation.   7. Mild thickening and calcification of the anterior and posterior   mitral valve leaflets.   8. Mild tricuspid regurgitation.   9. Estimated pulmonary artery systolic pressure is 57.8 mmHg assuming a   right atrial pressure of 3 mmHg, which is consistent with moderate   pulmonary hypertension.  10. Pulmonary hypertension is present.    [id number] Dexter Ahmadi MD,[facility] , Electronically signed on   10/20/2024 at 12:36:07 PM      < end of copied text >          RADIOLOGY & ADDITIONAL TESTS: Personally reviewed.     Consultant(s) Notes Reviewed:  [x] YES  [ ] NO   Discussed with JOSE/PATTIE, RN

## 2024-10-21 NOTE — PROGRESS NOTE ADULT - SUBJECTIVE AND OBJECTIVE BOX
Comfortable on NC O2    Vital Signs Last 24 Hrs  T(C): 36.4 (10-21-24 @ 17:58), Max: 37.1 (10-21-24 @ 05:07)  T(F): 97.5 (10-21-24 @ 17:58), Max: 98.7 (10-21-24 @ 05:07)  HR: 66 (10-21-24 @ 17:58) (62 - 69)  BP: 147/52 (10-21-24 @ 17:58) (133/69 - 149/76)  RR: 18 (10-21-24 @ 17:58) (18 - 18)  SpO2: 96% (10-21-24 @ 17:58) (96% - 98%)    I&O's Detail    20 Oct 2024 07:01  -  21 Oct 2024 07:00  --------------------------------------------------------  IN:    Oral Fluid: 200 mL    PRBCs (Packed Red Blood Cells): 300 mL  Total IN: 500 mL    OUT:    Voided (mL): 300 mL  Total OUT: 300 mL    s1s2  b/l air entry  soft, ND  tr edema                        7.1    7.64  )-----------( 165      ( 21 Oct 2024 07:00 )             23.2     21 Oct 2024 07:00    138    |  102    |  144    ----------------------------<  148    4.6     |  21     |  4.05     Ca    7.9        21 Oct 2024 07:00  Phos  7.2       21 Oct 2024 07:00  Mg     2.0       21 Oct 2024 07:00    TPro  7.0    /  Alb  3.1    /  TBili  0.4    /  DBili  x      /  AST  7      /  ALT  13     /  AlkPhos  66     21 Oct 2024 07:00    LIVER FUNCTIONS - ( 21 Oct 2024 07:00 )  Alb: 3.1 g/dL / Pro: 7.0 g/dL / ALK PHOS: 66 U/L / ALT: 13 U/L / AST: 7 U/L / GGT: x           acetaminophen     Tablet .. 650 milliGRAM(s) Oral every 6 hours PRN  aluminum hydroxide/magnesium hydroxide/simethicone Suspension 30 milliLiter(s) Oral every 4 hours PRN  apixaban 2.5 milliGRAM(s) Oral two times a day  calcium acetate 667 milliGRAM(s) Oral three times a day with meals  dextrose 5%. 1000 milliLiter(s) IV Continuous <Continuous>  dextrose 5%. 1000 milliLiter(s) IV Continuous <Continuous>  dextrose 50% Injectable 12.5 Gram(s) IV Push once  dextrose 50% Injectable 25 Gram(s) IV Push once  dextrose 50% Injectable 25 Gram(s) IV Push once  dextrose Oral Gel 15 Gram(s) Oral once PRN  diltiazem    milliGRAM(s) Oral daily  epoetin jacobo-epbx (RETACRIT) Injectable 50897 Unit(s) SubCutaneous every 7 days  ezetimibe 10 milliGRAM(s) Oral daily  ferrous    sulfate 325 milliGRAM(s) Oral daily  glucagon  Injectable 1 milliGRAM(s) IntraMuscular once  hydrALAZINE 100 milliGRAM(s) Oral three times a day  influenza  Vaccine (HIGH DOSE) 0.5 milliLiter(s) IntraMuscular once  insulin lispro (ADMELOG) corrective regimen sliding scale   SubCutaneous at bedtime  insulin lispro (ADMELOG) corrective regimen sliding scale   SubCutaneous three times a day before meals  labetalol 600 milliGRAM(s) Oral two times a day  melatonin 3 milliGRAM(s) Oral at bedtime PRN  ondansetron Injectable 4 milliGRAM(s) IV Push every 8 hours PRN  rosuvastatin 5 milliGRAM(s) Oral at bedtime    A/P:    DM, HTN, CKD 4  Adm w/SOB iso severe anemia, some PVC on CXR  Cardio-renal ZORAIDA/CKD  No significant volume overload  Hold diuretics   Epoetin  Bld tx as needed   No NSAID's  F/u CBC, BMP, UO  Prognosis is guarded     [phone]

## 2024-10-22 LAB
ALBUMIN SERPL ELPH-MCNC: 3.3 G/DL — SIGNIFICANT CHANGE UP (ref 3.3–5)
ALP SERPL-CCNC: 73 U/L — SIGNIFICANT CHANGE UP (ref 40–120)
ALT FLD-CCNC: 20 U/L — SIGNIFICANT CHANGE UP (ref 10–45)
ANION GAP SERPL CALC-SCNC: 15 MMOL/L — SIGNIFICANT CHANGE UP (ref 5–17)
AST SERPL-CCNC: 9 U/L — LOW (ref 10–40)
BILIRUB SERPL-MCNC: 0.4 MG/DL — SIGNIFICANT CHANGE UP (ref 0.2–1.2)
BUN SERPL-MCNC: 145 MG/DL — HIGH (ref 7–23)
C3 SERPL-MCNC: 134 MG/DL — SIGNIFICANT CHANGE UP (ref 81–157)
C4 SERPL-MCNC: 33 MG/DL — SIGNIFICANT CHANGE UP (ref 13–39)
CALCIUM SERPL-MCNC: 8 MG/DL — LOW (ref 8.4–10.5)
CHLORIDE SERPL-SCNC: 99 MMOL/L — SIGNIFICANT CHANGE UP (ref 96–108)
CO2 SERPL-SCNC: 22 MMOL/L — SIGNIFICANT CHANGE UP (ref 22–31)
CREAT SERPL-MCNC: 4.46 MG/DL — HIGH (ref 0.5–1.3)
EGFR: 10 ML/MIN/1.73M2 — LOW
GLUCOSE BLDC GLUCOMTR-MCNC: 131 MG/DL — HIGH (ref 70–99)
GLUCOSE BLDC GLUCOMTR-MCNC: 148 MG/DL — HIGH (ref 70–99)
GLUCOSE BLDC GLUCOMTR-MCNC: 150 MG/DL — HIGH (ref 70–99)
GLUCOSE BLDC GLUCOMTR-MCNC: 279 MG/DL — HIGH (ref 70–99)
GLUCOSE SERPL-MCNC: 125 MG/DL — HIGH (ref 70–99)
HAPTOGLOB SERPL-MCNC: 278 MG/DL — HIGH (ref 34–200)
HCT VFR BLD CALC: 23 % — LOW (ref 34.5–45)
HGB BLD-MCNC: 7 G/DL — CRITICAL LOW (ref 11.5–15.5)
LDH SERPL L TO P-CCNC: 217 U/L — SIGNIFICANT CHANGE UP (ref 50–242)
MAGNESIUM SERPL-MCNC: 2.3 MG/DL — SIGNIFICANT CHANGE UP (ref 1.6–2.6)
MCHC RBC-ENTMCNC: 26.6 PG — LOW (ref 27–34)
MCHC RBC-ENTMCNC: 30.4 GM/DL — LOW (ref 32–36)
MCV RBC AUTO: 87.5 FL — SIGNIFICANT CHANGE UP (ref 80–100)
NRBC # BLD: 0 /100 WBCS — SIGNIFICANT CHANGE UP (ref 0–0)
NT-PROBNP SERPL-SCNC: 5495 PG/ML — HIGH (ref 0–300)
PHOSPHATE SERPL-MCNC: 8.8 MG/DL — HIGH (ref 2.5–4.5)
PLATELET # BLD AUTO: 172 K/UL — SIGNIFICANT CHANGE UP (ref 150–400)
POTASSIUM SERPL-MCNC: 4.8 MMOL/L — SIGNIFICANT CHANGE UP (ref 3.5–5.3)
POTASSIUM SERPL-SCNC: 4.8 MMOL/L — SIGNIFICANT CHANGE UP (ref 3.5–5.3)
PROT SERPL-MCNC: 7.3 G/DL — SIGNIFICANT CHANGE UP (ref 6–8.3)
RBC # BLD: 2.63 M/UL — LOW (ref 3.8–5.2)
RBC # FLD: 17.7 % — HIGH (ref 10.3–14.5)
SODIUM SERPL-SCNC: 136 MMOL/L — SIGNIFICANT CHANGE UP (ref 135–145)
WBC # BLD: 8.09 K/UL — SIGNIFICANT CHANGE UP (ref 3.8–10.5)
WBC # FLD AUTO: 8.09 K/UL — SIGNIFICANT CHANGE UP (ref 3.8–10.5)

## 2024-10-22 PROCEDURE — 99233 SBSQ HOSP IP/OBS HIGH 50: CPT | Mod: GC

## 2024-10-22 PROCEDURE — 71045 X-RAY EXAM CHEST 1 VIEW: CPT | Mod: 26

## 2024-10-22 PROCEDURE — 99232 SBSQ HOSP IP/OBS MODERATE 35: CPT

## 2024-10-22 RX ORDER — CALCIUM ACETATE 667 MG/1
1334 CAPSULE ORAL
Refills: 0 | Status: DISCONTINUED | OUTPATIENT
Start: 2024-10-22 | End: 2024-11-06

## 2024-10-22 RX ADMIN — Medication 3: at 12:34

## 2024-10-22 RX ADMIN — CALCIUM ACETATE 1334 MILLIGRAM(S): 667 CAPSULE ORAL at 17:53

## 2024-10-22 RX ADMIN — HYDRALAZINE HYDROCHLORIDE 100 MILLIGRAM(S): 50 TABLET, FILM COATED ORAL at 05:47

## 2024-10-22 RX ADMIN — Medication 5 MILLIGRAM(S): at 22:05

## 2024-10-22 RX ADMIN — APIXABAN 2.5 MILLIGRAM(S): 5 TABLET, FILM COATED ORAL at 17:53

## 2024-10-22 RX ADMIN — HYDRALAZINE HYDROCHLORIDE 100 MILLIGRAM(S): 50 TABLET, FILM COATED ORAL at 22:05

## 2024-10-22 RX ADMIN — APIXABAN 2.5 MILLIGRAM(S): 5 TABLET, FILM COATED ORAL at 05:47

## 2024-10-22 RX ADMIN — Medication 600 MILLIGRAM(S): at 05:47

## 2024-10-22 RX ADMIN — EZETIMIBE 10 MILLIGRAM(S): 10 TABLET ORAL at 11:37

## 2024-10-22 RX ADMIN — Medication 325 MILLIGRAM(S): at 11:38

## 2024-10-22 RX ADMIN — CALCIUM ACETATE 667 MILLIGRAM(S): 667 CAPSULE ORAL at 08:48

## 2024-10-22 RX ADMIN — Medication 180 MILLIGRAM(S): at 05:46

## 2024-10-22 RX ADMIN — HYDRALAZINE HYDROCHLORIDE 100 MILLIGRAM(S): 50 TABLET, FILM COATED ORAL at 13:19

## 2024-10-22 RX ADMIN — Medication 600 MILLIGRAM(S): at 17:53

## 2024-10-22 NOTE — PROGRESS NOTE ADULT - ATTENDING COMMENTS
79y year old Female with PMH of HFpEF, Chronic Kidney Disease 4, DVT (on eliquis) Hypertension, Hyperlipidemia, Type 2 Diabetes Mellitus, Anemia, OA, admitted with sob x 1 day. no chest pain/ palpitations. No fevers.    feeling weak today. no chest pain/ palpitations. no changes in shortness of breath.   T(C): 36.8 (10-22-24 @ 05:06), Max: 36.8 (10-22-24 @ 05:06)  T(F): 98.2 (10-22-24 @ 05:06), Max: 98.2 (10-22-24 @ 05:06)  HR: 69 (10-22-24 @ 05:06) (66 - 69)  BP: 126/69 (10-22-24 @ 05:06) (126/69 - 147/52)  ABP: --  ABP(mean): --  RR: 17 (10-22-24 @ 05:06) (17 - 18)  SpO2: 97% (10-22-24 @ 05:06) (96% - 97%)      on exam aaox3, morbid obesity +, lungs with crackles at bases, poor inspiratory effort, expiratory wheezing +,  s1, s2, regular, abdomen- soft, b/l legs with chronic skin changes.  LABS:                        7.0    8.09  )-----------( 172      ( 22 Oct 2024 07:07 )             23.0     10-22    136  |  99  |  145[H]  ----------------------------<  125[H]  4.8   |  22  |  4.46[H]    Ca    8.0[L]      22 Oct 2024 07:07  Phos  8.8     10-22  Mg     2.3     10-22    TPro  7.3  /  Alb  3.3  /  TBili  0.4  /  DBili  x   /  AST  9[L]  /  ALT  20  /  AlkPhos  73  10-22    Consultant(s) Notes Reviewed:  [x ] YES  [ ] NO  Care Discussed with Consultants/Other Providers [ x] YES  [ ] NO  Imaging Personally Reviewed:  [x ] YES  [ ] NO    a/p:  # acute excerbation of HFpEF  # Acute on chronic anemia of chronic disease  # DM2 with hyperglycemia  # Underlying h/o Chronic Kidney Disease 4, DVT (on eliquis) Hypertension, Hyperlipidemia,  OA  -shortness of breath may also be secondary to anemia, will transfuse 1 more unit of blood. holding lasix as per nephro. maintain supplemental oxygen. if becomes acutely dyspneic and decompensates, may require hemodialysis.  no evidence of bleeding, patient already on epo. monitor fingersticks, continue insulin. Not a candidate for farxiga/ entrsto/ mra  because of renal function. continue other home meds for chronic conditions.   - goals of care discussion  - rest as per resident note  medically active 48-72 hrs.

## 2024-10-22 NOTE — PROGRESS NOTE ADULT - SUBJECTIVE AND OBJECTIVE BOX
Comfortable on NC O2 at rest    Vital Signs Last 24 Hrs  T(C): 36.7 (10-22-24 @ 19:30), Max: 36.8 (10-22-24 @ 05:06)  T(F): 98 (10-22-24 @ 19:30), Max: 98.2 (10-22-24 @ 05:06)  HR: 63 (10-22-24 @ 19:30) (62 - 69)  BP: 129/62 (10-22-24 @ 19:30) (126/69 - 138/67)  RR: 19 (10-22-24 @ 19:30) (16 - 19)  SpO2: 98% (10-22-24 @ 19:30) (97% - 98%)    I&O's Detail    21 Oct 2024 07:01  -  22 Oct 2024 07:00  --------------------------------------------------------  OUT:    Voided (mL): 600 mL  Total OUT: 600 mL    22 Oct 2024 07:01  -  22 Oct 2024 20:00  --------------------------------------------------------  IN:    Oral Fluid: 200 mL  Total IN: 200 mL    OUT:    Voided (mL): 400 mL  Total OUT: 400 mL    s1s2  b/l air entry  soft, ND  tr edema                                7.0    8.09  )-----------( 172      ( 22 Oct 2024 07:07 )             23.0     22 Oct 2024 07:07    136    |  99     |  145    ----------------------------<  125    4.8     |  22     |  4.46     Ca    8.0        22 Oct 2024 07:07  Phos  8.8       22 Oct 2024 07:07  Mg     2.3       22 Oct 2024 07:07    TPro  7.3    /  Alb  3.3    /  TBili  0.4    /  DBili  x      /  AST  9      /  ALT  20     /  AlkPhos  73     22 Oct 2024 07:07    LIVER FUNCTIONS - ( 22 Oct 2024 07:07 )  Alb: 3.3 g/dL / Pro: 7.3 g/dL / ALK PHOS: 73 U/L / ALT: 20 U/L / AST: 9 U/L / GGT: x           acetaminophen     Tablet .. 650 milliGRAM(s) Oral every 6 hours PRN  apixaban 2.5 milliGRAM(s) Oral two times a day  calcium acetate 1334 milliGRAM(s) Oral three times a day with meals  dextrose 5%. 1000 milliLiter(s) IV Continuous <Continuous>  dextrose 5%. 1000 milliLiter(s) IV Continuous <Continuous>  dextrose 50% Injectable 12.5 Gram(s) IV Push once  dextrose 50% Injectable 25 Gram(s) IV Push once  dextrose 50% Injectable 25 Gram(s) IV Push once  dextrose Oral Gel 15 Gram(s) Oral once PRN  diltiazem    milliGRAM(s) Oral daily  epoetin jacobo-epbx (RETACRIT) Injectable 00573 Unit(s) SubCutaneous every 7 days  ezetimibe 10 milliGRAM(s) Oral daily  ferrous    sulfate 325 milliGRAM(s) Oral daily  glucagon  Injectable 1 milliGRAM(s) IntraMuscular once  hydrALAZINE 100 milliGRAM(s) Oral three times a day  influenza  Vaccine (HIGH DOSE) 0.5 milliLiter(s) IntraMuscular once  insulin lispro (ADMELOG) corrective regimen sliding scale   SubCutaneous at bedtime  insulin lispro (ADMELOG) corrective regimen sliding scale   SubCutaneous three times a day before meals  labetalol 600 milliGRAM(s) Oral two times a day  melatonin 3 milliGRAM(s) Oral at bedtime PRN  ondansetron Injectable 4 milliGRAM(s) IV Push every 8 hours PRN  rosuvastatin 5 milliGRAM(s) Oral at bedtime    A/P:    DM, HTN, CKD 4 (Cr 2.13 - 7/2/24, Cr 2.35 - 10/7/24)  Adm w/SOB iso severe anemia, some PVC on CXR  Cardio-renal/hemodynamic ATN  No significant volume overload  UA w/pr30, bland  Imaging w/o hydro   Hold diuretics   Epoetin  Bld tx today  No NSAID's  F/u CBC, BMP, Phos, UO, serologies  Phoslo for high Phos  Hoping to avoid the need for RRT on this adm     [phone]

## 2024-10-22 NOTE — PROGRESS NOTE ADULT - SUBJECTIVE AND OBJECTIVE BOX
Patient is a 79y old  Female who presents with a chief complaint of shortness of breath (20 Oct 2024 11:57)      INTERVAL HPI/ OVERNIGHT EVENTS: Patient seen and examined at bedside.  Pt states that she feels "so-so", slightly improved SOB. Was able to eat some breakfast, but felt nauseous. No other complaints overnight.     Vital Signs Last 24 Hrs  T(C): 36.8 (22 Oct 2024 05:06), Max: 36.8 (22 Oct 2024 05:06)  T(F): 98.2 (22 Oct 2024 05:06), Max: 98.2 (22 Oct 2024 05:06)  HR: 69 (22 Oct 2024 05:06) (66 - 69)  BP: 126/69 (22 Oct 2024 05:06) (126/69 - 147/52)  BP(mean): --  RR: 17 (22 Oct 2024 05:06) (17 - 18)  SpO2: 97% (22 Oct 2024 05:06) (96% - 97%)    Parameters below as of 22 Oct 2024 05:06  Patient On (Oxygen Delivery Method): nasal cannula  O2 Flow (L/min): 2      REVIEW OF SYSTEMS:  CONSTITUTIONAL: No fever or chills  HEENT:  No headache, no sore throat  RESPIRATORY: No cough, wheezing, +shortness of breath  CARDIOVASCULAR: No chest pain, palpitations  GASTROINTESTINAL: No abd pain, nausea, vomiting, or diarrhea  GENITOURINARY: No dysuria, frequency, or hematuria  NEUROLOGICAL: no focal weakness or dizziness  MUSCULOSKELETAL: no myalgias       PHYSICAL EXAM:  General: NAD, morbidly obese female, lying in bed  Respiratory: B/L crackles throughout, + expiratory wheezes  CV: RRR, +S1/S2, +systolic murmur  Abdominal: Soft, Non tender, Nondistended +Bowel sounds   Extremities: +chronic skin changes with b/l hyperpigmentation, no ulcers noted. No edema, 2+ peripheral pulses  NERVOUS SYSTEM: answers questions and follows commands appropriately, A&Ox3, grossly moves all extremities   PSYCH: Appropriate affect, Alert & Awake; Good judgement      LABS: Personally reviewed                        7.0    8.09  )-----------( 172      ( 22 Oct 2024 07:07 )             23.0     10-22    136  |  99  |  145[H]  ----------------------------<  125[H]  4.8   |  22  |  4.46[H]    Ca    8.0[L]      22 Oct 2024 07:07    CAPILLARY BLOOD GLUCOSE  POCT Blood Glucose.: 150 mg/dL (22 Oct 2024 08:12)  POCT Blood Glucose.: 247 mg/dL (21 Oct 2024 21:24)  POCT Blood Glucose.: 147 mg/dL (21 Oct 2024 17:30)    IMAGING    TTE Echo Complete w/o Contrast w/ Doppler    Summary:   1. Normal global left ventricular systolic function.   2. Spectral Doppler shows pseudonormal pattern of left ventricular   myocardial filling (Grade II diastolic dysfunction).   3. Normal right ventricular size and function.   4. Mildly enlarged left atrium.   5. The right atrium is normal in size.   6. Mild mitral valve regurgitation.   7. Mild thickening and calcification of the anterior and posterior   mitral valve leaflets.   8. Mild tricuspid regurgitation.   9. Estimated pulmonary artery systolic pressure is 57.8 mmHg assuming a   right atrial pressure of 3 mmHg, which is consistent with moderate   pulmonary hypertension.  10. Pulmonary hypertension is present.    RADIOLOGY & ADDITIONAL TESTS: Personally reviewed.     Consultant(s) Notes Reviewed:  [x] YES  [ ] NO   Discussed with JOSE/CM, RN

## 2024-10-22 NOTE — PROGRESS NOTE ADULT - ASSESSMENT
79y year old Female with PMH of HFpEF, Chronic Kidney Disease 4, DVT (on eliquis) Hypertension, Hyperlipidemia, Type 2 Diabetes Mellitus, Anemia, OA who presents to the ER complaining of shortness of breath x2 days admitted for acute on chronic HFpEF    ekg sinus rhythm with 1st degree block  trop neg. probnp eelvated  XR with mild CHF    recommendations  pt with acute on chronic CKD, hold diuresis as per renal  further gdmt contraindicated with gfr 11  TTE with ef 70%, normal lv function, mod pulm htn  continue home cv meds for htn management  anemia tx as per primary team for additonal sob managemtn 79 year old Female with PMH of HFpEF, Chronic Kidney Disease 4, DVT (on eliquis), Hypertension, Hyperlipidemia, Type 2 Diabetes Mellitus, Anemia who presents to the ER with shortness of breath x2 days admitted for acute on chronic HFpEF, anemia and progressive renal disease.    ekg sinus rhythm with 1st degree block  troponin negative. probnp elevated   XR with mild CHF    recommendations  pt with acute on chronic CKD, hold diuresis as per renal  further gdmt contraindicated with gfr 10  TTE with ef 63%, normal lv function, moderate pulmonary htn  continue home cv meds for htn management  anemia tx as per primary team for additional dyspnea management

## 2024-10-22 NOTE — PROGRESS NOTE ADULT - ASSESSMENT
BURKE ABARCA is a 79y year old Female with PMH of HFpEF, Chronic Kidney Disease 4, DVT (on eliquis) Hypertension, Hyperlipidemia, Type 2 Diabetes Mellitus, Anemia, OA who presents to the ER complaining of shortness of breath x2 days. Admitted for further evaluation of CHF exacerbation.     #Shortness of breath - HFpEF exacerbation   #Acute Kidney Injury on Chronic Kidney Disease 4  #Anemia, likely chronic disease   - CXR with HEART:  Enlarged. LUNGS: Interstitial edema. Mild congestive heart failure.  - remote tele  - s/p lasix 80mg ivp in ER, was on bumex 2mg bid since august per patient.   - started IV lasix 40mg daily today 10/21  - TTE 10/20/24: LVEF 63%, grade 2 diastolic dysfunction  - Avoid nephrotoxic medications   - Hgb 7.1> 6.5 > 6.8> 7.5> 7.1 > 7.0 this AM  - s/p 1 U pRBC 10/20/24, repeat hgb 7.1  - iron studies: low transferrin, high ferritin  - transfuse if <7  - continue weekly epoietin  - f/u repeat CXR  - nephro recs appreciated: hold diuretics, no NSAIDs, c/w epoetin  - cardio recs appreciated: gdmt contraindicated 2/2 CKD, hold diuretics    #Hypertension  - continue home diltiazem, hydralazine, labetalol    #Hyperlipidemia  - continue home crestor, ezetemibe    #Type II Diabetes Mellitus  - hold home meds  - FS and DANGELO  - maintain blood glucose 100-180 while hospitalized     #history of DVT  - per patient she had RUE DVT while at [facility]  - has been on eliquis 2.5mg bid for nearly 3 months   - c/w eliquis 2.5mg    #DVT ppx  - Eliquis    #GOC  -full code     #Diet  -DASH/TLC, consistent carbs, fluid restriction 1200mL    Case seen and discussed with Dr. Jeong.    BURKE ABARCA is a 79y year old Female with PMH of HFpEF, Chronic Kidney Disease 4, DVT (on eliquis) Hypertension, Hyperlipidemia, Type 2 Diabetes Mellitus, Anemia, OA who presents to the ER complaining of shortness of breath x2 days. Admitted for further evaluation of CHF exacerbation.     #Shortness of breath - HFpEF exacerbation   #Acute Kidney Injury on Chronic Kidney Disease 4  #Anemia, likely chronic disease   - CXR with HEART:  Enlarged. LUNGS: Interstitial edema. Mild congestive heart failure.  - remote tele  - s/p lasix 80mg ivp in ER, was on bumex 2mg bid since august per patient.   - s/p IV lasix 40mg   - TTE 10/20/24: LVEF 63%, grade 2 diastolic dysfunction  - proBNP 5495 increased from 3792 on 10/19  - Hgb 7.1> 6.5 > 6.8> 7.5> 7.1 > 7.0 this AM  - s/p 1 U pRBC 10/20/24, repeat hgb 7.1  - iron studies: low transferrin, high ferritin  - transfuse if <7  - continue weekly epoietin  - f/u repeat CXR  - nephro recs appreciated: hold diuretics, no NSAIDs, c/w epoetin  - cardio recs appreciated: gdmt contraindicated 2/2 CKD, hold diuretics    #Hypertension  - continue home diltiazem, hydralazine, labetalol    #Hyperlipidemia  - continue home crestor, ezetemibe    #Type II Diabetes Mellitus  - hold home meds  - FS and DANGELO  - maintain blood glucose 100-180 while hospitalized     #history of DVT  - per patient she had RUE DVT while at [facility]  - has been on eliquis 2.5mg bid for nearly 3 months   - c/w eliquis 2.5mg    #DVT ppx  - Eliquis    #GOC  -full code     #Diet  -DASH/TLC, consistent carbs, fluid restriction 1200mL    Case seen and discussed with Dr. Jeong.

## 2024-10-22 NOTE — PROGRESS NOTE ADULT - SUBJECTIVE AND OBJECTIVE BOX
BURKE ABARCA  [id number]    Chief Complaint: HFpEF exacerbation  Interval events: pt seen and examined, labs and chart reviewed. reports occasional cp and sob. utilizing N/C 3 L. sinus 60s on tele    ALLERGIES:  ACE inhibitors (Angioedema)  statins (Muscle Pain)  predniSONE (Other)      PAST MEDICAL & SURGICAL HISTORY:  Hypertension      Diabetes  type 2      Thyroid disease      Anemia      Hypercholesteremia      Myelodysplasia (myelodysplastic syndrome)      Thyromegaly  s/p partial thyroidectomy long time ago      Other giant cell arteritis      Arthritis      Kidney insufficiency      PVD (peripheral vascular disease)      Mass of right knee      Urinary incontinence      H/O partial thyroidectomy      H/O: hysterectomy      History of cataract surgery      History of vascular surgery        MEDICATIONS  (STANDING):  apixaban 2.5 milliGRAM(s) Oral two times a day  calcium acetate 667 milliGRAM(s) Oral three times a day with meals  dextrose 5%. 1000 milliLiter(s) (100 mL/Hr) IV Continuous <Continuous>  dextrose 5%. 1000 milliLiter(s) (50 mL/Hr) IV Continuous <Continuous>  dextrose 50% Injectable 12.5 Gram(s) IV Push once  dextrose 50% Injectable 25 Gram(s) IV Push once  dextrose 50% Injectable 25 Gram(s) IV Push once  diltiazem    milliGRAM(s) Oral daily  epoetin jacobo-epbx (RETACRIT) Injectable 94241 Unit(s) SubCutaneous every 7 days  ezetimibe 10 milliGRAM(s) Oral daily  ferrous    sulfate 325 milliGRAM(s) Oral daily  glucagon  Injectable 1 milliGRAM(s) IntraMuscular once  hydrALAZINE 100 milliGRAM(s) Oral three times a day  influenza  Vaccine (HIGH DOSE) 0.5 milliLiter(s) IntraMuscular once  insulin lispro (ADMELOG) corrective regimen sliding scale   SubCutaneous at bedtime  insulin lispro (ADMELOG) corrective regimen sliding scale   SubCutaneous three times a day before meals  labetalol 600 milliGRAM(s) Oral two times a day  rosuvastatin 5 milliGRAM(s) Oral at bedtime    MEDICATIONS  (PRN):  acetaminophen     Tablet .. 650 milliGRAM(s) Oral every 6 hours PRN Temp greater or equal to 38C (100.4F), Mild Pain (1 - 3)  aluminum hydroxide/magnesium hydroxide/simethicone Suspension 30 milliLiter(s) Oral every 4 hours PRN Dyspepsia  dextrose Oral Gel 15 Gram(s) Oral once PRN Blood Glucose LESS THAN 70 milliGRAM(s)/deciliter  melatonin 3 milliGRAM(s) Oral at bedtime PRN Insomnia  ondansetron Injectable 4 milliGRAM(s) IV Push every 8 hours PRN Nausea and/or Vomiting  saline laxative (FLEET) Rectal Enema 1 Enema Rectal once PRN constipation      MEDICATIONS  (PRN):  acetaminophen     Tablet .. 650 milliGRAM(s) Oral every 6 hours PRN Temp greater or equal to 38C (100.4F), Mild Pain (1 - 3)  aluminum hydroxide/magnesium hydroxide/simethicone Suspension 30 milliLiter(s) Oral every 4 hours PRN Dyspepsia  dextrose Oral Gel 15 Gram(s) Oral once PRN Blood Glucose LESS THAN 70 milliGRAM(s)/deciliter  melatonin 3 milliGRAM(s) Oral at bedtime PRN Insomnia  ondansetron Injectable 4 milliGRAM(s) IV Push every 8 hours PRN Nausea and/or Vomiting  saline laxative (FLEET) Rectal Enema 1 Enema Rectal once PRN constipation      FAMILY HISTORY:  Family history of diabetes mellitus    Family history of hypertension    Family history of acute renal failure        ROS:  All 10 systems reviewed and positives noted in HPI    OBJECTIVE:    VITAL SIGNS:  Vital Signs Last 24 Hrs  T(C): 37.1 (21 Oct 2024 05:07), Max: 37.1 (21 Oct 2024 05:07)  T(F): 98.7 (21 Oct 2024 05:07), Max: 98.7 (21 Oct 2024 05:07)  HR: 69 (21 Oct 2024 05:07) (64 - 69)  BP: 149/76 (21 Oct 2024 05:07) (122/58 - 149/76)  BP(mean): 94 (20 Oct 2024 11:37) (94 - 94)  RR: 18 (21 Oct 2024 05:07) (17 - 18)  SpO2: 96% (21 Oct 2024 05:07) (96% - 98%)    Parameters below as of 21 Oct 2024 05:07  Patient On (Oxygen Delivery Method): nasal cannula  O2 Flow (L/min): 2      PHYSICAL EXAM:  General:  no distress  HEENT: sclera anicteric  Neck: supple, no carotid bruits b/l  CVS:no jvd, RRR,no murmurs/rubs/gallops  Chest: diminished breath sounds  Abdomen: non-distended  Extremities: no lower extremity edema b/l  Neuro: awake, alert & oriented x 3      LABS:                        7.1    7.64  )-----------( 165      ( 21 Oct 2024 07:00 )             23.2     10-21    138  |  102  |  144[H]  ----------------------------<  148[H]  4.6   |  21[L]  |  4.05[H]    Ca    7.9[L]      21 Oct 2024 07:00  Phos  7.2     10-21  Mg     2.0     10-21    TPro  7.0  /  Alb  3.1[L]  /  TBili  0.4  /  DBili  x   /  AST  7[L]  /  ALT  13  /  AlkPhos  66  10-21        ECG: sinus rhythm, 1st degree av block      TTE: < from: TTE Echo Complete w/o Contrast w/ Doppler (10.20.24 @ 09:26) >   1. Normal global left ventricular systolic function.   2. Spectral Doppler shows pseudonormal pattern of left ventricular   myocardial filling (Grade II diastolic dysfunction).   3. Normal right ventricular size and function.   4. Mildly enlarged left atrium.   5. The right atrium is normal in size.   6. Mild mitral valve regurgitation.   7. Mild thickening and calcification of the anterior and posterior   mitral valve leaflets.   8. Mild tricuspid regurgitation.   9. Estimated pulmonary artery systolic pressure is 57.8 mmHg assuming a   right atrial pressure of 3 mmHg, which is consistent with moderate   pulmonary hypertension.  10. Pulmonary hypertension is present.  LV EF (2D):              70 %       BURKE ABARCA  [id number]    Chief Complaint: HFpEF exacerbation/Anemia/Progressive CKD  Interval events: pt seen and examined, labs and chart reviewed. reports occasional shortness of breath. utilizing N/C 3 L. sinus 60s on tele    ALLERGIES:  ACE inhibitors (Angioedema)  statins (Muscle Pain)  predniSONE (Other)        MEDICATIONS  (STANDING):  apixaban 2.5 milliGRAM(s) Oral two times a day  calcium acetate 667 milliGRAM(s) Oral three times a day with meals  dextrose 5%. 1000 milliLiter(s) (100 mL/Hr) IV Continuous <Continuous>  dextrose 5%. 1000 milliLiter(s) (50 mL/Hr) IV Continuous <Continuous>  dextrose 50% Injectable 12.5 Gram(s) IV Push once  dextrose 50% Injectable 25 Gram(s) IV Push once  dextrose 50% Injectable 25 Gram(s) IV Push once  diltiazem    milliGRAM(s) Oral daily  epoetin jacobo-epbx (RETACRIT) Injectable 47384 Unit(s) SubCutaneous every 7 days  ezetimibe 10 milliGRAM(s) Oral daily  ferrous    sulfate 325 milliGRAM(s) Oral daily  glucagon  Injectable 1 milliGRAM(s) IntraMuscular once  hydrALAZINE 100 milliGRAM(s) Oral three times a day  influenza  Vaccine (HIGH DOSE) 0.5 milliLiter(s) IntraMuscular once  insulin lispro (ADMELOG) corrective regimen sliding scale   SubCutaneous at bedtime  insulin lispro (ADMELOG) corrective regimen sliding scale   SubCutaneous three times a day before meals  labetalol 600 milliGRAM(s) Oral two times a day  rosuvastatin 5 milliGRAM(s) Oral at bedtime      ROS:  All 10 systems reviewed and positives noted in HPI    OBJECTIVE:    VITAL SIGNS:  Vital Signs Last 24 Hrs  T(C): 37.1 (21 Oct 2024 05:07), Max: 37.1 (21 Oct 2024 05:07)  T(F): 98.7 (21 Oct 2024 05:07), Max: 98.7 (21 Oct 2024 05:07)  HR: 69 (21 Oct 2024 05:07) (64 - 69)  BP: 149/76 (21 Oct 2024 05:07) (122/58 - 149/76)  BP(mean): 94 (20 Oct 2024 11:37) (94 - 94)  RR: 18 (21 Oct 2024 05:07) (17 - 18)  SpO2: 96% (21 Oct 2024 05:07) (96% - 98%)    Parameters below as of 21 Oct 2024 05:07  Patient On (Oxygen Delivery Method): nasal cannula  O2 Flow (L/min): 2      PHYSICAL EXAM:  General:  no distress  HEENT: sclera anicteric  Neck: supple, no carotid bruits b/l  CVS: + jvd, RRR, no murmurs/rubs/gallops  Chest: diminished breath sounds  Abdomen: non-distended  Extremities: no lower extremity edema b/l  Neuro: awake, alert & oriented x 3      LABS:                        7.1    7.64  )-----------( 165      ( 21 Oct 2024 07:00 )             23.2     10-21    138  |  102  |  144[H]  ----------------------------<  148[H]  4.6   |  21[L]  |  4.05[H]    Ca    7.9[L]      21 Oct 2024 07:00  Phos  7.2     10-21  Mg     2.0     10-21    TPro  7.0  /  Alb  3.1[L]  /  TBili  0.4  /  DBili  x   /  AST  7[L]  /  ALT  13  /  AlkPhos  66  10-21        ECG: sinus rhythm, 1st degree av block      TTE: < from: TTE Echo Complete w/o Contrast w/ Doppler (10.20.24 @ 09:26) >   1. Normal global left ventricular systolic function.   2. Spectral Doppler shows pseudonormal pattern of left ventricular   myocardial filling (Grade II diastolic dysfunction).   3. Normal right ventricular size and function.   4. Mildly enlarged left atrium.   5. The right atrium is normal in size.   6. Mild mitral valve regurgitation.   7. Mild thickening and calcification of the anterior and posterior   mitral valve leaflets.   8. Mild tricuspid regurgitation.   9. Estimated pulmonary artery systolic pressure is 57.8 mmHg assuming a   right atrial pressure of 3 mmHg, which is consistent with moderate   pulmonary hypertension.  10. Pulmonary hypertension is present.

## 2024-10-23 LAB
ANION GAP SERPL CALC-SCNC: 17 MMOL/L — SIGNIFICANT CHANGE UP (ref 5–17)
AUTO DIFF PNL BLD: ABNORMAL
BUN SERPL-MCNC: 154 MG/DL — HIGH (ref 7–23)
C-ANCA SER-ACNC: NEGATIVE — SIGNIFICANT CHANGE UP
CALCIUM SERPL-MCNC: 8.1 MG/DL — LOW (ref 8.4–10.5)
CHLORIDE SERPL-SCNC: 98 MMOL/L — SIGNIFICANT CHANGE UP (ref 96–108)
CO2 SERPL-SCNC: 19 MMOL/L — LOW (ref 22–31)
CREAT SERPL-MCNC: 4.49 MG/DL — HIGH (ref 0.5–1.3)
EGFR: 9 ML/MIN/1.73M2 — LOW
GBM IGG SER-ACNC: <0.2 — SIGNIFICANT CHANGE UP (ref 0–0.9)
GLUCOSE BLDC GLUCOMTR-MCNC: 137 MG/DL — HIGH (ref 70–99)
GLUCOSE BLDC GLUCOMTR-MCNC: 173 MG/DL — HIGH (ref 70–99)
GLUCOSE BLDC GLUCOMTR-MCNC: 208 MG/DL — HIGH (ref 70–99)
GLUCOSE BLDC GLUCOMTR-MCNC: 231 MG/DL — HIGH (ref 70–99)
GLUCOSE SERPL-MCNC: 121 MG/DL — HIGH (ref 70–99)
HCT VFR BLD CALC: 25.1 % — LOW (ref 34.5–45)
HGB BLD-MCNC: 7.8 G/DL — LOW (ref 11.5–15.5)
MCHC RBC-ENTMCNC: 26.6 PG — LOW (ref 27–34)
MCHC RBC-ENTMCNC: 31.1 GM/DL — LOW (ref 32–36)
MCV RBC AUTO: 85.7 FL — SIGNIFICANT CHANGE UP (ref 80–100)
MPO AB + PR3 PNL SER: SIGNIFICANT CHANGE UP
NRBC # BLD: 0 /100 WBCS — SIGNIFICANT CHANGE UP (ref 0–0)
P-ANCA SER-ACNC: NEGATIVE — SIGNIFICANT CHANGE UP
PHOSPHATE SERPL-MCNC: 9.1 MG/DL — HIGH (ref 2.5–4.5)
PLATELET # BLD AUTO: 164 K/UL — SIGNIFICANT CHANGE UP (ref 150–400)
POTASSIUM SERPL-MCNC: 5 MMOL/L — SIGNIFICANT CHANGE UP (ref 3.5–5.3)
POTASSIUM SERPL-SCNC: 5 MMOL/L — SIGNIFICANT CHANGE UP (ref 3.5–5.3)
RBC # BLD: 2.93 M/UL — LOW (ref 3.8–5.2)
RBC # FLD: 17.5 % — HIGH (ref 10.3–14.5)
SODIUM SERPL-SCNC: 134 MMOL/L — LOW (ref 135–145)
TROPONIN I, HIGH SENSITIVITY RESULT: 29.4 NG/L — SIGNIFICANT CHANGE UP
URATE SERPL-MCNC: 9 MG/DL — HIGH (ref 2.5–7)
WBC # BLD: 8.68 K/UL — SIGNIFICANT CHANGE UP (ref 3.8–10.5)
WBC # FLD AUTO: 8.68 K/UL — SIGNIFICANT CHANGE UP (ref 3.8–10.5)

## 2024-10-23 PROCEDURE — 99232 SBSQ HOSP IP/OBS MODERATE 35: CPT

## 2024-10-23 PROCEDURE — 99233 SBSQ HOSP IP/OBS HIGH 50: CPT | Mod: GC

## 2024-10-23 PROCEDURE — 99223 1ST HOSP IP/OBS HIGH 75: CPT

## 2024-10-23 PROCEDURE — 99497 ADVNCD CARE PLAN 30 MIN: CPT | Mod: 25

## 2024-10-23 RX ORDER — SODIUM BICARBONATE 1 MEQ/ML
650 VIAL (ML) INTRAVENOUS THREE TIMES A DAY
Refills: 0 | Status: DISCONTINUED | OUTPATIENT
Start: 2024-10-23 | End: 2024-10-25

## 2024-10-23 RX ADMIN — Medication 180 MILLIGRAM(S): at 05:23

## 2024-10-23 RX ADMIN — HYDRALAZINE HYDROCHLORIDE 100 MILLIGRAM(S): 50 TABLET, FILM COATED ORAL at 13:11

## 2024-10-23 RX ADMIN — APIXABAN 2.5 MILLIGRAM(S): 5 TABLET, FILM COATED ORAL at 05:23

## 2024-10-23 RX ADMIN — Medication 5 MILLIGRAM(S): at 21:33

## 2024-10-23 RX ADMIN — Medication 600 MILLIGRAM(S): at 05:23

## 2024-10-23 RX ADMIN — Medication 650 MILLIGRAM(S): at 14:33

## 2024-10-23 RX ADMIN — Medication 1: at 17:54

## 2024-10-23 RX ADMIN — HYDRALAZINE HYDROCHLORIDE 100 MILLIGRAM(S): 50 TABLET, FILM COATED ORAL at 05:23

## 2024-10-23 RX ADMIN — Medication 650 MILLIGRAM(S): at 21:32

## 2024-10-23 RX ADMIN — CALCIUM ACETATE 1334 MILLIGRAM(S): 667 CAPSULE ORAL at 13:08

## 2024-10-23 RX ADMIN — HYDRALAZINE HYDROCHLORIDE 100 MILLIGRAM(S): 50 TABLET, FILM COATED ORAL at 21:33

## 2024-10-23 RX ADMIN — CALCIUM ACETATE 1334 MILLIGRAM(S): 667 CAPSULE ORAL at 09:24

## 2024-10-23 RX ADMIN — APIXABAN 2.5 MILLIGRAM(S): 5 TABLET, FILM COATED ORAL at 17:55

## 2024-10-23 RX ADMIN — EZETIMIBE 10 MILLIGRAM(S): 10 TABLET ORAL at 13:09

## 2024-10-23 RX ADMIN — Medication 600 MILLIGRAM(S): at 17:48

## 2024-10-23 RX ADMIN — Medication 2: at 13:05

## 2024-10-23 RX ADMIN — CALCIUM ACETATE 1334 MILLIGRAM(S): 667 CAPSULE ORAL at 17:47

## 2024-10-23 RX ADMIN — Medication 325 MILLIGRAM(S): at 13:09

## 2024-10-23 NOTE — CONSULT NOTE ADULT - SUBJECTIVE AND OBJECTIVE BOX
HPI:  BURKE ABARCA is a 79y year old Female with PMH of HFpEF, Chronic Kidney Disease 4, DVT (on eliquis) Hypertension, Hyperlipidemia, Type 2 Diabetes Mellitus, Anemia, OA who presents to the ER complaining of shortness of breath x2 days.     Patient states she has been progressively short of breath since yesterday, this morning was having difficulty dressing herself, felt winded and called granddaughter who brought her to ER. Denies chest pain, fever, chills. Patient reports medication compliance,    On ER arrival: /57; HR 64; RR 18; T 98.2; sat 100% on room air  Given lasix 80mg ivp in ER (19 Oct 2024 15:48)    Palliative care c/s for GOC. Pt seen and examined at bedside this afternoon. Pt denies CP, worsened SOB. Notes pain 2/2 arthritis in knees but denies pain when she is not moving now.     PERTINENT PM/SXH:   No pertinent past medical history    Hypertension    Diabetes    Thyroid disease    Anemia    Diabetes    Hypercholesteremia    Hypertension    Myelodysplasia (myelodysplastic syndrome)    Thyromegaly    Other giant cell arteritis    Arthritis    Kidney insufficiency    PVD (peripheral vascular disease)    Mass of right knee    Urinary incontinence      H/O partial thyroidectomy    H/O: hysterectomy    History of cataract surgery    History of vascular surgery      FAMILY HISTORY:  Family history of diabetes mellitus    Family history of hypertension    Family history of acute renal failure      Family Hx substance abuse [ ]yes [ ]no  ITEMS NOT CHECKED ARE NOT PRESENT    SOCIAL HISTORY:   Significant other/partner[ ]  Children[ ]  Yazdanism/Spirituality: Yazdanism  Substance hx:  [ ]   Tobacco hx:  [ ]   Alcohol hx: [ ]   Home Opioid hx:  [ ] I-Stop Reference No:  Living Situation: Home      ADVANCE DIRECTIVES:    DNR/MOLST - no - FULL CODE  DECISION MAKER(s):  [ x] Health Care Proxy(s)  - granddaughter        Name(s): Phone Number(s): Blanca Ferreira [phone]    BASELINE (I)ADL(s) (prior to admission):  [place]:  Moderate     Allergies  ACE inhibitors (Angioedema)  statins (Muscle Pain)    Intolerances    predniSONE (Other)  MEDICATIONS  (STANDING):  apixaban 2.5 milliGRAM(s) Oral two times a day  calcium acetate 1334 milliGRAM(s) Oral three times a day with meals  dextrose 5%. 1000 milliLiter(s) (50 mL/Hr) IV Continuous <Continuous>  dextrose 5%. 1000 milliLiter(s) (100 mL/Hr) IV Continuous <Continuous>  dextrose 50% Injectable 12.5 Gram(s) IV Push once  dextrose 50% Injectable 25 Gram(s) IV Push once  dextrose 50% Injectable 25 Gram(s) IV Push once  diltiazem    milliGRAM(s) Oral daily  epoetin jacobo-epbx (RETACRIT) Injectable 78601 Unit(s) SubCutaneous every 7 days  ezetimibe 10 milliGRAM(s) Oral daily  ferrous    sulfate 325 milliGRAM(s) Oral daily  glucagon  Injectable 1 milliGRAM(s) IntraMuscular once  hydrALAZINE 100 milliGRAM(s) Oral three times a day  influenza  Vaccine (HIGH DOSE) 0.5 milliLiter(s) IntraMuscular once  insulin lispro (ADMELOG) corrective regimen sliding scale   SubCutaneous at bedtime  insulin lispro (ADMELOG) corrective regimen sliding scale   SubCutaneous three times a day before meals  labetalol 600 milliGRAM(s) Oral two times a day  rosuvastatin 5 milliGRAM(s) Oral at bedtime  sodium bicarbonate 650 milliGRAM(s) Oral three times a day    MEDICATIONS  (PRN):  acetaminophen     Tablet .. 650 milliGRAM(s) Oral every 6 hours PRN Temp greater or equal to 38C (100.4F), Mild Pain (1 - 3)  dextrose Oral Gel 15 Gram(s) Oral once PRN Blood Glucose LESS THAN 70 milliGRAM(s)/deciliter  melatonin 3 milliGRAM(s) Oral at bedtime PRN Insomnia  ondansetron Injectable 4 milliGRAM(s) IV Push every 8 hours PRN Nausea and/or Vomiting    PRESENT SYMPTOMS:      Pain: [ ]yes [x ]no  QOL impact -   Location -                    Aggravating factors -  Quality -  Radiation -  Timing-  Severity (0-10 scale):  Minimal acceptable level (0-10 scale):       Dyspnea:                        Mild    Anxiety:                             none  Depressed:               does not report  Fatigue:                      none  Nausea:                         none  Loss of appetite:              none  Constipation:                   none      Other Symptoms:  [x ]All other review of systems negative       Chaplaincy Referral:  Deferred   Palliative Performance Status Version 2:    50     %    [email or web address]    PHYSICAL EXAM:  Vital Signs Last 24 Hrs  T(C): 36.4 (23 Oct 2024 12:31), Max: 36.7 (22 Oct 2024 19:30)  T(F): 97.6 (23 Oct 2024 12:31), Max: 98.1 (23 Oct 2024 05:13)  HR: 67 (23 Oct 2024 12:) (63 - 73)  BP: 130/60 (23 Oct 2024 12:) (129/62 - 143/74)  BP(mean): 97 (23 Oct 2024 05:13) (97 - 97)  RR: 17 (23 Oct 2024 12:) (17 - 19)  SpO2: 96% (23 Oct 2024 12:) (95% - 98%)    Parameters below as of 23 Oct 2024 12:31  Patient On (Oxygen Delivery Method): nasal cannula  O2 Flow (L/min): 2   I&O's Summary    22 Oct 2024 07:01  -  23 Oct 2024 07:00  --------------------------------------------------------  IN: 200 mL / OUT: 1000 mL / NET: -800 mL      GENERAL: pt laying in bed in NAD  HEENT: NC/AT, mmm  PULMONARY: CTAB anteriorly, no wheezing  CARDIOVASCULAR:  RRR, no murmur  GASTROINTESTINAL: soft, nontender   Last BM: 10/21  MUSCULOSKELETAL:  +weakness, no pitting edema to BLE  Psych: A&Ox3, appropriate affect      LABS:                        7.8    8.68  )-----------( 164      ( 23 Oct 2024 05:50 )             25.1   10-23    134[L]  |  98  |  154[H]  ----------------------------<  121[H]  5.0   |  19[L]  |  4.49[H]    Ca    8.1[L]      23 Oct 2024 05:50  Phos  9.1     10  Mg     2.3     10-    TPro  7.3  /  Alb  3.3  /  TBili  0.4  /  DBili  x   /  AST  9[L]  /  ALT  20  /  AlkPhos  73  10-22      Urinalysis Basic - ( 23 Oct 2024 05:50 )    Color: x / Appearance: x / SG: x / pH: x  Gluc: 121 mg/dL / Ketone: x  / Bili: x / Urobili: x   Blood: x / Protein: x / Nitrite: x   Leuk Esterase: x / RBC: x / WBC x   Sq Epi: x / Non Sq Epi: x / Bacteria: x      RADIOLOGY & ADDITIONAL STUDIES:    PROCEDURE DATE:  10/20/2024          INTERPRETATION:  TRANSTHORACIC ECHOCARDIOGRAM REPORT        Patient Name:   BURKE ABARCA Patient Location: [address] [place] [address] Rec #:  [id number]        Accession #:      [id number]  Account #:      [id number]         Height:           63.8 in 162.0 cm  YOB: 1945       Weight:           205.0 lb 93.00 kg  Patient Age:    79 years        BSA:              1.97 m²  Patient Gender: F               BP:               153/79 mmHg      Date of Exam:        10/20/2024 9:26:04 AM  Sonographer:         DENIS  Referring Physician: GABRIELA    Procedure:     2D Echo/Doppler/Color Doppler Complete.  Indications:   I50.9 - Heart failure, unspecified  Diagnosis:     I50.9 - Heart failure, unspecified  Study Details: Technically good study.        2D AND M-MODE MEASUREMENTS (normal ranges within parentheses):  Left Ventricle:                  Normal   Aorta/Left Atrium:               Normal  IVSd (2D):              1.05 cm (0.7-1.1) Aortic Root (2D):    2.90 cm   (2.4-3.7)  LVPWd (2D):             1.14 cm (0.7-1.1) Aortic Root (Mmode): 2.20 cm   (2.4-3.7)  LVIDd (2D):             4.53 cm (3.4-5.7) AoV Cusp Separation: 1.70 cm   (1.5-2.6)  LVIDs (2D):             2.73 cm           Left Atrium (2D):    3.90 cm   (1.9-4.0)  LV FS (2D):             39.7 %   (>25%)   Left Atrium (Mmode): 4.20 cm   (1.9-4.0)  LV EF (2D):              70 %    (>55%)  Relative Wall Thickness  0.50    (<0.42)    LV SYSTOLIC FUNCTION BY 2D PLANIMETRY (MOD):  EF-A4C View: 61.3 % EF-A2C View: 62.4 % EF-Biplane: 63 %    LV DIASTOLIC FUNCTION:  MV Peak E: 1.32 m/s Decel Time: 208 msec  MV Peak A: 0.71 m/s  E/A Ratio: 1.87    SPECTRAL DOPPLER ANALYSIS (where applicable):  Mitral Valve:  MV Max Eros:   1.41 m/s MV P1/2 Time: 60.32 msec  MV Mean Grad: 3.0 mmHg MV Area, PHT: 3.65 cm²    Aortic Valve: AoV Max Eros: 2.22 m/s AoV Peak P.7 mmHg AoV Mean P.2 mmHg    LVOT Vmax: 1.21 m/s LVOT VTI: 0.260 m LVOT Diameter: 1.90 cm    AoV Area, Vmax: 1.55 cm² AoV Area, VTI: 1.68 cm² AoV Area, Vmn: 1.58 cm²  Ao VTI: 0.439  Tricuspid Valve and PA/RV Systolic Pressure: TR Max Velocity: 3.70 m/s RA   Pressure: 3 mmHg RVSP/PASP: 57.8 mmHg      PHYSICIAN INTERPRETATION:  Left Ventricle: Normal left ventricular size and wall thicknesses, with   normal systolic and diastolic function. The left ventricular internal   cavity size is normal. Left ventricular wall thickness is normal.  Global LV systolic function was normal. Spectral Doppler shows   pseudonormal pattern of left ventricular myocardial filling (Grade II   diastolic dysfunction).  Right Ventricle: Normal right ventricular size and function.  Left Atrium: Mildly enlarged left atrium.  Right Atrium: The right atrium is normal in size.  Pericardium: There is no evidence of pericardial effusion.  Mitral Valve: Mild thickening and calcification of the anterior and   posterior mitral valve leaflets. Mitral leaflet mobility is normal. Mild   mitral valve regurgitation is seen.  Tricuspid Valve: Structurally normal tricuspid valve, with normal leaflet   excursion. Mild tricuspid regurgitation is visualized. Estimated   pulmonary artery systolic pressure is 57.8 mmHg assuming a right atrial   pressure of 3 mmHg, which is consistent with moderate pulmonary   hypertension.  Aortic Valve: The aortic valve is trileaflet.  Pulmonic Valve: Structurally normal pulmonic valve, with normal leaflet   excursion. Trace pulmonic valve regurgitation.  Aorta: The aortic root and ascending aorta are structurally normal, with   no evidence of dilitation. The aortic arch was not well visualized.  Pulmonary Artery: Pulmonary hypertension is present.  Venous: The inferior vena cava was normal sized, with respiratory size   variation greater than 50%.      Summary:   1. Normal global left ventricular systolic function.   2. Spectral Doppler shows pseudonormal pattern of left ventricular   myocardial filling (Grade II diastolic dysfunction).   3. Normal right ventricular size and function.   4. Mildly enlarged left atrium.   5. The right atrium is normal in size.   6. Mild mitral valve regurgitation.   7. Mild thickening and calcification of the anterior and posterior   mitral valve leaflets.   8. Mild tricuspid regurgitation.   9. Estimated pulmonary artery systolic pressure is 57.8 mmHg assuming a   right atrial pressure of 3 mmHg, which is consistent with moderate   pulmonary hypertension.  10. Pulmonary hypertension is present.    [id number] Dexter Ahmadi MD,[facility] , Electronically signed on   10/20/2024 at 12:36:07 PM        PROTEIN CALORIE MALNUTRITION PRESENT: [ ]mild [ ]moderate [ ]severe [ ]underweight [ ]morbid obesity  https://www.andeal.org/vault/2440/web/files/ONC/Table_Clinical%20Characteristics%20to%20Document%20Malnutrition-White%20JV%20et%20al%2020.pdf    Height (cm): 162.6 (10-23-24 @ 07:50), 162.6 (24 @ 12:09)  Weight (kg): 93 (10-23-24 @ 07:50), 94.3 (24 @ 12:09), 95.7 (24 @ 03:15)  BMI (kg/m2): 35.2 (10-23-24 @ 07:50), 35.7 (24 @ 12:09)    [ ]PPSV2 < or = to 30% [ ]significant weight loss  [ ]poor nutritional intake  [ ]anasarca[ ]Artificial Nutrition      Other REFERRALS:  [ ]Hospice  [ ]Child Life  [ ]Social Work  [ ]Case management [ ]Holistic Therapy

## 2024-10-23 NOTE — DISCHARGE NOTE PROVIDER - NSDCACTIVITY_GEN_ALL_CORE
Do not drive or operate machinery/Showering allowed/No heavy lifting/straining/Activity as tolerated

## 2024-10-23 NOTE — DISCHARGE NOTE PROVIDER - NSDCFUADDAPPT_GEN_ALL_CORE_FT
APPTS ARE READY TO BE MADE: [ ] YES    Best Family or Patient Contact (if needed):    Additional Information about above appointments (if needed):    1: Cardiology  2: Nephrology  3:     Other comments or requests:    APPTS ARE READY TO BE MADE: [ ] YES    Best Family or Patient Contact (if needed):    Additional Information about above appointments (if needed):    1: Cardiology  2: Nephrology  3: PCP (Dr. Hernandez)    Other comments or requests:    APPTS ARE READY TO BE MADE: [x] YES    Best Family or Patient Contact (if needed):    Additional Information about above appointments (if needed):    1: Cardiology  2: Nephrology  3: PCP (Dr. Hernandez)    Other comments or requests:    APPTS ARE READY TO BE MADE: [x] YES    Best Family or Patient Contact (if needed):    Additional Information about above appointments (if needed):    1: Cardiology  2: Nephrology  3: PCP (Dr. Hernandez)    Other comments or requests:     Patient is being discharged to [facility]. Caregiver will arrange follow up.

## 2024-10-23 NOTE — PHYSICAL THERAPY INITIAL EVALUATION ADULT - PERTINENT HX OF CURRENT PROBLEM, REHAB EVAL
BURKE ABARCA is a 79y year old Female with PMH of HFpEF, Chronic Kidney Disease 4, DVT (on eliquis) Hypertension, Hyperlipidemia, Type 2 Diabetes Mellitus, Anemia, OA who presents to the ER complaining of shortness of breath x2 days.     Patient states she has been progressively short of breath since yesterday, this morning was having difficulty dressing herself, felt winded and called granddaughter who brought her to ER. Denies chest pain, fever, chills. Patient reports medication compliance,

## 2024-10-23 NOTE — DISCHARGE NOTE PROVIDER - CARE PROVIDERS DIRECT ADDRESSES
[email or web address]

## 2024-10-23 NOTE — DISCHARGE NOTE PROVIDER - NSDCMRMEDTOKEN_GEN_ALL_CORE_FT
Apresoline 100 mg oral tablet: 1 tab(s) orally 3 times a day  Bumex 2 mg oral tablet: 1 tab(s) orally 2 times a day  calcitriol 0.25 mcg oral capsule: 1 cap(s) orally once a day  calcium acetate 667 mg oral tablet: 1 tab(s) orally 3 times a day  Crestor 5 mg oral tablet: 1 tab(s) orally once a day (at bedtime)  DilTIAZem (Eqv-Cardizem CD) 180 mg/24 hours oral capsule, extended release: 1 cap(s) orally once a day  Eliquis 2.5 mg oral tablet: 1 tab(s) orally 2 times a day  ezetimibe 10 mg oral tablet: 1 tab(s) orally once a day  ferrous sulfate 325 mg (65 mg elemental iron) oral delayed release tablet: 1 tab(s) orally once a day  glipiZIDE 5 mg oral tablet: 1 tab(s) orally once a day  labetalol 300 mg oral tablet: 2 tab(s) orally 2 times a day   Apresoline 100 mg oral tablet: 1 tab(s) orally 3 times a day  Bumex 2 mg oral tablet: 1 tab(s) orally 2 times a day  calcitriol 0.25 mcg oral capsule: 1 cap(s) orally once a day  calcium acetate 667 mg oral tablet: 1 tab(s) orally 3 times a day  Crestor 5 mg oral tablet: 1 tab(s) orally once a day (at bedtime)  DilTIAZem (Eqv-Cardizem CD) 180 mg/24 hours oral capsule, extended release: 1 cap(s) orally once a day  Eliquis 2.5 mg oral tablet: 1 tab(s) orally 2 times a day  ezetimibe 10 mg oral tablet: 1 tab(s) orally once a day  ferrous sulfate 325 mg (65 mg elemental iron) oral delayed release tablet: 1 tab(s) orally once a day  labetalol 300 mg oral tablet: 2 tab(s) orally 2 times a day  linagliptin 5 mg oral tablet: 1 tab(s) orally once a day

## 2024-10-23 NOTE — PROGRESS NOTE ADULT - NS ATTEND AMEND GEN_ALL_CORE FT
-  -  79-year-old woman admitted with complaints of increasing shortness of breath for 2 days prior to admission and concern for acute exacerbation of chronic diastolic CHF.  She has known history of HFpEF.  Medical issues include chronic kidney disease stage IV, hypertension on multiple medications DVT on Eliquis, hypertension, hyperlipidemia, diabetes, anemia, osteoarthritis.  On physical examination, she remains hypervolemic.  Echocardiogram consistent with preserved left ventricular systolic function, grade 2 diastolic dysfunction    Recommendations  -Continue IV diuresis.  -Further guideline directed medical therapy such as SGLT2 inhibitor, ARNI/ACE inhibitor/ARB, MRA contraindicated in the setting of advanced kidney disease.  -Monitor labs, transfuse as needed   -Discussed with patient  -Discussed with primary team.  -I will sign out for cardiologist to follow along tomorrow.
I have seen and examined the patient. I have discussed case with CORNEL Washburn.    Serenity Gupta is a 79 year old woman with past medical history of Hypertension, Hyperlipidemia, Type II Diabetes Mellitus, Obesity, Chronic kidney disease and Anemia presents with progressive dyspnea, found to have progressive anemia, progressive renal disease with acute on chronic diastolic heart failure exacerbation.    ECG consistent with normal sinus rhythm with first degree AV block. Troponin negative x 2, does not appear to be an acute coronary syndrome. Echo report with normal LVEF 63%, normal RV size and function and moderate pulmonary hypertension. Pro BNP elevated and CXR with mild CHF. Overall, the patient appears euvolemic at this time, in regards to HFpEF; as per Renal they recommend to hold further diuresis at this time. The patient is not a candidate for SGLT2i or MRA at this time due to progressive CKD with GFR 10, continue Hydralazine, may add Isosorbide dinitrate per guidelines. Recommend anemia workup and treatment per primary team, s/p PRBC transfusions, goal to maintain Hgb > 8 mg/dl. Follow up Renal regarding optimization of renal function, avoid nephrotoxic meds.    Would follow up with Renal regarding management of diuretics at this time due to worsening renal function, please re-consult Cardiology if needed.    Jovan Jain MD  Cardiology
I have seen and examined the patient. I have discussed case with CORNEL Washburn.    Serenity Gupta is a 79 year old woman with past medical history of Hypertension, Hyperlipidemia, Type II Diabetes Mellitus, Obesity, Chronic kidney disease and Anemia presents with progressive dyspnea, found to have progressive anemia, progressive renal disease with acute on chronic diastolic heart failure exacerbation.    ECG consistent with normal sinus rhythm with first degree AV block. Troponin negative, does not appear to be an acute coronary syndrome. Echo report with normal LVEF 63%, normal RV size and function and moderate pulmonary hypertension. Pro BNP elevated and CXR with mild CHF. Overall, the patient appears near-euvolemic at this time, in regards to HFpEF; as per Renal they recommend to hold further diuresis at this time. The patient is not a candidate for SGLT2i or MRA at this time due to progressive CKD with GFR 10, continue Hydralazine, would add Isosorbide dinitrate per guidelines. Recommend anemia workup and treatment per primary team, s/p PRBC transfusion, goal to maintain Hgb > 8 mg/dl. Follow up Renal regarding optimization of renal function, avoid nephrotoxic meds.    Discussed with Dr. Valles. We will continue to follow along.    Jovan Jain MD  Cardiology

## 2024-10-23 NOTE — DISCHARGE NOTE PROVIDER - HOSPITAL COURSE
Hospital Course  HPI:  BURKE ABARCA is a 79y year old Female with PMH of HFpEF, Chronic Kidney Disease 4, DVT (on eliquis) Hypertension, Hyperlipidemia, Type 2 Diabetes Mellitus, Anemia, OA who presents to the ER complaining of shortness of breath x2 days.     Patient states she has been progressively short of breath since yesterday, this morning was having difficulty dressing herself, felt winded and called granddaughter who brought her to ER.  On ER arrival: /57; HR 64; RR 18; T 98.2; sat 100% on room air  Given lasix 80mg ivp in ER (19 Oct 2024 15:48)    Labs were significant for anemia s/p 2 units of blood transfusion, most recently Hgb 7.8.          Hospital Course  HPI:  BURKE ABARCA is a 79y year old Female with PMH of HFpEF, Chronic Kidney Disease 4, DVT (on eliquis) Hypertension, Hyperlipidemia, Type 2 Diabetes Mellitus, Anemia, OA who presents to the ER complaining of shortness of breath x2 days.     Patient states she has been progressively short of breath since yesterday, this morning was having difficulty dressing herself, felt winded and called granddaughter who brought her to ER.  On ER arrival: /57; HR 64; RR 18; T 98.2; sat 100% on room air  Given lasix 80mg ivp in ER (19 Oct 2024 15:48)  Chest x-ray showed Interstitial edema. Mild congestive heart failure and cardiomegaly.   US Renal with no evidence of hydronephrosis.   TTE with 63% EF, normal RV size and function and moderate pulmonary hypertension.    Labs were significant for anemia s/p 2 units of blood transfusion, most recently Hgb 7.8. Kidney function worsening for patient with Cr 4.49, eGFR 9. Negative cANCA and pANCA. proBNP increased from 3792 to 5495.   Repeat chest x-ray showed No significant change in comparison to the prior study. Mild CHF is again suggested. Magnified cardiac silhouette remains stable. There is evidence of prior right lower neck surgery and degenerative arthritis involving the glenohumeral joints.   CT chest showed     Patient was seen by nephrology who recommended to hold diuretics and NSAIDs due to worsening kidney function. Patient was also seen by cardiology team who recommended to hold further diuresis at this time per nephrology recommendations. Patient is not a candidate for SGLT2i or MRA at this time due to progressive CKD with GFR 10, continue Hydralazine, to add Isosorbide dinitrate per guidelines.     Patient is now hemodynamically stable and medically optimized for discharge to acute rehab with close outpatient follow-up with PCP, cardiology, and nephrology.    VITALS:   T(C): 36.4 (10-23-24 @ 12:31), Max: 36.7 (10-22-24 @ 19:30)  HR: 67 (10-23-24 @ 12:31) (63 - 73)  BP: 130/60 (10-23-24 @ 12:31) (129/62 - 143/74)  RR: 17 (10-23-24 @ 12:31) (17 - 19)  SpO2: 96% (10-23-24 @ 12:31) (95% - 98%)    GENERAL: NAD, lying in bed comfortably  HEAD:  Atraumatic, Normocephalic  ENT: Moist mucous membranes  NECK: Supple, No JVD  CHEST/LUNG: +b/l crackles with poor inspiratory effort. Nasal canula in place. No rales, rhonchi, rubs. Unlabored respirations  HEART: Regular rate and rhythm; No murmurs, rubs, or gallops  ABDOMEN: Bowel Sounds x4; Soft, nontender, nondistended  EXTREMITIES:  +chronic hyperpigmentation noted on b/l lower extremities. 2+ Peripheral Pulses, brisk capillary refill. No clubbing, cyanosis, or edema  NERVOUS SYSTEM:  A&Ox3, no focal deficits     LABS                        7.8    8.68  )-----------( 164      ( 23 Oct 2024 05:50 )             25.1       10-23    134[L]  |  98  |  154[H]  ----------------------------<  121[H]  5.0   |  19[L]  |  4.49[H]    Ca    8.1[L]      23 Oct 2024 05:50  Phos  9.1     10-23  Mg     2.3     10-22    TPro  7.3  /  Alb  3.3  /  TBili  0.4  /  DBili  x   /  AST  9[L]  /  ALT  20  /  AlkPhos  73  10-22                   Hospital Course  HPI:  BURKE ABARCA is a 79y year old Female with PMH of HFpEF, Chronic Kidney Disease 4, DVT (on eliquis) Hypertension, Hyperlipidemia, Type 2 Diabetes Mellitus, Anemia, OA who presents to the ER complaining of shortness of breath x2 days.     Patient states she has been progressively short of breath since yesterday, this morning was having difficulty dressing herself, felt winded and called granddaughter who brought her to ER.  On ER arrival: /57; HR 64; RR 18; T 98.2; sat 100% on room air  Given lasix 80mg ivp in ER (19 Oct 2024 15:48)  Chest x-ray showed Interstitial edema. Mild congestive heart failure and cardiomegaly.   US Renal with no evidence of hydronephrosis.   TTE with 63% EF, normal RV size and function and moderate pulmonary hypertension.    Labs were significant for anemia s/p 2 units of blood transfusion, most recently Hgb 7.8. Kidney function worsening for patient with Cr 4.49, eGFR 9. Negative cANCA and pANCA. proBNP increased from 3792 to 5495.   Repeat chest x-ray showed No significant change in comparison to the prior study. Mild CHF is again suggested. Magnified cardiac silhouette remains stable. There is evidence of prior right lower neck surgery and degenerative arthritis involving the glenohumeral joints.   CT chest showed     Patient was seen by nephrology who recommended to hold diuretics and NSAIDs due to worsening kidney function. Patient was also seen by cardiology team who recommended to hold further diuresis at this time per nephrology recommendations. Patient is not a candidate for SGLT2i or MRA at this time due to progressive CKD with GFR 10, continue Hydralazine, to add Isosorbide dinitrate per guidelines.     Patient was not improving and required hemodialysis. She was shuttled to [facility] for placement of permacath on 10/31/2024 and returned to [place] following the procedure. Patient received a total of 5 hemodialysis sessions and is now scheduled for M/W/F. Symptoms have improved.     Patient is now hemodynamically stable and medically optimized for discharge to acute rehab with dialysis with close outpatient follow-up with PCP, cardiology, and nephrology.    Vital Signs Last 24 Hrs  T(C): 37 (02 Nov 2024 11:40), Max: 37.3 (01 Nov 2024 20:36)  T(F): 98.6 (02 Nov 2024 11:40), Max: 99.1 (01 Nov 2024 20:36)  HR: 70 (02 Nov 2024 11:40) (70 - 82)  BP: 134/66 (02 Nov 2024 11:40) (122/66 - 134/66)  RR: 17 (02 Nov 2024 11:40) (16 - 18)  SpO2: 94% (02 Nov 2024 11:40) (93% - 94%)  Parameters below as of 02 Nov 2024 11:40      GENERAL: NAD, lying in bed comfortably  HEAD:  Atraumatic, Normocephalic  ENT: Moist mucous membranes  NECK: Supple, No JVD  CHEST/LUNG: CTABL, good inspiratory effort. Saturating on room air. No rales, rhonchi, rubs. Unlabored respirations  HEART: Regular rate and rhythm; No murmurs, rubs, or gallops  ABDOMEN: Bowel Sounds x4; Soft, nontender, nondistended  EXTREMITIES: +chronic hyperpigmentation noted on b/l lower extremities. 2+ Peripheral Pulses, brisk capillary refill. No clubbing, cyanosis, or edema  NERVOUS SYSTEM:  A&Ox3, no focal deficits     LABS                        7.3    10.73 )-----------( 188      ( 02 Nov 2024 07:36 )             24.1     11-02    137  |  101  |  43[H]  ----------------------------<  113[H]  4.2   |  29  |  2.35[H]    Ca    8.2[L]      02 Nov 2024 07:36  Phos  2.7     11-02  Mg     2.2     11-02    TPro  6.7  /  Alb  2.9[L]  /  TBili  0.3  /  DBili  x   /  AST  13  /  ALT  16  /  AlkPhos  76  11-02                   Hospital Course  HPI:  BURKE ABARCA is a 79y year old Female with PMH of HFpEF, Chronic Kidney Disease 4, DVT (on eliquis) Hypertension, Hyperlipidemia, Type 2 Diabetes Mellitus, Anemia, OA who presented to the ER with shortness of breath that had been progressively worsening. In the ER arrival: /57; HR 64; RR 18; T 98.2; sat 100% on room air. Given lasix 80mg ivp in ER. Chest x-ray showed Interstitial edema. Mild congestive heart failure and cardiomegaly. US Renal with no evidence of hydronephrosis. TTE with 63% EF, normal RV size and function and moderate pulmonary hypertension.    Labs were significant for anemia s/p 2 units of blood transfusion, most recently Hgb 7.8. Kidney function worsening for patient with Cr 4.49, eGFR 9. Negative cANCA and pANCA. proBNP increased from 3792 to 5495.   Repeat chest x-ray showed No significant change in comparison to the prior study. Mild CHF is again suggested. Magnified cardiac silhouette remains stable. There is evidence of prior right lower neck surgery and degenerative arthritis involving the glenohumeral joints.   CT chest showed Pulmonary interstitial edema with Small right, trace left pleural effusions.     Patient was seen by nephrology who recommended to hold diuretics and NSAIDs due to worsening kidney function. Patient was also seen by cardiology team who recommended to hold further diuresis at this time per nephrology recommendations. Patient is not a candidate for SGLT2i or MRA at this time due to progressive CKD with GFR 10, continue Hydralazine, to add Isosorbide dinitrate per guidelines.     Patient was not improving and required hemodialysis. She was shuttled to [facility] for placement of permacath on 10/31/2024 and returned to [place] following the procedure. Patient received a total of **** hemodialysis sessions and is now scheduled for M/W/F sessions. Symptoms have improved.     Patient is now hemodynamically stable and medically optimized for discharge to acute rehab with dialysis with close outpatient follow-up with PCP, cardiology, and nephrology.    Vital Signs Last 24 Hrs  T(C): 37 (02 Nov 2024 11:40), Max: 37.3 (01 Nov 2024 20:36)  T(F): 98.6 (02 Nov 2024 11:40), Max: 99.1 (01 Nov 2024 20:36)  HR: 70 (02 Nov 2024 11:40) (70 - 82)  BP: 134/66 (02 Nov 2024 11:40) (122/66 - 134/66)  RR: 17 (02 Nov 2024 11:40) (16 - 18)  SpO2: 94% (02 Nov 2024 11:40) (93% - 94%)  Parameters below as of 02 Nov 2024 11:40      GENERAL: NAD, lying in bed comfortably  HEAD:  Atraumatic, Normocephalic  ENT: Moist mucous membranes  NECK: Supple, No JVD  CHEST/LUNG: CTABL, good inspiratory effort. Saturating on room air. No rales, rhonchi, rubs. Unlabored respirations  HEART: Regular rate and rhythm; No murmurs, rubs, or gallops  ABDOMEN: Bowel Sounds x4; Soft, nontender, nondistended  EXTREMITIES: +chronic hyperpigmentation noted on b/l lower extremities. 2+ Peripheral Pulses, brisk capillary refill. No clubbing, cyanosis, or edema  NERVOUS SYSTEM:  A&Ox3, no focal deficits     LABS                        7.3    10.73 )-----------( 188      ( 02 Nov 2024 07:36 )             24.1     11-02    137  |  101  |  43[H]  ----------------------------<  113[H]  4.2   |  29  |  2.35[H]    Ca    8.2[L]      02 Nov 2024 07:36  Phos  2.7     11-02  Mg     2.2     11-02    TPro  6.7  /  Alb  2.9[L]  /  TBili  0.3  /  DBili  x   /  AST  13  /  ALT  16  /  AlkPhos  76  11-02                   Hospital Course  HPI:  BURKE ABARCA is a 79y year old Female with PMH of HFpEF, Chronic Kidney Disease 4, DVT (on eliquis) Hypertension, Hyperlipidemia, Type 2 Diabetes Mellitus, Anemia, OA who presented to the ER with shortness of breath that had been progressively worsening. In the ER arrival: /57; HR 64; RR 18; T 98.2; sat 100% on room air. Given lasix 80mg ivp in ER. Chest x-ray showed Interstitial edema. Mild congestive heart failure and cardiomegaly. US Renal with no evidence of hydronephrosis. TTE with 63% EF, normal RV size and function and moderate pulmonary hypertension.    Labs were significant for anemia s/p 2 units of blood transfusion, most recently Hgb 7.8. Kidney function worsening for patient with Cr 4.49, eGFR 9. Negative cANCA and pANCA. proBNP increased from 3792 to 5495.   Repeat chest x-ray showed No significant change in comparison to the prior study. Mild CHF is again suggested. Magnified cardiac silhouette remains stable. There is evidence of prior right lower neck surgery and degenerative arthritis involving the glenohumeral joints.   CT chest showed Pulmonary interstitial edema with Small right, trace left pleural effusions.     Patient was seen by nephrology who recommended to hold diuretics and NSAIDs due to worsening kidney function. Patient was also seen by cardiology team who recommended to hold further diuresis at this time per nephrology recommendations. Patient is not a candidate for SGLT2i or MRA at this time due to progressive CKD with GFR 10, continue Hydralazine, to add Isosorbide dinitrate per guidelines.     Patient was not improving and required hemodialysis. She was shuttled to [facility] for placement of permacath on 10/31/2024 and returned to [place] following the procedure. Patient started hemodialysis sessions and is now scheduled for dialysis on M/W/F. Symptoms have improved.     Patient is hemodynamically stable and medically optimized for discharge to acute rehab with dialysis with close outpatient follow-up with PCP, cardiology, and nephrology.    ---  VITALS:   Vital Signs Last 24 Hrs  T(F): 98.2 (04 Nov 2024 13:36), Max: 98.4 (04 Nov 2024 12:55)  HR: 80 (04 Nov 2024 17:18) (69 - 80)  BP: 135/66 (04 Nov 2024 17:18) (118/54 - 168/81)  RR: 18 (04 Nov 2024 17:18) (16 - 18)  SpO2: 94% (04 Nov 2024 17:18) (93% - 98%)  ---  PHYSICAL EXAM:   General: Awake and alert, cooperative with exam. No acute distress.   Cardiology: Normal S1, S2. No murmurs. Regular rate and rhythm.   Respiratory: Lungs clear to ascultation bilaterally. No wheezes, rales, or rhonchi.   Gastrointestinal: Positive bowel sounds. Soft. Non-tender. Non-distended. No guarding, rigidity, or rebound tenderness.  Extremities: No peripheral edema bilaterally.  Neurological: A+Ox3. CN 2-12 intact. No focal neurological deficits. Normal speech. No facial droop.  ---    Indicate ongoing risks or concerns: Will need HD M/W/F.   30 Day Supply through Meds to Beds: Completed or not. If not, please provide reason why.     GOC:   • Code Status   • Summary of Goals of Care Conversation/ what matters most    Source of Infection: n/a  Antibiotic / Last Day: n/a    Discharging Provider:  Gauri Fuentes MD   Contact Info: [phone]    Outpatient Provider: Dr. Dia Powell  [facility] Provider: Sign-out given?    PLEASE COPY AND PASTE INTO CARE PLAN USING CTRL V  You came to the hospital due to:   You were diagnosed with:   You were treated with:   You were prescribed the following new medications:    You will need to follow up with your primary care physician for further management.    Discharging Provider:  Gauri Fuentse MD  Contact Info: [phone] - Please call with any questions or concerns.                Hospital Course  HPI:  BURKE ABARCA is a 79y year old Female with PMH of HFpEF, Chronic Kidney Disease 4, DVT (on eliquis) Hypertension, Hyperlipidemia, Type 2 Diabetes Mellitus, Anemia, OA who presented to the ER with shortness of breath that had been progressively worsening. In the ER arrival: /57; HR 64; RR 18; T 98.2; sat 100% on room air. Given lasix 80mg ivp in ER. Chest x-ray showed Interstitial edema. Mild congestive heart failure and cardiomegaly. US Renal with no evidence of hydronephrosis. TTE with 63% EF, normal RV size and function and moderate pulmonary hypertension.    Labs were significant for anemia s/p 2 units of blood transfusion, most recently Hgb 7.8. Kidney function worsening for patient with Cr 4.49, eGFR 9. Negative cANCA and pANCA. proBNP increased from 3792 to 5495.   Repeat chest x-ray showed No significant change in comparison to the prior study. Mild CHF is again suggested. Magnified cardiac silhouette remains stable. There is evidence of prior right lower neck surgery and degenerative arthritis involving the glenohumeral joints.   CT chest showed Pulmonary interstitial edema with Small right, trace left pleural effusions.     Patient was seen by nephrology who recommended to hold diuretics and NSAIDs due to worsening kidney function. Patient was also seen by cardiology team who recommended to hold further diuresis at this time per nephrology recommendations. Patient is not a candidate for SGLT2i or MRA at this time due to progressive CKD with GFR 10, continue Hydralazine, to add Isosorbide dinitrate per guidelines.     Patient was not improving and required hemodialysis. She was shuttled to [facility] for placement of permacath on 10/31/2024 and returned to [place] following the procedure. Patient started hemodialysis sessions and is now scheduled for dialysis on M/W/F. Symptoms have improved. Last HD session was 11/06/2024.     Patient is hemodynamically stable and medically optimized for discharge to acute rehab ([facility]) with dialysis with close outpatient follow-up with PCP, cardiology, and nephrology.    ---  VITALS:   Vital Signs Last 24 Hrs  T(C): 36.5 (05 Nov 2024 12:21), Max: 37.2 (04 Nov 2024 20:23)  T(F): 97.7 (05 Nov 2024 12:21), Max: 99 (04 Nov 2024 20:23)  HR: 64 (05 Nov 2024 12:21) (64 - 80)  BP: 130/75 (05 Nov 2024 12:21) (130/75 - 168/81)  BP(mean): 95 (05 Nov 2024 04:56) (95 - 95)  RR: 18 (05 Nov 2024 12:21) (18 - 18)  SpO2: 96% (05 Nov 2024 12:21) (94% - 98%)    Parameters below as of 05 Nov 2024 12:21  Patient On (Oxygen Delivery Method): room air  ---  PHYSICAL EXAM:   General: Awake and alert, cooperative with exam. No acute distress.   Cardiology: Normal S1, S2. No murmurs. Regular rate and rhythm.   Respiratory: Lungs clear to ascultation bilaterally. No wheezes, rales, or rhonchi.   Gastrointestinal: Positive bowel sounds. Soft. Non-tender. Non-distended. No guarding, rigidity, or rebound tenderness.  Extremities: No peripheral edema bilaterally.  Neurological: A+Ox3. CN 2-12 intact. No focal neurological deficits. Normal speech. No facial droop.  ---    Indicate ongoing risks or concerns: Will need HD M/W/F.   30 Day Supply through Meds to Beds: Completed or not. If not, please provide reason why.     GOC:   • Code Status: Full code   • Summary of Goals of Care Conversation/ what matters most    Source of Infection: n/a  Antibiotic / Last Day: n/a    Outpatient Provider: Dr. Dia Powell  SNF Provider: Dr Aram. ****     Discharging Provider:  Gauri Fuentes MD  Contact Info: [phone] - Please call with any questions or concerns.                Hospital Course  HPI:  BURKE ABARCA is a 79y year old Female with PMH of HFpEF, Chronic Kidney Disease 4, DVT (on eliquis) Hypertension, Hyperlipidemia, Type 2 Diabetes Mellitus, Anemia, OA who presented to the ER with shortness of breath that had been progressively worsening. In the ER arrival: /57; HR 64; RR 18; T 98.2; sat 100% on room air. Given lasix 80mg ivp in ER. Chest x-ray showed Interstitial edema. Mild congestive heart failure and cardiomegaly. US Renal with no evidence of hydronephrosis. TTE with 63% EF, normal RV size and function and moderate pulmonary hypertension.    Labs were significant for anemia s/p 2 units of blood transfusion, most recently Hgb 7.8. Kidney function worsening for patient with Cr 4.49, eGFR 9. Negative cANCA and pANCA. proBNP increased from 3792 to 5495.   Repeat chest x-ray showed No significant change in comparison to the prior study. Mild CHF is again suggested. Magnified cardiac silhouette remains stable. There is evidence of prior right lower neck surgery and degenerative arthritis involving the glenohumeral joints.   CT chest showed Pulmonary interstitial edema with Small right, trace left pleural effusions.     Patient was seen by nephrology who recommended to hold diuretics and NSAIDs due to worsening kidney function. Patient was also seen by cardiology team who recommended to hold further diuresis at this time per nephrology recommendations. Patient is not a candidate for SGLT2i or MRA at this time due to progressive CKD with GFR 10, continue Hydralazine, to add Isosorbide dinitrate per guidelines.     Patient was not improving and required hemodialysis. She was shuttled to [facility] for placement of permacath on 10/31/2024 and returned to [place] following the procedure. Patient started hemodialysis sessions and is now scheduled for dialysis on M/W/F. Symptoms have improved. Last HD session was 11/06/2024.     Patient is hemodynamically stable and medically optimized for discharge to acute rehab ([facility]) with dialysis with close outpatient follow-up with PCP, cardiology, and nephrology.    ---  Vital Signs Last 24 Hrs  T(C): 36.9 (06 Nov 2024 09:00), Max: 37 (06 Nov 2024 04:57)  T(F): 98.4 (06 Nov 2024 09:00), Max: 98.6 (06 Nov 2024 04:57)  HR: 69 (06 Nov 2024 09:00) (64 - 72)  BP: 107/52 (06 Nov 2024 09:00) (107/52 - 151/70)  BP(mean): --  RR: 18 (06 Nov 2024 09:00) (16 - 18)  SpO2: 98% (06 Nov 2024 09:00) (92% - 98%)    Parameters below as of 06 Nov 2024 09:00  Patient On (Oxygen Delivery Method): room air      ---  PHYSICAL EXAM:   General: Awake and alert, cooperative with exam. No acute distress. +right sided permacath, clean/dry/intact  Cardiology: Normal S1, S2. No murmurs. Regular rate and rhythm.   Respiratory: Lungs clear to ascultation bilaterally. No wheezes, rales, or rhonchi.   Gastrointestinal: Positive bowel sounds. Soft. Non-tender. Non-distended. No guarding, rigidity, or rebound tenderness.  Extremities: No peripheral edema bilaterally.  Neurological: A+Ox3. CN 2-12 intact. No focal neurological deficits. Normal speech. No facial droop.  ---    Indicate ongoing risks or concerns: Will need HD M/W/F.   30 Day Supply through Meds to Beds: None. Going to [facility]:   • Code Status: Full code     Source of Infection: n/a  Antibiotic / Last Day: n/a    Outpatient Provider: Dr. Dia Powell  SNF Provider: Dr. Saundra Chiu    Discharging Provider:  Gauri Fuentes MD  Contact Info: [phone] - Please call with any questions or concerns.                Hospital Course  HPI:  BURKE ABARCA is a 79y year old Female with PMH of HFpEF, Chronic Kidney Disease 4, DVT (on eliquis) Hypertension, Hyperlipidemia, Type 2 Diabetes Mellitus, Anemia, OA who presented to the ER with shortness of breath that had been progressively worsening. In the ER arrival: /57; HR 64; RR 18; T 98.2; sat 100% on room air. Given lasix 80mg ivp in ER. Chest x-ray showed Interstitial edema. Mild congestive heart failure and cardiomegaly. US Renal with no evidence of hydronephrosis. TTE with 63% EF, normal RV size and function and moderate pulmonary hypertension.    Labs were significant for anemia s/p 2 units of blood transfusion, most recently Hgb 7.8. Kidney function worsening for patient with Cr 4.49, eGFR 9. Negative cANCA and pANCA. proBNP increased from 3792 to 5495.   Repeat chest x-ray showed No significant change in comparison to the prior study. Mild CHF is again suggested. Magnified cardiac silhouette remains stable. There is evidence of prior right lower neck surgery and degenerative arthritis involving the glenohumeral joints.   CT chest showed Pulmonary interstitial edema with Small right, trace left pleural effusions.     Patient was seen by nephrology who recommended to hold diuretics and NSAIDs due to worsening kidney function. Patient was also seen by cardiology team who recommended to hold further diuresis at this time per nephrology recommendations. Patient is not a candidate for SGLT2i or MRA at this time due to progressive CKD with GFR 10, continue Hydralazine, to add Isosorbide dinitrate per guidelines.     Patient was not improving and required hemodialysis. She was shuttled to [facility] for placement of permacath on 10/31/2024 and returned to [place] following the procedure. Patient started hemodialysis sessions and is now scheduled for dialysis on M/W/F. Symptoms have improved. Last HD session was 11/06/2024.     Patient is hemodynamically stable and medically optimized for discharge to acute rehab ([facility]) with dialysis with close outpatient follow-up with PCP, cardiology, and nephrology.    ---  Vital Signs Last 24 Hrs  T(C): 36.9 (06 Nov 2024 09:00), Max: 37 (06 Nov 2024 04:57)  T(F): 98.4 (06 Nov 2024 09:00), Max: 98.6 (06 Nov 2024 04:57)  HR: 69 (06 Nov 2024 09:00) (64 - 72)  BP: 107/52 (06 Nov 2024 09:00) (107/52 - 151/70)  BP(mean): --  RR: 18 (06 Nov 2024 09:00) (16 - 18)  SpO2: 98% (06 Nov 2024 09:00) (92% - 98%)    Parameters below as of 06 Nov 2024 09:00  Patient On (Oxygen Delivery Method): room air      ---  PHYSICAL EXAM:   General: Awake and alert, cooperative with exam. No acute distress. +right sided permacath, clean/dry/intact  Cardiology: Normal S1, S2. No murmurs. Regular rate and rhythm.   Respiratory: Lungs clear to ascultation bilaterally. No wheezes, rales, or rhonchi.   Gastrointestinal: Positive bowel sounds. Soft. Non-tender. Non-distended. No guarding, rigidity, or rebound tenderness.  Extremities: No peripheral edema bilaterally.  Neurological: A+Ox3. CN 2-12 intact. No focal neurological deficits. Normal speech. No facial droop.  ---    Indicate ongoing risks or concerns: Will need HD M/W/F.   30 Day Supply through Meds to Beds: None. [facility]:   • Code Status: Full code     Source of Infection: n/a  Antibiotic / Last Day: n/a    Outpatient Provider: Dr. Dia Powell  SNF Provider: Dr. Benjamin Chiu (notified)     Discharging Provider:  Gauri Fuentes MD  Contact Info: [phone] - Please call with any questions or concerns.

## 2024-10-23 NOTE — PROGRESS NOTE ADULT - ASSESSMENT
BURKE ABARCA is a 79y year old Female with PMH of HFpEF, Chronic Kidney Disease 4, DVT (on eliquis) Hypertension, Hyperlipidemia, Type 2 Diabetes Mellitus, Anemia, OA who presents to the ER complaining of shortness of breath x2 days. Admitted for further evaluation of CHF exacerbation.     #Shortness of breath - HFpEF exacerbation   #Acute Kidney Injury on Chronic Kidney Disease 4  #Anemia, likely chronic disease   - CXR with Interstitial edema with mild congestive heart failure.   - remote tele  - s/p lasix 80mg ivp in ER, was on bumex 2mg bid since august per patient.   - s/p IV lasix 40mg   - TTE 10/20/24: LVEF 63%, grade 2 diastolic dysfunction  - proBNP 5495 increased from 3792 on 10/19  - Hgb 7.1> 6.5 > 6.8> 7.5> 7.1 > 7.0 > 7.8 this AM  - s/p 1 U pRBC 10/20/24, repeat hgb 7.1  - s/p 1 U pRBC 10/22/24, repeat hgb 7.8  - iron studies: low transferrin, high ferritin  - transfuse if <7  - continue weekly epoietin  - nephro recs appreciated: hold diuretics, no NSAIDs, c/w epoetin  - cardio recs appreciated: gdmt contraindicated 2/2 CKD, hold diuretics    #Hypertension  - continue home diltiazem, hydralazine, labetalol    #Hyperlipidemia  - continue home crestor, ezetemibe    #Type II Diabetes Mellitus  - hold home meds  - FS and DANGELO  - maintain blood glucose 100-180 while hospitalized     #history of DVT  - per patient she had RUE DVT while at [facility]  - has been on eliquis 2.5mg bid for nearly 3 months   - c/w eliquis 2.5mg    #DVT ppx  - Eliquis    #GOC  -full code     #Diet  -DASH/TLC, consistent carbs    Case seen and discussed with Dr. Jeong.

## 2024-10-23 NOTE — PROGRESS NOTE ADULT - ASSESSMENT
79 year old Female with PMH of HFpEF, Chronic Kidney Disease 4, DVT (on eliquis), Hypertension, Hyperlipidemia, Type 2 Diabetes Mellitus, Anemia who presents to the ER with shortness of breath x2 days admitted for acute on chronic HFpEF, anemia and progressive renal disease.    ekg sinus rhythm with 1st degree block  troponin negative. probnp elevated   XR with mild CHF    recommendations  pt with acute on chronic CKD, hold diuresis as per renal  further gdmt contraindicated with gfr 10  TTE with ef 63%, normal lv function, moderate pulmonary htn  continue home cv meds for htn management  anemia tx as per primary team for additional dyspnea management  followup renal reccs    pt cv stable 79 year old Female with PMH of HFpEF, Chronic Kidney Disease 4, DVT (on eliquis), Hypertension, Hyperlipidemia, Type 2 Diabetes Mellitus, Anemia who presents to the ER with shortness of breath x2 days admitted for acute on chronic HFpEF, anemia and progressive renal disease.    ekg sinus rhythm with 1st degree block  troponin negative x 2. probnp elevated   XR with mild CHF    recommendations  pt with acute on chronic CKD, hold diuresis as per renal  further gdmt contraindicated with gfr 10  TTE with ef 63%, normal lv function, moderate pulmonary htn  continue home cv meds for htn management  anemia tx as per primary team for additional dyspnea management  followup renal reccs    pt cv stable

## 2024-10-23 NOTE — DISCHARGE NOTE PROVIDER - NSDCCPCAREPLAN_GEN_ALL_CORE_FT
PRINCIPAL DISCHARGE DIAGNOSIS  Diagnosis: Acute on chronic systolic congestive heart failure  Assessment and Plan of Treatment: You were seen and admitted to the hospital for shortness of breath likely secondary to your chronic heart failure and chronic kidney disease. You were given multiple doses of lasix to help with symptoms. You were seen by the nephrology team who then recommended to discontinue the lasix due to worsening kidney function. You were also seen by the cardiology team who also recommended to stop the lasix at this time. It is important that you follow up with cardiology and nephrology as well as your PCP outpatient on discharge. Please continue all medications as prescribed.   -  Heart failure (HF) is a condition that does not allow your heart to fill or pump properly. Not enough oxygen in your blood gets to your organs and tissues. HF can occur in the right side, the left side, or both lower chambers of your heart. HF is often caused by damage or injury to your heart. The damage may be caused by heart attack, other heart conditions, or high blood pressure. HF is a long-term condition that tends to get worse over time. It is important to manage your health to improve your quality of life. HF can be worsened by heavy alcohol use, smoking, diabetes that is not controlled, or obesity.  Call 911 for:  Squeezing, pressure, or pain in your chest that lasts longer than 5 minutes or returns  Discomfort or pain in your back, neck, jaw, stomach, or arm  Trouble breathing  Nausea or vomiting  Lightheadedness or a sudden cold sweat, especially with chest pain or trouble breathing.  Seek care immediately if:  You gain 3 or more pounds (1.4 kg) in a day  Your heartbeat is fast, slow, or uneven all the time.  Do not smoke.   Do not drink alcohol Weigh yourself every morning. Use the same scale, in the same spot. Do this after you use the bathroom, but before you eat or drink anything. Record your weight each day so you will notice any sudden weight gain. Swelling and weight gain are signs of fluid retention.Manage any chronic health conditions you have.     PRINCIPAL DISCHARGE DIAGNOSIS  Diagnosis: Acute on chronic systolic congestive heart failure  Assessment and Plan of Treatment: You were seen and admitted to the hospital for shortness of breath likely secondary to your chronic heart failure and chronic kidney disease. You were given multiple doses of lasix to help with symptoms. You were seen by the nephrology team who then recommended to discontinue the lasix due to worsening kidney function. You were also seen by the cardiology team who also recommended to stop the lasix at this time. It is important that you follow up with cardiology and nephrology as well as your PCP outpatient on discharge. Please continue all medications as prescribed.   -  Heart failure (HF) is a condition that does not allow your heart to fill or pump properly. Not enough oxygen in your blood gets to your organs and tissues. HF can occur in the right side, the left side, or both lower chambers of your heart. HF is often caused by damage or injury to your heart. The damage may be caused by heart attack, other heart conditions, or high blood pressure. HF is a long-term condition that tends to get worse over time. It is important to manage your health to improve your quality of life. HF can be worsened by heavy alcohol use, smoking, diabetes that is not controlled, or obesity.  Call 911 for:  Squeezing, pressure, or pain in your chest that lasts longer than 5 minutes or returns  Discomfort or pain in your back, neck, jaw, stomach, or arm  Trouble breathing  Nausea or vomiting  Lightheadedness or a sudden cold sweat, especially with chest pain or trouble breathing.  Seek care immediately if:  You gain 3 or more pounds (1.4 kg) in a day  Your heartbeat is fast, slow, or uneven all the time.  Do not smoke.   Do not drink alcohol Weigh yourself every morning. Use the same scale, in the same spot. Do this after you use the bathroom, but before you eat or drink anything. Record your weight each day so you will notice any sudden weight gain. Swelling and weight gain are signs of fluid retention.Manage any chronic health conditions you have.      SECONDARY DISCHARGE DIAGNOSES  Diagnosis: CKD (chronic kidney disease)  Assessment and Plan of Treatment: You were seen and admitted for worsening chronic kidney function. You were seen by our nephrology team who found that you would be a good candidate for dialysis. You had a permacath placed on 10/31/2024 at [facility] and now have had a total of 5 dialysis sessions. You are scheduled to continue sessions on Mondays, Wednesday, and Fridays at your subacute rehab. Please continue taking all medications as prescribed.   -  Your kidney function is not optimal. This can be due to a variety of reasons. If you were not aware of this, you should discuss with your Primary Care Doctor about seeing a Nephrologist. If you already have a nephrologist, be sure to continue following up as scheduled.     PRINCIPAL DISCHARGE DIAGNOSIS  Diagnosis: Acute on chronic systolic congestive heart failure  Assessment and Plan of Treatment: You came to the hospital due to:  Shortness of breath  You were diagnosed with: Acute on chronic systolic conjestive heart failure  You were treated with: Lasix to help with symptoms. You were seen by the nephrology team who then recommended to discontinue the lasix due to worsening kidney function. You were also seen by the cardiology team who also recommended to stop the lasix at this time. Due to the continued fluid overload, you were started on dialysis, with improvement in symptoms.   You will need to follow up with your primary care physician, nephrologist, and cardiologist for further management.  Discharging Provider:  Gauri Fuentes MD  Contact Info: [phone] - Please call with any questions or concerns.  ===  Heart failure (HF) is a condition that does not allow your heart to fill or pump properly. Not enough oxygen in your blood gets to your organs and tissues. HF can occur in the right side, the left side, or both lower chambers of your heart. HF is often caused by damage or injury to your heart.   Call 911 for:  Squeezing, pressure, or pain in your chest that lasts longer than 5 minutes or returns  Discomfort or pain in your back, neck, jaw, stomach, or arm  Trouble breathing  Nausea or vomiting  Lightheadedness or a sudden cold sweat, especially with chest pain or trouble breathing.  Seek care immediately if:  You gain 3 or more pounds (1.4 kg) in a day  Your heartbeat is fast, slow, or uneven all the time.  Do not smoke.   Do not drink alcohol Weigh yourself every morning. Use the same scale, in the same spot. Do this after you use the bathroom, but before you eat or drink anything. Record your weight each day so you will notice any sudden weight gain. Swelling and weight gain are signs of fluid retention.Manage any chronic health conditions you have.      SECONDARY DISCHARGE DIAGNOSES  Diagnosis: CKD (chronic kidney disease)  Assessment and Plan of Treatment: You were seen and admitted for worsening chronic kidney function. You were seen by our nephrology team who found that you would be a good candidate for dialysis. You had a permacath placed on 10/31/2024 at [facility] and have started dialysis. You are scheduled to continue sessions on Mondays, Wednesday, and Fridays at your subacute rehab. Please continue taking all medications as prescribed.   -  Your kidney function is not optimal. This can be due to a variety of reasons. If you were not aware of this, you should discuss with your Primary Care Doctor about seeing a Nephrologist. If you already have a nephrologist, be sure to continue following up as scheduled.    Diagnosis: Anemia of chronic disease  Assessment and Plan of Treatment: You were found to be anemia and received a total of 2 units of packed red blood cells during your admission. You were also started on weekly epoietin for anemia.    Diagnosis: Hyponatremia  Assessment and Plan of Treatment:     Diagnosis: HTN (hypertension)  Assessment and Plan of Treatment:     Diagnosis: Type 2 diabetes mellitus  Assessment and Plan of Treatment:      PRINCIPAL DISCHARGE DIAGNOSIS  Diagnosis: Acute on chronic systolic congestive heart failure  Assessment and Plan of Treatment: You came to the hospital due to:  Shortness of breath  You were diagnosed with: Acute on chronic systolic conjestive heart failure  You were treated with: Lasix to help with symptoms. You were seen by the nephrology team who then recommended to discontinue the lasix due to worsening kidney function. You were also seen by the cardiology team who also recommended to stop the lasix at this time. Due to the continued fluid overload, you were started on dialysis, with improvement in symptoms.   You will need to follow up with your primary care physician, nephrologist, and cardiologist for further management.  Discharging Provider:  Gauri Fuentes MD  Contact Info: [phone] - Please call with any questions or concerns.  ===  Heart failure (HF) is a condition that does not allow your heart to fill or pump properly. Not enough oxygen in your blood gets to your organs and tissues. HF can occur in the right side, the left side, or both lower chambers of your heart. HF is often caused by damage or injury to your heart.   Call 911 for:  Squeezing, pressure, or pain in your chest that lasts longer than 5 minutes or returns  Discomfort or pain in your back, neck, jaw, stomach, or arm  Trouble breathing  Nausea or vomiting  Lightheadedness or a sudden cold sweat, especially with chest pain or trouble breathing.  Seek care immediately if:  You gain 3 or more pounds (1.4 kg) in a day  Your heartbeat is fast, slow, or uneven all the time.  Do not smoke.   Do not drink alcohol Weigh yourself every morning. Use the same scale, in the same spot. Do this after you use the bathroom, but before you eat or drink anything. Record your weight each day so you will notice any sudden weight gain. Swelling and weight gain are signs of fluid retention.Manage any chronic health conditions you have.      SECONDARY DISCHARGE DIAGNOSES  Diagnosis: CKD (chronic kidney disease)  Assessment and Plan of Treatment: You were seen and admitted for worsening chronic kidney function. You were seen by our nephrology team who found that you would be a good candidate for dialysis. You had a permacath placed on 10/31/2024 at [facility] and have started dialysis. You are scheduled to continue sessions on Mondays, Wednesday, and Fridays at your subacute rehab. Please continue taking all medications as prescribed.   -  Your kidney function is not optimal. This can be due to a variety of reasons. If you were not aware of this, you should discuss with your Primary Care Doctor about seeing a Nephrologist. If you already have a nephrologist, be sure to continue following up as scheduled.    Diagnosis: Anemia of chronic disease  Assessment and Plan of Treatment: You were found to be anemia and received a total of 2 units of packed red blood cells during your admission. You were also started on weekly epoietin for anemia.    Diagnosis: Hyponatremia  Assessment and Plan of Treatment:     Diagnosis: HTN (hypertension)  Assessment and Plan of Treatment:     Diagnosis: Type 2 diabetes mellitus  Assessment and Plan of Treatment:     Diagnosis: Cardiorenal syndrome  Assessment and Plan of Treatment:     Diagnosis: Obesity (BMI 35.0-39.9 without comorbidity)  Assessment and Plan of Treatment:

## 2024-10-23 NOTE — PROGRESS NOTE ADULT - SUBJECTIVE AND OBJECTIVE BOX
Patient is a 79y old  Female who presents with a chief complaint of shortness of breath (20 Oct 2024 11:57)      INTERVAL HPI/ OVERNIGHT EVENTS: Patient seen and examined at bedside. States that breathing has improved today. No other complaints overnight.     Vital Signs Last 24 Hrs  T(C): 36.4 (23 Oct 2024 12:31), Max: 36.7 (22 Oct 2024 19:30)  T(F): 97.6 (23 Oct 2024 12:31), Max: 98.1 (23 Oct 2024 05:13)  HR: 67 (23 Oct 2024 12:31) (63 - 73)  BP: 130/60 (23 Oct 2024 12:31) (129/62 - 143/74)  BP(mean): 97 (23 Oct 2024 05:13) (97 - 97)  RR: 17 (23 Oct 2024 12:31) (17 - 19)  SpO2: 96% (23 Oct 2024 12:31) (95% - 98%)    Parameters below as of 23 Oct 2024 12:31  Patient On (Oxygen Delivery Method): nasal cannula  O2 Flow (L/min): 2      REVIEW OF SYSTEMS:  CONSTITUTIONAL: No fever or chills  HEENT:  No headache, no sore throat  RESPIRATORY: No cough, wheezing, +shortness of breath  CARDIOVASCULAR: No chest pain, palpitations  GASTROINTESTINAL: No abd pain, nausea, vomiting, or diarrhea  GENITOURINARY: No dysuria, frequency, or hematuria  NEUROLOGICAL: no focal weakness or dizziness  MUSCULOSKELETAL: no myalgias       PHYSICAL EXAM:  General: NAD, morbidly obese female, lying in bed  Respiratory: B/L crackles throughout, + expiratory wheezes  CV: RRR, +S1/S2, +systolic murmur  Abdominal: Soft, Non tender, Nondistended +Bowel sounds   Extremities: +chronic skin changes with b/l hyperpigmentation, no ulcers noted. No edema, 2+ peripheral pulses  NERVOUS SYSTEM: answers questions and follows commands appropriately, A&Ox3, grossly moves all extremities   PSYCH: Appropriate affect, Alert & Awake; Good judgement      LABS: Personally reviewed                                   7.8    8.68  )-----------( 164      ( 23 Oct 2024 05:50 )             25.1     10-23    134[L]  |  98  |  154[H]  ----------------------------<  121[H]  5.0   |  19[L]  |  4.49[H]    Ca    8.1[L]      23 Oct 2024 05:50  Phos  9.1     10-23  Mg     2.3     10-22    TPro  7.3  /  Alb  3.3  /  TBili  0.4  /  DBili  x   /  AST  9[L]  /  ALT  20  /  AlkPhos  73  10-22    CAPILLARY BLOOD GLUCOSE  POCT Blood Glucose.: 231 mg/dL (23 Oct 2024 12:19)  POCT Blood Glucose.: 137 mg/dL (23 Oct 2024 08:26)  POCT Blood Glucose.: 131 mg/dL (22 Oct 2024 22:01)  POCT Blood Glucose.: 148 mg/dL (22 Oct 2024 17:10)      IMAGING  CHEST XRAY  IMPRESSION  No significant change in comparison to the prior study. Mild CHF is   again suggested. Magnified cardiac silhouette remains stable. There is   evidence of prior right lower neck surgery and degenerative arthritis   involving the glenohumeral joints.     TTE Echo Complete w/o Contrast w/ Doppler    Summary:   1. Normal global left ventricular systolic function.   2. Spectral Doppler shows pseudonormal pattern of left ventricular   myocardial filling (Grade II diastolic dysfunction).   3. Normal right ventricular size and function.   4. Mildly enlarged left atrium.   5. The right atrium is normal in size.   6. Mild mitral valve regurgitation.   7. Mild thickening and calcification of the anterior and posterior   mitral valve leaflets.   8. Mild tricuspid regurgitation.   9. Estimated pulmonary artery systolic pressure is 57.8 mmHg assuming a   right atrial pressure of 3 mmHg, which is consistent with moderate   pulmonary hypertension.  10. Pulmonary hypertension is present.    RADIOLOGY & ADDITIONAL TESTS: Personally reviewed.     Consultant(s) Notes Reviewed:  [x] YES  [ ] NO   Discussed with JOSE/PATTIE, RN

## 2024-10-23 NOTE — PROGRESS NOTE ADULT - ATTENDING COMMENTS
79y year old Female with PMH of HFpEF, Chronic Kidney Disease 4, DVT (on eliquis) Hypertension, Hyperlipidemia, Type 2 Diabetes Mellitus, Anemia, OA, admitted with sob x 1 day. no chest pain/ palpitations. No fevers.    stated shortness of breath improved. received 1 unit of blood yesterday.  T(C): 36.4 (10-23-24 @ 12:31), Max: 36.7 (10-22-24 @ 19:30)  T(F): 97.6 (10-23-24 @ 12:31), Max: 98.1 (10-23-24 @ 05:13)  HR: 67 (10-23-24 @ 12:31) (63 - 73)  BP: 130/60 (10-23-24 @ 12:31) (129/62 - 143/74)  ABP: --  ABP(mean): --  RR: 17 (10-23-24 @ 12:31) (17 - 19)  SpO2: 96% (10-23-24 @ 12:31) (96% - 98%)    on exam aaox3, morbid obesity +, lungs with crackles at bases, poor inspiratory effort, no wheezing,  s1, s2, regular, abdomen- soft, b/l legs with chronic skin changes.  LABS:                        7.8    8.68  )-----------( 164      ( 23 Oct 2024 05:50 )             25.1     10-23    134[L]  |  98  |  154[H]  ----------------------------<  121[H]  5.0   |  19[L]  |  4.49[H]    Ca    8.1[L]      23 Oct 2024 05:50  Phos  9.1     10-23  Mg     2.3     10-22    TPro  7.3  /  Alb  3.3  /  TBili  0.4  /  DBili  x   /  AST  9[L]  /  ALT  20  /  AlkPhos  73  10-22    Consultant(s) Notes Reviewed:  [x ] YES  [ ] NO  Care Discussed with Consultants/Other Providers [ x] YES  [ ] NO  Imaging Personally Reviewed:  [x ] YES  [ ] NO    a/p:  # acute excerbation of HFpEF  # Acute on chronic anemia of chronic disease  # DM2 with hyperglycemia  # Underlying h/o Chronic Kidney Disease 4, DVT (on eliquis) Hypertension, Hyperlipidemia,  OA  -s/p transfusion of 1 more unit, monitor h/h. holding lasix as per nephro. maintain supplemental oxygen. if becomes acutely dyspneic and decompensates, may require hemodialysis.  no evidence of bleeding, patient already on epo. monitor fingersticks, continue insulin. Not a candidate for farxiga/ entrsto/ mra  because of renal function. continue other home meds for chronic conditions.   - goals of care discussion  - rest as per resident note  medically active 48-72 hrs.

## 2024-10-23 NOTE — PHYSICAL THERAPY INITIAL EVALUATION ADULT - ADDITIONAL COMMENTS
Pt lives with friend in  with 2 steps to enter. Pt uses RW for short distances from dining room to bathroom, also uses w/c indoors and outdoors. Reports a decline in fuction from SOB and worsening OA in knees

## 2024-10-23 NOTE — DISCHARGE NOTE PROVIDER - NSDCCAREPROVSEEN_GEN_ALL_CORE_FT
Jean-Paul, Carmita Raya, Patricia Jain, [facility]  Kevania, Daviad Storey, Jyoti Ahmadi, Dexter Jurado, Elzbieta Rm, Kimberly Washburn Ma. Floyd Jean-Paul, Carmita Raya, Patricia Jain, [facility]  Ginette-Ethel, Davida Storey, Jyoti Ahmadi, Dexter [facility], Elzbieta Rm, Kimberly Washburn Ma. Floyd Patton, Namyin Jeong, Carmita Hoover, Maribell Raya, Patricia Fuentes, Gauri Hansen, Fernando Perez, Yanni  [facility], Elzbieta Zuleta, Ginna White, Skip Maurice, Francisco Alexander, Mukund Storey, Jyoti Ahmadi, Dexter Hendricks, Laila

## 2024-10-23 NOTE — GOALS OF CARE CONVERSATION - ADVANCED CARE PLANNING - CONVERSATION DETAILS
discussed advance directives with patient. she stated if she is ' brain dead' then she should be allowed to pass peacefully.  But when talked about CPR, mechanical ventilation, patient did not have any answer.

## 2024-10-23 NOTE — CONSULT NOTE ADULT - LOCATION OF DISCUSSION
ATTN: Case Management  RE: Referral for Home Health    As of 7/9/2020, we have accepted the Home Health referral for the patient listed above.    A [facility] clinician will be out to see the patient within 48 hours. If you have any questions or concerns regarding the patient’s transition to Home Health, please do not hesitate to contact us at [phone].      We look forward to collaborating with you,  [facility] Home Health Team   Face to face

## 2024-10-23 NOTE — CONSULT NOTE ADULT - CONVERSATION DETAILS
Introduced palliative care team to pt at bedside and discussed supportive care role. Discussed pt's clinical status and risk of future exacerbations in setting of underlying CHF. She expressed understanding that her kidney function was elevated and that she had to be given diuretics for her CHF cautiously due to CKD. I asked pt if she was familiar with HD and if she would be open to it if needed. Pt shared her father survived 15 years on HD and her brother was also on it. We discussed what HD would entail with 3x weekly sessions for multiple hours a day; pt was okay with HD if she needed it.   We also discussed code status and CPR. Pt stated she wanted chest compressions/intubation if needed in the event of cardiac/resp arrest. She stated she would not want to be long term on a ventilator however, especially if there was no recovery of brain function. Pt remains FULL CODE.

## 2024-10-23 NOTE — PHYSICAL THERAPY INITIAL EVALUATION ADULT - SITTING BALANCE: STATIC
PROBLEMS:    Febrile syndrome/Sepsis possible pneumonia/asthmatic bronchitis  GAIL mass  HTN  Advanced  Dementia  Severe Protein Calorie Malnutrition     PLAN;    PULMONARY STABLE  PAT CT CHEST GAIL MASS VS SCAR NEEDS FU CT VS PET SCAN AS OUTPAT IN 2 MONTHS  SUPPORTIVE CARE  POOR PROGNOSIS  D/W STAFF   DVT PROPHYLASIX normal balance

## 2024-10-23 NOTE — PHYSICAL THERAPY INITIAL EVALUATION ADULT - ASSISTIVE DEVICE FOR TRANSFER: GAIT, REHAB EVAL
Near syncope, hypotension, DDX arrythmia, orthostatic hypotension, polypharmacy  PPM Hx  Increased confusion  Troponin elevation, CAD  Dehydration, ARF  Mild Increase in Chronic anemia  Fall 2 days PTA    - s/p 500ml Normal Saline in the ED  - hold lasix today.  Pt's son Jonn reports pt may be taking lasix for CHF hx.  He will review meds at home with pts wife to check for incorrect administration or duplicate administration of bp meds.   - repeat labs  - cardiology consult, PPM eval  - hypotension, will decrease coreg 12.5 bid to 6.25 BID  - Advanced directives, discussed with pt's son, HCP, Jonn at bedside.  Pt's son will complete Molst after further discussion with his family.  He is interested in signing a DNR after review with his family.  - DVT prophylaxis : heparin  -FOBT negative rolling walker

## 2024-10-23 NOTE — PROGRESS NOTE ADULT - SUBJECTIVE AND OBJECTIVE BOX
BURKE ABARCA  [id number]    Chief Complaint: HFpEF exacerbation/Anemia/Progressive CKD  Interval events: pt seen and examined, labs and chart reviewed. reports improved breathing. sinus 60s on tele    ALLERGIES:  ACE inhibitors (Angioedema)  statins (Muscle Pain)  predniSONE (Other)        MEDICATIONS  (STANDING):  apixaban 2.5 milliGRAM(s) Oral two times a day  calcium acetate 667 milliGRAM(s) Oral three times a day with meals  dextrose 5%. 1000 milliLiter(s) (100 mL/Hr) IV Continuous <Continuous>  dextrose 5%. 1000 milliLiter(s) (50 mL/Hr) IV Continuous <Continuous>  dextrose 50% Injectable 12.5 Gram(s) IV Push once  dextrose 50% Injectable 25 Gram(s) IV Push once  dextrose 50% Injectable 25 Gram(s) IV Push once  diltiazem    milliGRAM(s) Oral daily  epoetin jacobo-epbx (RETACRIT) Injectable 07937 Unit(s) SubCutaneous every 7 days  ezetimibe 10 milliGRAM(s) Oral daily  ferrous    sulfate 325 milliGRAM(s) Oral daily  glucagon  Injectable 1 milliGRAM(s) IntraMuscular once  hydrALAZINE 100 milliGRAM(s) Oral three times a day  influenza  Vaccine (HIGH DOSE) 0.5 milliLiter(s) IntraMuscular once  insulin lispro (ADMELOG) corrective regimen sliding scale   SubCutaneous at bedtime  insulin lispro (ADMELOG) corrective regimen sliding scale   SubCutaneous three times a day before meals  labetalol 600 milliGRAM(s) Oral two times a day  rosuvastatin 5 milliGRAM(s) Oral at bedtime      ROS:  All 10 systems reviewed and positives noted in HPI    OBJECTIVE:    VITAL SIGNS:  Vital Signs Last 24 Hrs  T(C): 37.1 (21 Oct 2024 05:07), Max: 37.1 (21 Oct 2024 05:07)  T(F): 98.7 (21 Oct 2024 05:07), Max: 98.7 (21 Oct 2024 05:07)  HR: 69 (21 Oct 2024 05:07) (64 - 69)  BP: 149/76 (21 Oct 2024 05:07) (122/58 - 149/76)  BP(mean): 94 (20 Oct 2024 11:37) (94 - 94)  RR: 18 (21 Oct 2024 05:07) (17 - 18)  SpO2: 96% (21 Oct 2024 05:07) (96% - 98%)    Parameters below as of 21 Oct 2024 05:07  Patient On (Oxygen Delivery Method): nasal cannula  O2 Flow (L/min): 2      PHYSICAL EXAM:  General:  no distress  HEENT: sclera anicteric  Neck: supple, no carotid bruits b/l  CVS: + jvd, RRR, no murmurs/rubs/gallops  Chest: diminished breath sounds  Abdomen: non-distended  Extremities: no lower extremity edema b/l  Neuro: awake, alert & oriented x 3      LABS:                        7.1    7.64  )-----------( 165      ( 21 Oct 2024 07:00 )             23.2     10-21    138  |  102  |  144[H]  ----------------------------<  148[H]  4.6   |  21[L]  |  4.05[H]    Ca    7.9[L]      21 Oct 2024 07:00  Phos  7.2     10-21  Mg     2.0     10-21    TPro  7.0  /  Alb  3.1[L]  /  TBili  0.4  /  DBili  x   /  AST  7[L]  /  ALT  13  /  AlkPhos  66  10-21        ECG: sinus rhythm, 1st degree av block      TTE: < from: TTE Echo Complete w/o Contrast w/ Doppler (10.20.24 @ 09:26) >   1. Normal global left ventricular systolic function.   2. Spectral Doppler shows pseudonormal pattern of left ventricular   myocardial filling (Grade II diastolic dysfunction).   3. Normal right ventricular size and function.   4. Mildly enlarged left atrium.   5. The right atrium is normal in size.   6. Mild mitral valve regurgitation.   7. Mild thickening and calcification of the anterior and posterior   mitral valve leaflets.   8. Mild tricuspid regurgitation.   9. Estimated pulmonary artery systolic pressure is 57.8 mmHg assuming a   right atrial pressure of 3 mmHg, which is consistent with moderate   pulmonary hypertension.  10. Pulmonary hypertension is present.

## 2024-10-23 NOTE — DISCHARGE NOTE PROVIDER - PROVIDER TOKENS
PROVIDER:[TOKEN:[[id number]],FOLLOWUP:[1 week],ESTABLISHEDPATIENT:[T]] PROVIDER:[TOKEN:[[id number]],FOLLOWUP:[1 week],ESTABLISHEDPATIENT:[T]],PROVIDER:[TOKEN:[[id number]],FOLLOWUP:[1 week]] PROVIDER:[TOKEN:[[id number]],FOLLOWUP:[1 week],ESTABLISHEDPATIENT:[T]],PROVIDER:[TOKEN:[[id number]],FOLLOWUP:[1 week]],PROVIDER:[TOKEN:[[id number]]]

## 2024-10-23 NOTE — DISCHARGE NOTE PROVIDER - ATTENDING DISCHARGE PHYSICAL EXAMINATION:
A+Ox3, no murmurs, lungs CTA b/l, abdomen soft/NT/ND, no lower extremity swelling, right wall HD catheter.

## 2024-10-23 NOTE — PHYSICAL THERAPY INITIAL EVALUATION ADULT - GAIT TRAINING, PT EVAL
In 1-3 therapy sessions pt will be able to ambulate 25 ft with mod A  1 and with appropriate assistive device.

## 2024-10-23 NOTE — DISCHARGE NOTE PROVIDER - CARE PROVIDER_API CALL
Andre Hernandez [facility]  Internal Medicine  [address] [place] [address]  Phone: [phone]  Fax: [phone]  Established Patient  Follow Up Time: 1 week   Andre Hernandez [facility]  Internal Medicine  [address] [place] [address]  Phone: [phone]  Fax: [phone]  Established Patient  Follow Up Time: 1 week    Jyoti Storey  Nephrology  [address] [facility], [address] [place] [address]  Phone: [phone]  Fax: [phone]  Follow Up Time: 1 week   Andre Hernandez [facility]  Internal Medicine  [address] [place] [address]  Phone: [phone]  Fax: [phone]  Established Patient  Follow Up Time: 1 week    Jyoti Storey  Nephrology  [address] [facility], [address]  [place] [address]  Phone: [phone]  Fax: [phone]  Follow Up Time: 1 week    Jovan Jain  Cardiovascular Disease  [address] [facility], [address]  [place] [address]  Phone: [phone]  Fax: [phone]  Follow Up Time:

## 2024-10-23 NOTE — PHYSICAL THERAPY INITIAL EVALUATION ADULT - IMPAIRMENTS CONTRIBUTING TO GAIT DEVIATIONS, PT EVAL
Dyspnea on exertion/impaired balance/decreased flexibility/pain/impaired postural control/decreased ROM/decreased strength

## 2024-10-23 NOTE — PROGRESS NOTE ADULT - SUBJECTIVE AND OBJECTIVE BOX
Comfortable on NC O2 at rest    Vital Signs Last 24 Hrs  T(C): 36.4 (10-23-24 @ 12:31), Max: 36.7 (10-23-24 @ 05:13)  T(F): 97.6 (10-23-24 @ 12:31), Max: 98.1 (10-23-24 @ 05:13)  HR: 67 (10-23-24 @ 12:31) (67 - 73)  BP: 130/60 (10-23-24 @ 12:31) (130/60 - 143/74)  BP(mean): 97 (10-23-24 @ 05:13) (97 - 97)  RR: 17 (10-23-24 @ 12:31) (17 - 19)  SpO2: 96% (10-23-24 @ 12:31) (95% - 97%)    I&O's Detail    22 Oct 2024 07:01  -  23 Oct 2024 07:00  --------------------------------------------------------  OUT:    Voided (mL): 1000 mL  Total OUT: 1000 mL    s1s2  b/l air entry  soft, ND  tr edema                                        7.8    8.68  )-----------( 164      ( 23 Oct 2024 05:50 )             25.1     23 Oct 2024 05:50    134    |  98     |  154    ----------------------------<  121    5.0     |  19     |  4.49     Ca    8.1        23 Oct 2024 05:50  Phos  9.1       23 Oct 2024 05:50  Mg     2.3       22 Oct 2024 07:07    TPro  7.3    /  Alb  3.3    /  TBili  0.4    /  DBili  x      /  AST  9      /  ALT  20     /  AlkPhos  73     22 Oct 2024 07:07    LIVER FUNCTIONS - ( 22 Oct 2024 07:07 )  Alb: 3.3 g/dL / Pro: 7.3 g/dL / ALK PHOS: 73 U/L / ALT: 20 U/L / AST: 9 U/L / GGT: x           acetaminophen     Tablet .. 650 milliGRAM(s) Oral every 6 hours PRN  apixaban 2.5 milliGRAM(s) Oral two times a day  calcium acetate 1334 milliGRAM(s) Oral three times a day with meals  dextrose 5%. 1000 milliLiter(s) IV Continuous <Continuous>  dextrose 5%. 1000 milliLiter(s) IV Continuous <Continuous>  dextrose 50% Injectable 12.5 Gram(s) IV Push once  dextrose 50% Injectable 25 Gram(s) IV Push once  dextrose 50% Injectable 25 Gram(s) IV Push once  dextrose Oral Gel 15 Gram(s) Oral once PRN  diltiazem    milliGRAM(s) Oral daily  epoetin jacobo-epbx (RETACRIT) Injectable 77797 Unit(s) SubCutaneous every 7 days  ezetimibe 10 milliGRAM(s) Oral daily  ferrous    sulfate 325 milliGRAM(s) Oral daily  glucagon  Injectable 1 milliGRAM(s) IntraMuscular once  hydrALAZINE 100 milliGRAM(s) Oral three times a day  influenza  Vaccine (HIGH DOSE) 0.5 milliLiter(s) IntraMuscular once  insulin lispro (ADMELOG) corrective regimen sliding scale   SubCutaneous at bedtime  insulin lispro (ADMELOG) corrective regimen sliding scale   SubCutaneous three times a day before meals  labetalol 600 milliGRAM(s) Oral two times a day  melatonin 3 milliGRAM(s) Oral at bedtime PRN  ondansetron Injectable 4 milliGRAM(s) IV Push every 8 hours PRN  rosuvastatin 5 milliGRAM(s) Oral at bedtime  sodium bicarbonate 650 milliGRAM(s) Oral three times a day    A/P:    DM, HTN, CKD 4 (Cr 2.13 - 7/2/24, Cr 2.35 - 10/7/24)  Adm w/SOB iso severe anemia, some PVC on CXR  Cardio-renal/hemodynamic ATN  No significant volume overload  UA w/pr 30, bland  Imaging w/o hydro   Hold diuretics   Epoetin   S/p 2u PRBCs on this adm  No NSAID's  F/u CBC, BMP, Phos, UO, serologies  Phoslo for high Phos  Hoping that Cr is reaching plateau   Pt is hoping to avoid the need for RRT on this adm     [phone]

## 2024-10-23 NOTE — CHART NOTE - NSCHARTNOTEFT_GEN_A_CORE
Spoke with patient's sister at bedside and updated her regarding patient's hospital course. All questions and concerns addressed at this time.

## 2024-10-24 LAB
ALBUMIN SERPL ELPH-MCNC: 3.4 G/DL — SIGNIFICANT CHANGE UP (ref 3.3–5)
ALP SERPL-CCNC: 77 U/L — SIGNIFICANT CHANGE UP (ref 40–120)
ALT FLD-CCNC: 19 U/L — SIGNIFICANT CHANGE UP (ref 10–45)
ANION GAP SERPL CALC-SCNC: 14 MMOL/L — SIGNIFICANT CHANGE UP (ref 5–17)
AST SERPL-CCNC: 9 U/L — LOW (ref 10–40)
BASOPHILS # BLD AUTO: 0.02 K/UL — SIGNIFICANT CHANGE UP (ref 0–0.2)
BASOPHILS NFR BLD AUTO: 0.2 % — SIGNIFICANT CHANGE UP (ref 0–2)
BILIRUB SERPL-MCNC: 0.5 MG/DL — SIGNIFICANT CHANGE UP (ref 0.2–1.2)
BLD GP AB SCN SERPL QL: SIGNIFICANT CHANGE UP
BUN SERPL-MCNC: 160 MG/DL — HIGH (ref 7–23)
CALCIUM SERPL-MCNC: 8.2 MG/DL — LOW (ref 8.4–10.5)
CHLORIDE SERPL-SCNC: 98 MMOL/L — SIGNIFICANT CHANGE UP (ref 96–108)
CO2 SERPL-SCNC: 22 MMOL/L — SIGNIFICANT CHANGE UP (ref 22–31)
CREAT SERPL-MCNC: 4.66 MG/DL — HIGH (ref 0.5–1.3)
EGFR: 9 ML/MIN/1.73M2 — LOW
EOSINOPHIL # BLD AUTO: 0.29 K/UL — SIGNIFICANT CHANGE UP (ref 0–0.5)
EOSINOPHIL NFR BLD AUTO: 3.3 % — SIGNIFICANT CHANGE UP (ref 0–6)
GLUCOSE BLDC GLUCOMTR-MCNC: 138 MG/DL — HIGH (ref 70–99)
GLUCOSE BLDC GLUCOMTR-MCNC: 154 MG/DL — HIGH (ref 70–99)
GLUCOSE BLDC GLUCOMTR-MCNC: 159 MG/DL — HIGH (ref 70–99)
GLUCOSE BLDC GLUCOMTR-MCNC: 184 MG/DL — HIGH (ref 70–99)
GLUCOSE SERPL-MCNC: 145 MG/DL — HIGH (ref 70–99)
HCT VFR BLD CALC: 25.3 % — LOW (ref 34.5–45)
HGB BLD-MCNC: 7.9 G/DL — LOW (ref 11.5–15.5)
IMM GRANULOCYTES NFR BLD AUTO: 1 % — HIGH (ref 0–0.9)
LYMPHOCYTES # BLD AUTO: 0.67 K/UL — LOW (ref 1–3.3)
LYMPHOCYTES # BLD AUTO: 7.5 % — LOW (ref 13–44)
MCHC RBC-ENTMCNC: 26.6 PG — LOW (ref 27–34)
MCHC RBC-ENTMCNC: 31.2 GM/DL — LOW (ref 32–36)
MCV RBC AUTO: 85.2 FL — SIGNIFICANT CHANGE UP (ref 80–100)
MONOCYTES # BLD AUTO: 0.78 K/UL — SIGNIFICANT CHANGE UP (ref 0–0.9)
MONOCYTES NFR BLD AUTO: 8.8 % — SIGNIFICANT CHANGE UP (ref 2–14)
NEUTROPHILS # BLD AUTO: 7.05 K/UL — SIGNIFICANT CHANGE UP (ref 1.8–7.4)
NEUTROPHILS NFR BLD AUTO: 79.2 % — HIGH (ref 43–77)
NRBC # BLD: 0 /100 WBCS — SIGNIFICANT CHANGE UP (ref 0–0)
PLATELET # BLD AUTO: 188 K/UL — SIGNIFICANT CHANGE UP (ref 150–400)
POTASSIUM SERPL-MCNC: 5.1 MMOL/L — SIGNIFICANT CHANGE UP (ref 3.5–5.3)
POTASSIUM SERPL-SCNC: 5.1 MMOL/L — SIGNIFICANT CHANGE UP (ref 3.5–5.3)
PROT SERPL-MCNC: 7.4 G/DL — SIGNIFICANT CHANGE UP (ref 6–8.3)
RBC # BLD: 2.97 M/UL — LOW (ref 3.8–5.2)
RBC # FLD: 17.6 % — HIGH (ref 10.3–14.5)
SODIUM SERPL-SCNC: 134 MMOL/L — LOW (ref 135–145)
WBC # BLD: 8.9 K/UL — SIGNIFICANT CHANGE UP (ref 3.8–10.5)
WBC # FLD AUTO: 8.9 K/UL — SIGNIFICANT CHANGE UP (ref 3.8–10.5)

## 2024-10-24 PROCEDURE — 99233 SBSQ HOSP IP/OBS HIGH 50: CPT | Mod: GC

## 2024-10-24 PROCEDURE — 71250 CT THORAX DX C-: CPT | Mod: 26

## 2024-10-24 RX ADMIN — CALCIUM ACETATE 1334 MILLIGRAM(S): 667 CAPSULE ORAL at 09:01

## 2024-10-24 RX ADMIN — Medication 1: at 08:59

## 2024-10-24 RX ADMIN — Medication 180 MILLIGRAM(S): at 05:57

## 2024-10-24 RX ADMIN — Medication 600 MILLIGRAM(S): at 18:20

## 2024-10-24 RX ADMIN — Medication 5 MILLIGRAM(S): at 21:13

## 2024-10-24 RX ADMIN — EZETIMIBE 10 MILLIGRAM(S): 10 TABLET ORAL at 13:00

## 2024-10-24 RX ADMIN — Medication 1: at 12:57

## 2024-10-24 RX ADMIN — Medication 600 MILLIGRAM(S): at 05:58

## 2024-10-24 RX ADMIN — APIXABAN 2.5 MILLIGRAM(S): 5 TABLET, FILM COATED ORAL at 05:57

## 2024-10-24 RX ADMIN — HYDRALAZINE HYDROCHLORIDE 100 MILLIGRAM(S): 50 TABLET, FILM COATED ORAL at 13:03

## 2024-10-24 RX ADMIN — HYDRALAZINE HYDROCHLORIDE 100 MILLIGRAM(S): 50 TABLET, FILM COATED ORAL at 21:13

## 2024-10-24 RX ADMIN — Medication 325 MILLIGRAM(S): at 13:00

## 2024-10-24 RX ADMIN — CALCIUM ACETATE 1334 MILLIGRAM(S): 667 CAPSULE ORAL at 12:59

## 2024-10-24 RX ADMIN — Medication 650 MILLIGRAM(S): at 13:04

## 2024-10-24 RX ADMIN — Medication 650 MILLIGRAM(S): at 05:57

## 2024-10-24 RX ADMIN — CALCIUM ACETATE 1334 MILLIGRAM(S): 667 CAPSULE ORAL at 18:18

## 2024-10-24 RX ADMIN — Medication 650 MILLIGRAM(S): at 21:14

## 2024-10-24 RX ADMIN — APIXABAN 2.5 MILLIGRAM(S): 5 TABLET, FILM COATED ORAL at 18:19

## 2024-10-24 RX ADMIN — HYDRALAZINE HYDROCHLORIDE 100 MILLIGRAM(S): 50 TABLET, FILM COATED ORAL at 05:57

## 2024-10-24 NOTE — PROGRESS NOTE ADULT - ATTENDING COMMENTS
79y year old Female with PMH of HFpEF, Chronic Kidney Disease 4, DVT (on eliquis) Hypertension, Hyperlipidemia, Type 2 Diabetes Mellitus, Anemia, OA who presents to the ER complaining of shortness of breath x2 days. Admitted for acute on chronic dCHF Plan: monitor renal fuction and volume status, wean off O2, apprec neprho and cardio recs, monitor clinical course, dc planning underway

## 2024-10-24 NOTE — PROGRESS NOTE ADULT - ASSESSMENT
BURKE ABARCA is a 79y year old Female with PMH of HFpEF, Chronic Kidney Disease 4, DVT (on eliquis) Hypertension, Hyperlipidemia, Type 2 Diabetes Mellitus, Anemia, OA who presents to the ER complaining of shortness of breath x2 days. Admitted for further evaluation of CHF exacerbation.     #Shortness of breath - likely 2/2 HFpEF exacerbation   #Acute Kidney Injury on Chronic Kidney Disease 4  #Anemia, likely chronic disease   - admit to medicine   - CXR with Interstitial edema with mild congestive heart failure.   - remote tele, continuous O2  - s/p lasix 80mg ivp in ER, was on bumex 2mg bid since August per patient.   - s/p IV lasix 40mg   - TTE 10/20/24: LVEF 63%, grade 2 diastolic dysfunction  - proBNP 5495 increased from 3792 on 10/19  - Hgb 7.1> 6.5 > 6.8> 7.5> 7.1 > 7.0 > 7.8 > 7.9  - s/p 1 U pRBC 10/20/24, repeat hgb 7.1  - s/p 1 U pRBC 10/22/24, repeat hgb 7.8  - iron studies: low transferrin, high ferritin  - transfuse if <7  - keep type and screen active  - continue weekly epoietin  - nephro recs appreciated: hold diuretics, no NSAIDs, c/w epoetin  - cardio recs appreciated: gdmt contraindicated 2/2 CKD, hold diuretics  - pt currently on 2L of O2, try to titrate to RA today as pt tolerates    #Hypertension  - continue home diltiazem, hydralazine, labetalol    #Hyperlipidemia  - continue home crestor, ezetemibe    #Type II Diabetes Mellitus  - hold home meds  - FS and DANGELO  - maintain blood glucose 100-180 while hospitalized     #history of DVT  - per patient she had RUE DVT while at [facility]  - has been on eliquis 2.5mg bid for nearly 3 months   - c/w eliquis 2.5mg    #DVT ppx  - Eliquis    #GOC  -full code     #Diet  -DASH/TLC, consistent carbs    Dispo: pending [facility]    Case seen and discussed with Dr. Dowling.     BURKE ABARCA is a 79y year old Female with PMH of HFpEF, Chronic Kidney Disease 4, DVT (on eliquis) Hypertension, Hyperlipidemia, Type 2 Diabetes Mellitus, Anemia, OA who presents to the ER complaining of shortness of breath x2 days. Admitted for further evaluation of CHF exacerbation.     #Shortness of breath - likely 2/2 HFpEF exacerbation   #Acute Kidney Injury on Chronic Kidney Disease 4  #Anemia, likely chronic disease   - admit to medicine   - CXR with Interstitial edema with mild congestive heart failure.   - remote tele, continuous O2  - s/p lasix 80mg ivp in ER, was on bumex 2mg bid since August per patient.   - s/p IV lasix 40mg   - TTE 10/20/24: LVEF 63%, grade 2 diastolic dysfunction  - proBNP 5495 increased from 3792 on 10/19  - Hgb 7.1> 6.5 > 6.8> 7.5> 7.1 > 7.0 > 7.8 > 7.9  - s/p 1 U pRBC 10/20/24, repeat hgb 7.1  - s/p 1 U pRBC 10/22/24, repeat hgb 7.8  - iron studies: low transferrin, high ferritin  - transfuse if <7  - keep type and screen active  - continue weekly epoietin  - nephro recs appreciated: hold diuretics, no NSAIDs, c/w epoetin  - cardio recs appreciated: gdmt contraindicated 2/2 CKD, hold diuretics  - change oxygen to humidified O2 due to increased congestion and bloody mucus per pt   - pt currently on 2L of O2, try to titrate to RA today as pt tolerates    #Hyperphosphatemia   - phos 9.1  - per nephro recs - start pt on phoslo (calcium acetate)    #Hypertension  - continue home diltiazem, hydralazine, labetalol    #Hyperlipidemia  - continue home crestor, ezetemibe    #Type II Diabetes Mellitus  - hold home meds  - FS and DANGELO  - maintain blood glucose 100-180 while hospitalized     #history of DVT  - per patient she had RUE DVT while at [facility]  - has been on eliquis 2.5mg bid for nearly 3 months   - c/w eliquis 2.5mg    #DVT ppx  - Eliquis    #GOC  -full code     #Diet  -DASH/TLC, consistent carbs    Dispo: pending [facility]    Case seen and discussed with Dr. Dowling.     BURKE ABARCA is a 79y year old Female with PMH of HFpEF, Chronic Kidney Disease 4, DVT (on eliquis) Hypertension, Hyperlipidemia, Type 2 Diabetes Mellitus, Anemia, OA who presents to the ER complaining of shortness of breath x2 days. Admitted for acute on chronic dCHF    #Shortness of breath - likely 2/2 HFpEF exacerbation, acute on chronic diastolic CHF  #Acute Kidney Injury on Chronic Kidney Disease 4  #Anemia, likely chronic disease   - admit to medicine   - CXR with Interstitial edema with mild congestive heart failure.   - remote tele, continuous O2  - s/p lasix 80mg ivp in ER, was on bumex 2mg bid since August per patient.   - s/p IV lasix 40mg   - TTE 10/20/24: LVEF 63%, grade 2 diastolic dysfunction  - proBNP 5495 increased from 3792 on 10/19  - Hgb 7.1> 6.5 > 6.8> 7.5> 7.1 > 7.0 > 7.8 > 7.9  - s/p 1 U pRBC 10/20/24, repeat hgb 7.1  - s/p 1 U pRBC 10/22/24, repeat hgb 7.8  - iron studies: low transferrin, high ferritin  - transfuse if <7  - keep type and screen active  - continue weekly epoietin  - nephro recs appreciated: hold diuretics, no NSAIDs, c/w epoetin  - cardio recs appreciated: gdmt contraindicated 2/2 CKD, hold diuretics  - change oxygen to humidified O2 due to increased congestion and bloody mucus per pt   - pt currently on 2L of O2, try to titrate to RA today as pt tolerates    #Hyperphosphatemia   - phos 9.1  - per nephro recs - start pt on phoslo (calcium acetate)    #Hypertension  - continue home diltiazem, hydralazine, labetalol    #Hyperlipidemia  - continue home crestor, ezetemibe    #Type II Diabetes Mellitus  - hold home meds  - FS and DANGELO  - maintain blood glucose 100-180 while hospitalized     #history of DVT  - per patient she had RUE DVT while at [facility]  - has been on eliquis 2.5mg bid for nearly 3 months   - c/w eliquis 2.5mg    #DVT ppx  - Eliquis    #GOC  -full code     #Diet  -DASH/TLC, consistent carbs    Dispo: pending [facility]    Case seen and discussed with Dr. Dowling.

## 2024-10-24 NOTE — PROGRESS NOTE ADULT - SUBJECTIVE AND OBJECTIVE BOX
Comfortable on NC O2 at rest, some nausea    Vital Signs Last 24 Hrs  T(C): 36.4 (10-24-24 @ 13:11), Max: 36.8 (10-23-24 @ 20:40)  T(F): 97.6 (10-24-24 @ 13:11), Max: 98.2 (10-23-24 @ 20:40)  HR: 60 (10-24-24 @ 13:11) (60 - 64)  BP: 163/67 (10-24-24 @ 13:11) (147/67 - 163/67)  RR: 16 (10-24-24 @ 13:11) (16 - 18)  SpO2: 96% (10-24-24 @ 17:00) (95% - 97%)    s1s2  b/l air entry  soft, ND  tr edema                                                 7.9    8.90  )-----------( 188      ( 24 Oct 2024 08:34 )             25.3     24 Oct 2024 08:34    134    |  98     |  160    ----------------------------<  145    5.1     |  22     |  4.66     Ca    8.2        24 Oct 2024 08:34  Phos  9.1       23 Oct 2024 05:50    TPro  7.4    /  Alb  3.4    /  TBili  0.5    /  DBili  x      /  AST  9      /  ALT  19     /  AlkPhos  77     24 Oct 2024 08:34    LIVER FUNCTIONS - ( 24 Oct 2024 08:34 )  Alb: 3.4 g/dL / Pro: 7.4 g/dL / ALK PHOS: 77 U/L / ALT: 19 U/L / AST: 9 U/L / GGT: x           acetaminophen     Tablet .. 650 milliGRAM(s) Oral every 6 hours PRN  apixaban 2.5 milliGRAM(s) Oral two times a day  calcium acetate 1334 milliGRAM(s) Oral three times a day with meals  dextrose 5%. 1000 milliLiter(s) IV Continuous <Continuous>  dextrose 5%. 1000 milliLiter(s) IV Continuous <Continuous>  dextrose 50% Injectable 12.5 Gram(s) IV Push once  dextrose 50% Injectable 25 Gram(s) IV Push once  dextrose 50% Injectable 25 Gram(s) IV Push once  dextrose Oral Gel 15 Gram(s) Oral once PRN  diltiazem    milliGRAM(s) Oral daily  epoetin jacobo-epbx (RETACRIT) Injectable 89174 Unit(s) SubCutaneous every 7 days  ezetimibe 10 milliGRAM(s) Oral daily  ferrous    sulfate 325 milliGRAM(s) Oral daily  glucagon  Injectable 1 milliGRAM(s) IntraMuscular once  hydrALAZINE 100 milliGRAM(s) Oral three times a day  influenza  Vaccine (HIGH DOSE) 0.5 milliLiter(s) IntraMuscular once  insulin lispro (ADMELOG) corrective regimen sliding scale   SubCutaneous three times a day before meals  insulin lispro (ADMELOG) corrective regimen sliding scale   SubCutaneous at bedtime  labetalol 600 milliGRAM(s) Oral two times a day  melatonin 3 milliGRAM(s) Oral at bedtime PRN  ondansetron Injectable 4 milliGRAM(s) IV Push every 8 hours PRN  rosuvastatin 5 milliGRAM(s) Oral at bedtime  sodium bicarbonate 650 milliGRAM(s) Oral three times a day    A/P:    DM, HTN, CKD 4 (Cr 2.13 - 7/2/24, Cr 2.35 - 10/7/24)  Adm w/SOB iso severe anemia, some PVC on CXR  S/p 2u PRBCs on this adm  Started on diuretics   Cardio-renal/hemodynamic ATN  No significant volume overload  UA w/pr 30, bland  Imaging w/o hydro   Diuretics held since 10/22  Epoetin   F/u CBC, BMP, Phos, UO  Serologies are negative   Renal diet  Phoslo for high Phos  Mild uremic symptoms   D/w pt again today impending need for RRT  Pt agrees that if no improvement in renal fx in am, will have temp HD cath placed and begin HD     [phone]

## 2024-10-24 NOTE — PROGRESS NOTE ADULT - SUBJECTIVE AND OBJECTIVE BOX
Patient is a 79y old  Female who presents with a chief complaint of shortness of breath (20 Oct 2024 11:57)      INTERVAL HPI/ OVERNIGHT EVENTS: Patient seen and examined at bedside. States that breathing has improved slightly today, but has been having some congestion and bloody mucus. No other complaints at this time.     Vital Signs Last 24 Hrs  T(C): 36.8 (24 Oct 2024 05:22), Max: 36.8 (23 Oct 2024 20:40)  T(F): 98.2 (24 Oct 2024 05:22), Max: 98.2 (23 Oct 2024 20:40)  HR: 63 (24 Oct 2024 05:22) (63 - 67)  BP: 147/67 (24 Oct 2024 05:22) (130/60 - 160/63)  BP(mean): --  RR: 18 (24 Oct 2024 05:22) (17 - 18)  SpO2: 96% (24 Oct 2024 05:22) (95% - 96%)    Parameters below as of 24 Oct 2024 05:22  Patient On (Oxygen Delivery Method): nasal cannula  O2 Flow (L/min): 2    REVIEW OF SYSTEMS:  CONSTITUTIONAL: No fever or chills  HEENT:  No headache, no sore throat  RESPIRATORY: No cough, wheezing, +shortness of breath  CARDIOVASCULAR: No chest pain, palpitations  GASTROINTESTINAL: No abd pain, nausea, vomiting, or diarrhea  GENITOURINARY: No dysuria, frequency, or hematuria  NEUROLOGICAL: no focal weakness or dizziness  MUSCULOSKELETAL: no myalgias       PHYSICAL EXAM:  General: NAD, morbidly obese female, lying in bed  Respiratory: B/L crackles throughout, + improved expiratory wheezes  CV: RRR, +S1/S2, +systolic murmur  Abdominal: Soft, Non tender, Nondistended +Bowel sounds   Extremities: +chronic skin changes with b/l hyperpigmentation, no ulcers noted. No edema, 2+ peripheral pulses  NERVOUS SYSTEM: answers questions and follows commands appropriately, A&Ox3, grossly moves all extremities   PSYCH: Appropriate affect, Alert & Awake; Good judgement      LABS: Personally reviewed                        7.9    8.90  )-----------( 188      ( 24 Oct 2024 08:34 )             25.3     10-24    134[L]  |  98  |  160[H]  ----------------------------<  145[H]  5.1   |  22  |  4.66[H]    Ca    8.2[L]      24 Oct 2024 08:34  Phos  9.1     10-23    TPro  7.4  /  Alb  3.4  /  TBili  0.5  /  DBili  x   /  AST  9[L]  /  ALT  19  /  AlkPhos  77  10-24                        IMAGING  CHEST XRAY 10/22/2024  IMPRESSION  No significant change in comparison to the prior study. Mild CHF is   again suggested. Magnified cardiac silhouette remains stable. There is   evidence of prior right lower neck surgery and degenerative arthritis   involving the glenohumeral joints.     TTE Echo Complete w/o Contrast w/ Doppler  Summary:   1. Normal global left ventricular systolic function.   2. Spectral Doppler shows pseudonormal pattern of left ventricular   myocardial filling (Grade II diastolic dysfunction).   3. Normal right ventricular size and function.   4. Mildly enlarged left atrium.   5. The right atrium is normal in size.   6. Mild mitral valve regurgitation.   7. Mild thickening and calcification of the anterior and posterior   mitral valve leaflets.   8. Mild tricuspid regurgitation.   9. Estimated pulmonary artery systolic pressure is 57.8 mmHg assuming a   right atrial pressure of 3 mmHg, which is consistent with moderate   pulmonary hypertension.  10. Pulmonary hypertension is present.    MEDICATIONS  (STANDING):  apixaban 2.5 milliGRAM(s) Oral two times a day  calcium acetate 1334 milliGRAM(s) Oral three times a day with meals  dextrose 5%. 1000 milliLiter(s) (50 mL/Hr) IV Continuous <Continuous>  dextrose 5%. 1000 milliLiter(s) (100 mL/Hr) IV Continuous <Continuous>  dextrose 50% Injectable 12.5 Gram(s) IV Push once  dextrose 50% Injectable 25 Gram(s) IV Push once  dextrose 50% Injectable 25 Gram(s) IV Push once  diltiazem    milliGRAM(s) Oral daily  epoetin jacobo-epbx (RETACRIT) Injectable 89061 Unit(s) SubCutaneous every 7 days  ezetimibe 10 milliGRAM(s) Oral daily  ferrous    sulfate 325 milliGRAM(s) Oral daily  glucagon  Injectable 1 milliGRAM(s) IntraMuscular once  hydrALAZINE 100 milliGRAM(s) Oral three times a day  influenza  Vaccine (HIGH DOSE) 0.5 milliLiter(s) IntraMuscular once  insulin lispro (ADMELOG) corrective regimen sliding scale   SubCutaneous three times a day before meals  insulin lispro (ADMELOG) corrective regimen sliding scale   SubCutaneous at bedtime  labetalol 600 milliGRAM(s) Oral two times a day  rosuvastatin 5 milliGRAM(s) Oral at bedtime  sodium bicarbonate 650 milliGRAM(s) Oral three times a day    MEDICATIONS  (PRN):  acetaminophen     Tablet .. 650 milliGRAM(s) Oral every 6 hours PRN Temp greater or equal to 38C (100.4F), Mild Pain (1 - 3)  dextrose Oral Gel 15 Gram(s) Oral once PRN Blood Glucose LESS THAN 70 milliGRAM(s)/deciliter  melatonin 3 milliGRAM(s) Oral at bedtime PRN Insomnia  ondansetron Injectable 4 milliGRAM(s) IV Push every 8 hours PRN Nausea and/or Vomiting      RADIOLOGY & ADDITIONAL TESTS: Personally reviewed.     Consultant(s) Notes Reviewed:  [x] YES  [ ] NO   Discussed with JOSE/PATTIE, RN

## 2024-10-25 DIAGNOSIS — N17.9 ACUTE KIDNEY FAILURE, UNSPECIFIED: ICD-10-CM

## 2024-10-25 LAB
ANION GAP SERPL CALC-SCNC: 14 MMOL/L — SIGNIFICANT CHANGE UP (ref 5–17)
BUN SERPL-MCNC: 177 MG/DL — HIGH (ref 7–23)
CALCIUM SERPL-MCNC: 8.4 MG/DL — SIGNIFICANT CHANGE UP (ref 8.4–10.5)
CHLORIDE SERPL-SCNC: 98 MMOL/L — SIGNIFICANT CHANGE UP (ref 96–108)
CO2 SERPL-SCNC: 22 MMOL/L — SIGNIFICANT CHANGE UP (ref 22–31)
CREAT SERPL-MCNC: 4.65 MG/DL — HIGH (ref 0.5–1.3)
EGFR: 9 ML/MIN/1.73M2 — LOW
GLUCOSE BLDC GLUCOMTR-MCNC: 119 MG/DL — HIGH (ref 70–99)
GLUCOSE BLDC GLUCOMTR-MCNC: 134 MG/DL — HIGH (ref 70–99)
GLUCOSE BLDC GLUCOMTR-MCNC: 157 MG/DL — HIGH (ref 70–99)
GLUCOSE BLDC GLUCOMTR-MCNC: 238 MG/DL — HIGH (ref 70–99)
GLUCOSE SERPL-MCNC: 129 MG/DL — HIGH (ref 70–99)
HCT VFR BLD CALC: 26.3 % — LOW (ref 34.5–45)
HGB BLD-MCNC: 8.2 G/DL — LOW (ref 11.5–15.5)
MAGNESIUM SERPL-MCNC: 2.6 MG/DL — SIGNIFICANT CHANGE UP (ref 1.6–2.6)
MCHC RBC-ENTMCNC: 27.5 PG — SIGNIFICANT CHANGE UP (ref 27–34)
MCHC RBC-ENTMCNC: 31.2 GM/DL — LOW (ref 32–36)
MCV RBC AUTO: 88.3 FL — SIGNIFICANT CHANGE UP (ref 80–100)
NRBC # BLD: 0 /100 WBCS — SIGNIFICANT CHANGE UP (ref 0–0)
PHOSPHATE SERPL-MCNC: 7.9 MG/DL — HIGH (ref 2.5–4.5)
PLATELET # BLD AUTO: 173 K/UL — SIGNIFICANT CHANGE UP (ref 150–400)
POTASSIUM SERPL-MCNC: 5.2 MMOL/L — SIGNIFICANT CHANGE UP (ref 3.5–5.3)
POTASSIUM SERPL-SCNC: 5.2 MMOL/L — SIGNIFICANT CHANGE UP (ref 3.5–5.3)
RBC # BLD: 2.98 M/UL — LOW (ref 3.8–5.2)
RBC # FLD: 17.5 % — HIGH (ref 10.3–14.5)
SODIUM SERPL-SCNC: 134 MMOL/L — LOW (ref 135–145)
TROPONIN I, HIGH SENSITIVITY RESULT: 33.8 NG/L — SIGNIFICANT CHANGE UP
WBC # BLD: 8.16 K/UL — SIGNIFICANT CHANGE UP (ref 3.8–10.5)
WBC # FLD AUTO: 8.16 K/UL — SIGNIFICANT CHANGE UP (ref 3.8–10.5)

## 2024-10-25 PROCEDURE — 93970 EXTREMITY STUDY: CPT | Mod: 26

## 2024-10-25 PROCEDURE — 99232 SBSQ HOSP IP/OBS MODERATE 35: CPT

## 2024-10-25 PROCEDURE — 93010 ELECTROCARDIOGRAM REPORT: CPT

## 2024-10-25 PROCEDURE — 99233 SBSQ HOSP IP/OBS HIGH 50: CPT | Mod: GC

## 2024-10-25 PROCEDURE — 71045 X-RAY EXAM CHEST 1 VIEW: CPT | Mod: 26

## 2024-10-25 RX ORDER — SALIVA SUBSTITUTE COMBO NO.5 538 MG
5 POWDER IN PACKET (EA) MUCOUS MEMBRANE DAILY
Refills: 0 | Status: DISCONTINUED | OUTPATIENT
Start: 2024-10-25 | End: 2024-11-06

## 2024-10-25 RX ORDER — ERYTHROPOIETIN 10000 [IU]/ML
10000 INJECTION, SOLUTION INTRAVENOUS; SUBCUTANEOUS ONCE
Refills: 0 | Status: COMPLETED | OUTPATIENT
Start: 2024-10-25 | End: 2024-10-25

## 2024-10-25 RX ORDER — CHLORHEXIDINE GLUCONATE 40 MG/ML
1 SOLUTION TOPICAL
Refills: 0 | Status: DISCONTINUED | OUTPATIENT
Start: 2024-10-25 | End: 2024-10-26

## 2024-10-25 RX ORDER — ALBUTEROL 90 MCG
2 AEROSOL (GRAM) INHALATION EVERY 6 HOURS
Refills: 0 | Status: DISCONTINUED | OUTPATIENT
Start: 2024-10-25 | End: 2024-11-06

## 2024-10-25 RX ORDER — CALCIUM CARBONATE 215(500)MG
1 TABLET,CHEWABLE ORAL DAILY
Refills: 0 | Status: DISCONTINUED | OUTPATIENT
Start: 2024-10-25 | End: 2024-11-06

## 2024-10-25 RX ORDER — SODIUM CHLORIDE 9 MG/ML
10 INJECTION, SOLUTION INTRAMUSCULAR; INTRAVENOUS; SUBCUTANEOUS
Refills: 0 | Status: DISCONTINUED | OUTPATIENT
Start: 2024-10-25 | End: 2024-11-06

## 2024-10-25 RX ADMIN — APIXABAN 2.5 MILLIGRAM(S): 5 TABLET, FILM COATED ORAL at 05:33

## 2024-10-25 RX ADMIN — Medication 1: at 12:42

## 2024-10-25 RX ADMIN — Medication 2 PUFF(S): at 01:38

## 2024-10-25 RX ADMIN — Medication 1 SPRAY(S): at 12:41

## 2024-10-25 RX ADMIN — Medication 600 MILLIGRAM(S): at 05:33

## 2024-10-25 RX ADMIN — Medication 5 MILLILITER(S): at 12:41

## 2024-10-25 RX ADMIN — Medication 5 MILLIGRAM(S): at 21:32

## 2024-10-25 RX ADMIN — Medication 2 PUFF(S): at 11:44

## 2024-10-25 RX ADMIN — CALCIUM ACETATE 1334 MILLIGRAM(S): 667 CAPSULE ORAL at 12:41

## 2024-10-25 RX ADMIN — Medication 600 MILLIGRAM(S): at 20:42

## 2024-10-25 RX ADMIN — APIXABAN 2.5 MILLIGRAM(S): 5 TABLET, FILM COATED ORAL at 20:41

## 2024-10-25 RX ADMIN — Medication 650 MILLIGRAM(S): at 16:16

## 2024-10-25 RX ADMIN — CALCIUM ACETATE 1334 MILLIGRAM(S): 667 CAPSULE ORAL at 08:51

## 2024-10-25 RX ADMIN — HYDRALAZINE HYDROCHLORIDE 100 MILLIGRAM(S): 50 TABLET, FILM COATED ORAL at 21:32

## 2024-10-25 RX ADMIN — HYDRALAZINE HYDROCHLORIDE 100 MILLIGRAM(S): 50 TABLET, FILM COATED ORAL at 05:33

## 2024-10-25 RX ADMIN — HYDRALAZINE HYDROCHLORIDE 100 MILLIGRAM(S): 50 TABLET, FILM COATED ORAL at 13:23

## 2024-10-25 RX ADMIN — ERYTHROPOIETIN 10000 UNIT(S): 10000 INJECTION, SOLUTION INTRAVENOUS; SUBCUTANEOUS at 18:25

## 2024-10-25 RX ADMIN — Medication 325 MILLIGRAM(S): at 12:41

## 2024-10-25 RX ADMIN — Medication 650 MILLIGRAM(S): at 17:16

## 2024-10-25 RX ADMIN — EZETIMIBE 10 MILLIGRAM(S): 10 TABLET ORAL at 12:41

## 2024-10-25 RX ADMIN — Medication 180 MILLIGRAM(S): at 05:33

## 2024-10-25 RX ADMIN — Medication 650 MILLIGRAM(S): at 05:33

## 2024-10-25 NOTE — PROCEDURAL SAFETY CHECKLIST WITH OR WITHOUT SEDATION - NSPRESEDATION2FT_GEN_ALL_CORE
Scheduled referral to cardiology.  
Anesthesia confirms case reviewed for anesthesia risk alert.
Anesthesia confirms case reviewed for anesthesia risk alert.

## 2024-10-25 NOTE — PROGRESS NOTE ADULT - ATTENDING COMMENTS
79y year old Female with PMH of HFpEF, Chronic Kidney Disease 4, DVT (on eliquis) Hypertension, Hyperlipidemia, Type 2 Diabetes Mellitus, Anemia, OA who presents to the ER complaining of shortness of breath x2 days. Admitted for acute on chronic dCHF Plan: trend  renal fuction and volume status, wean off O2, apprec nephro, cardio and pulm recs, likely to undergo dialysis, may not be candidate for thoracentesis

## 2024-10-25 NOTE — PROGRESS NOTE ADULT - SUBJECTIVE AND OBJECTIVE BOX
Indication for Geriatrics and Palliative Care Services/INTERVAL HPI: GOC    OVERNIGHT EVENTS: pt had SOB episode overnight. trop negative. pt given respiratory tx and supplemental O2 increased, see separate chart note.  SUBJECTIVE AND OBJECTIVE: Pt seen and examined at bedside this morning.  She reports some SOB but denies any other complaints. Denies anxiety overnight.     Allergies  ACE inhibitors (Angioedema)  statins (Muscle Pain)    Intolerances    predniSONE (Other)  MEDICATIONS  (STANDING):  apixaban 2.5 milliGRAM(s) Oral two times a day  Biotene Dry Mouth Oral Rinse 5 milliLiter(s) Swish and Spit daily  calcium acetate 1334 milliGRAM(s) Oral three times a day with meals  dextrose 5%. 1000 milliLiter(s) (50 mL/Hr) IV Continuous <Continuous>  dextrose 5%. 1000 milliLiter(s) (100 mL/Hr) IV Continuous <Continuous>  dextrose 50% Injectable 12.5 Gram(s) IV Push once  dextrose 50% Injectable 25 Gram(s) IV Push once  dextrose 50% Injectable 25 Gram(s) IV Push once  diltiazem    milliGRAM(s) Oral daily  epoetin jacobo-epbx (RETACRIT) Injectable 07925 Unit(s) IV Push once  ezetimibe 10 milliGRAM(s) Oral daily  ferrous    sulfate 325 milliGRAM(s) Oral daily  glucagon  Injectable 1 milliGRAM(s) IntraMuscular once  hydrALAZINE 100 milliGRAM(s) Oral three times a day  influenza  Vaccine (HIGH DOSE) 0.5 milliLiter(s) IntraMuscular once  insulin lispro (ADMELOG) corrective regimen sliding scale   SubCutaneous at bedtime  insulin lispro (ADMELOG) corrective regimen sliding scale   SubCutaneous three times a day before meals  labetalol 600 milliGRAM(s) Oral two times a day  rosuvastatin 5 milliGRAM(s) Oral at bedtime    MEDICATIONS  (PRN):  acetaminophen     Tablet .. 650 milliGRAM(s) Oral every 6 hours PRN Temp greater or equal to 38C (100.4F), Mild Pain (1 - 3)  albuterol    90 MICROgram(s) HFA Inhaler 2 Puff(s) Inhalation every 6 hours PRN Shortness of Breath  dextrose Oral Gel 15 Gram(s) Oral once PRN Blood Glucose LESS THAN 70 milliGRAM(s)/deciliter  melatonin 3 milliGRAM(s) Oral at bedtime PRN Insomnia  ondansetron Injectable 4 milliGRAM(s) IV Push every 8 hours PRN Nausea and/or Vomiting  sodium chloride 0.65% Nasal 1 Spray(s) Both Nostrils daily PRN Nasal Congestion      ITEMS UNCHECKED ARE NOT PRESENT    PRESENT SYMPTOMS:      Pain:  [ ]yes [ x]no  QOL impact -   Location -                    Aggravating factors -  Quality -  Radiation -  Timing-  Severity (0-10 scale):  Minimal acceptable level (0-10 scale):       Dyspnea:                        Mild -moderate  Anxiety:                             none  Depressed:               does not report  Fatigue:                      mild  Nausea:                         none  Loss of appetite:              none  Constipation:                   none      Other Symptoms:  [x ]All other review of systems negative       Chaplaincy Referral:  Deferred   Palliative Performance Status Version 2:    50     %        http://npcrc.org/files/news/palliative_performance_scale_ppsv2.pdf    PHYSICAL EXAM:  Vital Signs Last 24 Hrs  T(C): 36.4 (25 Oct 2024 11:40), Max: 36.5 (24 Oct 2024 21:06)  T(F): 97.6 (25 Oct 2024 11:40), Max: 97.7 (24 Oct 2024 21:06)  HR: 65 (25 Oct 2024 11:40) (60 - 69)  BP: 151/70 (25 Oct 2024 11:40) (142/73 - 165/65)  BP(mean): --  RR: 17 (25 Oct 2024 11:40) (16 - 20)  SpO2: 97% (25 Oct 2024 11:40) (92% - 100%)    Parameters below as of 25 Oct 2024 11:40  Patient On (Oxygen Delivery Method): nasal cannula  O2 Flow (L/min): 2.5   I&O's Summary    24 Oct 2024 07:01  -  25 Oct 2024 07:00  --------------------------------------------------------  IN: 120 mL / OUT: 1400 mL / NET: -1280 mL       GENERAL: pt laying in bed in NAD  HEENT: NC/AT, mmm  PULMONARY: CTAB anteriorly, no wheezing  CARDIOVASCULAR:  RRR, no murmur  GASTROINTESTINAL: soft, nontender   Last BM: 10/23  MUSCULOSKELETAL:  +weakness, no pitting edema to BLE  Psych: A&Ox3, appropriate affect      LABS:                        8.2    8.16  )-----------( 173      ( 25 Oct 2024 07:59 )             26.3   10-25    134[L]  |  98  |  177[H]  ----------------------------<  129[H]  5.2   |  22  |  4.65[H]    Ca    8.4      25 Oct 2024 07:59  Phos  7.9     10-25  Mg     2.6     10-25    TPro  7.4  /  Alb  3.4  /  TBili  0.5  /  DBili  x   /  AST  9[L]  /  ALT  19  /  AlkPhos  77  10-24      Urinalysis Basic - ( 25 Oct 2024 07:59 )    Color: x / Appearance: x / SG: x / pH: x  Gluc: 129 mg/dL / Ketone: x  / Bili: x / Urobili: x   Blood: x / Protein: x / Nitrite: x   Leuk Esterase: x / RBC: x / WBC x   Sq Epi: x / Non Sq Epi: x / Bacteria: x      RADIOLOGY & ADDITIONAL STUDIES:    Protein Calorie Malnutrition Present: [ ]mild [ ]moderate [ ]severe [ ]underweight [ ]morbid obesity  https://www.andeal.org/vault/2440/web/files/ONC/Table_Clinical%20Characteristics%20to%20Document%20Malnutrition-White%20JV%20et%20al%202012.pdf    Height (cm): 162.6 (10-24-24 @ 11:03), 162.6 (06-22-24 @ 12:09)  Weight (kg): 93 (10-24-24 @ 11:03), 94.3 (06-22-24 @ 12:09), 95.7 (05-06-24 @ 03:15)  BMI (kg/m2): 35.2 (10-24-24 @ 11:03), 35.7 (06-22-24 @ 12:09)    [ ]PPSV2 < or = 30%  [ ]significant weight loss [ ]poor nutritional intake [ ]anasarca[ ]Artificial Nutrition    Other REFERRALS:  [ ]Hospice  [ ]Child Life  [ ]Social Work  [ ]Case management [ ]Holistic Therapy

## 2024-10-25 NOTE — CONSULT NOTE ADULT - ASSESSMENT
CHIEF COMPLAINT:  Patient is a 79y old  Female who presents with a chief complaint of shortness of breath (19 Oct 2024 15:48)    HPI:  BURKE ABARCA is a 79y year old Female with PMH of HFpEF, Chronic Kidney Disease 4, DVT (on eliquis) Hypertension, Hyperlipidemia, Type 2 Diabetes Mellitus, Anemia, OA who presents to the ER complaining of shortness of breath x2 days.     Patient states she has been progressively short of breath since yesterday, this morning was having difficulty dressing herself, felt winded and called granddaughter who brought her to ER. Denies chest pain, fever, chills. Patient reports medication compliance,    On ER arrival: /57; HR 64; RR 18; T 98.2; sat 100% on room air  Given lasix 80mg ivp in ER (19 Oct 2024 15:48)    Seen interviewed and examined, d/w dr chamorro.  Hx of chf, no cad history, chest pain, palpitations, no hx of MI/stents or valvular disease. follows with cardio in [facility].    PMH:       Hypertension    Diabetes    Thyroid disease    Anemia    Diabetes    Hypercholesteremia    Hypertension    Myelodysplasia (myelodysplastic syndrome)    Thyromegaly    Other giant cell arteritis    Arthritis    Kidney insufficiency    PVD (peripheral vascular disease)    Mass of right knee    Urinary incontinence        PSH:   H/O partial thyroidectomy    H/O: hysterectomy    History of cataract surgery    History of vascular surgery    FAMILY HISTORY:  Family history of diabetes mellitus    Family history of hypertension    Family history of acute renal failure        SOCIAL HISTORY:  Smoking:  no  Alcohol:  social  Drugs:    ALLERGIES:  ACE inhibitors (Angioedema)  statins (Muscle Pain)  predniSONE (Other)      Home Medications:  Apresoline 100 mg oral tablet: 1 tab(s) orally 3 times a day (19 Oct 2024 15:45)  Bumex 2 mg oral tablet: 1 tab(s) orally 2 times a day (19 Oct 2024 15:45)  calcitriol 0.25 mcg oral capsule: 1 cap(s) orally once a day (19 Oct 2024 15:45)  calcium acetate 667 mg oral tablet: 1 tab(s) orally 3 times a day (19 Oct 2024 15:45)  Crestor 5 mg oral tablet: 1 tab(s) orally once a day (at bedtime) (19 Oct 2024 15:45)  DilTIAZem (Eqv-Cardizem CD) 180 mg/24 hours oral capsule, extended release: 1 cap(s) orally once a day (19 Oct 2024 15:46)  Eliquis 2.5 mg oral tablet: 1 tab(s) orally 2 times a day (19 Oct 2024 15:46)  ezetimibe 10 mg oral tablet: 1 tab(s) orally once a day (19 Oct 2024 15:46)  ferrous sulfate 325 mg (65 mg elemental iron) oral delayed release tablet: 1 tab(s) orally once a day (19 Oct 2024 15:46)  glipiZIDE 5 mg oral tablet: 1 tab(s) orally once a day (19 Oct 2024 15:46)  labetalol 300 mg oral tablet: 2 tab(s) orally 2 times a day (19 Oct 2024 15:46)      MEDICATIONS:  acetaminophen     Tablet .. 650 milliGRAM(s) Oral every 6 hours PRN  aluminum hydroxide/magnesium hydroxide/simethicone Suspension 30 milliLiter(s) Oral every 4 hours PRN  apixaban 2.5 milliGRAM(s) Oral two times a day  calcitriol   Capsule 0.25 MICROGram(s) Oral daily  calcium acetate 667 milliGRAM(s) Oral three times a day with meals  dextrose 5%. 1000 milliLiter(s) IV Continuous <Continuous>  dextrose 5%. 1000 milliLiter(s) IV Continuous <Continuous>  dextrose 50% Injectable 12.5 Gram(s) IV Push once  dextrose 50% Injectable 25 Gram(s) IV Push once  dextrose 50% Injectable 25 Gram(s) IV Push once  dextrose Oral Gel 15 Gram(s) Oral once PRN  diltiazem    milliGRAM(s) Oral daily  ezetimibe 10 milliGRAM(s) Oral daily  ferrous    sulfate 325 milliGRAM(s) Oral daily  glucagon  Injectable 1 milliGRAM(s) IntraMuscular once  hydrALAZINE 100 milliGRAM(s) Oral three times a day  influenza  Vaccine (HIGH DOSE) 0.5 milliLiter(s) IntraMuscular once  insulin lispro (ADMELOG) corrective regimen sliding scale   SubCutaneous at bedtime  insulin lispro (ADMELOG) corrective regimen sliding scale   SubCutaneous three times a day before meals  labetalol 600 milliGRAM(s) Oral two times a day  melatonin 3 milliGRAM(s) Oral at bedtime PRN  ondansetron Injectable 4 milliGRAM(s) IV Push every 8 hours PRN  rosuvastatin 5 milliGRAM(s) Oral at bedtime  saline laxative (FLEET) Rectal Enema 1 Enema Rectal once PRN      REVIEW OF SYSTEMS:  sob, see h and p      PHYSICAL EXAM:  T(C): 37 (10-20-24 @ 05:00), Max: 37 (10-20-24 @ 05:00)  HR: 69 (10-20-24 @ 05:00) (64 - 87)  BP: 153/79 (10-20-24 @ 05:00) (115/63 - 168/62)  RR: 17 (10-20-24 @ 05:00) (17 - 18)  SpO2: 97% (10-20-24 @ 05:00) (97% - 100%)  Wt(kg): --    GENERAL: NAD, well-groomed, well-developed  HEAD:  Atraumatic, Normocephalic  EYES: EOMI, conjunctiva and sclera clear  ENT: Moist mucous membranes,  NECK: Supple, mild jvd  CHEST/LUNG: Clear to ausculation and percussion bilaterally; No rales, rhonchi, wheezing, or rubs  HEART: Regular rate and rhythm; No murmurs, rubs, or gallops PMI non displaced.  ABDOMEN: Soft, Nontender, Nondistended; Bowel sounds present  EXTREMITIES:  No clubbing, cyanosis, or edema  SKIN: No rashes or lesions  NERVOUS SYSTEM:  A No focal deficits    Cardiovascular Diagnostic Testing:  ECG:  < from: 12 Lead ECG (10.19.24 @ 12:26) >  Ventricular Rate 62 BPM    Atrial Rate 62 BPM    P-R Interval 236 ms    QRS Duration 76 ms    Q-T Interval 450 ms    QTC Calculation(Bazett) 456 ms    P Axis 86 degrees    R Axis 35 degrees    T Axis 48 degrees    Diagnosis Line Sinus rhythm with 1st degree AV block  Poor R wave progression  Possible Anterior infarct , age undetermined  Abnormal ECG  When compared with ECG of 23-JUN-2024 10:26,  Minimal criteria for Inferior infarct are no longer present  Confirmed by FRANK BERGER, DAVID CARDOZO ([id number]) on 10/19/2024 1:53:40 PM    < end of copied text >  < from: Xray Chest 1 View- PORTABLE-Urgent (10.19.24 @ 14:31) >    ACC: [id number] EXAM:  XR CHEST PORTABLE URGENT 1V   ORDERED BY: LISA COBB     PROCEDURE DATE:  10/19/2024          INTERPRETATION:  HISTORY: ;  Chest Pain;  TECHNIQUE: Portable frontal view of the chest, 1 view.  COMPARISON: 6/24/2024.  FINDINGS/  IMPRESSION:    HEART:  Enlarged.  LUNGS: Interstitial edema. Mild congestive heart failure.  BONES: degenerative changes    --- End of Report ---      < end of copied text >  < from: US Duplex Venous Lower Ext Complete, Bilateral (06.24.24 @ 14:32) >    ACC: [id number] EXAM:  US DPLX LWR EXT VEINS COMPL BI   ORDERED BY: PAULETTE ALANIS     PROCEDURE DATE:  06/24/2024          INTERPRETATION:  CLINICAL INFORMATION: Bilateral thigh tenderness to   palpation. Immobile.    COMPARISON: None available.    TECHNIQUE: Duplex sonography of the BILATERAL LOWER extremity veins with   color and spectral Doppler, with and without compression.    FINDINGS:    RIGHT:  Normal compressibility of the RIGHT common femoral, femoral and popliteal   veins.  Doppler examination shows normal spontaneous and phasic flow.  No RIGHT calf vein thrombosis is detected.    Right popliteal fossa cyst measuring 4.7 x 0.7 x 3.6 cm.    LEFT:  Normal compressibility of the LEFT common femoral, femoral and popliteal   veins.  Doppler examination shows normal spontaneous and phasic flow.  No LEFT calf vein thrombosis is detected.    IMPRESSION:  No evidence of deep venous thrombosis in either lower extremity.    < end of copied text >      LABS:                        7.1    7.77  )-----------( 193      ( 19 Oct 2024 12:45 )             22.5     10-19    140  |  103  |  127[H]  ----------------------------<  118[H]  4.1   |  24  |  3.29[H]    Ca    8.1[L]      19 Oct 2024 12:45    TPro  7.6  /  Alb  3.5  /  TBili  0.4  /  DBili  x   /  AST  10  /  ALT  20  /  AlkPhos  75  10-19      Troponin I, High Sensitivity Result: 30.7 ng/L (10-19-24 @ 12:45)      Telemetry:  sinus    < from: TTE Echo Complete w/o Contrast w/ Doppler (05.06.24 @ 10:01) >    Summary:   1. Normal global left ventricular systolic function.   2. Left ventricular ejection fraction, by visual estimation, is 55 to   60%.   3. Moderately increased posterior wall thickness. Severely increased   septal wall thickness.   4. The right ventricle is not well visualized, appears normal in size   with normal systolic function.   5. The left atrium is not well visualized, appears normal in size.   6. The right atrium is not well visualized, appears top normal in size.   7. Mild mitral valve regurgitation.   8. Mild tricuspid regurgitation.   9. No aortic valve stenosis.  10. Estimated pulmonary artery systolic pressure is 61.0 mmHg assuming a   right atrial pressure of 8 mmHg, which is consistent with severe   pulmonary hypertension.  11. There is no evidence of pericardial effusion.    [id number] Livermore VA HospitalGreer Jain MD Electronically signed on   5/6/2024 at 2:28:31 PM    < end of copied text >        Imp:  HFpEf   acute on chronic  LVH by prior echo, normal lvef. HBP, DM, CKD    Suggest  will need diuresis and bp control, avoiding agents that worsen renal function.  b blocker, hydralazine reasonable   could add amlodipine as well for bp control  no farxiga/jardiance/MRA due to renal dysfunction    repeat echo pending  d/w dr chamorro and patient.      
Assessment  - Acute pulmonary Edema due to worsening renal function  - B/L Pl effusions and Dyspnea due to above   - Diastolic CHF - Chronic   - Underlying Hypertension, DM2   - Obesity    Plan  from pulmonary perspective pl effusion too small to drain via thoracentesis  suspect pl effusion/pulm edema from non cardiogenic causes - will need HD  Renal f/u  supplemental o2 if needed, Keep sat > 92%  dyspnea/tachypnea ould improve once uremia improves  Rest of care as per primary team. 
79y year old Female with PMH of HFpEF, Chronic Kidney Disease 4, DVT (on eliquis) Hypertension, Hyperlipidemia, Type 2 Diabetes Mellitus, Anemia, OA who presents to the ER complaining of shortness of breath x2 days. Admitted for further evaluation of CHF exacerbation. Palliative care c/s for GOC.    Debility  - PPS 50%  - amb with walker at home    Acute on chronic HFpEF exacerbation   Acute Kidney Injury on Chronic Kidney Disease 4  Anemia of chronic disease  - TTE 10/20/24: LVEF 63%, grade 2 diastolic dysfunction  - s/p 1 U pRBC 10/20/24; continue to monitor Hgb  - nephro following- hold diuretics, no NSAIDs, c/w epoetin; continue to monitor Cr  - cardio following- gdmt contraindicated 2/2 CKD, hold diuretics; continue to monitor resp status, titrate supplemental O2 down as tolerated     Advanced care planning  - HCP: granddaughter Blanca Ferreira   - see GOC note above, Pt is FULL CODE    Encounter for Palliative Care  - Introduced the palliative care team to pt at bedside, see GO note above. HCP form placed in chart. Pt is FULL CODE. Pt okay with HD if needed.   - Case discussed with hospitalist Dr. Jeong.  - Will continue to follow for supportive care.

## 2024-10-25 NOTE — CHART NOTE - NSCHARTNOTEFT_GEN_A_CORE
NUTRITION FOLLOW UP    SOURCE: Patient [X)   Family [ ]    Medical Record (X)    Diet, DASH/TLC:   Sodium & Cholesterol Restricted  Consistent Carbohydrate {Evening Snack}  For patients receiving Renal Replacement - No Protein Restr, No Conc K, No Conc Phos, Low Sodium (10-23-24 @ 12:31) [Active]    Pt now with SOB/chest pain, increasing O2 needs. Also endorsed nausea over night at time of visit, +zofran prn. Pt states that she ate minimally at dinner last night in setting of worsening symptoms. Nephrology following- pt will likely need RRT. Noted elevated phos levels +phoslo. Continue renal diet + trial of Nepro qd. POCT noted, well controlled. CHF diet education previously provided- will reinforce when patient's symptoms improve.     PO INTAKE: %         CURRENT WEIGHT: 224.8lbs (10/24)    PERTINENT MEDS:   Pertinent Medications: MEDICATIONS  (STANDING):  apixaban 2.5 milliGRAM(s) Oral two times a day  calcium acetate 1334 milliGRAM(s) Oral three times a day with meals  dextrose 5%. 1000 milliLiter(s) (100 mL/Hr) IV Continuous <Continuous>  dextrose 5%. 1000 milliLiter(s) (50 mL/Hr) IV Continuous <Continuous>  dextrose 50% Injectable 12.5 Gram(s) IV Push once  dextrose 50% Injectable 25 Gram(s) IV Push once  dextrose 50% Injectable 25 Gram(s) IV Push once  diltiazem    milliGRAM(s) Oral daily  epoetin jacobo-epbx (RETACRIT) Injectable 32978 Unit(s) SubCutaneous every 7 days  ezetimibe 10 milliGRAM(s) Oral daily  ferrous    sulfate 325 milliGRAM(s) Oral daily  glucagon  Injectable 1 milliGRAM(s) IntraMuscular once  hydrALAZINE 100 milliGRAM(s) Oral three times a day  influenza  Vaccine (HIGH DOSE) 0.5 milliLiter(s) IntraMuscular once  insulin lispro (ADMELOG) corrective regimen sliding scale   SubCutaneous three times a day before meals  insulin lispro (ADMELOG) corrective regimen sliding scale   SubCutaneous at bedtime  labetalol 600 milliGRAM(s) Oral two times a day  rosuvastatin 5 milliGRAM(s) Oral at bedtime  sodium bicarbonate 650 milliGRAM(s) Oral three times a day    MEDICATIONS  (PRN):  acetaminophen     Tablet .. 650 milliGRAM(s) Oral every 6 hours PRN Temp greater or equal to 38C (100.4F), Mild Pain (1 - 3)  albuterol    90 MICROgram(s) HFA Inhaler 2 Puff(s) Inhalation every 6 hours PRN Shortness of Breath  dextrose Oral Gel 15 Gram(s) Oral once PRN Blood Glucose LESS THAN 70 milliGRAM(s)/deciliter  melatonin 3 milliGRAM(s) Oral at bedtime PRN Insomnia  ondansetron Injectable 4 milliGRAM(s) IV Push every 8 hours PRN Nausea and/or Vomiting      PERTINENT LABS:  10-25 Na134 mmol/L[L] Glu 129 mg/dL[H] K+ 5.2 mmol/L Cr  4.65 mg/dL[H]  mg/dL[H] 10-25 Phos 7.9 mg/dL[H] 10-24 Alb 3.4 g/dL    CAPILLARY BLOOD GLUCOSE      POCT Blood Glucose.: 134 mg/dL (25 Oct 2024 08:19)  POCT Blood Glucose.: 138 mg/dL (24 Oct 2024 21:38)  POCT Blood Glucose.: 154 mg/dL (24 Oct 2024 16:59)  POCT Blood Glucose.: 184 mg/dL (24 Oct 2024 12:53)      SKIN:  no pressure ulcers  EDEMA: none  LAST BM: 10/23     ESTIMATED NEEDS:   [X] no change since previous assessment  [ ] recalculated:     PREVIOUS NUTRITION DIAGNOSIS:    1. Altered nutrition related lab values (A1c)    NUTRITION DIAGNOSIS is :  (X)  Ongoing      NEW NUTRITION DIAGNOSIS: Inadequate oral intake related to SOB, increasing O2 needs nausea as evidenced by variable PO intake %    NUTRITION RECOMMENDATIONS:   1. Consistent carbohydrate, renal, DASH/TLC + trial Nepro qd  2. Possible plan for RRT  3. Pt continues on phoslo, +zofran prn    MONITORING AND EVALUATION:   1. Tolerance to diet prescription   2. PO intake  3. Weights  4. Labs  5. Follow Up per protocol     RD to remain available   Justa Estrada RDN   [phone] or TEAMS

## 2024-10-25 NOTE — PROGRESS NOTE ADULT - SUBJECTIVE AND OBJECTIVE BOX
Patient is a 79y old  Female who presents with a chief complaint of shortness of breath (20 Oct 2024 11:57)      INTERVAL HPI/ OVERNIGHT EVENTS: Patient seen and examined at bedside. States that breathing has improved slightly today, but has been having some congestion and bloody mucus. No other complaints at this time.     Vital Signs Last 24 Hrs  T(C): 36.8 (24 Oct 2024 05:22), Max: 36.8 (23 Oct 2024 20:40)  T(F): 98.2 (24 Oct 2024 05:22), Max: 98.2 (23 Oct 2024 20:40)  HR: 63 (24 Oct 2024 05:22) (63 - 67)  BP: 147/67 (24 Oct 2024 05:22) (130/60 - 160/63)  BP(mean): --  RR: 18 (24 Oct 2024 05:22) (17 - 18)  SpO2: 96% (24 Oct 2024 05:22) (95% - 96%)    Parameters below as of 24 Oct 2024 05:22  Patient On (Oxygen Delivery Method): nasal cannula  O2 Flow (L/min): 2    REVIEW OF SYSTEMS:  CONSTITUTIONAL: No fever or chills  HEENT:  No headache, no sore throat  RESPIRATORY: No cough, wheezing, +shortness of breath  CARDIOVASCULAR: No chest pain, palpitations  GASTROINTESTINAL: No abd pain, nausea, vomiting, or diarrhea  GENITOURINARY: No dysuria, frequency, or hematuria  NEUROLOGICAL: no focal weakness or dizziness  MUSCULOSKELETAL: no myalgias       PHYSICAL EXAM:  General: NAD, morbidly obese female, lying in bed  Respiratory: B/L crackles throughout, + improved expiratory wheezes  CV: RRR, +S1/S2, +systolic murmur  Abdominal: Soft, Non tender, Nondistended +Bowel sounds   Extremities: +chronic skin changes with b/l hyperpigmentation, no ulcers noted. No edema, 2+ peripheral pulses  NERVOUS SYSTEM: answers questions and follows commands appropriately, A&Ox3, grossly moves all extremities   PSYCH: Appropriate affect, Alert & Awake; Good judgement      LABS: Personally reviewed                        7.9    8.90  )-----------( 188      ( 24 Oct 2024 08:34 )             25.3     10-24    134[L]  |  98  |  160[H]  ----------------------------<  145[H]  5.1   |  22  |  4.66[H]    Ca    8.2[L]      24 Oct 2024 08:34  Phos  9.1     10-23    TPro  7.4  /  Alb  3.4  /  TBili  0.5  /  DBili  x   /  AST  9[L]  /  ALT  19  /  AlkPhos  77  10-24                        IMAGING  CHEST XRAY 10/22/2024  IMPRESSION  No significant change in comparison to the prior study. Mild CHF is   again suggested. Magnified cardiac silhouette remains stable. There is   evidence of prior right lower neck surgery and degenerative arthritis   involving the glenohumeral joints.     TTE Echo Complete w/o Contrast w/ Doppler  Summary:   1. Normal global left ventricular systolic function.   2. Spectral Doppler shows pseudonormal pattern of left ventricular   myocardial filling (Grade II diastolic dysfunction).   3. Normal right ventricular size and function.   4. Mildly enlarged left atrium.   5. The right atrium is normal in size.   6. Mild mitral valve regurgitation.   7. Mild thickening and calcification of the anterior and posterior   mitral valve leaflets.   8. Mild tricuspid regurgitation.   9. Estimated pulmonary artery systolic pressure is 57.8 mmHg assuming a   right atrial pressure of 3 mmHg, which is consistent with moderate   pulmonary hypertension.  10. Pulmonary hypertension is present.    MEDICATIONS  (STANDING):  apixaban 2.5 milliGRAM(s) Oral two times a day  calcium acetate 1334 milliGRAM(s) Oral three times a day with meals  dextrose 5%. 1000 milliLiter(s) (50 mL/Hr) IV Continuous <Continuous>  dextrose 5%. 1000 milliLiter(s) (100 mL/Hr) IV Continuous <Continuous>  dextrose 50% Injectable 12.5 Gram(s) IV Push once  dextrose 50% Injectable 25 Gram(s) IV Push once  dextrose 50% Injectable 25 Gram(s) IV Push once  diltiazem    milliGRAM(s) Oral daily  epoetin jacobo-epbx (RETACRIT) Injectable 98408 Unit(s) SubCutaneous every 7 days  ezetimibe 10 milliGRAM(s) Oral daily  ferrous    sulfate 325 milliGRAM(s) Oral daily  glucagon  Injectable 1 milliGRAM(s) IntraMuscular once  hydrALAZINE 100 milliGRAM(s) Oral three times a day  influenza  Vaccine (HIGH DOSE) 0.5 milliLiter(s) IntraMuscular once  insulin lispro (ADMELOG) corrective regimen sliding scale   SubCutaneous three times a day before meals  insulin lispro (ADMELOG) corrective regimen sliding scale   SubCutaneous at bedtime  labetalol 600 milliGRAM(s) Oral two times a day  rosuvastatin 5 milliGRAM(s) Oral at bedtime  sodium bicarbonate 650 milliGRAM(s) Oral three times a day    MEDICATIONS  (PRN):  acetaminophen     Tablet .. 650 milliGRAM(s) Oral every 6 hours PRN Temp greater or equal to 38C (100.4F), Mild Pain (1 - 3)  dextrose Oral Gel 15 Gram(s) Oral once PRN Blood Glucose LESS THAN 70 milliGRAM(s)/deciliter  melatonin 3 milliGRAM(s) Oral at bedtime PRN Insomnia  ondansetron Injectable 4 milliGRAM(s) IV Push every 8 hours PRN Nausea and/or Vomiting      RADIOLOGY & ADDITIONAL TESTS: Personally reviewed.     Consultant(s) Notes Reviewed:  [x] YES  [ ] NO   Discussed with JOSE/PATTIE, RN     Patient is a 79y old  Female who presents with a chief complaint of shortness of breath (20 Oct 2024 11:57)      INTERVAL HPI/ OVERNIGHT EVENTS: Patient seen and examined at bedside. States that breathing has improved slightly today, but has been having some congestion and dry mouth. No other complaints at this time.      Vital Signs Last 24 Hrs  T(C): 36.4 (25 Oct 2024 11:40), Max: 36.5 (24 Oct 2024 21:06)  T(F): 97.6 (25 Oct 2024 11:40), Max: 97.7 (24 Oct 2024 21:06)  HR: 65 (25 Oct 2024 11:40) (60 - 69)  BP: 151/70 (25 Oct 2024 11:40) (142/73 - 165/65)  BP(mean): --  RR: 17 (25 Oct 2024 11:40) (16 - 20)  SpO2: 97% (25 Oct 2024 11:40) (92% - 100%)    Parameters below as of 25 Oct 2024 16:30  Patient On (Oxygen Delivery Method): nasal cannula      REVIEW OF SYSTEMS:  CONSTITUTIONAL: No fever or chills  HEENT:  No headache, no sore throat  RESPIRATORY: No cough, wheezing, +shortness of breath  CARDIOVASCULAR: No chest pain, palpitations  GASTROINTESTINAL: No abd pain, nausea, vomiting, or diarrhea  GENITOURINARY: No dysuria, frequency, or hematuria  NEUROLOGICAL: no focal weakness or dizziness  MUSCULOSKELETAL: no myalgias       PHYSICAL EXAM:  General: NAD, morbidly obese female, lying in bed  Respiratory: B/L crackles throughout, + improved expiratory wheezes  CV: RRR, +S1/S2, +systolic murmur  Abdominal: Soft, Non tender, Nondistended +Bowel sounds   Extremities: +chronic skin changes with b/l hyperpigmentation, no ulcers noted. No edema, 2+ peripheral pulses  NERVOUS SYSTEM: answers questions and follows commands appropriately, A&Ox3, grossly moves all extremities   PSYCH: Appropriate affect, Alert & Awake; Good judgement      LABS: Personally reviewed                        8.2    8.16  )-----------( 173      ( 25 Oct 2024 07:59 )             26.3     10-25    134[L]  |  98  |  177[H]  ----------------------------<  129[H]  5.2   |  22  |  4.65[H]    Ca    8.4      25 Oct 2024 07:59  Phos  7.9     10-25  Mg     2.6     10-25    TPro  7.4  /  Alb  3.4  /  TBili  0.5  /  DBili  x   /  AST  9[L]  /  ALT  19  /  AlkPhos  77  10-24                        IMAGING  CHEST XRAY 10/22/2024  IMPRESSION  No significant change in comparison to the prior study. Mild CHF is   again suggested. Magnified cardiac silhouette remains stable. There is   evidence of prior right lower neck surgery and degenerative arthritis   involving the glenohumeral joints.     TTE Echo Complete w/o Contrast w/ Doppler  Summary:   1. Normal global left ventricular systolic function.   2. Spectral Doppler shows pseudonormal pattern of left ventricular   myocardial filling (Grade II diastolic dysfunction).   3. Normal right ventricular size and function.   4. Mildly enlarged left atrium.   5. The right atrium is normal in size.   6. Mild mitral valve regurgitation.   7. Mild thickening and calcification of the anterior and posterior   mitral valve leaflets.   8. Mild tricuspid regurgitation.   9. Estimated pulmonary artery systolic pressure is 57.8 mmHg assuming a   right atrial pressure of 3 mmHg, which is consistent with moderate   pulmonary hypertension.  10. Pulmonary hypertension is present.    MEDICATIONS  (STANDING):  apixaban 2.5 milliGRAM(s) Oral two times a day  calcium acetate 1334 milliGRAM(s) Oral three times a day with meals  dextrose 5%. 1000 milliLiter(s) (50 mL/Hr) IV Continuous <Continuous>  dextrose 5%. 1000 milliLiter(s) (100 mL/Hr) IV Continuous <Continuous>  dextrose 50% Injectable 12.5 Gram(s) IV Push once  dextrose 50% Injectable 25 Gram(s) IV Push once  dextrose 50% Injectable 25 Gram(s) IV Push once  diltiazem    milliGRAM(s) Oral daily  epoetin jacobo-epbx (RETACRIT) Injectable 98026 Unit(s) SubCutaneous every 7 days  ezetimibe 10 milliGRAM(s) Oral daily  ferrous    sulfate 325 milliGRAM(s) Oral daily  glucagon  Injectable 1 milliGRAM(s) IntraMuscular once  hydrALAZINE 100 milliGRAM(s) Oral three times a day  influenza  Vaccine (HIGH DOSE) 0.5 milliLiter(s) IntraMuscular once  insulin lispro (ADMELOG) corrective regimen sliding scale   SubCutaneous three times a day before meals  insulin lispro (ADMELOG) corrective regimen sliding scale   SubCutaneous at bedtime  labetalol 600 milliGRAM(s) Oral two times a day  rosuvastatin 5 milliGRAM(s) Oral at bedtime  sodium bicarbonate 650 milliGRAM(s) Oral three times a day    MEDICATIONS  (PRN):  acetaminophen     Tablet .. 650 milliGRAM(s) Oral every 6 hours PRN Temp greater or equal to 38C (100.4F), Mild Pain (1 - 3)  dextrose Oral Gel 15 Gram(s) Oral once PRN Blood Glucose LESS THAN 70 milliGRAM(s)/deciliter  melatonin 3 milliGRAM(s) Oral at bedtime PRN Insomnia  ondansetron Injectable 4 milliGRAM(s) IV Push every 8 hours PRN Nausea and/or Vomiting      RADIOLOGY & ADDITIONAL TESTS: Personally reviewed.     Consultant(s) Notes Reviewed:  [x] YES  [ ] NO   Discussed with SW/PATTIE, RN

## 2024-10-25 NOTE — CHART NOTE - NSCHARTNOTEFT_GEN_A_CORE
Nurse reached out pt was short of breath and experiencing chest tightness, earlier in the day pt was titrated down to 1L NC. Pt was assessed at bedside, pt looks uncomfortable, visibly short of breath. Pt stated chest pressure, non-radiating, associated with nausea. Pt denied any headache, numbness, tingling, or abdominal pain. PE was pertinent for bilateral wheezing heard at the upper lobes. Pt's oxygen was increased to 3L, now saturating at 97%, pt states feeling better and less nauseous. EKG was ordered, showed no new acute findings, troponin was ordered   pt given albuterol inhaler to use PRN for shortness of breath Nurse reached out pt was short of breath and experiencing chest tightness, earlier in the day pt was titrated down to 1L NC. Pt was assessed at bedside, pt looks uncomfortable, visibly short of breath. Pt stated chest pressure, non-radiating, associated with nausea. Pt denied any headache, numbness, tingling, or abdominal pain. PE was pertinent for bilateral wheezing heard at the upper lobes. Pt's oxygen was increased to 3L, now saturating at 97%, pt states feeling better and less nauseous. EKG was ordered, showed no new acute findings, troponin was negative at 33.8  pt given albuterol inhaler to use PRN for shortness of breath

## 2024-10-25 NOTE — PROGRESS NOTE ADULT - ASSESSMENT
BURKE ABARCA is a 79y year old Female with PMH of HFpEF, Chronic Kidney Disease 4, DVT (on eliquis) Hypertension, Hyperlipidemia, Type 2 Diabetes Mellitus, Anemia, OA who presents to the ER complaining of shortness of breath x2 days. Admitted for acute on chronic dCHF    #Shortness of breath - likely 2/2 HFpEF exacerbation, acute on chronic diastolic CHF  #Acute Kidney Injury on Chronic Kidney Disease 4  #Anemia, likely chronic disease   - admit to medicine   - CXR with Interstitial edema with mild congestive heart failure.   - remote tele, continuous O2  - s/p lasix 80mg ivp in ER, was on bumex 2mg bid since August per patient.   - s/p IV lasix 40mg   - TTE 10/20/24: LVEF 63%, grade 2 diastolic dysfunction  - proBNP 5495 increased from 3792 on 10/19  - Hgb 7.1> 6.5 > 6.8> 7.5> 7.1 > 7.0 > 7.8 > 7.9  - s/p 1 U pRBC 10/20/24, repeat hgb 7.1  - s/p 1 U pRBC 10/22/24, repeat hgb 7.8  - iron studies: low transferrin, high ferritin  - transfuse if <7  - keep type and screen active  - continue weekly epoietin  - nephro recs appreciated: hold diuretics, no NSAIDs, c/w epoetin  - cardio recs appreciated: gdmt contraindicated 2/2 CKD, hold diuretics  - change oxygen to humidified O2 due to increased congestion and bloody mucus per pt   - pt currently on 2L of O2, try to titrate to RA today as pt tolerates    #Hyperphosphatemia   - phos 9.1  - per nephro recs - start pt on phoslo (calcium acetate)    #Hypertension  - continue home diltiazem, hydralazine, labetalol    #Hyperlipidemia  - continue home crestor, ezetemibe    #Type II Diabetes Mellitus  - hold home meds  - FS and DANGELO  - maintain blood glucose 100-180 while hospitalized     #history of DVT  - per patient she had RUE DVT while at [facility]  - has been on eliquis 2.5mg bid for nearly 3 months   - c/w eliquis 2.5mg    #DVT ppx  - Eliquis    #GOC  -full code     #Diet  -DASH/TLC, consistent carbs    Dispo: pending [facility]    Case seen and discussed with Dr. Dowling.     BURKE ABARCA is a 79y year old Female with PMH of HFpEF, Chronic Kidney Disease 4, DVT (on eliquis) Hypertension, Hyperlipidemia, Type 2 Diabetes Mellitus, Anemia, OA who presents to the ER complaining of shortness of breath x2 days. Admitted for acute on chronic dCHF    #Shortness of breath - likely 2/2 HFpEF exacerbation, acute on chronic diastolic CHF  #Acute Kidney Injury on Chronic Kidney Disease 4  #Anemia, likely chronic disease   - admit to medicine   - CXR with Interstitial edema with mild congestive heart failure.   - remote tele, continuous O2  - s/p lasix 80mg ivp in ER, was on bumex 2mg bid since August per patient.   - s/p IV lasix 40mg   - TTE 10/20/24: LVEF 63%, grade 2 diastolic dysfunction  - proBNP 5495 increased from 3792 on 10/19  - Hgb 7.1> 6.5 > 6.8> 7.5> 7.1 > 7.0 > 7.8 > 7.9  - s/p 1 U pRBC 10/20/24, repeat hgb 7.1  - s/p 1 U pRBC 10/22/24, repeat hgb 7.8  - iron studies: low transferrin, high ferritin  - transfuse if <7  - keep type and screen active  - continue weekly epoietin  - nephro recs appreciated: hold diuretics, no NSAIDs, c/w epoetin  - cardio recs appreciated: gdmt contraindicated 2/2 CKD, hold diuretics  - change oxygen to humidified O2 due to increased congestion and bloody mucus per pt   - pt uptitrated on oxygen due to increased SOB  - pulm consult placed  - nephrology recommended to start HD, catheter placed, pending CXR for confirmation of placement    #Hyperphosphatemia   - phos 7.9  - per nephro recs - start pt on phoslo (calcium acetate)    #Hypertension  - continue home diltiazem, hydralazine, labetalol    #Hyperlipidemia  - continue home crestor, ezetemibe    #Type II Diabetes Mellitus  - hold home meds  - FS and DANGELO  - maintain blood glucose 100-180 while hospitalized     #history of DVT  - per patient she had RUE DVT while at [facility]  - has been on eliquis 2.5mg bid for nearly 3 months   - c/w eliquis 2.5mg    #DVT ppx  - Eliquis    #GOC  -full code     #Diet  -DASH/TLC, consistent carbs    Dispo: pending [facility]    Family to be updated.     Case seen and discussed with Dr. Dowling.

## 2024-10-25 NOTE — PROCEDURE NOTE - ADDITIONAL PROCEDURE DETAILS
attempted HD catheter placement in [facility] however was unable to pass guidewire  procedure aborted and attempted made to [facility] successful

## 2024-10-25 NOTE — CONSULT NOTE ADULT - SUBJECTIVE AND OBJECTIVE BOX
PULMONARY CONSULT  Location of Patient : [facility] [id number] ([facility])  Attending requesting Consult:Chas Dowling      Initial HPI on admission:  HPI:  79 year old Female with PMH of HFpEF, Chronic Kidney Disease 4, DVT (on eliquis) Hypertension, Hyperlipidemia, Type 2 Diabetes Mellitus, Anemia, OA who presents to the ER complaining of shortness of breath x2 days.   found to have pulmonary edema due to worsening renal dysfunction  Started on HD  pulmonary consult called for b/l pl effusion ? role in thoracentesis    when seen patient and examined  patient states mild sob, no cough   denies h/o lung issues, no         PAST MEDICAL & SURGICAL HISTORY:  Hypertension  Diabetes type 2  Thyroid disease  Anemia  Hypercholesteremia  Myelodysplasia (myelodysplastic syndrome)  Thyromegaly s/p partial thyroidectomy long time ago  Other giant cell arteritis  Arthritis  Kidney insufficiency  PVD (peripheral vascular disease)  Mass of right knee  Urinary incontinence  H/O partial thyroidectomy  H/O: hysterectomy  History of cataract surgery  History of vascular surgery        Allergies    ACE inhibitors (Angioedema)  statins (Muscle Pain)    Intolerances    predniSONE (Other)    FAMILY HISTORY:  Family history of diabetes mellitus    Family history of hypertension    Family history of acute renal failure      Social history:     Smoking: Denies     Drinking: social     Drug use: denies    Review of Systems: as stated above    CONSTITUTIONAL: No fever, No chills, +fatigue  EYES: No eye pain, No visual disturbances, No discharge  ENMT:  No difficulty hearing, No tinnitus, No vertigo; No sinus or throat pain  NECK: No pain, No stiffness  RESPIRATORY: No Cough, +SOB, No Secretions  CARDIOVASCULAR: No chest pain, No palpitations, No dizziness, or No leg swelling  GASTROINTESTINAL: No abdominal or epigastric pain. No nausea, No vomiting, No hematemesis; No diarrhea, No constipation. No melena, No hematochezia.  GENITOURINARY: No dysuria, No frequency, No hematuria, No incontinence  NEUROLOGICAL: No headaches, No memory loss, No loss of strength, No numbness, No tremors  SKIN: No itching, No burning, No rashes, No lesions   MUSCULOSKELETAL: No joint pain or swelling; No muscle, back, No extremity pain  PSYCHIATRIC: No depression, No anxiety, No mood swings, No difficulty sleeping      Medications:  MEDICATIONS  (STANDING):  apixaban 2.5 milliGRAM(s) Oral two times a day  Biotene Dry Mouth Oral Rinse 5 milliLiter(s) Swish and Spit daily  calcium acetate 1334 milliGRAM(s) Oral three times a day with meals  dextrose 5%. 1000 milliLiter(s) (50 mL/Hr) IV Continuous <Continuous>  dextrose 5%. 1000 milliLiter(s) (100 mL/Hr) IV Continuous <Continuous>  dextrose 50% Injectable 12.5 Gram(s) IV Push once  dextrose 50% Injectable 25 Gram(s) IV Push once  dextrose 50% Injectable 25 Gram(s) IV Push once  diltiazem    milliGRAM(s) Oral daily  epoetin jacobo-epbx (RETACRIT) Injectable 93466 Unit(s) IV Push once  ezetimibe 10 milliGRAM(s) Oral daily  ferrous    sulfate 325 milliGRAM(s) Oral daily  glucagon  Injectable 1 milliGRAM(s) IntraMuscular once  hydrALAZINE 100 milliGRAM(s) Oral three times a day  influenza  Vaccine (HIGH DOSE) 0.5 milliLiter(s) IntraMuscular once  insulin lispro (ADMELOG) corrective regimen sliding scale   SubCutaneous at bedtime  insulin lispro (ADMELOG) corrective regimen sliding scale   SubCutaneous three times a day before meals  labetalol 600 milliGRAM(s) Oral two times a day  rosuvastatin 5 milliGRAM(s) Oral at bedtime    MEDICATIONS  (PRN):  acetaminophen     Tablet .. 650 milliGRAM(s) Oral every 6 hours PRN Temp greater or equal to 38C (100.4F), Mild Pain (1 - 3)  albuterol    90 MICROgram(s) HFA Inhaler 2 Puff(s) Inhalation every 6 hours PRN Shortness of Breath  dextrose Oral Gel 15 Gram(s) Oral once PRN Blood Glucose LESS THAN 70 milliGRAM(s)/deciliter  melatonin 3 milliGRAM(s) Oral at bedtime PRN Insomnia  ondansetron Injectable 4 milliGRAM(s) IV Push every 8 hours PRN Nausea and/or Vomiting  sodium chloride 0.65% Nasal 1 Spray(s) Both Nostrils daily PRN Nasal Congestion      Antibiotics History      Heme Medications   apixaban 2.5 milliGRAM(s) Oral two times a day, 10-19-24 @ 15:47      GI Medications        Home Medications:  Last Order Reconciliation Date: 10-19-24 @ 15:48 (Admission Reconciliation)  Apresoline 100 mg oral tablet: 1 tab(s) orally 3 times a day (10-19-24 @ 15:45)  Bumex 2 mg oral tablet: 1 tab(s) orally 2 times a day (10-19-24 @ 15:45)  calcitriol 0.25 mcg oral capsule: 1 cap(s) orally once a day (10-19-24 @ 15:45)  calcium acetate 667 mg oral tablet: 1 tab(s) orally 3 times a day (10-19-24 @ 15:45)  Crestor 5 mg oral tablet: 1 tab(s) orally once a day (at bedtime) (10-19-24 @ 15:45)  DilTIAZem (Eqv-Cardizem CD) 180 mg/24 hours oral capsule, extended release: 1 cap(s) orally once a day (10-19-24 @ 15:46)  Eliquis 2.5 mg oral tablet: 1 tab(s) orally 2 times a day (10-19-24 @ 15:46)  ezetimibe 10 mg oral tablet: 1 tab(s) orally once a day (10-19-24 @ 15:46)  ferrous sulfate 325 mg (65 mg elemental iron) oral delayed release tablet: 1 tab(s) orally once a day (10-19-24 @ 15:46)  glipiZIDE 5 mg oral tablet: 1 tab(s) orally once a day (10-19-24 @ 15:46)  labetalol 300 mg oral tablet: 2 tab(s) orally 2 times a day (10-19-24 @ 15:46)      LABS:                        8.2    8.16  )-----------( 173      ( 25 Oct 2024 07:59 )             26.3     10-25    134[L]  |  98  |  177[H]  ----------------------------<  129[H]  5.2   |  22  |  4.65[H]    Ca    8.4      25 Oct 2024 07:59  Phos  7.9     10-25  Mg     2.6     10-25    TPro  7.4  /  Alb  3.4  /  TBili  0.5  /  DBili  x   /  AST  9[L]  /  ALT  19  /  AlkPhos  77  10-24    CHUNG -- 10-22 @ 07:07  Anti SS-1 --  Anti SS-2 --  Anti RNP --  RF -- 10-22 @ 07:07    Atypical ANCA Indeterminate Method interference due to CHUNG Fluorescence 10-22 @ 07:07  c-ANCA titer -- 10-22 @ 07:07  c-ANCA Negative 10-22 @ 07:07  p-ANCA Negative 10-22 @ 07:07     Trend Bun/Cr  10-25-24 @ 07:59  BUN/CR -  177[H] / 4.65[H]  10-24-24 @ 08:34  BUN/CR -  160[H] / 4.66[H]  10-23-24 @ 05:50  BUN/CR -  154[H] / 4.49[H]  10-22-24 @ 07:07  BUN/CR -  145[H] / 4.46[H]  10-21-24 @ 07:00  BUN/CR -  144[H] / 4.05[H]  10-20-24 @ 12:12  BUN/CR -  128[H] / 3.46[H]  10-19-24 @ 12:45  BUN/CR -  127[H] / 3.29[H]  06-27-24 @ 07:10  BUN/CR -  82[H] / 2.11[H]  06-26-24 @ 05:55  BUN/CR -  77[H] / 2.09[H]  06-25-24 @ 07:10  BUN/CR -  72[H] / 2.01[H]  06-24-24 @ 06:35  BUN/CR -  71[H] / 2.05[H]  06-23-24 @ 05:49  BUN/CR -  70[H] / 2.13[H]    RADIOLOGY  CXR:      CT:    < from: CT Chest No Cont (10.24.24 @ 10:32) >    ACC: [id number] EXAM:  CT CHEST   ORDERED BY: CHATA DUNLAP     PROCEDURE DATE:  10/24/2024          INTERPRETATION:  CLINICAL INFORMATION: Shortness of breath.    COMPARISON: CT cervical spine March 15, 2019    CONTRAST/COMPLICATIONS:  IV Contrast: NONE  Oral Contrast: NONE  Complications: None reported at time of study completion    PROCEDURE:  CT Angiography of the Chest.  Sagittal and coronal reformats were performed as well as 3D (MIP)   reconstructions.    FINDINGS:    LUNGS AND LARGE AIRWAYS: Patent central airways. No pulmonary nodules.   Scattered areas of platelike atelectasis in both lower lungs.   Interlobular septal thickening and mild groundglass bilaterally, most   pronounced in the right middle and lower lobes.  PLEURA: Small right and trace left pleural effusions.  VESSELS: Atherosclerotic arterial calcifications, including coronary   artery calcification.  HEART: Cardiomegaly. No pericardial effusion. Aortic valvular   calcifications which can be seen in the setting of aortic stenosis.  MEDIASTINUM AND LOU: No lymphadenopathy.  CHEST WALL AND LOWER NECK: Status post right thyroidectomy. Enlarged left   lobe, unchanged compared with CT cervical spine March 15, 2019.  VISUALIZED UPPER ABDOMEN: Within normal limits.  BONES: Degenerative changes in both shoulders.    IMPRESSION:  Pulmonary interstitial edema.  Small right, trace left pleural effusions.      < end of copied text >  ECHO:  < from: TTE Echo Complete w/o Contrast w/ Doppler (10.20.24 @ 09:26) >    Summary:   1. Normal global left ventricular systolic function.   2. Spectral Doppler shows pseudonormal pattern of left ventricular myocardial filling (Grade II diastolic dysfunction).   3. Normal right ventricular size and function.   4. Mildly enlarged left atrium.   5. The right atrium is normal in size.   6. Mild mitral valve regurgitation.   7. Mild thickening and calcification of the anterior and posterior mitral valve leaflets.   8. Mild tricuspid regurgitation.   9. Estimated pulmonary artery systolic pressure is 57.8 mmHg assuming a   right atrial pressure of 3 mmHg, which is consistent with moderate   pulmonary hypertension.  10. Pulmonary hypertension is present.    [id number] Dxeter Ahmadi MD,[facility] , Electronically signed on   10/20/2024 at 12:36:07 PM    < end of copied text >      VITALS:  T(C): 36.4 (10-25-24 @ 11:40), Max: 36.5 (10-24-24 @ 21:06)  T(F): 97.6 (10-25-24 @ 11:40), Max: 97.7 (10-24-24 @ 21:06)  HR: 65 (10-25-24 @ 11:40) (60 - 69)  BP: 151/70 (10-25-24 @ 11:40) (142/73 - 165/65)  BP(mean): --  ABP: --  ABP(mean): --  RR: 17 (10-25-24 @ 11:40) (16 - 20)  SpO2: 97% (10-25-24 @ 11:40) (92% - 100%)  CVP(mm Hg): --  CVP(cm H2O): --    Ins and Outs     10-24-24 @ 07:01  -  10-25-24 @ 07:00  --------------------------------------------------------  IN: 120 mL / OUT: 1400 mL / NET: -1280 mL        Height (cm): 162.6 (10-25-24 @ 13:49)  Weight (kg): 93 (10-25-24 @ 13:49)  BMI (kg/m2): 35.2 (10-25-24 @ 13:49)        I&O's Detail    24 Oct 2024 07:01  -  25 Oct 2024 07:00  --------------------------------------------------------  IN:    Oral Fluid: 120 mL  Total IN: 120 mL    OUT:    Voided (mL): 1400 mL  Total OUT: 1400 mL    Total NET: -1280 mL          Physical Examination:  GENERAL:               Alert, Oriented, mild acute distress.    HEENT:                   No JVD, Moist MM, enlarged neck circumference   PULM:                     Bilateral air entry, Clear to auscultation bilaterally dimminished, no significant sputum production, + Rales, No Rhonchi, No Wheezing  CVS:                         S1, S2,  No Murmur  ABD:                        Soft, nondistended, nontender, normoactive bowel sounds,   EXT:                         ++ edema, nontender, No Cyanosis or Clubbing   Vascular:                Warm Extremities, Normal Capillary refill, Normal Distal Pulses  SKIN:                       Warm and well perfused, no rashes noted.   NEURO:                  Alert, oriented, interactive, nonfocal, follows commands  PSYC:                      Calm, + Insight.

## 2024-10-25 NOTE — PROGRESS NOTE ADULT - ASSESSMENT
79y year old Female with PMH of HFpEF, Chronic Kidney Disease 4, DVT (on eliquis) Hypertension, Hyperlipidemia, Type 2 Diabetes Mellitus, Anemia, OA who presents to the ER complaining of shortness of breath x2 days. Admitted for further evaluation of CHF exacerbation. Palliative care c/s for GOC.    Debility  - PPS 40-50%  - amb with walker at home    Acute on chronic HFpEF exacerbation   Acute Kidney Injury on Chronic Kidney Disease 4  Anemia of chronic disease  - TTE 10/20/24: LVEF 63%, grade 2 diastolic dysfunction  - s/p 1 U pRBC 10/20/24; continue to monitor Hgb  - nephro following- D/w pt impending need for RRT. Pt agrees that if no improvement in renal fx in am, will have temp HD cath placed and begin HD   - cardio following- gdmt contraindicated 2/2 CKD, hold diuretics; continue to monitor resp status, titrate supplemental O2 down as tolerated     Advanced care planning  - HCP: granddaughter Blanca [place]   - Pt is FULL CODE    Encounter for Palliative Care  - Spoke with pt at bedside who is aware she may need HD initiated. Pt's father and brother were on HD so she is familiar with the process. Her main concern is to "breath better". Supportive care given.  - Will continue to follow for supportive care and ongoing GOC discussions.

## 2024-10-25 NOTE — PROCEDURE NOTE - NSICDXPROCEDURE_GEN_ALL_CORE_FT
PROCEDURES:  Insertion of vascular access device for hemodialysis 25-Oct-2024 14:51:08  Ratna Rivera N

## 2024-10-25 NOTE — PROGRESS NOTE ADULT - SUBJECTIVE AND OBJECTIVE BOX
Comfortable on NC O2 at rest, some nausea    Vital Signs Last 24 Hrs  T(C): 36.4 (10-25-24 @ 11:40), Max: 36.5 (10-24-24 @ 21:06)  T(F): 97.6 (10-25-24 @ 11:40), Max: 97.7 (10-24-24 @ 21:06)  HR: 65 (10-25-24 @ 11:40) (60 - 69)  BP: 151/70 (10-25-24 @ 11:40) (142/73 - 165/65)  RR: 17 (10-25-24 @ 11:40) (16 - 20)  SpO2: 97% (10-25-24 @ 11:40) (92% - 100%)    I&O's Detail    24 Oct 2024 07:01  -  25 Oct 2024 07:00  --------------------------------------------------------  OUT:    Voided (mL): 1400 mL  Total OUT: 1400 mL    s1s2  b/l air entry  soft, ND  tr edema                                                          8.2    8.16  )-----------( 173      ( 25 Oct 2024 07:59 )             26.3     25 Oct 2024 07:59    134    |  98     |  177    ----------------------------<  129    5.2     |  22     |  4.65     Ca    8.4        25 Oct 2024 07:59  Phos  7.9       25 Oct 2024 07:59  Mg     2.6       25 Oct 2024 07:59    TPro  7.4    /  Alb  3.4    /  TBili  0.5    /  DBili  x      /  AST  9      /  ALT  19     /  AlkPhos  77     24 Oct 2024 08:34    LIVER FUNCTIONS - ( 24 Oct 2024 08:34 )  Alb: 3.4 g/dL / Pro: 7.4 g/dL / ALK PHOS: 77 U/L / ALT: 19 U/L / AST: 9 U/L / GGT: x           acetaminophen     Tablet .. 650 milliGRAM(s) Oral every 6 hours PRN  albuterol    90 MICROgram(s) HFA Inhaler 2 Puff(s) Inhalation every 6 hours PRN  apixaban 2.5 milliGRAM(s) Oral two times a day  Biotene Dry Mouth Oral Rinse 5 milliLiter(s) Swish and Spit daily  calcium acetate 1334 milliGRAM(s) Oral three times a day with meals  dextrose 5%. 1000 milliLiter(s) IV Continuous <Continuous>  dextrose 5%. 1000 milliLiter(s) IV Continuous <Continuous>  dextrose 50% Injectable 12.5 Gram(s) IV Push once  dextrose 50% Injectable 25 Gram(s) IV Push once  dextrose 50% Injectable 25 Gram(s) IV Push once  dextrose Oral Gel 15 Gram(s) Oral once PRN  diltiazem    milliGRAM(s) Oral daily  epoetin jacobo-epbx (RETACRIT) Injectable 87357 Unit(s) IV Push once  ezetimibe 10 milliGRAM(s) Oral daily  ferrous    sulfate 325 milliGRAM(s) Oral daily  glucagon  Injectable 1 milliGRAM(s) IntraMuscular once  hydrALAZINE 100 milliGRAM(s) Oral three times a day  influenza  Vaccine (HIGH DOSE) 0.5 milliLiter(s) IntraMuscular once  insulin lispro (ADMELOG) corrective regimen sliding scale   SubCutaneous three times a day before meals  insulin lispro (ADMELOG) corrective regimen sliding scale   SubCutaneous at bedtime  labetalol 600 milliGRAM(s) Oral two times a day  melatonin 3 milliGRAM(s) Oral at bedtime PRN  ondansetron Injectable 4 milliGRAM(s) IV Push every 8 hours PRN  rosuvastatin 5 milliGRAM(s) Oral at bedtime  sodium chloride 0.65% Nasal 1 Spray(s) Both Nostrils daily PRN    A/P:    DM, HTN, CKD 4 (Cr 2.13 - 7/2/24, Cr 2.35 - 10/7/24)  Adm w/SOB iso severe anemia, some PVC on CXR  S/p 2u PRBCs on this adm  Started on diuretics   Cardio-renal/hemodynamic ATN/CKD 4  UA w/pr 30, bland  Imaging w/o hydro   Diuretics held since 10/22  Epoetin   Serologies are negative   Renal diet  Phoslo for high Phos  No improvement in renal fx  Mild uremic symptoms   To have temp HD cath placed today and begin HD   2nd HD tomorrow  Will follow     [phone]

## 2024-10-26 LAB
ALBUMIN SERPL ELPH-MCNC: 3 G/DL — LOW (ref 3.3–5)
ALP SERPL-CCNC: 80 U/L — SIGNIFICANT CHANGE UP (ref 40–120)
ALT FLD-CCNC: 15 U/L — SIGNIFICANT CHANGE UP (ref 10–45)
ANION GAP SERPL CALC-SCNC: 12 MMOL/L — SIGNIFICANT CHANGE UP (ref 5–17)
AST SERPL-CCNC: 14 U/L — SIGNIFICANT CHANGE UP (ref 10–40)
BILIRUB SERPL-MCNC: 0.5 MG/DL — SIGNIFICANT CHANGE UP (ref 0.2–1.2)
BUN SERPL-MCNC: 114 MG/DL — HIGH (ref 7–23)
CALCIUM SERPL-MCNC: 8.3 MG/DL — LOW (ref 8.4–10.5)
CHLORIDE SERPL-SCNC: 100 MMOL/L — SIGNIFICANT CHANGE UP (ref 96–108)
CO2 SERPL-SCNC: 25 MMOL/L — SIGNIFICANT CHANGE UP (ref 22–31)
CREAT SERPL-MCNC: 3.24 MG/DL — HIGH (ref 0.5–1.3)
EGFR: 14 ML/MIN/1.73M2 — LOW
GLUCOSE BLDC GLUCOMTR-MCNC: 127 MG/DL — HIGH (ref 70–99)
GLUCOSE BLDC GLUCOMTR-MCNC: 153 MG/DL — HIGH (ref 70–99)
GLUCOSE BLDC GLUCOMTR-MCNC: 161 MG/DL — HIGH (ref 70–99)
GLUCOSE BLDC GLUCOMTR-MCNC: 298 MG/DL — HIGH (ref 70–99)
GLUCOSE SERPL-MCNC: 144 MG/DL — HIGH (ref 70–99)
HBV CORE AB SER-ACNC: SIGNIFICANT CHANGE UP
HBV SURFACE AB SER-ACNC: 7.3 MIU/ML — LOW
HBV SURFACE AB SER-ACNC: ABNORMAL
HBV SURFACE AG SER-ACNC: SIGNIFICANT CHANGE UP
HCT VFR BLD CALC: 24.1 % — LOW (ref 34.5–45)
HCV AB S/CO SERPL IA: 0.12 S/CO — SIGNIFICANT CHANGE UP (ref 0–0.99)
HCV AB SERPL-IMP: SIGNIFICANT CHANGE UP
HGB BLD-MCNC: 7.7 G/DL — LOW (ref 11.5–15.5)
MCHC RBC-ENTMCNC: 27.1 PG — SIGNIFICANT CHANGE UP (ref 27–34)
MCHC RBC-ENTMCNC: 32 GM/DL — SIGNIFICANT CHANGE UP (ref 32–36)
MCV RBC AUTO: 84.9 FL — SIGNIFICANT CHANGE UP (ref 80–100)
NRBC # BLD: 0 /100 WBCS — SIGNIFICANT CHANGE UP (ref 0–0)
PLATELET # BLD AUTO: 187 K/UL — SIGNIFICANT CHANGE UP (ref 150–400)
POTASSIUM SERPL-MCNC: 4.4 MMOL/L — SIGNIFICANT CHANGE UP (ref 3.5–5.3)
POTASSIUM SERPL-SCNC: 4.4 MMOL/L — SIGNIFICANT CHANGE UP (ref 3.5–5.3)
PROT SERPL-MCNC: 7 G/DL — SIGNIFICANT CHANGE UP (ref 6–8.3)
RBC # BLD: 2.84 M/UL — LOW (ref 3.8–5.2)
RBC # FLD: 17.1 % — HIGH (ref 10.3–14.5)
SODIUM SERPL-SCNC: 137 MMOL/L — SIGNIFICANT CHANGE UP (ref 135–145)
WBC # BLD: 8.23 K/UL — SIGNIFICANT CHANGE UP (ref 3.8–10.5)
WBC # FLD AUTO: 8.23 K/UL — SIGNIFICANT CHANGE UP (ref 3.8–10.5)

## 2024-10-26 PROCEDURE — 99233 SBSQ HOSP IP/OBS HIGH 50: CPT | Mod: GC

## 2024-10-26 RX ORDER — CHLORHEXIDINE GLUCONATE 40 MG/ML
1 SOLUTION TOPICAL DAILY
Refills: 0 | Status: DISCONTINUED | OUTPATIENT
Start: 2024-10-26 | End: 2024-11-06

## 2024-10-26 RX ADMIN — HYDRALAZINE HYDROCHLORIDE 100 MILLIGRAM(S): 50 TABLET, FILM COATED ORAL at 21:34

## 2024-10-26 RX ADMIN — CALCIUM ACETATE 1334 MILLIGRAM(S): 667 CAPSULE ORAL at 07:47

## 2024-10-26 RX ADMIN — Medication 600 MILLIGRAM(S): at 05:37

## 2024-10-26 RX ADMIN — APIXABAN 2.5 MILLIGRAM(S): 5 TABLET, FILM COATED ORAL at 05:37

## 2024-10-26 RX ADMIN — Medication 180 MILLIGRAM(S): at 05:37

## 2024-10-26 RX ADMIN — Medication 5 MILLILITER(S): at 12:21

## 2024-10-26 RX ADMIN — Medication 5 MILLIGRAM(S): at 21:34

## 2024-10-26 RX ADMIN — ONDANSETRON HYDROCHLORIDE 4 MILLIGRAM(S): 2 INJECTION, SOLUTION INTRAMUSCULAR; INTRAVENOUS at 17:52

## 2024-10-26 RX ADMIN — Medication 650 MILLIGRAM(S): at 10:15

## 2024-10-26 RX ADMIN — CHLORHEXIDINE GLUCONATE 1 APPLICATION(S): 40 SOLUTION TOPICAL at 12:33

## 2024-10-26 RX ADMIN — HYDRALAZINE HYDROCHLORIDE 100 MILLIGRAM(S): 50 TABLET, FILM COATED ORAL at 05:36

## 2024-10-26 RX ADMIN — CALCIUM ACETATE 1334 MILLIGRAM(S): 667 CAPSULE ORAL at 17:52

## 2024-10-26 RX ADMIN — Medication 3: at 12:22

## 2024-10-26 RX ADMIN — EZETIMIBE 10 MILLIGRAM(S): 10 TABLET ORAL at 12:22

## 2024-10-26 RX ADMIN — Medication 1: at 08:25

## 2024-10-26 RX ADMIN — Medication 600 MILLIGRAM(S): at 17:52

## 2024-10-26 RX ADMIN — APIXABAN 2.5 MILLIGRAM(S): 5 TABLET, FILM COATED ORAL at 17:52

## 2024-10-26 RX ADMIN — CALCIUM ACETATE 1334 MILLIGRAM(S): 667 CAPSULE ORAL at 12:22

## 2024-10-26 RX ADMIN — Medication 650 MILLIGRAM(S): at 09:05

## 2024-10-26 RX ADMIN — Medication 325 MILLIGRAM(S): at 12:22

## 2024-10-26 NOTE — PROGRESS NOTE ADULT - ASSESSMENT
BURKE ABARCA is a 79y year old Female with PMH of HFpEF, Chronic Kidney Disease 4, DVT (on eliquis) Hypertension, Hyperlipidemia, Type 2 Diabetes Mellitus, Anemia, OA who presents to the ER complaining of shortness of breath x2 days. Admitted for acute on chronic dCHF    #Shortness of breath - likely 2/2 HFpEF exacerbation, acute on chronic diastolic CHF  #Acute Kidney Injury on Chronic Kidney Disease 4  #Anemia, likely chronic disease   - admit to medicine   - CXR with Interstitial edema with mild congestive heart failure.   - remote tele, continuous O2  - s/p lasix 80mg ivp in ER, was on bumex 2mg bid since August per patient.   - s/p IV lasix 40mg   - TTE 10/20/24: LVEF 63%, grade 2 diastolic dysfunction  - proBNP 5495 increased from 3792 on 10/19  - Hgb at baseline  - s/p 1 U pRBC 10/20/24, repeat hgb 7.1  - s/p 1 U pRBC 10/22/24, repeat hgb 7.8  - iron studies: low transferrin, high ferritin  - transfuse if <7  - keep type and screen active  - continue weekly epoietin  - nephro recs appreciated: hold diuretics, no NSAIDs, c/w epoetin  - cardio recs appreciated: gdmt contraindicated 2/2 CKD, hold diuretics  - change oxygen to humidified O2 due to increased congestion and bloody mucus per pt   - pt uptitrated on oxygen due to increased SOB  - pulm consult recs appreciated  - s/p 1 session of HD, 2nd session due today    #Hyperphosphatemia, improving  - phos 7.9  - per nephro recs - start pt on phoslo (calcium acetate)  - monitor     #Hypertension  - continue home diltiazem, hydralazine, labetalol    #Hyperlipidemia  - continue home crestor, ezetemibe    #Type II Diabetes Mellitus  - hold home meds  - FS and DANGELO  - maintain blood glucose 100-180 while hospitalized     #history of DVT  - per patient she had RUE DVT while at [facility]  - has been on eliquis 2.5mg bid for nearly 3 months   - c/w eliquis 2.5mg    #DVT ppx  - Eliquis    #GOC  -full code     #Diet  -DASH/TLC, consistent carbs    Dispo: pending [facility]    *Case seen and discussed with Dr. Dowling.

## 2024-10-26 NOTE — PROGRESS NOTE ADULT - SUBJECTIVE AND OBJECTIVE BOX
Patient is a 79y old  Female who presents with a chief complaint of shortness of breath (20 Oct 2024 11:57)      INTERVAL HPI/ OVERNIGHT EVENTS: Patient seen and examined at bedside. States that breathing has improved today. Tolerated her first hemodialysis session well yesterday. No other complaints.     Vital Signs Last 24 Hrs  T(C): 36.6 (26 Oct 2024 14:16), Max: 36.7 (26 Oct 2024 05:09)  T(F): 97.8 (26 Oct 2024 14:16), Max: 98 (26 Oct 2024 05:09)  HR: 73 (26 Oct 2024 14:16) (68 - 77)  BP: 134/63 (26 Oct 2024 14:16) (134/63 - 173/85)  RR: 18 (26 Oct 2024 14:16) (16 - 18)  SpO2: 96% (26 Oct 2024 14:16) (95% - 98%)    Parameters below as of 26 Oct 2024 14:16  Patient On (Oxygen Delivery Method): nasal cannula  O2 Flow (L/min): 3      REVIEW OF SYSTEMS:  CONSTITUTIONAL: No fever or chills  HEENT:  No headache, no sore throat  RESPIRATORY: No cough, wheezing, +shortness of breath  CARDIOVASCULAR: No chest pain, palpitations  GASTROINTESTINAL: No abd pain, nausea, vomiting, or diarrhea  GENITOURINARY: No dysuria, frequency, or hematuria  NEUROLOGICAL: no focal weakness or dizziness  MUSCULOSKELETAL: no myalgias       PHYSICAL EXAM:  General: NAD, morbidly obese female, lying in bed  Respiratory: B/L crackles throughout, + improved expiratory wheezes  CV: RRR, +S1/S2, +systolic murmur  Abdominal: Soft, Non tender, Nondistended +Bowel sounds   Extremities: +chronic skin changes with b/l hyperpigmentation, no ulcers noted. No edema, 2+ peripheral pulses  NERVOUS SYSTEM: answers questions and follows commands appropriately, A&Ox3, grossly moves all extremities   PSYCH: Appropriate affect, Alert & Awake; Good judgement      LABS: Personally reviewed                        7.7    8.23  )-----------( 187      ( 26 Oct 2024 06:10 )             24.1     10-26    137  |  100  |  114[H]  ----------------------------<  144[H]  4.4   |  25  |  3.24[H]    Ca    8.3[L]      26 Oct 2024 06:10  Phos  7.9     10-25  Mg     2.6     10-25    TPro  7.0  /  Alb  3.0[L]  /  TBili  0.5  /  DBili  x   /  AST  14  /  ALT  15  /  AlkPhos  80  10-26                       IMAGING  CHEST XRAY 10/22/2024  IMPRESSION  No significant change in comparison to the prior study. Mild CHF is   again suggested. Magnified cardiac silhouette remains stable. There is   evidence of prior right lower neck surgery and degenerative arthritis   involving the glenohumeral joints.     TTE Echo Complete w/o Contrast w/ Doppler  Summary:   1. Normal global left ventricular systolic function.   2. Spectral Doppler shows pseudonormal pattern of left ventricular   myocardial filling (Grade II diastolic dysfunction).   3. Normal right ventricular size and function.   4. Mildly enlarged left atrium.   5. The right atrium is normal in size.   6. Mild mitral valve regurgitation.   7. Mild thickening and calcification of the anterior and posterior   mitral valve leaflets.   8. Mild tricuspid regurgitation.   9. Estimated pulmonary artery systolic pressure is 57.8 mmHg assuming a   right atrial pressure of 3 mmHg, which is consistent with moderate   pulmonary hypertension.  10. Pulmonary hypertension is present.    MEDICATIONS  (STANDING):  apixaban 2.5 milliGRAM(s) Oral two times a day  calcium acetate 1334 milliGRAM(s) Oral three times a day with meals  dextrose 5%. 1000 milliLiter(s) (50 mL/Hr) IV Continuous <Continuous>  dextrose 5%. 1000 milliLiter(s) (100 mL/Hr) IV Continuous <Continuous>  dextrose 50% Injectable 12.5 Gram(s) IV Push once  dextrose 50% Injectable 25 Gram(s) IV Push once  dextrose 50% Injectable 25 Gram(s) IV Push once  diltiazem    milliGRAM(s) Oral daily  epoetin jacobo-epbx (RETACRIT) Injectable 03683 Unit(s) SubCutaneous every 7 days  ezetimibe 10 milliGRAM(s) Oral daily  ferrous    sulfate 325 milliGRAM(s) Oral daily  glucagon  Injectable 1 milliGRAM(s) IntraMuscular once  hydrALAZINE 100 milliGRAM(s) Oral three times a day  influenza  Vaccine (HIGH DOSE) 0.5 milliLiter(s) IntraMuscular once  insulin lispro (ADMELOG) corrective regimen sliding scale   SubCutaneous three times a day before meals  insulin lispro (ADMELOG) corrective regimen sliding scale   SubCutaneous at bedtime  labetalol 600 milliGRAM(s) Oral two times a day  rosuvastatin 5 milliGRAM(s) Oral at bedtime  sodium bicarbonate 650 milliGRAM(s) Oral three times a day    MEDICATIONS  (PRN):  acetaminophen     Tablet .. 650 milliGRAM(s) Oral every 6 hours PRN Temp greater or equal to 38C (100.4F), Mild Pain (1 - 3)  dextrose Oral Gel 15 Gram(s) Oral once PRN Blood Glucose LESS THAN 70 milliGRAM(s)/deciliter  melatonin 3 milliGRAM(s) Oral at bedtime PRN Insomnia  ondansetron Injectable 4 milliGRAM(s) IV Push every 8 hours PRN Nausea and/or Vomiting      RADIOLOGY & ADDITIONAL TESTS: Personally reviewed.     Consultant(s) Notes Reviewed:  [x] YES  [ ] NO   Discussed with JOSE/PATTIE, RN

## 2024-10-26 NOTE — PROGRESS NOTE ADULT - SUBJECTIVE AND OBJECTIVE BOX
Pharmacist Admission Medication History    Admission medication history is complete. The information provided in this note is only as accurate as the sources available at the time of the update.    Information Source(s): Patient, Hospital records, and CareEverywhere/SureScripts via in-person    Pertinent Information:   - Patient was a reliable historian able to confirm all medication names, strengths, frequencies, and last doses.   - Patient reports a recent 3 day course of Nitrofurantoin for a UTI on 4/18-4/20.   - Patient reports the following doses of immunosuppression medications:    - Tacrolimus PO 1.5 mg PO in the AM and 1 mg PO in the PM   - Mycophenolate Acid  mg - 3 tabs (540 mg) PO BID   - Prednisone 5 mg PO in the AM  - Patient reports that Bactrim is taken as 1 tablet on Monday, Wednesday, Friday. Previously on PTA medication list as 1 tablet daily. Updated PTA medication list.     Changes made to PTA medication list:  Added:   Mycophenolate Acid  mg - 3 tablets (540 mg) PO BID  Deleted:   Polyethylene glycol - Removed from PTA list because this was a one time order as a bowel prep for a colonoscopy  Bisacodyl tablets - Removed from PTA list because this was a one time order as a bowel prep for a colonoscopy.   Bactrim - Changed to patient taking differently. Previous 1 tablet every day changed to 1 tablet MWF.   Changed:   Atorvastatin changed to PM      Allergies reviewed with patient and updates made in EHR: yes    Medication History Completed By: Alberto Byrne [facility] 5/1/2024 1:48 PM    PTA Med List   Medication Sig Note Last Dose    acetaminophen (TYLENOL) 325 MG tablet Take 1 tablet (325 mg) by mouth every 4 hours as needed for mild pain or fever  5/1/2024    aspirin (ASA) 81 MG chewable tablet Take 81 mg by mouth every evening  4/30/2024 at 2000    atorvastatin (LIPITOR) 10 MG tablet Take 1 tablet (10 mg) by mouth daily (Patient taking differently: Take 10 mg by mouth at bedtime)   4/30/2024 at 2000    biotin 1000 MCG TABS tablet Take 3,000 mcg by mouth every evening  4/30/2024 at 2000    calcium carbonate-vitamin D (OSCAL) 500-5 MG-MCG tablet Take 1 tablet by mouth 2 times daily  5/1/2024 at 0800    ciclopirox (PENLAC) 8 % external solution Apply to adjacent skin and affected nails daily.  Remove with alcohol every 7 days, then repeat.  Past Week    COMPOUNDED NON-CONTROLLED SUBSTANCE (CMPD RX) - PHARMACY TO MIX COMPOUNDED MEDICATION Apply nightly to toenails. SM 61 -  Ciclopirox 8% / Itraconazole 3% / Fluconazole 3% / Terbinafine HCl 1% / Ibuprofen 2% DMSO Suspension  Past Week    diphenhydrAMINE (BENADRYL) 25 MG tablet Take 25 mg by mouth every 8 hours as needed for allergies  Past Month    estradiol (ESTRACE) 0.1 MG/GM vaginal cream Place 1 g vaginally three times a week 5/1/2024: Monday, Wednesday, Friday 4/27/2024 at 0800    guaiFENesin (MUCINEX) 600 MG 12 hr tablet Take 1 tablet by mouth 2 times daily as needed for cough or congestion  Past Month    Multiple Vitamins-Minerals (WOMENS DAILY FORMULA PO) Take 1 tablet by mouth daily  5/1/2024 at 0800    mycophenolic acid (GENERIC EQUIVALENT) 180 MG EC tablet Take 540 mg by mouth 2 times daily  5/1/2024 at 0800    nitroFURantoin macrocrystal-monohydrate (MACROBID) 100 MG capsule Take 1 capsule (100 mg) by mouth 2 times daily PRN UTI  4/21/2024    nitroGLYcerin (NITROSTAT) 0.4 MG sublingual tablet Place 1 tablet (0.4 mg) under the tongue every 5 minutes as needed for chest pain For chest pain place 1 tablet under the tongue every 5 minutes for 3 doses. If symptoms persist 5 minutes after 1st dose call 911.  More than a month    predniSONE (DELTASONE) 5 MG tablet Take 1 tablet (5 mg) by mouth daily  5/1/2024 at 0800    Probiotic Product (PROBIOTIC PO) Take 2 tablets by mouth every morning ([facility] Women's Vaginal Probiotic)  Past Week    sulfamethoxazole-trimethoprim (BACTRIM) 400-80 MG tablet Take 1 tablet by mouth daily (Patient taking  Follow-up Pulmonary Progress Note  Chief Complaint : Acute on chronic systolic congestive heart failure      patient s/p HD yesterday  feeling better  no cp, sob, palp, n/v      Allergies :ACE inhibitors (Angioedema)  statins (Muscle Pain)  predniSONE (Other)      PAST MEDICAL & SURGICAL HISTORY:  Hypertension    Diabetes  type 2    Thyroid disease    Anemia    Hypercholesteremia    Myelodysplasia (myelodysplastic syndrome)    Thyromegaly  s/p partial thyroidectomy long time ago    Other giant cell arteritis    Arthritis    Kidney insufficiency    PVD (peripheral vascular disease)    Mass of right knee    Urinary incontinence    H/O partial thyroidectomy    H/O: hysterectomy    History of cataract surgery    History of vascular surgery        Medications:  MEDICATIONS  (STANDING):  apixaban 2.5 milliGRAM(s) Oral two times a day  Biotene Dry Mouth Oral Rinse 5 milliLiter(s) Swish and Spit daily  calcium acetate 1334 milliGRAM(s) Oral three times a day with meals  chlorhexidine 2% Cloths 1 Application(s) Topical daily  dextrose 5%. 1000 milliLiter(s) (100 mL/Hr) IV Continuous <Continuous>  dextrose 5%. 1000 milliLiter(s) (50 mL/Hr) IV Continuous <Continuous>  dextrose 50% Injectable 12.5 Gram(s) IV Push once  dextrose 50% Injectable 25 Gram(s) IV Push once  dextrose 50% Injectable 25 Gram(s) IV Push once  diltiazem    milliGRAM(s) Oral daily  ezetimibe 10 milliGRAM(s) Oral daily  ferrous    sulfate 325 milliGRAM(s) Oral daily  glucagon  Injectable 1 milliGRAM(s) IntraMuscular once  hydrALAZINE 100 milliGRAM(s) Oral three times a day  influenza  Vaccine (HIGH DOSE) 0.5 milliLiter(s) IntraMuscular once  insulin lispro (ADMELOG) corrective regimen sliding scale   SubCutaneous at bedtime  insulin lispro (ADMELOG) corrective regimen sliding scale   SubCutaneous three times a day before meals  labetalol 600 milliGRAM(s) Oral two times a day  rosuvastatin 5 milliGRAM(s) Oral at bedtime    MEDICATIONS  (PRN):  acetaminophen     Tablet .. 650 milliGRAM(s) Oral every 6 hours PRN Temp greater or equal to 38C (100.4F), Mild Pain (1 - 3)  albuterol    90 MICROgram(s) HFA Inhaler 2 Puff(s) Inhalation every 6 hours PRN Shortness of Breath  dextrose Oral Gel 15 Gram(s) Oral once PRN Blood Glucose LESS THAN 70 milliGRAM(s)/deciliter  melatonin 3 milliGRAM(s) Oral at bedtime PRN Insomnia  ondansetron Injectable 4 milliGRAM(s) IV Push every 8 hours PRN Nausea and/or Vomiting  sodium chloride 0.65% Nasal 1 Spray(s) Both Nostrils daily PRN Nasal Congestion  sodium chloride 0.9% lock flush 10 milliLiter(s) IV Push every 1 hour PRN Pre/post blood products, medications, blood draw, and to maintain line patency      Antibiotics History      Heme Medications   apixaban 2.5 milliGRAM(s) Oral two times a day, 10-19-24 @ 15:47      GI Medications        LABS:                        7.7    8.23  )-----------( 187      ( 26 Oct 2024 06:10 )             24.1     10-26    137  |  100  |  114[H]  ----------------------------<  144[H]  4.4   |  25  |  3.24[H]    Ca    8.3[L]      26 Oct 2024 06:10  Phos  7.9     10-25  Mg     2.6     10-25    TPro  7.0  /  Alb  3.0[L]  /  TBili  0.5  /  DBili  x   /  AST  14  /  ALT  15  /  AlkPhos  80  10-26    CHUNG -- 10-22 @ 07:07  Anti SS-1 --  Anti SS-2 --  Anti RNP --  RF -- 10-22 @ 07:07    Atypical ANCA Indeterminate Method interference due to CHUNG Fluorescence 10-22 @ 07:07  c-ANCA titer -- 10-22 @ 07:07  c-ANCA Negative 10-22 @ 07:07  p-ANCA Negative 10-22 @ 07:07           Trend Bun/Cr  10-26-24 @ 06:10  BUN/CR -  114[H] / 3.24[H]  10-25-24 @ 07:59  BUN/CR -  177[H] / 4.65[H]  10-24-24 @ 08:34  BUN/CR -  160[H] / 4.66[H]  10-23-24 @ 05:50  BUN/CR -  154[H] / 4.49[H]  10-22-24 @ 07:07  BUN/CR -  145[H] / 4.46[H]  10-21-24 @ 07:00  BUN/CR -  144[H] / 4.05[H]  10-20-24 @ 12:12  BUN/CR -  128[H] / 3.46[H]  10-19-24 @ 12:45  BUN/CR -  127[H] / 3.29[H]  06-27-24 @ 07:10  BUN/CR -  82[H] / 2.11[H]  06-26-24 @ 05:55  BUN/CR -  77[H] / 2.09[H]  06-25-24 @ 07:10  BUN/CR -  72[H] / 2.01[H]  06-24-24 @ 06:35  BUN/CR -  71[H] / 2.05[H]      VITALS:  T(C): 36.4 (10-26-24 @ 11:50), Max: 36.7 (10-26-24 @ 05:09)  T(F): 97.5 (10-26-24 @ 11:50), Max: 98 (10-26-24 @ 05:09)  HR: 73 (10-26-24 @ 11:50) (68 - 77)  BP: 152/73 (10-26-24 @ 11:50) (152/73 - 173/85)  BP(mean): --  ABP: --  ABP(mean): --  RR: 18 (10-26-24 @ 11:50) (16 - 18)  SpO2: 96% (10-26-24 @ 11:50) (95% - 98%)  CVP(mm Hg): --  CVP(cm H2O): --    Ins and Outs     10-25-24 @ 07:01  -  10-26-24 @ 07:00  --------------------------------------------------------  IN: 125 mL / OUT: 800 mL / NET: -675 mL        Height (cm): 162.6 (10-25-24 @ 13:49)  Weight (kg): 93 (10-25-24 @ 13:49)  BMI (kg/m2): 35.2 (10-25-24 @ 13:49)        I&O's Detail    25 Oct 2024 07:01  -  26 Oct 2024 07:00  --------------------------------------------------------  IN:    Oral Fluid: 125 mL  Total IN: 125 mL    OUT:    Voided (mL): 800 mL  Total OUT: 800 mL    Total NET: -675 mL        differently: Take 1 tablet by mouth Every Mon, Wed, Fri Morning)  5/1/2024 at 0800    tacrolimus (GENERIC EQUIVALENT) 1 MG capsule Take 1 capsule (1 mg) by mouth every 12 hours (Patient taking differently: Take 1 mg by mouth every 12 hours Takes with 0.5 mg capsule in the AM for a total dose of 1.5 mg AM. Takes 1 mg capsule in the PM.)  5/1/2024 at 0800    tacrolimus (GENERIC) 0.5 MG capsule Take 1 capsule (0.5 mg) by mouth every morning (Patient taking differently: Take 0.5 mg by mouth every morning Take with 1 mg capsule in the AM for a total dose of 1.5 mg)  5/1/2024 at 0800

## 2024-10-26 NOTE — PROGRESS NOTE ADULT - SUBJECTIVE AND OBJECTIVE BOX
NEPHROLOGY PROGRESS NOTE    CHIEF COMPLAINT:  ZORAIDA/CKD    HPI:  Tolerated 1st HD well yesterday and feeling better.      EXAM:  Vital Signs Last 24 Hrs  T(C): 36.4 (26 Oct 2024 11:50), Max: 36.7 (26 Oct 2024 05:09)  T(F): 97.5 (26 Oct 2024 11:50), Max: 98 (26 Oct 2024 05:09)  HR: 73 (26 Oct 2024 11:50) (68 - 77)  BP: 152/73 (26 Oct 2024 11:50) (152/73 - 173/85)  BP(mean): --  RR: 18 (26 Oct 2024 11:50) (16 - 18)  SpO2: 96% (26 Oct 2024 11:50) (95% - 98%)    Parameters below as of 26 Oct 2024 11:50  Patient On (Oxygen Delivery Method): nasal cannula  O2 Flow (L/min): 3    I&O's Summary    25 Oct 2024 07:01  -  26 Oct 2024 07:00  --------------------------------------------------------  IN: 125 mL / OUT: 800 mL / NET: -675 mL      Daily Height in cm: 162.56 (25 Oct 2024 13:49)    Daily Weight in k.5 (26 Oct 2024 05:09)    Conversant, in no apparent distress  Normal respiratory effort, lungs clear bilaterally  Heart RRR with no murmur, no peripheral edema    LABS                        7.7    8.23  )-----------( 187      ( 26 Oct 2024 06:10 )             24.1     10-26    137  |  100  |  114[H]  ----------------------------<  144[H]  4.4   |  25  |  3.24[H]    Ca    8.3[L]      26 Oct 2024 06:10  Phos  7.9     10-25  Mg     2.6     10-25    TPro  7.0  /  Alb  3.0[L]  /  TBili  0.5  /  DBili  x   /  AST  14  /  ALT  15  /  AlkPhos  80  10-        A/P:    DM, HTN, CKD 4 (Cr 2.13 - 24, Cr 2.35 - 10/7/24)  Adm w/SOB iso severe anemia, some PVC on CXR  Cardio-renal/hemodynamic ATN/CKD 4 w/o improvement requiring dialysis initiation  Mild uremic symptoms resolving  2nd HD later today and rest tomorrow    [phone]

## 2024-10-26 NOTE — CHART NOTE - NSCHARTNOTEFT_GEN_A_CORE
Spoke with patient's goddaughter, Davida, via telephone and updated her regarding patient's hospital course. All questions and concerns addressed.

## 2024-10-26 NOTE — PROGRESS NOTE ADULT - ATTENDING COMMENTS
79y year old Female with PMH of HFpEF, Chronic Kidney Disease 4, DVT (on eliquis) Hypertension, Hyperlipidemia, Type 2 Diabetes Mellitus, Anemia, OA who presents to the ER complaining of shortness of breath x2 days. Admitted for acute on chronic dCHF Plan: trend  renal fuction and volume status, wean off O2, apprec nephro, cardio and pulm recs, cont dialysis as per nephro recs

## 2024-10-26 NOTE — PROGRESS NOTE ADULT - ASSESSMENT
Physical Examination:  GENERAL:               Alert, Oriented, no acute distress.    HEENT:                   No JVD, Moist MM, enlarged neck circumference   PULM:                     Bilateral air entry, Clear to auscultation bilaterally dimminished, no significant sputum production, + Rales, No Rhonchi, No Wheezing  CVS:                         S1, S2,  No Murmur  ABD:                        Soft, nondistended, nontender, normoactive bowel sounds,   EXT:                         ++ edema, nontender, No Cyanosis or Clubbing   Vascular:                Warm Extremities, Normal Capillary refill, Normal Distal Pulses  SKIN:                       Warm and well perfused, no rashes noted.   NEURO:                  Alert, oriented, interactive, nonfocal, follows commands  PSYC:                      Calm, + Insight.         Assessment  - Acute pulmonary Edema due to worsening renal function  - B/L Pl effusions and Dyspnea due to above   - Diastolic CHF - Chronic   - Underlying Hypertension, DM2   - Obesity    Plan  from pulmonary perspective pl effusion too small to drain via thoracentesis  suspect pl effusion/pulm edema from non cardiogenic causes   resp status improving with HD  taper n/c to off as tolerated ;  Keep sat > 92%  dyspnea/tachypnea should improve once uremia improves  Rest of care as per primary team.

## 2024-10-27 LAB
ALBUMIN SERPL ELPH-MCNC: 3.1 G/DL — LOW (ref 3.3–5)
ALP SERPL-CCNC: 80 U/L — SIGNIFICANT CHANGE UP (ref 40–120)
ALT FLD-CCNC: 29 U/L — SIGNIFICANT CHANGE UP (ref 10–45)
ANION GAP SERPL CALC-SCNC: 9 MMOL/L — SIGNIFICANT CHANGE UP (ref 5–17)
AST SERPL-CCNC: 7 U/L — LOW (ref 10–40)
BILIRUB SERPL-MCNC: 0.4 MG/DL — SIGNIFICANT CHANGE UP (ref 0.2–1.2)
BUN SERPL-MCNC: 78 MG/DL — HIGH (ref 7–23)
CALCIUM SERPL-MCNC: 8.4 MG/DL — SIGNIFICANT CHANGE UP (ref 8.4–10.5)
CHLORIDE SERPL-SCNC: 99 MMOL/L — SIGNIFICANT CHANGE UP (ref 96–108)
CO2 SERPL-SCNC: 30 MMOL/L — SIGNIFICANT CHANGE UP (ref 22–31)
CREAT SERPL-MCNC: 2.63 MG/DL — HIGH (ref 0.5–1.3)
EGFR: 18 ML/MIN/1.73M2 — LOW
GLUCOSE BLDC GLUCOMTR-MCNC: 122 MG/DL — HIGH (ref 70–99)
GLUCOSE BLDC GLUCOMTR-MCNC: 132 MG/DL — HIGH (ref 70–99)
GLUCOSE BLDC GLUCOMTR-MCNC: 153 MG/DL — HIGH (ref 70–99)
GLUCOSE BLDC GLUCOMTR-MCNC: 173 MG/DL — HIGH (ref 70–99)
GLUCOSE SERPL-MCNC: 121 MG/DL — HIGH (ref 70–99)
HCT VFR BLD CALC: 25.1 % — LOW (ref 34.5–45)
HGB BLD-MCNC: 7.8 G/DL — LOW (ref 11.5–15.5)
MAGNESIUM SERPL-MCNC: 2.3 MG/DL — SIGNIFICANT CHANGE UP (ref 1.6–2.6)
MCHC RBC-ENTMCNC: 26.9 PG — LOW (ref 27–34)
MCHC RBC-ENTMCNC: 31.1 GM/DL — LOW (ref 32–36)
MCV RBC AUTO: 86.6 FL — SIGNIFICANT CHANGE UP (ref 80–100)
NRBC # BLD: 0 /100 WBCS — SIGNIFICANT CHANGE UP (ref 0–0)
PHOSPHATE SERPL-MCNC: 4.2 MG/DL — SIGNIFICANT CHANGE UP (ref 2.5–4.5)
PLATELET # BLD AUTO: 197 K/UL — SIGNIFICANT CHANGE UP (ref 150–400)
POTASSIUM SERPL-MCNC: 4.4 MMOL/L — SIGNIFICANT CHANGE UP (ref 3.5–5.3)
POTASSIUM SERPL-SCNC: 4.4 MMOL/L — SIGNIFICANT CHANGE UP (ref 3.5–5.3)
PROT SERPL-MCNC: 7 G/DL — SIGNIFICANT CHANGE UP (ref 6–8.3)
RBC # BLD: 2.9 M/UL — LOW (ref 3.8–5.2)
RBC # FLD: 17.2 % — HIGH (ref 10.3–14.5)
SODIUM SERPL-SCNC: 138 MMOL/L — SIGNIFICANT CHANGE UP (ref 135–145)
WBC # BLD: 8.8 K/UL — SIGNIFICANT CHANGE UP (ref 3.8–10.5)
WBC # FLD AUTO: 8.8 K/UL — SIGNIFICANT CHANGE UP (ref 3.8–10.5)

## 2024-10-27 PROCEDURE — 99233 SBSQ HOSP IP/OBS HIGH 50: CPT | Mod: GC

## 2024-10-27 RX ORDER — PANTOPRAZOLE SODIUM 40 MG/1
40 TABLET, DELAYED RELEASE ORAL
Refills: 0 | Status: DISCONTINUED | OUTPATIENT
Start: 2024-10-28 | End: 2024-11-06

## 2024-10-27 RX ORDER — ONDANSETRON HYDROCHLORIDE 2 MG/ML
4 INJECTION, SOLUTION INTRAMUSCULAR; INTRAVENOUS ONCE
Refills: 0 | Status: COMPLETED | OUTPATIENT
Start: 2024-10-27 | End: 2024-10-27

## 2024-10-27 RX ADMIN — APIXABAN 2.5 MILLIGRAM(S): 5 TABLET, FILM COATED ORAL at 18:14

## 2024-10-27 RX ADMIN — HYDRALAZINE HYDROCHLORIDE 100 MILLIGRAM(S): 50 TABLET, FILM COATED ORAL at 15:12

## 2024-10-27 RX ADMIN — CALCIUM ACETATE 1334 MILLIGRAM(S): 667 CAPSULE ORAL at 09:14

## 2024-10-27 RX ADMIN — Medication 5 MILLIGRAM(S): at 21:46

## 2024-10-27 RX ADMIN — Medication 1: at 13:07

## 2024-10-27 RX ADMIN — HYDRALAZINE HYDROCHLORIDE 100 MILLIGRAM(S): 50 TABLET, FILM COATED ORAL at 05:24

## 2024-10-27 RX ADMIN — CALCIUM ACETATE 1334 MILLIGRAM(S): 667 CAPSULE ORAL at 18:14

## 2024-10-27 RX ADMIN — APIXABAN 2.5 MILLIGRAM(S): 5 TABLET, FILM COATED ORAL at 05:24

## 2024-10-27 RX ADMIN — Medication 600 MILLIGRAM(S): at 18:14

## 2024-10-27 RX ADMIN — CHLORHEXIDINE GLUCONATE 1 APPLICATION(S): 40 SOLUTION TOPICAL at 13:10

## 2024-10-27 RX ADMIN — Medication 180 MILLIGRAM(S): at 05:24

## 2024-10-27 RX ADMIN — ONDANSETRON HYDROCHLORIDE 4 MILLIGRAM(S): 2 INJECTION, SOLUTION INTRAMUSCULAR; INTRAVENOUS at 09:42

## 2024-10-27 RX ADMIN — Medication 5 MILLILITER(S): at 13:08

## 2024-10-27 RX ADMIN — Medication 600 MILLIGRAM(S): at 05:24

## 2024-10-27 RX ADMIN — HYDRALAZINE HYDROCHLORIDE 100 MILLIGRAM(S): 50 TABLET, FILM COATED ORAL at 21:46

## 2024-10-27 NOTE — CHART NOTE - NSCHARTNOTEFT_GEN_A_CORE
Patient was re-examined at bedside after episode of vomiting earlier this morning. States that she feels better and nausea has improved. Congestion is also improving with use of nasal spray. She has been able to cough up mucus. Sister and brother at bedside. Updated on patient's hospital course. All questions and concerns addressed at this time.

## 2024-10-27 NOTE — PROGRESS NOTE ADULT - SUBJECTIVE AND OBJECTIVE BOX
Follow-up Pulmonary Progress Note  Chief Complaint : Acute on chronic systolic congestive heart failure        pt has episodes of n/v  no cough  less sob  no cp, sob, abd pain        Allergies :ACE inhibitors (Angioedema)  statins (Muscle Pain)  predniSONE (Other)      PAST MEDICAL & SURGICAL HISTORY:  Hypertension    Diabetes  type 2    Thyroid disease    Anemia    Hypercholesteremia    Myelodysplasia (myelodysplastic syndrome)    Thyromegaly  s/p partial thyroidectomy long time ago    Other giant cell arteritis    Arthritis    Kidney insufficiency    PVD (peripheral vascular disease)    Mass of right knee    Urinary incontinence    H/O partial thyroidectomy    H/O: hysterectomy    History of cataract surgery    History of vascular surgery        Medications:  MEDICATIONS  (STANDING):  apixaban 2.5 milliGRAM(s) Oral two times a day  Biotene Dry Mouth Oral Rinse 5 milliLiter(s) Swish and Spit daily  calcium acetate 1334 milliGRAM(s) Oral three times a day with meals  chlorhexidine 2% Cloths 1 Application(s) Topical daily  dextrose 5%. 1000 milliLiter(s) (50 mL/Hr) IV Continuous <Continuous>  dextrose 5%. 1000 milliLiter(s) (100 mL/Hr) IV Continuous <Continuous>  dextrose 50% Injectable 12.5 Gram(s) IV Push once  dextrose 50% Injectable 25 Gram(s) IV Push once  dextrose 50% Injectable 25 Gram(s) IV Push once  diltiazem    milliGRAM(s) Oral daily  ezetimibe 10 milliGRAM(s) Oral daily  ferrous    sulfate 325 milliGRAM(s) Oral daily  glucagon  Injectable 1 milliGRAM(s) IntraMuscular once  hydrALAZINE 100 milliGRAM(s) Oral three times a day  influenza  Vaccine (HIGH DOSE) 0.5 milliLiter(s) IntraMuscular once  insulin lispro (ADMELOG) corrective regimen sliding scale   SubCutaneous at bedtime  insulin lispro (ADMELOG) corrective regimen sliding scale   SubCutaneous three times a day before meals  labetalol 600 milliGRAM(s) Oral two times a day  rosuvastatin 5 milliGRAM(s) Oral at bedtime    MEDICATIONS  (PRN):  acetaminophen     Tablet .. 650 milliGRAM(s) Oral every 6 hours PRN Temp greater or equal to 38C (100.4F), Mild Pain (1 - 3)  albuterol    90 MICROgram(s) HFA Inhaler 2 Puff(s) Inhalation every 6 hours PRN Shortness of Breath  dextrose Oral Gel 15 Gram(s) Oral once PRN Blood Glucose LESS THAN 70 milliGRAM(s)/deciliter  melatonin 3 milliGRAM(s) Oral at bedtime PRN Insomnia  ondansetron Injectable 4 milliGRAM(s) IV Push every 8 hours PRN Nausea and/or Vomiting  sodium chloride 0.65% Nasal 1 Spray(s) Both Nostrils daily PRN Nasal Congestion  sodium chloride 0.9% lock flush 10 milliLiter(s) IV Push every 1 hour PRN Pre/post blood products, medications, blood draw, and to maintain line patency      Antibiotics History      Heme Medications   apixaban 2.5 milliGRAM(s) Oral two times a day, 10-19-24 @ 15:47      GI Medications        LABS:                        7.8    8.80  )-----------( 197      ( 27 Oct 2024 06:08 )             25.1     10-27    138  |  99  |  78[H]  ----------------------------<  121[H]  4.4   |  30  |  2.63[H]    Ca    8.4      27 Oct 2024 06:08  Phos  4.2     10-27  Mg     2.3     10-27    TPro  7.0  /  Alb  3.1[L]  /  TBili  0.4  /  DBili  x   /  AST  7[L]  /  ALT  29  /  AlkPhos  80  10-27    CHUNG -- 10-22 @ 07:07  Anti SS-1 --  Anti SS-2 --  Anti RNP --  RF -- 10-22 @ 07:07    Atypical ANCA Indeterminate Method interference due to CHUNG Fluorescence 10-22 @ 07:07  c-ANCA titer -- 10-22 @ 07:07  c-ANCA Negative 10-22 @ 07:07  p-ANCA Negative 10-22 @ 07:07            Urinalysis Basic - ( 27 Oct 2024 06:08 )    Color: x / Appearance: x / SG: x / pH: x  Gluc: 121 mg/dL / Ketone: x  / Bili: x / Urobili: x   Blood: x / Protein: x / Nitrite: x   Leuk Esterase: x / RBC: x / WBC x   Sq Epi: x / Non Sq Epi: x / Bacteria: x        ACC: [id number] EXAM: XR CHEST PORTABLE URGENT 1V ORDERED BY: VALERIE SANTANA NIGEL    PROCEDURE DATE: 10/25/2024        INTERPRETATION: AP chest on October 25, 2024 at 2:57 PM. Patient had left-sided dialysis catheter placement.    Heart magnified by technique. Clips in the lower right neck again noted.    Congestive findings are again noted mild to moderate roughly similar to October 22.    A large left jugular line is been inserted with tip in the mid upper superior vena cava.    IMPRESSION: Persistent CHF. Large left jugular line inserted.    --- End of Report ---           VITALS:  T(C): 36.9 (10-27-24 @ 12:23), Max: 36.9 (10-26-24 @ 20:54)  T(F): 98.4 (10-27-24 @ 12:23), Max: 98.5 (10-27-24 @ 04:57)  HR: 67 (10-27-24 @ 12:23) (67 - 75)  BP: 131/62 (10-27-24 @ 12:23) (114/66 - 163/76)  BP(mean): 82 (10-27-24 @ 04:57) (82 - 82)  ABP: --  ABP(mean): --  RR: 16 (10-27-24 @ 12:23) (16 - 18)  SpO2: 98% (10-27-24 @ 12:23) (96% - 98%)  CVP(mm Hg): --  CVP(cm H2O): --    Ins and Outs     10-26-24 @ 07:01  -  10-27-24 @ 07:00  --------------------------------------------------------  IN: 800 mL / OUT: 2000 mL / NET: -1200 mL        Height (cm): 162.6 (10-25-24 @ 13:49)  Weight (kg): 93 (10-25-24 @ 13:49)  BMI (kg/m2): 35.2 (10-25-24 @ 13:49)        I&O's Detail    26 Oct 2024 07:01  -  27 Oct 2024 07:00  --------------------------------------------------------  IN:    Other (mL): 800 mL  Total IN: 800 mL    OUT:    Other (mL): 1800 mL    Voided (mL): 200 mL  Total OUT: 2000 mL    Total NET: -1200 mL

## 2024-10-27 NOTE — PROGRESS NOTE ADULT - ASSESSMENT
BURKE ABARCA is a 79y year old Female with PMH of HFpEF, Chronic Kidney Disease 4, DVT (on eliquis) Hypertension, Hyperlipidemia, Type 2 Diabetes Mellitus, Anemia, OA who presents to the ER complaining of shortness of breath x2 days. Admitted for acute on chronic dCHF    #Shortness of breath - likely 2/2 HFpEF exacerbation, acute on chronic diastolic CHF  #Anemia, likely chronic disease   - admit to medicine   - CXR with Interstitial edema with mild congestive heart failure.   - remote tele, continuous O2  - s/p lasix 80mg ivp in ER, was on bumex 2mg bid since August per patient.   - s/p IV lasix 40mg   - TTE 10/20/24: LVEF 63%, grade 2 diastolic dysfunction  - proBNP 5495 increased from 3792 on 10/19  - Hgb at baseline now  - s/p 1 U pRBC 10/20/24, repeat hgb 7.1  - s/p 1 U pRBC 10/22/24, repeat hgb 7.8  - iron studies: low transferrin, high ferritin  - transfuse if <7  - keep type and screen active  - continue weekly epoietin  - nephro recs appreciated: hold diuretics, no NSAIDs, c/w epoetin  - cardio recs appreciated: gdmt contraindicated 2/2 CKD, hold diuretics  - change oxygen to humidified O2 due to increased congestion and bloody mucus per pt   - pt up-titrated on oxygen due to increased SOB  - pulm consult recs appreciated  - s/p 2 sessions of HD, per nephro recs rest today  - attempts to wean pt off oxygen today    #Acute Kidney Injury on Chronic Kidney Disease 4  -s/p 2 HD sessions   -Cr improving with HD sessions  - Cr 2.63, BUN 78, eGFR 18  -c/w HD sessions per nephro recs    #Nausea/vomiting, likely 2/2 to HD, fluid changes  -pt noted to have episode of nausea/vomiting this AM  -zofran prn  -f/u labs    #Hyperphosphatemia, resolved  - phos 4.2  - improving with dialysis and phoslo  - monitor     #Hypertension  - continue home diltiazem, hydralazine, labetalol    #Hyperlipidemia  - continue home crestor, ezetemibe    #Type II Diabetes Mellitus  - hold home meds  - FS and DANGELO  - maintain blood glucose 100-180 while hospitalized     #history of DVT  - per patient she had RUE DVT while at [facility]  - has been on eliquis 2.5mg bid for nearly 3 months   - c/w eliquis 2.5mg    #DVT ppx  - Eliquis    #GOC  -full code     #Diet  -DASH/TLC, consistent carbs    Dispo: pending FELICIANO    *Case seen and discussed with Dr. Dowling.

## 2024-10-27 NOTE — PROGRESS NOTE ADULT - ASSESSMENT
Physical Examination:  GENERAL:               Alert, Oriented, no acute distress.    HEENT:                   No JVD, Moist MM, enlarged neck circumference   PULM:                     Bilateral air entry, Clear to auscultation bilaterally dimminished, no significant sputum production, no Rales, No Rhonchi, No Wheezing  CVS:                         S1, S2,  No Murmur  ABD:                        Soft, nondistended, nontender, normoactive bowel sounds,   EXT:                         no  edema, nontender, No Cyanosis or Clubbing    NEURO:                  Alert, oriented, interactive, nonfocal, follows commands  PSYC:                      Calm, + Insight.         Assessment  - Acute pulmonary Edema due to worsening renal function/non cardiogenic causes   - B/L Pl effusions and Dyspnea due to above   - Diastolic CHF - Chronic   - Underlying Hypertension, DM2   - Obesity    Plan  from pulmonary perspective pl effusion too small to drain via thoracentesis  would repeat cxr in 48 hrs    resp status improving with HD  managment of n/v as per primary team suspect from new HD and fluid shifts    taper n/c to off as tolerated ;  Keep sat > 92%    PT/OOB as tolerated   Rest of care as per primary team.

## 2024-10-27 NOTE — PROGRESS NOTE ADULT - ATTENDING COMMENTS
79y year old Female with PMH of HFpEF, Chronic Kidney Disease 4, DVT (on eliquis) Hypertension, Hyperlipidemia, Type 2 Diabetes Mellitus, Anemia, OA who presents to the ER complaining of shortness of breath x2 days. Admitted for acute on chronic dCHF Plan: trend  renal fuction and volume status, wean off O2, apprec nephro, cardio and pulm recs, cont dialysis as per nephro recs, pt with nausea and vomiting this AM, monitor clinical course, good response renally from dialysis sessions 79y year old Female with PMH of HFpEF, Chronic Kidney Disease 4, DVT (on eliquis) Hypertension, Hyperlipidemia, Type 2 Diabetes Mellitus, Anemia, OA who presents to the ER complaining of shortness of breath x2 days. Admitted for acute on chronic dCHF Plan: trend  renal fuction and volume status, wean off O2, apprec nephro, cardio and pulm recs, cont dialysis as per nephro recs, pt with nausea and vomiting this AM, monitor clinical course, good response renally from dialysis sessions, FELICIANO recommended by PT dc planning underway

## 2024-10-27 NOTE — PROGRESS NOTE ADULT - SUBJECTIVE AND OBJECTIVE BOX
Patient is a 79y old  Female who presents with a chief complaint of shortness of breath (20 Oct 2024 11:57)      INTERVAL HPI/ OVERNIGHT EVENTS: Patient seen and examined at bedside. Tolerated her second hemodialysis session well yesterday. Having an episode of vomiting at bedside after eating breakfast. Associated with nausea. No headache, no chest pain, no dizziness.     Vital Signs Last 24 Hrs  T(C): 36.9 (27 Oct 2024 12:23), Max: 36.9 (26 Oct 2024 20:54)  T(F): 98.4 (27 Oct 2024 12:23), Max: 98.5 (27 Oct 2024 04:57)  HR: 67 (27 Oct 2024 12:23) (67 - 75)  BP: 131/62 (27 Oct 2024 12:23) (114/66 - 163/76)  BP(mean): 82 (27 Oct 2024 04:57) (82 - 82)  RR: 16 (27 Oct 2024 12:23) (16 - 18)  SpO2: 98% (27 Oct 2024 12:23) (96% - 98%)    Parameters below as of 27 Oct 2024 12:23  Patient On (Oxygen Delivery Method): nasal cannula  O2 Flow (L/min): 3      REVIEW OF SYSTEMS:  CONSTITUTIONAL: No fever or chills  HEENT:  No headache, no sore throat  RESPIRATORY: No cough, wheezing, +shortness of breath  CARDIOVASCULAR: No chest pain, palpitations  GASTROINTESTINAL: No abd pain, nausea, vomiting, or diarrhea  GENITOURINARY: No dysuria, frequency, or hematuria  NEUROLOGICAL: no focal weakness or dizziness  MUSCULOSKELETAL: no myalgias       PHYSICAL EXAM:  General: NAD, morbidly obese female, lying in bed, vomiting  Respiratory: B/L crackles throughout, + improved expiratory wheezes  CV: RRR, +S1/S2, +systolic murmur  Abdominal: Soft, Non tender, Nondistended  Extremities: +chronic skin changes with b/l hyperpigmentation, no ulcers noted. No edema, 2+ peripheral pulses  NERVOUS SYSTEM: answers questions and follows commands appropriately, A&Ox3, grossly moves all extremities   PSYCH: Appropriate affect, Alert & Awake; Good judgement      LABS: Personally reviewed                        7.8    8.80  )-----------( 197      ( 27 Oct 2024 06:08 )             25.1     10-27    138  |  99  |  78[H]  ----------------------------<  121[H]  4.4   |  30  |  2.63[H]    Ca    8.4      27 Oct 2024 06:08  Phos  4.2     10-27  Mg     2.3     10-27    TPro  7.0  /  Alb  3.1[L]  /  TBili  0.4  /  DBili  x   /  AST  7[L]  /  ALT  29  /  AlkPhos  80  10-27                     IMAGING  CHEST XRAY 10/22/2024  IMPRESSION  No significant change in comparison to the prior study. Mild CHF is   again suggested. Magnified cardiac silhouette remains stable. There is   evidence of prior right lower neck surgery and degenerative arthritis   involving the glenohumeral joints.     TTE Echo Complete w/o Contrast w/ Doppler  Summary:   1. Normal global left ventricular systolic function.   2. Spectral Doppler shows pseudonormal pattern of left ventricular   myocardial filling (Grade II diastolic dysfunction).   3. Normal right ventricular size and function.   4. Mildly enlarged left atrium.   5. The right atrium is normal in size.   6. Mild mitral valve regurgitation.   7. Mild thickening and calcification of the anterior and posterior   mitral valve leaflets.   8. Mild tricuspid regurgitation.   9. Estimated pulmonary artery systolic pressure is 57.8 mmHg assuming a   right atrial pressure of 3 mmHg, which is consistent with moderate   pulmonary hypertension.  10. Pulmonary hypertension is present.    MEDICATIONS  (STANDING):  apixaban 2.5 milliGRAM(s) Oral two times a day  Biotene Dry Mouth Oral Rinse 5 milliLiter(s) Swish and Spit daily  calcium acetate 1334 milliGRAM(s) Oral three times a day with meals  chlorhexidine 2% Cloths 1 Application(s) Topical daily  dextrose 5%. 1000 milliLiter(s) (50 mL/Hr) IV Continuous <Continuous>  dextrose 5%. 1000 milliLiter(s) (100 mL/Hr) IV Continuous <Continuous>  dextrose 50% Injectable 12.5 Gram(s) IV Push once  dextrose 50% Injectable 25 Gram(s) IV Push once  dextrose 50% Injectable 25 Gram(s) IV Push once  diltiazem    milliGRAM(s) Oral daily  ezetimibe 10 milliGRAM(s) Oral daily  ferrous    sulfate 325 milliGRAM(s) Oral daily  glucagon  Injectable 1 milliGRAM(s) IntraMuscular once  hydrALAZINE 100 milliGRAM(s) Oral three times a day  influenza  Vaccine (HIGH DOSE) 0.5 milliLiter(s) IntraMuscular once  insulin lispro (ADMELOG) corrective regimen sliding scale   SubCutaneous at bedtime  insulin lispro (ADMELOG) corrective regimen sliding scale   SubCutaneous three times a day before meals  labetalol 600 milliGRAM(s) Oral two times a day  rosuvastatin 5 milliGRAM(s) Oral at bedtime    MEDICATIONS  (PRN):  acetaminophen     Tablet .. 650 milliGRAM(s) Oral every 6 hours PRN Temp greater or equal to 38C (100.4F), Mild Pain (1 - 3)  albuterol    90 MICROgram(s) HFA Inhaler 2 Puff(s) Inhalation every 6 hours PRN Shortness of Breath  dextrose Oral Gel 15 Gram(s) Oral once PRN Blood Glucose LESS THAN 70 milliGRAM(s)/deciliter  melatonin 3 milliGRAM(s) Oral at bedtime PRN Insomnia  ondansetron Injectable 4 milliGRAM(s) IV Push every 8 hours PRN Nausea and/or Vomiting  sodium chloride 0.65% Nasal 1 Spray(s) Both Nostrils daily PRN Nasal Congestion  sodium chloride 0.9% lock flush 10 milliLiter(s) IV Push every 1 hour PRN Pre/post blood products, medications, blood draw, and to maintain line patency    RADIOLOGY & ADDITIONAL TESTS: Personally reviewed.     Consultant(s) Notes Reviewed:  [x] YES  [ ] NO   Discussed with JOSE/PATTIE, RN

## 2024-10-28 LAB
ALBUMIN SERPL ELPH-MCNC: 3.1 G/DL — LOW (ref 3.3–5)
ALP SERPL-CCNC: 76 U/L — SIGNIFICANT CHANGE UP (ref 40–120)
ALT FLD-CCNC: 14 U/L — SIGNIFICANT CHANGE UP (ref 10–45)
ANION GAP SERPL CALC-SCNC: 8 MMOL/L — SIGNIFICANT CHANGE UP (ref 5–17)
AST SERPL-CCNC: 12 U/L — SIGNIFICANT CHANGE UP (ref 10–40)
BILIRUB SERPL-MCNC: 0.4 MG/DL — SIGNIFICANT CHANGE UP (ref 0.2–1.2)
BUN SERPL-MCNC: 91 MG/DL — HIGH (ref 7–23)
CALCIUM SERPL-MCNC: 8 MG/DL — LOW (ref 8.4–10.5)
CHLORIDE SERPL-SCNC: 96 MMOL/L — SIGNIFICANT CHANGE UP (ref 96–108)
CO2 SERPL-SCNC: 28 MMOL/L — SIGNIFICANT CHANGE UP (ref 22–31)
CREAT SERPL-MCNC: 3.33 MG/DL — HIGH (ref 0.5–1.3)
EGFR: 14 ML/MIN/1.73M2 — LOW
GLUCOSE BLDC GLUCOMTR-MCNC: 109 MG/DL — HIGH (ref 70–99)
GLUCOSE BLDC GLUCOMTR-MCNC: 116 MG/DL — HIGH (ref 70–99)
GLUCOSE BLDC GLUCOMTR-MCNC: 201 MG/DL — HIGH (ref 70–99)
GLUCOSE BLDC GLUCOMTR-MCNC: 273 MG/DL — HIGH (ref 70–99)
GLUCOSE SERPL-MCNC: 107 MG/DL — HIGH (ref 70–99)
HCT VFR BLD CALC: 24.3 % — LOW (ref 34.5–45)
HGB BLD-MCNC: 7.6 G/DL — LOW (ref 11.5–15.5)
MCHC RBC-ENTMCNC: 27.4 PG — SIGNIFICANT CHANGE UP (ref 27–34)
MCHC RBC-ENTMCNC: 31.3 GM/DL — LOW (ref 32–36)
MCV RBC AUTO: 87.7 FL — SIGNIFICANT CHANGE UP (ref 80–100)
NRBC # BLD: 0 /100 WBCS — SIGNIFICANT CHANGE UP (ref 0–0)
PLATELET # BLD AUTO: 187 K/UL — SIGNIFICANT CHANGE UP (ref 150–400)
POTASSIUM SERPL-MCNC: 4.8 MMOL/L — SIGNIFICANT CHANGE UP (ref 3.5–5.3)
POTASSIUM SERPL-SCNC: 4.8 MMOL/L — SIGNIFICANT CHANGE UP (ref 3.5–5.3)
PROT SERPL-MCNC: 6.8 G/DL — SIGNIFICANT CHANGE UP (ref 6–8.3)
RBC # BLD: 2.77 M/UL — LOW (ref 3.8–5.2)
RBC # FLD: 17.1 % — HIGH (ref 10.3–14.5)
SODIUM SERPL-SCNC: 132 MMOL/L — LOW (ref 135–145)
WBC # BLD: 8.99 K/UL — SIGNIFICANT CHANGE UP (ref 3.8–10.5)
WBC # FLD AUTO: 8.99 K/UL — SIGNIFICANT CHANGE UP (ref 3.8–10.5)

## 2024-10-28 PROCEDURE — 99233 SBSQ HOSP IP/OBS HIGH 50: CPT | Mod: GC

## 2024-10-28 PROCEDURE — 93010 ELECTROCARDIOGRAM REPORT: CPT

## 2024-10-28 RX ADMIN — PANTOPRAZOLE SODIUM 40 MILLIGRAM(S): 40 TABLET, DELAYED RELEASE ORAL at 05:31

## 2024-10-28 RX ADMIN — CHLORHEXIDINE GLUCONATE 1 APPLICATION(S): 40 SOLUTION TOPICAL at 13:59

## 2024-10-28 RX ADMIN — CALCIUM ACETATE 1334 MILLIGRAM(S): 667 CAPSULE ORAL at 09:00

## 2024-10-28 RX ADMIN — Medication 5 MILLILITER(S): at 13:57

## 2024-10-28 RX ADMIN — CALCIUM ACETATE 1334 MILLIGRAM(S): 667 CAPSULE ORAL at 13:57

## 2024-10-28 RX ADMIN — APIXABAN 2.5 MILLIGRAM(S): 5 TABLET, FILM COATED ORAL at 05:31

## 2024-10-28 RX ADMIN — Medication 180 MILLIGRAM(S): at 05:31

## 2024-10-28 RX ADMIN — HYDRALAZINE HYDROCHLORIDE 100 MILLIGRAM(S): 50 TABLET, FILM COATED ORAL at 21:15

## 2024-10-28 RX ADMIN — Medication 2: at 17:56

## 2024-10-28 RX ADMIN — Medication 325 MILLIGRAM(S): at 13:56

## 2024-10-28 RX ADMIN — EZETIMIBE 10 MILLIGRAM(S): 10 TABLET ORAL at 13:57

## 2024-10-28 RX ADMIN — HYDRALAZINE HYDROCHLORIDE 100 MILLIGRAM(S): 50 TABLET, FILM COATED ORAL at 05:31

## 2024-10-28 RX ADMIN — Medication 5 MILLIGRAM(S): at 21:15

## 2024-10-28 RX ADMIN — APIXABAN 2.5 MILLIGRAM(S): 5 TABLET, FILM COATED ORAL at 17:56

## 2024-10-28 RX ADMIN — HYDRALAZINE HYDROCHLORIDE 100 MILLIGRAM(S): 50 TABLET, FILM COATED ORAL at 13:57

## 2024-10-28 RX ADMIN — Medication 600 MILLIGRAM(S): at 17:56

## 2024-10-28 RX ADMIN — CALCIUM ACETATE 1334 MILLIGRAM(S): 667 CAPSULE ORAL at 17:56

## 2024-10-28 RX ADMIN — Medication 1: at 21:17

## 2024-10-28 NOTE — PROGRESS NOTE ADULT - SUBJECTIVE AND OBJECTIVE BOX
NEPHROLOGY PROGRESS NOTE    CHIEF COMPLAINT:  ESRD    HPI:  She had 3 hrs dialysis earlier today and feels wiped out now.      EXAM:  Vital Signs Last 24 Hrs  T(C): 36.7 (28 Oct 2024 13:44), Max: 37.1 (27 Oct 2024 21:23)  T(F): 98.1 (28 Oct 2024 13:44), Max: 98.7 (27 Oct 2024 21:23)  HR: 76 (28 Oct 2024 13:44) (62 - 76)  BP: 135/66 (28 Oct 2024 13:44) (133/67 - 152/68)  BP(mean): --  RR: 19 (28 Oct 2024 13:44) (16 - 19)  SpO2: 94% (28 Oct 2024 13:44) (93% - 99%)    Parameters below as of 28 Oct 2024 13:44  Patient On (Oxygen Delivery Method): nasal cannula  O2 Flow (L/min): 1    I&O's Summary    28 Oct 2024 07:01  -  28 Oct 2024 15:37  --------------------------------------------------------  IN: 480 mL / OUT: 1500 mL / NET: -1020 mL      Daily     Daily Weight in k.5 (28 Oct 2024 13:00)    Conversant, in no apparent distress  Normal respiratory effort, lungs clear bilaterally  Heart RRR with no murmur, less peripheral edema    LABS                        7.6    8.99  )-----------( 187      ( 28 Oct 2024 08:24 )             24.3     1028    132[L]  |  96  |  91[H]  ----------------------------<  107[H]  4.8   |  28  |  3.33[H]    Ca    8.0[L]      28 Oct 2024 08:24  Phos  4.2     10  Mg     2.3     10-27    TPro  6.8  /  Alb  3.1[L]  /  TBili  0.4  /  DBili  x   /  AST  12  /  ALT  14  /  AlkPhos  76  10-28        A/P:    DM, HTN, CKD 4 (Cr 2.13 - 24, Cr 2.35 - 10/7/24)  Adm w/SOB iso severe anemia, some PVC on CXR  Cardio-renal/hemodynamic ATN/CKD 4 w/o improvement requiring dialysis initiation  Mild uremic symptoms resolved  Completed HD today, resume Wednesday  Plan tunneled dialysis catheter this week    [phone]

## 2024-10-28 NOTE — PROGRESS NOTE ADULT - SUBJECTIVE AND OBJECTIVE BOX
Patient is a 79y old  Female who presents with a chief complaint of shortness of breath (28 Oct 2024 15:36)      INTERVAL HPI/ OVERNIGHT EVENTS: Patient seen and examined while receiving dialysis via Left IJ. No overnight events. Pt's nausea has resolved. Pt has not made any urine and bladder scan this morning showed 65mL.       MEDICATIONS  (STANDING):  apixaban 2.5 milliGRAM(s) Oral two times a day  Biotene Dry Mouth Oral Rinse 5 milliLiter(s) Swish and Spit daily  calcium acetate 1334 milliGRAM(s) Oral three times a day with meals  chlorhexidine 2% Cloths 1 Application(s) Topical daily  dextrose 5%. 1000 milliLiter(s) (50 mL/Hr) IV Continuous <Continuous>  dextrose 5%. 1000 milliLiter(s) (100 mL/Hr) IV Continuous <Continuous>  dextrose 50% Injectable 12.5 Gram(s) IV Push once  dextrose 50% Injectable 25 Gram(s) IV Push once  dextrose 50% Injectable 25 Gram(s) IV Push once  diltiazem    milliGRAM(s) Oral daily  ezetimibe 10 milliGRAM(s) Oral daily  ferrous    sulfate 325 milliGRAM(s) Oral daily  glucagon  Injectable 1 milliGRAM(s) IntraMuscular once  hydrALAZINE 100 milliGRAM(s) Oral three times a day  influenza  Vaccine (HIGH DOSE) 0.5 milliLiter(s) IntraMuscular once  insulin lispro (ADMELOG) corrective regimen sliding scale   SubCutaneous at bedtime  insulin lispro (ADMELOG) corrective regimen sliding scale   SubCutaneous three times a day before meals  labetalol 600 milliGRAM(s) Oral two times a day  pantoprazole    Tablet 40 milliGRAM(s) Oral before breakfast  rosuvastatin 5 milliGRAM(s) Oral at bedtime    MEDICATIONS  (PRN):  acetaminophen     Tablet .. 650 milliGRAM(s) Oral every 6 hours PRN Temp greater or equal to 38C (100.4F), Mild Pain (1 - 3)  albuterol    90 MICROgram(s) HFA Inhaler 2 Puff(s) Inhalation every 6 hours PRN Shortness of Breath  dextrose Oral Gel 15 Gram(s) Oral once PRN Blood Glucose LESS THAN 70 milliGRAM(s)/deciliter  melatonin 3 milliGRAM(s) Oral at bedtime PRN Insomnia  ondansetron Injectable 4 milliGRAM(s) IV Push every 8 hours PRN Nausea and/or Vomiting  sodium chloride 0.65% Nasal 1 Spray(s) Both Nostrils daily PRN Nasal Congestion  sodium chloride 0.9% lock flush 10 milliLiter(s) IV Push every 1 hour PRN Pre/post blood products, medications, blood draw, and to maintain line patency      Allergies    ACE inhibitors (Angioedema)  statins (Muscle Pain)    Intolerances    predniSONE (Other)      REVIEW OF SYSTEMS:  CONSTITUTIONAL: No fever or chills  HEENT:  No headache, no sore throat  RESPIRATORY: No cough, wheezing, or shortness of breath  CARDIOVASCULAR: No chest pain, palpitations  GASTROINTESTINAL: No abd pain, nausea, vomiting, or diarrhea  GENITOURINARY: No dysuria, frequency, or hematuria  NEUROLOGICAL: no focal weakness or dizziness  MUSCULOSKELETAL: no myalgias     Vital Signs Last 24 Hrs  T(C): 36.7 (28 Oct 2024 13:44), Max: 37.1 (27 Oct 2024 21:23)  T(F): 98.1 (28 Oct 2024 13:44), Max: 98.7 (27 Oct 2024 21:23)  HR: 76 (28 Oct 2024 13:44) (62 - 76)  BP: 135/66 (28 Oct 2024 13:44) (133/67 - 152/68)  BP(mean): --  RR: 19 (28 Oct 2024 13:44) (16 - 19)  SpO2: 94% (28 Oct 2024 13:44) (93% - 99%)    Parameters below as of 28 Oct 2024 13:44  Patient On (Oxygen Delivery Method): nasal cannula  O2 Flow (L/min): 1    I&O's Summary    28 Oct 2024 07:01  -  28 Oct 2024 15:46  --------------------------------------------------------  IN: 480 mL / OUT: 1500 mL / NET: -1020 mL      BMI (kg/m2): 35.2 (10-25-24 @ 13:49)    PHYSICAL EXAM:  General: NAD, morbidly obese female, receiving dialysis via Left IJ  Respiratory: B/L crackles throughout, + improved expiratory wheezes  CV: RRR, +S1/S2, +systolic murmur  Abdominal: Soft, Non tender, Nondistended  Extremities: +chronic skin changes with b/l hyperpigmentation, no ulcers noted. No edema, 2+ peripheral pulses  NERVOUS SYSTEM: answers questions and follows commands appropriately, A&Ox3, grossly moves all extremities   PSYCH: Appropriate affect, Alert & Awake; Good judgement      LABS: Personally reviewed  CBC                        7.6    8.99  )-----------( 187      ( 28 Oct 2024 08:24 )             24.3     CMP  10-28    132  |  96  |  91  ----------------------------<  107  4.8   |  28  |  3.33    Ca    8.0      28 Oct 2024 08:24  Phos  4.2     10-27  Mg     2.3     10-27    TPro  6.8  /  Alb  3.1  /  TBili  0.4  /  DBili  x   /  AST  12  /  ALT  14  /  AlkPhos  76  10-28                                  POCT Blood Glucose.: 116 mg/dL (28 Oct 2024 13:40)  POCT Blood Glucose.: 109 mg/dL (28 Oct 2024 08:47)  POCT Blood Glucose.: 173 mg/dL (27 Oct 2024 21:30)  POCT Blood Glucose.: 122 mg/dL (27 Oct 2024 17:07)      Urinalysis Basic - ( 28 Oct 2024 08:24 )    Color: x / Appearance: x / SG: x / pH: x  Gluc: 107 mg/dL / Ketone: x  / Bili: x / Urobili: x   Blood: x / Protein: x / Nitrite: x   Leuk Esterase: x / RBC: x / WBC x   Sq Epi: x / Non Sq Epi: x / Bacteria: x                RADIOLOGY & ADDITIONAL TESTS: Personally reviewed.     Consultant(s) Notes Reviewed:  [x] YES  [ ] NO   Discussed with JOSE/PATTIE, RN

## 2024-10-28 NOTE — PROGRESS NOTE ADULT - SUBJECTIVE AND OBJECTIVE BOX
Follow-up Pulmonary Progress Note  Chief Complaint : Acute on chronic systolic congestive heart failure      patient seen and examined  feeling better  less sob, no cp, sob, palp, n/v        Allergies :ACE inhibitors (Angioedema)  statins (Muscle Pain)  predniSONE (Other)      PAST MEDICAL & SURGICAL HISTORY:  Hypertension  Diabetes type 2  Thyroid disease  Anemia  Hypercholesteremia  Myelodysplasia (myelodysplastic syndrome)  Thyromegaly s/p partial thyroidectomy long time ago  Other giant cell arteritis  Arthritis  Kidney insufficiency  PVD (peripheral vascular disease)  Mass of right knee  Urinary incontinence  H/O partial thyroidectomy  H/O: hysterectomy  History of cataract surgery  History of vascular surgery        Medications:  MEDICATIONS  (STANDING):  apixaban 2.5 milliGRAM(s) Oral two times a day  Biotene Dry Mouth Oral Rinse 5 milliLiter(s) Swish and Spit daily  calcium acetate 1334 milliGRAM(s) Oral three times a day with meals  chlorhexidine 2% Cloths 1 Application(s) Topical daily  dextrose 5%. 1000 milliLiter(s) (100 mL/Hr) IV Continuous <Continuous>  dextrose 5%. 1000 milliLiter(s) (50 mL/Hr) IV Continuous <Continuous>  dextrose 50% Injectable 12.5 Gram(s) IV Push once  dextrose 50% Injectable 25 Gram(s) IV Push once  dextrose 50% Injectable 25 Gram(s) IV Push once  diltiazem    milliGRAM(s) Oral daily  ezetimibe 10 milliGRAM(s) Oral daily  ferrous    sulfate 325 milliGRAM(s) Oral daily  glucagon  Injectable 1 milliGRAM(s) IntraMuscular once  hydrALAZINE 100 milliGRAM(s) Oral three times a day  influenza  Vaccine (HIGH DOSE) 0.5 milliLiter(s) IntraMuscular once  insulin lispro (ADMELOG) corrective regimen sliding scale   SubCutaneous at bedtime  insulin lispro (ADMELOG) corrective regimen sliding scale   SubCutaneous three times a day before meals  labetalol 600 milliGRAM(s) Oral two times a day  pantoprazole    Tablet 40 milliGRAM(s) Oral before breakfast  rosuvastatin 5 milliGRAM(s) Oral at bedtime    MEDICATIONS  (PRN):  acetaminophen     Tablet .. 650 milliGRAM(s) Oral every 6 hours PRN Temp greater or equal to 38C (100.4F), Mild Pain (1 - 3)  albuterol    90 MICROgram(s) HFA Inhaler 2 Puff(s) Inhalation every 6 hours PRN Shortness of Breath  dextrose Oral Gel 15 Gram(s) Oral once PRN Blood Glucose LESS THAN 70 milliGRAM(s)/deciliter  melatonin 3 milliGRAM(s) Oral at bedtime PRN Insomnia  ondansetron Injectable 4 milliGRAM(s) IV Push every 8 hours PRN Nausea and/or Vomiting  sodium chloride 0.65% Nasal 1 Spray(s) Both Nostrils daily PRN Nasal Congestion  sodium chloride 0.9% lock flush 10 milliLiter(s) IV Push every 1 hour PRN Pre/post blood products, medications, blood draw, and to maintain line patency      Antibiotics History      Heme Medications   apixaban 2.5 milliGRAM(s) Oral two times a day, 10-19-24 @ 15:47      GI Medications  pantoprazole    Tablet 40 milliGRAM(s) Oral before breakfast, 10-28-24 @ 00:00, Routine        LABS:                        7.6    8.99  )-----------( 187      ( 28 Oct 2024 08:24 )             24.3     10-28    132[L]  |  96  |  91[H]  ----------------------------<  107[H]  4.8   |  28  |  3.33[H]    Ca    8.0[L]      28 Oct 2024 08:24  Phos  4.2     10-27  Mg     2.3     10-27    TPro  6.8  /  Alb  3.1[L]  /  TBili  0.4  /  DBili  x   /  AST  12  /  ALT  14  /  AlkPhos  76  10-28    CHUNG -- 10-22 @ 07:07  Anti SS-1 --  Anti SS-2 --  Anti RNP --  RF -- 10-22 @ 07:07    Atypical ANCA Indeterminate Method interference due to CHUNG Fluorescence 10-22 @ 07:07  c-ANCA titer -- 10-22 @ 07:07  c-ANCA Negative 10-22 @ 07:07  p-ANCA Negative 10-22 @ 07:07            Urinalysis Basic - ( 28 Oct 2024 08:24 )    Color: x / Appearance: x / SG: x / pH: x  Gluc: 107 mg/dL / Ketone: x  / Bili: x / Urobili: x   Blood: x / Protein: x / Nitrite: x   Leuk Esterase: x / RBC: x / WBC x   Sq Epi: x / Non Sq Epi: x / Bacteria: x                   VITALS:  T(C): 36.7 (10-28-24 @ 13:44), Max: 37.1 (10-27-24 @ 21:23)  T(F): 98.1 (10-28-24 @ 13:44), Max: 98.7 (10-27-24 @ 21:23)  HR: 76 (10-28-24 @ 13:44) (62 - 76)  BP: 135/66 (10-28-24 @ 13:44) (133/67 - 152/68)  BP(mean): --  ABP: --  ABP(mean): --  RR: 19 (10-28-24 @ 13:44) (16 - 19)  SpO2: 94% (10-28-24 @ 13:44) (93% - 99%)  CVP(mm Hg): --  CVP(cm H2O): --    Ins and Outs     10-28-24 @ 07:01  -  10-28-24 @ 14:20  --------------------------------------------------------  IN: 240 mL / OUT: 1500 mL / NET: -1260 mL                I&O's Detail    28 Oct 2024 07:01  -  28 Oct 2024 14:20  --------------------------------------------------------  IN:    Oral Fluid: 240 mL  Total IN: 240 mL    OUT:    Other (mL): 1500 mL  Total OUT: 1500 mL    Total NET: -1260 mL

## 2024-10-28 NOTE — PROGRESS NOTE ADULT - ASSESSMENT
Physical Examination:  GENERAL:               Alert, Oriented, no acute distress.    HEENT:                   No JVD, Moist MM, enlarged neck circumference   PULM:                     Bilateral air entry, Clear to auscultation bilaterally dimminished, no significant sputum production, no Rales, No Rhonchi, No Wheezing  CVS:                         S1, S2,  No Murmur  ABD:                        Soft, nondistended, nontender, normoactive bowel sounds,   EXT:                         no  edema, nontender, No Cyanosis or Clubbing    NEURO:                  Alert, oriented, interactive, nonfocal, follows commands  PSYC:                      Calm, + Insight.         Assessment  - Acute pulmonary Edema due to worsening renal function/non cardiogenic causes   - B/L Pl effusions and Dyspnea due to above   - Diastolic CHF - Chronic   - Underlying Hypertension, DM2   - Obesity    Plan  from pulmonary perspective pl effusion too small to drain via thoracentesis  CXR in am     resp status improving with HD  managment of n/v as per primary team suspect from new HD and fluid shifts    taper n/c to off as tolerated ;  Keep sat > 92%    PT/OOB as tolerated   Rest of care as per primary team.

## 2024-10-28 NOTE — PROGRESS NOTE ADULT - ATTENDING COMMENTS
79y year old Female with PMH of HFpEF, Chronic Kidney Disease 4, DVT (on eliquis) Hypertension, Hyperlipidemia, Type 2 Diabetes Mellitus, Anemia, OA who presents to the ER complaining of shortness of breath x2 days. Admitted for acute on chronic dCHF Plan: trend  renal fuction and volume status, wean off O2, apprec nephro, cardio and pulm recs, cont dialysis as per nephro recs, pt with nausea and vomiting this AM, monitor clinical course, good response renally from dialysis sessions, FELICIANO recommended by PT dc planning underway

## 2024-10-28 NOTE — PROGRESS NOTE ADULT - ASSESSMENT
BURKE ABARCA is a 79y year old Female with PMH of HFpEF, Chronic Kidney Disease 4, DVT (on eliquis) Hypertension, Hyperlipidemia, Type 2 Diabetes Mellitus, Anemia, OA who presents to the ER complaining of shortness of breath x2 days. Admitted for acute on chronic dCHF    #Shortness of breath - likely 2/2 HFpEF exacerbation, acute on chronic diastolic CHF  #Anemia, likely chronic disease   - admit to medicine   - CXR with Interstitial edema with mild congestive heart failure  - remote tele, continuous O2  - s/p lasix 80mg ivp in ER, was on bumex 2mg bid since August per patient.   - s/p IV lasix 40mg   - TTE 10/20/24: LVEF 63%, grade 2 diastolic dysfunction  - proBNP 5495 increased from 3792 on 10/19  - s/p 1 U pRBC 10/20/24, repeat hgb 7.1  - s/p 1 U pRBC 10/22/24, repeat hgb 7.8  - iron studies: low transferrin, high ferritin  - hgb 7.6 today  - transfuse if <7  - keep type and screen active  - continue weekly epoietin  - nephro recs appreciated: hold diuretics, no NSAIDs, c/w epoetin  - cardio recs appreciated: gdmt contraindicated 2/2 CKD, hold diuretics  - change oxygen to humidified O2 due to increased congestion and bloody mucus per pt   - pt up-titrated on oxygen due to increased SOB  - pulm consult recs appreciated  - s/p 3 sessions of HD  - attempts to wean pt off oxygen     #Acute Kidney Injury on Chronic Kidney Disease 4  -s/p 3 HD sessions, third session done today  - Cr 3.33, BUN 91, GFR 14 today  - follow up nephro recommendations regarding further HD    #Nausea/vomiting, likely 2/2 to HD, fluid changes- improved  -pt noted to have episode of nausea/vomiting 10/27/24  -zofran prn    #hyponatremia  - Na 132  - monitor    #hypocalcemia  - Ca 8  - monitor    #Hyperphosphatemia, resolved  - phos 4.2 10/27  - improving with dialysis and phoslo  - monitor     #Hypertension  - continue home diltiazem, hydralazine, labetalol    #Hyperlipidemia  - continue home crestor, ezetemibe    #Type II Diabetes Mellitus  - hold home meds  - FS and DANGELO  - maintain blood glucose 100-180 while hospitalized     #history of DVT  - per patient she had RUE DVT while at [facility]  - has been on eliquis 2.5mg bid for nearly 3 months   - c/w eliquis 2.5mg    #DVT ppx  - Eliquis    #GOC  -full code     #Diet  -DASH/TLC, consistent carbs    Dispo: pt medically active, possible FELICIANO when cleared    *Case discussed with Dr. Dowling.     BURKE ABARCA is a 79y year old Female with PMH of HFpEF, Chronic Kidney Disease 4, DVT (on eliquis) Hypertension, Hyperlipidemia, Type 2 Diabetes Mellitus, Anemia, OA who presents to the ER complaining of shortness of breath x2 days. Admitted for acute on chronic dCHF    #Shortness of breath - likely 2/2 HFpEF exacerbation, acute on chronic diastolic CHF  #Anemia, likely chronic disease   - admit to medicine   - CXR with Interstitial edema with mild congestive heart failure  - remote tele, continuous O2  - s/p lasix 80mg ivp in ER, was on bumex 2mg bid since August per patient.   - s/p IV lasix 40mg   - TTE 10/20/24: LVEF 63%, grade 2 diastolic dysfunction  - proBNP 5495 increased from 3792 on 10/19  - s/p 1 U pRBC 10/20/24, repeat hgb 7.1  - s/p 1 U pRBC 10/22/24, repeat hgb 7.8  - iron studies: low transferrin, high ferritin  - hgb 7.6 today  - transfuse if <7  - keep type and screen active  - continue weekly epoietin  - nephro recs appreciated: hold diuretics, no NSAIDs, c/w epoetin  - cardio recs appreciated: gdmt contraindicated 2/2 CKD, hold diuretics  - change oxygen to humidified O2 due to increased congestion and bloody mucus per pt   - pt up-titrated on oxygen due to increased SOB  - pulm consult recs appreciated  - s/p 3 sessions of HD  - attempts to wean pt off oxygen     #Acute Kidney Injury on Chronic Kidney Disease 4  -s/p 3 HD sessions, third session done today; resume dialysis on Wednesday  - Cr 3.33, BUN 91, GFR 14 today  - nephro recommendations appreciated- plan for tunneled dialysis catheter this week    #Nausea/vomiting, likely 2/2 to HD, fluid changes- improved  -pt noted to have episode of nausea/vomiting 10/27/24  -zofran prn    #hyponatremia  - Na 132  - monitor    #hypocalcemia  - Ca 8  - monitor    #Hyperphosphatemia, resolved  - phos 4.2 10/27  - improving with dialysis and phoslo  - monitor     #Hypertension  - continue home diltiazem, hydralazine, labetalol    #Hyperlipidemia  - continue home crestor, ezetemibe    #Type II Diabetes Mellitus  - hold home meds  - FS and DANGELO  - maintain blood glucose 100-180 while hospitalized     #history of DVT  - per patient she had RUE DVT while at [facility]  - has been on eliquis 2.5mg bid for nearly 3 months   - c/w eliquis 2.5mg    #DVT ppx  - Eliquis    #GOC  -full code     #Diet  -DASH/TLC, consistent carbs    Dispo: pt medically active, possible FELICIANO when cleared    *Case discussed with Dr. Dowling.

## 2024-10-29 LAB
A1C WITH ESTIMATED AVERAGE GLUCOSE RESULT: 6.4 % — HIGH (ref 4–5.6)
ALBUMIN SERPL ELPH-MCNC: 3.1 G/DL — LOW (ref 3.3–5)
ALP SERPL-CCNC: 76 U/L — SIGNIFICANT CHANGE UP (ref 40–120)
ALT FLD-CCNC: 12 U/L — SIGNIFICANT CHANGE UP (ref 10–45)
ANION GAP SERPL CALC-SCNC: 3 MMOL/L — LOW (ref 5–17)
AST SERPL-CCNC: 11 U/L — SIGNIFICANT CHANGE UP (ref 10–40)
BILIRUB SERPL-MCNC: 0.6 MG/DL — SIGNIFICANT CHANGE UP (ref 0.2–1.2)
BUN SERPL-MCNC: 58 MG/DL — HIGH (ref 7–23)
CALCIUM SERPL-MCNC: 8.5 MG/DL — SIGNIFICANT CHANGE UP (ref 8.4–10.5)
CHLORIDE SERPL-SCNC: 98 MMOL/L — SIGNIFICANT CHANGE UP (ref 96–108)
CO2 SERPL-SCNC: 32 MMOL/L — HIGH (ref 22–31)
CREAT SERPL-MCNC: 2.42 MG/DL — HIGH (ref 0.5–1.3)
EGFR: 20 ML/MIN/1.73M2 — LOW
ESTIMATED AVERAGE GLUCOSE: 137 MG/DL — HIGH (ref 68–114)
GLUCOSE BLDC GLUCOMTR-MCNC: 137 MG/DL — HIGH (ref 70–99)
GLUCOSE BLDC GLUCOMTR-MCNC: 176 MG/DL — HIGH (ref 70–99)
GLUCOSE BLDC GLUCOMTR-MCNC: 178 MG/DL — HIGH (ref 70–99)
GLUCOSE BLDC GLUCOMTR-MCNC: 201 MG/DL — HIGH (ref 70–99)
GLUCOSE SERPL-MCNC: 128 MG/DL — HIGH (ref 70–99)
HCT VFR BLD CALC: 25.5 % — LOW (ref 34.5–45)
HGB BLD-MCNC: 7.9 G/DL — LOW (ref 11.5–15.5)
MAGNESIUM SERPL-MCNC: 2.2 MG/DL — SIGNIFICANT CHANGE UP (ref 1.6–2.6)
MCHC RBC-ENTMCNC: 27.5 PG — SIGNIFICANT CHANGE UP (ref 27–34)
MCHC RBC-ENTMCNC: 31 GM/DL — LOW (ref 32–36)
MCV RBC AUTO: 88.9 FL — SIGNIFICANT CHANGE UP (ref 80–100)
NRBC # BLD: 0 /100 WBCS — SIGNIFICANT CHANGE UP (ref 0–0)
PHOSPHATE SERPL-MCNC: 3 MG/DL — SIGNIFICANT CHANGE UP (ref 2.5–4.5)
PLATELET # BLD AUTO: 196 K/UL — SIGNIFICANT CHANGE UP (ref 150–400)
POTASSIUM SERPL-MCNC: 4 MMOL/L — SIGNIFICANT CHANGE UP (ref 3.5–5.3)
POTASSIUM SERPL-SCNC: 4 MMOL/L — SIGNIFICANT CHANGE UP (ref 3.5–5.3)
PROT SERPL-MCNC: 6.9 G/DL — SIGNIFICANT CHANGE UP (ref 6–8.3)
RBC # BLD: 2.87 M/UL — LOW (ref 3.8–5.2)
RBC # FLD: 17.2 % — HIGH (ref 10.3–14.5)
SODIUM SERPL-SCNC: 133 MMOL/L — LOW (ref 135–145)
WBC # BLD: 10.02 K/UL — SIGNIFICANT CHANGE UP (ref 3.8–10.5)
WBC # FLD AUTO: 10.02 K/UL — SIGNIFICANT CHANGE UP (ref 3.8–10.5)

## 2024-10-29 PROCEDURE — 99232 SBSQ HOSP IP/OBS MODERATE 35: CPT

## 2024-10-29 PROCEDURE — 71045 X-RAY EXAM CHEST 1 VIEW: CPT | Mod: 26

## 2024-10-29 PROCEDURE — 99233 SBSQ HOSP IP/OBS HIGH 50: CPT | Mod: GC

## 2024-10-29 RX ORDER — ERYTHROPOIETIN 10000 [IU]/ML
10000 INJECTION, SOLUTION INTRAVENOUS; SUBCUTANEOUS
Refills: 0 | Status: DISCONTINUED | OUTPATIENT
Start: 2024-10-29 | End: 2024-10-29

## 2024-10-29 RX ORDER — LINAGLIPTIN 5 MG/1
5 TABLET, FILM COATED ORAL ONCE
Refills: 0 | Status: COMPLETED | OUTPATIENT
Start: 2024-10-29 | End: 2024-10-29

## 2024-10-29 RX ORDER — SIMETHICONE 80 MG/1
80 TABLET, CHEWABLE ORAL ONCE
Refills: 0 | Status: COMPLETED | OUTPATIENT
Start: 2024-10-29 | End: 2024-10-29

## 2024-10-29 RX ORDER — ERYTHROPOIETIN 10000 [IU]/ML
10000 INJECTION, SOLUTION INTRAVENOUS; SUBCUTANEOUS
Refills: 0 | Status: DISCONTINUED | OUTPATIENT
Start: 2024-10-29 | End: 2024-11-06

## 2024-10-29 RX ORDER — LINAGLIPTIN 5 MG/1
5 TABLET, FILM COATED ORAL DAILY
Refills: 0 | Status: DISCONTINUED | OUTPATIENT
Start: 2024-10-30 | End: 2024-11-06

## 2024-10-29 RX ADMIN — HYDRALAZINE HYDROCHLORIDE 100 MILLIGRAM(S): 50 TABLET, FILM COATED ORAL at 21:58

## 2024-10-29 RX ADMIN — Medication 5 MILLILITER(S): at 11:30

## 2024-10-29 RX ADMIN — Medication 325 MILLIGRAM(S): at 11:31

## 2024-10-29 RX ADMIN — Medication 180 MILLIGRAM(S): at 05:47

## 2024-10-29 RX ADMIN — CALCIUM ACETATE 1334 MILLIGRAM(S): 667 CAPSULE ORAL at 11:30

## 2024-10-29 RX ADMIN — EZETIMIBE 10 MILLIGRAM(S): 10 TABLET ORAL at 11:31

## 2024-10-29 RX ADMIN — SIMETHICONE 80 MILLIGRAM(S): 80 TABLET, CHEWABLE ORAL at 21:58

## 2024-10-29 RX ADMIN — APIXABAN 2.5 MILLIGRAM(S): 5 TABLET, FILM COATED ORAL at 17:31

## 2024-10-29 RX ADMIN — HYDRALAZINE HYDROCHLORIDE 100 MILLIGRAM(S): 50 TABLET, FILM COATED ORAL at 05:47

## 2024-10-29 RX ADMIN — Medication 600 MILLIGRAM(S): at 05:47

## 2024-10-29 RX ADMIN — LINAGLIPTIN 5 MILLIGRAM(S): 5 TABLET, FILM COATED ORAL at 11:45

## 2024-10-29 RX ADMIN — HYDRALAZINE HYDROCHLORIDE 100 MILLIGRAM(S): 50 TABLET, FILM COATED ORAL at 15:51

## 2024-10-29 RX ADMIN — Medication 2: at 12:50

## 2024-10-29 RX ADMIN — PANTOPRAZOLE SODIUM 40 MILLIGRAM(S): 40 TABLET, DELAYED RELEASE ORAL at 05:47

## 2024-10-29 RX ADMIN — CHLORHEXIDINE GLUCONATE 1 APPLICATION(S): 40 SOLUTION TOPICAL at 11:45

## 2024-10-29 RX ADMIN — CALCIUM ACETATE 1334 MILLIGRAM(S): 667 CAPSULE ORAL at 09:50

## 2024-10-29 RX ADMIN — CALCIUM ACETATE 1334 MILLIGRAM(S): 667 CAPSULE ORAL at 17:31

## 2024-10-29 RX ADMIN — Medication 1: at 17:35

## 2024-10-29 RX ADMIN — Medication 600 MILLIGRAM(S): at 17:31

## 2024-10-29 RX ADMIN — APIXABAN 2.5 MILLIGRAM(S): 5 TABLET, FILM COATED ORAL at 05:47

## 2024-10-29 NOTE — PROGRESS NOTE ADULT - ASSESSMENT
BURKE ABARCA is a 79y year old Female with PMH of HFpEF, Chronic Kidney Disease 4, DVT (on eliquis) Hypertension, Hyperlipidemia, Type 2 Diabetes Mellitus, Anemia, OA who presents to the ER complaining of shortness of breath x2 days. Admitted for acute on chronic dCHF.     #Shortness of breath - likely 2/2 HFpEF exacerbation, acute on chronic diastolic CHF  #Anemia, likely chronic disease   - admit to medicine   - CXR with Interstitial edema with mild congestive heart failure  - remote tele, continuous O2  - s/p lasix 80mg ivp in ER, was on bumex 2mg bid since August per patient.   - s/p IV lasix 40mg   - TTE 10/20/24: LVEF 63%, grade 2 diastolic dysfunction  - proBNP 5495 increased from 3792 on 10/19  - s/p 1 U pRBC 10/20/24, repeat hgb 7.1  - s/p 1 U pRBC 10/22/24, repeat hgb 7.8  - iron studies: low transferrin, high ferritin  - hgb 7.6 today  - transfuse if <7  - keep type and screen active  - continue weekly epoietin  - nephro recs appreciated: hold diuretics, no NSAIDs, c/w epoetin  - cardio recs appreciated: gdmt contraindicated 2/2 CKD, hold diuretics  - change oxygen to humidified O2 due to increased congestion and bloody mucus per pt   - pt up-titrated on oxygen due to increased SOB  - pulm consult recs appreciated  - s/p 3 sessions of HD  - currently not on oxygen, VSS, O2 sat at 93% on RA    #Acute Kidney Injury on Chronic Kidney Disease 4  - uremia improving  - s/p 3 HD sessions, resume dialysis on Wednesday  - Cr 2.42, BUN 58, GFR 20 today  - nephro recommendations appreciated- plan for tunneled dialysis catheter this week    #Nausea/vomiting, likely 2/2 to HD, fluid changes- improved  -pt noted to have episode of nausea/vomiting 10/27/24  -zofran prn    #Hyponatremia  - Na 133  - monitor    #Hypocalcemia, improved  - Ca 8.5  - monitor    #Hyperphosphatemia, resolved  - phos 3.0  - improving with dialysis and phoslo  - monitor     #Hypertension  - continue home diltiazem, hydralazine, labetalol    #Hyperlipidemia  - continue home crestor, ezetemibe    #Type II Diabetes Mellitus  - hold home meds  - FS and DANGELO  - maintain blood glucose 100-180 while hospitalized   - f/u HbA1c  - noted to have elevated blood sugars on admission  - per diabetic educator recs, pt would benefit from STAT dose of linagliptin 5mg, can be started on daily dose tomorrow  - linagliptin 5mg x1 ordered for today  - trend blood sugars    #history of DVT  - per patient she had RUE DVT while at [facility]  - has been on eliquis 2.5mg bid for nearly 3 months   - c/w eliquis 2.5mg    #DVT ppx  - Eliquis    #GOC  -full code     #Diet  -DASH/TLC, consistent carbs    Dispo: pt medically active, possible FELICIANO when cleared    *Case discussed with Dr. Dowling.     BURKE ABARCA is a 79y year old Female with PMH of HFpEF, Chronic Kidney Disease 4, DVT (on eliquis) Hypertension, Hyperlipidemia, Type 2 Diabetes Mellitus, Anemia, OA who presents to the ER complaining of shortness of breath x2 days. Admitted for acute on chronic dCHF.     #Shortness of breath - likely 2/2 HFpEF exacerbation, acute on chronic diastolic CHF  #Anemia, likely chronic disease   - admit to medicine   - CXR with Interstitial edema with mild congestive heart failure  - remote tele, continuous O2  - s/p lasix 80mg ivp in ER, was on bumex 2mg bid since August per patient.   - s/p IV lasix 40mg   - TTE 10/20/24: LVEF 63%, grade 2 diastolic dysfunction  - proBNP 5495 increased from 3792 on 10/19  - s/p 1 U pRBC 10/20/24, repeat hgb 7.1  - s/p 1 U pRBC 10/22/24, repeat hgb 7.8  - iron studies: low transferrin, high ferritin  - hgb 7.6 today  - transfuse if <7  - keep type and screen active  - continue weekly epoietin  - nephro recs appreciated: hold diuretics, no NSAIDs, c/w epoetin  - cardio recs appreciated: gdmt contraindicated 2/2 CKD, hold diuretics  - change oxygen to humidified O2 due to increased congestion and bloody mucus per pt   - pt was up-titrated on oxygen due to increased SOB  - pulm consult recs appreciated: pleural effusion too small to drain at this time, improved CXR, perm cath required, continue to taper off O2   - s/p 3 sessions of HD, next session due for Wednesday  - currently not on oxygen, VSS, O2 sat at 93% on RA    #Acute Kidney Injury on Chronic Kidney Disease 4  - uremia improving  - s/p 3 HD sessions, resume dialysis on Wednesday  - Cr 2.42, BUN 58, GFR 20 today  - nephro recommendations appreciated- plan for tunneled dialysis catheter this week, reached out to Dr. Sepulveda to see if this can be done in house as requested by Dr. Storey    #Nausea/vomiting, likely 2/2 to HD, fluid changes- improved  -pt noted to have episode of nausea/vomiting 10/27/24  -zofran prn    #Hyponatremia  - Na 133  - monitor    #Hypocalcemia, improved  - Ca 8.5  - monitor    #Hyperphosphatemia, resolved  - phos 3.0  - improving with dialysis and phoslo  - monitor     #Hypertension  - continue home diltiazem, hydralazine, labetalol    #Hyperlipidemia  - continue home crestor, ezetemibe    #Type II Diabetes Mellitus  - hold home meds  - FS and DANGELO  - maintain blood glucose 100-180 while hospitalized   - f/u HbA1c  - noted to have elevated blood sugars on admission  - per diabetic educator recs, pt would benefit from STAT dose of linagliptin 5mg, can be started on daily dose tomorrow  - linagliptin 5mg x1 ordered for today  - trend blood sugars    #history of DVT  - per patient she had RUE DVT while at [facility]  - has been on eliquis 2.5mg bid for nearly 3 months   - c/w eliquis 2.5mg    #DVT ppx  - Eliquis    #GOC  -full code     #Diet  -DASH/TLC, consistent carbs    Dispo: pt medically active, possible FELICIANO when cleared    *Case discussed with Dr. Dowling.

## 2024-10-29 NOTE — PROGRESS NOTE ADULT - SUBJECTIVE AND OBJECTIVE BOX
Follow-up Pulmonary Progress Note  Chief Complaint : Acute on chronic systolic congestive heart failure    patient seen and examined  in good spirits, smiling  on room air  no cp, palp, n/v, cough, secretions.         Allergies :ACE inhibitors (Angioedema)  statins (Muscle Pain)  predniSONE (Other)      PAST MEDICAL & SURGICAL HISTORY:  Hypertension    Diabetes  type 2    Thyroid disease    Anemia    Hypercholesteremia    Myelodysplasia (myelodysplastic syndrome)    Thyromegaly  s/p partial thyroidectomy long time ago    Other giant cell arteritis    Arthritis    Kidney insufficiency    PVD (peripheral vascular disease)    Mass of right knee    Urinary incontinence    H/O partial thyroidectomy    H/O: hysterectomy    History of cataract surgery    History of vascular surgery        Medications:  MEDICATIONS  (STANDING):  apixaban 2.5 milliGRAM(s) Oral two times a day  Biotene Dry Mouth Oral Rinse 5 milliLiter(s) Swish and Spit daily  calcium acetate 1334 milliGRAM(s) Oral three times a day with meals  chlorhexidine 2% Cloths 1 Application(s) Topical daily  dextrose 5%. 1000 milliLiter(s) (50 mL/Hr) IV Continuous <Continuous>  dextrose 5%. 1000 milliLiter(s) (100 mL/Hr) IV Continuous <Continuous>  dextrose 50% Injectable 12.5 Gram(s) IV Push once  dextrose 50% Injectable 25 Gram(s) IV Push once  dextrose 50% Injectable 25 Gram(s) IV Push once  diltiazem    milliGRAM(s) Oral daily  ezetimibe 10 milliGRAM(s) Oral daily  ferrous    sulfate 325 milliGRAM(s) Oral daily  glucagon  Injectable 1 milliGRAM(s) IntraMuscular once  hydrALAZINE 100 milliGRAM(s) Oral three times a day  influenza  Vaccine (HIGH DOSE) 0.5 milliLiter(s) IntraMuscular once  insulin lispro (ADMELOG) corrective regimen sliding scale   SubCutaneous at bedtime  insulin lispro (ADMELOG) corrective regimen sliding scale   SubCutaneous three times a day before meals  labetalol 600 milliGRAM(s) Oral two times a day  pantoprazole    Tablet 40 milliGRAM(s) Oral before breakfast  rosuvastatin 5 milliGRAM(s) Oral at bedtime    MEDICATIONS  (PRN):  acetaminophen     Tablet .. 650 milliGRAM(s) Oral every 6 hours PRN Temp greater or equal to 38C (100.4F), Mild Pain (1 - 3)  albuterol    90 MICROgram(s) HFA Inhaler 2 Puff(s) Inhalation every 6 hours PRN Shortness of Breath  dextrose Oral Gel 15 Gram(s) Oral once PRN Blood Glucose LESS THAN 70 milliGRAM(s)/deciliter  melatonin 3 milliGRAM(s) Oral at bedtime PRN Insomnia  ondansetron Injectable 4 milliGRAM(s) IV Push every 8 hours PRN Nausea and/or Vomiting  sodium chloride 0.65% Nasal 1 Spray(s) Both Nostrils daily PRN Nasal Congestion  sodium chloride 0.9% lock flush 10 milliLiter(s) IV Push every 1 hour PRN Pre/post blood products, medications, blood draw, and to maintain line patency      Antibiotics History      Heme Medications   apixaban 2.5 milliGRAM(s) Oral two times a day, 10-19-24 @ 15:47      GI Medications  pantoprazole    Tablet 40 milliGRAM(s) Oral before breakfast, 10-28-24 @ 00:00, Routine        LABS:                        7.9    10.02 )-----------( 196      ( 29 Oct 2024 06:21 )             25.5     10-29    133[L]  |  98  |  58[H]  ----------------------------<  128[H]  4.0   |  32[H]  |  2.42[H]    Ca    8.5      29 Oct 2024 06:21  Phos  3.0     10-29  Mg     2.2     10-29    TPro  6.9  /  Alb  3.1[L]  /  TBili  0.6  /  DBili  x   /  AST  11  /  ALT  12  /  AlkPhos  76  10-29    CHUNG -- 10-22 @ 07:07  Anti SS-1 --  Anti SS-2 --  Anti RNP --  RF -- 10-22 @ 07:07    Atypical ANCA Indeterminate Method interference due to CHUNG Fluorescence 10-22 @ 07:07  c-ANCA titer -- 10-22 @ 07:07  c-ANCA Negative 10-22 @ 07:07  p-ANCA Negative 10-22 @ 07:07         VITALS:  T(C): 37.1 (10-29-24 @ 12:42), Max: 37.1 (10-29-24 @ 12:42)  T(F): 98.7 (10-29-24 @ 12:42), Max: 98.7 (10-29-24 @ 12:42)  HR: 67 (10-29-24 @ 12:42) (67 - 74)  BP: 143/64 (10-29-24 @ 12:42) (135/67 - 154/67)  BP(mean): --  ABP: --  ABP(mean): --  RR: 14 (10-29-24 @ 12:42) (14 - 18)  SpO2: 94% (10-29-24 @ 12:42) (92% - 94%)  CVP(mm Hg): --  CVP(cm H2O): --    Ins and Outs     10-28-24 @ 07:01  -  10-29-24 @ 07:00  --------------------------------------------------------  IN: 480 mL / OUT: 1750 mL / NET: -1270 mL    10-29-24 @ 07:01  -  10-29-24 @ 14:37  --------------------------------------------------------  IN: 480 mL / OUT: 0 mL / NET: 480 mL                I&O's Detail    28 Oct 2024 07:01  -  29 Oct 2024 07:00  --------------------------------------------------------  IN:    Oral Fluid: 480 mL  Total IN: 480 mL    OUT:    Other (mL): 1500 mL    Voided (mL): 250 mL  Total OUT: 1750 mL    Total NET: -1270 mL      29 Oct 2024 07:01  -  29 Oct 2024 14:37  --------------------------------------------------------  IN:    Oral Fluid: 480 mL  Total IN: 480 mL    OUT:  Total OUT: 0 mL    Total NET: 480 mL

## 2024-10-29 NOTE — PROGRESS NOTE ADULT - ATTENDING COMMENTS
79y year old Female with PMH of HFpEF, Chronic Kidney Disease 4, DVT (on eliquis) Hypertension, Hyperlipidemia, Type 2 Diabetes Mellitus, Anemia, OA who presents to the ER complaining of shortness of breath x2 days. Admitted for acute on chronic dCHF. Plan: cont dialysis, monitor volume status, central tunnel likely for IR placement, monitor clinical course 79y year old Female with PMH of HFpEF, Chronic Kidney Disease 4, DVT (on eliquis) Hypertension, Hyperlipidemia, Type 2 Diabetes Mellitus, Anemia, OA who presents to the ER complaining of shortness of breath x2 days. Admitted for acute on chronic dCHF. Plan: cont dialysis, monitor volume status, cath to be placed by gen surg dr zaragoza, monitor clinical course

## 2024-10-29 NOTE — CHART NOTE - NSCHARTNOTEFT_GEN_A_CORE
Spoke to Dr. Storey regarding dialysis tunnel catheter as per nephrology recommendations. She recommended to reach out to Dr. Herve Sepulveda to see if placement could be done here. Reached out to Dr. Sepulveda via teams, awaiting reply.

## 2024-10-29 NOTE — PROGRESS NOTE ADULT - SUBJECTIVE AND OBJECTIVE BOX
Comfortable on NC O2     Vital Signs Last 24 Hrs  T(C): 36.7 (10-29-24 @ 20:21), Max: 37.1 (10-29-24 @ 12:42)  T(F): 98 (10-29-24 @ 20:21), Max: 98.7 (10-29-24 @ 12:42)  HR: 70 (10-29-24 @ 20:21) (67 - 70)  BP: 104/60 (10-29-24 @ 20:21) (104/60 - 143/64)  RR: 18 (10-29-24 @ 20:21) (14 - 18)  SpO2: 95% (10-29-24 @ 20:21) (93% - 95%)    I&O's Detail    28 Oct 2024 07:01  -  29 Oct 2024 07:00  --------------------------------------------------------  IN:    Oral Fluid: 480 mL  Total IN: 480 mL    OUT:    Other (mL): 1500 mL    Voided (mL): 250 mL  Total OUT: 1750 mL    29 Oct 2024 07:01  -  29 Oct 2024 21:27  --------------------------------------------------------  IN:    Oral Fluid: 480 mL  Total IN: 480 mL    OUT:  Total OUT: 0 mL    s1s2  b/l air entry  soft, ND  tr edema                                                                  7.9    10.02 )-----------( 196      ( 29 Oct 2024 06:21 )             25.5     29 Oct 2024 06:21    133    |  98     |  58     ----------------------------<  128    4.0     |  32     |  2.42     Ca    8.5        29 Oct 2024 06:21  Phos  3.0       29 Oct 2024 06:21  Mg     2.2       29 Oct 2024 06:21    TPro  6.9    /  Alb  3.1    /  TBili  0.6    /  DBili  x      /  AST  11     /  ALT  12     /  AlkPhos  76     29 Oct 2024 06:21    LIVER FUNCTIONS - ( 29 Oct 2024 06:21 )  Alb: 3.1 g/dL / Pro: 6.9 g/dL / ALK PHOS: 76 U/L / ALT: 12 U/L / AST: 11 U/L / GGT: x           acetaminophen     Tablet .. 650 milliGRAM(s) Oral every 6 hours PRN  albuterol    90 MICROgram(s) HFA Inhaler 2 Puff(s) Inhalation every 6 hours PRN  apixaban 2.5 milliGRAM(s) Oral two times a day  Biotene Dry Mouth Oral Rinse 5 milliLiter(s) Swish and Spit daily  calcium acetate 1334 milliGRAM(s) Oral three times a day with meals  chlorhexidine 2% Cloths 1 Application(s) Topical daily  dextrose 5%. 1000 milliLiter(s) IV Continuous <Continuous>  dextrose 5%. 1000 milliLiter(s) IV Continuous <Continuous>  dextrose 50% Injectable 12.5 Gram(s) IV Push once  dextrose 50% Injectable 25 Gram(s) IV Push once  dextrose 50% Injectable 25 Gram(s) IV Push once  dextrose Oral Gel 15 Gram(s) Oral once PRN  diltiazem    milliGRAM(s) Oral daily  ezetimibe 10 milliGRAM(s) Oral daily  ferrous    sulfate 325 milliGRAM(s) Oral daily  glucagon  Injectable 1 milliGRAM(s) IntraMuscular once  hydrALAZINE 100 milliGRAM(s) Oral three times a day  influenza  Vaccine (HIGH DOSE) 0.5 milliLiter(s) IntraMuscular once  insulin lispro (ADMELOG) corrective regimen sliding scale   SubCutaneous three times a day before meals  insulin lispro (ADMELOG) corrective regimen sliding scale   SubCutaneous at bedtime  labetalol 600 milliGRAM(s) Oral two times a day  melatonin 3 milliGRAM(s) Oral at bedtime PRN  ondansetron Injectable 4 milliGRAM(s) IV Push every 8 hours PRN  pantoprazole    Tablet 40 milliGRAM(s) Oral before breakfast  rosuvastatin 5 milliGRAM(s) Oral at bedtime  simethicone 80 milliGRAM(s) Chew once  sodium chloride 0.65% Nasal 1 Spray(s) Both Nostrils daily PRN  sodium chloride 0.9% lock flush 10 milliLiter(s) IV Push every 1 hour PRN    A/P:    DM, HTN, CKD 4 (Cr 2.13 - 7/2/24, Cr 2.35 - 10/7/24)  Adm w/SOB iso severe anemia, some PVC on CXR  S/p 2u PRBCs on this adm  Started on diuretics   Cardio-renal/hemodynamic ZORAIDA/CKD 4  UA w/pr 30, bland  Imaging w/o hydro   Diuretics held since 10/22  Serologies are negative   Renal diet  Phoslo for high Phos  No improvement in renal fx  Started on HD 10/25 via temp HD cath   HD in am as ordered  Epoetin w/HD  Pls arrange for perm cath  Pt will need arrangements for FELICIANO w/HD     [phone]

## 2024-10-29 NOTE — PROGRESS NOTE ADULT - ASSESSMENT
Physical Examination:  GENERAL:               Alert, Oriented, no acute distress.    HEENT:                   No JVD, Moist MM, enlarged neck circumference   PULM:                     Bilateral air entry, Clear to auscultation bilaterally dimminished, no significant sputum production, no Rales, No Rhonchi, No Wheezing  CVS:                         S1, S2,  No Murmur  ABD:                        Soft, nondistended, nontender, normoactive bowel sounds,   EXT:                         no  edema, nontender, No Cyanosis or Clubbing    NEURO:                  Alert, oriented, interactive, nonfocal, follows commands  PSYC:                      Calm, + Insight.         Assessment  - Acute pulmonary Edema due to worsening renal function/non cardiogenic causes   - B/L Pl effusions and Dyspnea due to above   - Diastolic CHF - Chronic   - Underlying Hypertension, DM2   - Obesity    Plan  from pulmonary perspective pl effusion too small to drain via thoracentesis  CXR today significantly improved  no acute pulmonary intervention.  Will need perm cath    taper n/c to off as tolerated ;  Keep sat > 92%    PT/OOB as tolerated   Rest of care as per primary team.

## 2024-10-29 NOTE — PROGRESS NOTE ADULT - SUBJECTIVE AND OBJECTIVE BOX
Patient is a 79y old  Female who presents with a chief complaint of shortness of breath (28 Oct 2024 15:36)      INTERVAL HPI/ OVERNIGHT EVENTS: Patient seen and examined this morning. Not currently on nasal cannula. Reports that she is feeling better this morning. Denies shortness of breath. No other acute complaints.       MEDICATIONS  (STANDING):  apixaban 2.5 milliGRAM(s) Oral two times a day  Biotene Dry Mouth Oral Rinse 5 milliLiter(s) Swish and Spit daily  calcium acetate 1334 milliGRAM(s) Oral three times a day with meals  chlorhexidine 2% Cloths 1 Application(s) Topical daily  dextrose 5%. 1000 milliLiter(s) (100 mL/Hr) IV Continuous <Continuous>  dextrose 5%. 1000 milliLiter(s) (50 mL/Hr) IV Continuous <Continuous>  dextrose 50% Injectable 12.5 Gram(s) IV Push once  dextrose 50% Injectable 25 Gram(s) IV Push once  dextrose 50% Injectable 25 Gram(s) IV Push once  diltiazem    milliGRAM(s) Oral daily  ezetimibe 10 milliGRAM(s) Oral daily  ferrous    sulfate 325 milliGRAM(s) Oral daily  glucagon  Injectable 1 milliGRAM(s) IntraMuscular once  hydrALAZINE 100 milliGRAM(s) Oral three times a day  influenza  Vaccine (HIGH DOSE) 0.5 milliLiter(s) IntraMuscular once  insulin lispro (ADMELOG) corrective regimen sliding scale   SubCutaneous at bedtime  insulin lispro (ADMELOG) corrective regimen sliding scale   SubCutaneous three times a day before meals  labetalol 600 milliGRAM(s) Oral two times a day  linagliptin 5 milliGRAM(s) Oral once  pantoprazole    Tablet 40 milliGRAM(s) Oral before breakfast  rosuvastatin 5 milliGRAM(s) Oral at bedtime    MEDICATIONS  (PRN):  acetaminophen     Tablet .. 650 milliGRAM(s) Oral every 6 hours PRN Temp greater or equal to 38C (100.4F), Mild Pain (1 - 3)  albuterol    90 MICROgram(s) HFA Inhaler 2 Puff(s) Inhalation every 6 hours PRN Shortness of Breath  dextrose Oral Gel 15 Gram(s) Oral once PRN Blood Glucose LESS THAN 70 milliGRAM(s)/deciliter  melatonin 3 milliGRAM(s) Oral at bedtime PRN Insomnia  ondansetron Injectable 4 milliGRAM(s) IV Push every 8 hours PRN Nausea and/or Vomiting  sodium chloride 0.65% Nasal 1 Spray(s) Both Nostrils daily PRN Nasal Congestion  sodium chloride 0.9% lock flush 10 milliLiter(s) IV Push every 1 hour PRN Pre/post blood products, medications, blood draw, and to maintain line patency        Allergies    ACE inhibitors (Angioedema)  statins (Muscle Pain)    Intolerances    predniSONE (Other)      REVIEW OF SYSTEMS:  CONSTITUTIONAL: No fever or chills  HEENT:  No headache, no sore throat  RESPIRATORY: No cough, wheezing, or shortness of breath  CARDIOVASCULAR: No chest pain, palpitations  GASTROINTESTINAL: No abd pain, nausea, vomiting, or diarrhea  GENITOURINARY: No dysuria, frequency, or hematuria  NEUROLOGICAL: no focal weakness or dizziness  MUSCULOSKELETAL: no myalgias     Vital Signs Last 24 Hrs  T(C): 36.9 (29 Oct 2024 05:03), Max: 36.9 (29 Oct 2024 05:03)  T(F): 98.4 (29 Oct 2024 05:03), Max: 98.4 (29 Oct 2024 05:03)  HR: 69 (29 Oct 2024 05:03) (67 - 76)  BP: 135/67 (29 Oct 2024 05:03) (135/66 - 154/67)  BP(mean): --  RR: 17 (29 Oct 2024 05:03) (16 - 19)  SpO2: 93% (29 Oct 2024 05:03) (92% - 99%)    Parameters below as of 29 Oct 2024 05:03  Patient On (Oxygen Delivery Method): room air      I&O's Summary    28 Oct 2024 07:01  -  28 Oct 2024 15:46  --------------------------------------------------------  IN: 480 mL / OUT: 1500 mL / NET: -1020 mL      BMI (kg/m2): 35.2 (10-25-24 @ 13:49)    PHYSICAL EXAM:  General: NAD, morbidly obese female, receiving dialysis via Left IJ  Respiratory: B/L crackles throughout, +improved expiratory wheezes  CV: RRR, +S1/S2, +systolic murmur  Abdominal: Soft, Non tender, Nondistended  Extremities: +chronic skin changes with b/l hyperpigmentation, no ulcers noted. No edema, 2+ peripheral pulses  NERVOUS SYSTEM: answers questions and follows commands appropriately, A&Ox3, grossly moves all extremities   PSYCH: Appropriate affect, Alert & Awake; Good judgement      LABS: Personally reviewed                        7.9    10.02 )-----------( 196      ( 29 Oct 2024 06:21 )             25.5     10-29    133[L]  |  98  |  58[H]  ----------------------------<  128[H]  4.0   |  32[H]  |  2.42[H]    Ca    8.5      29 Oct 2024 06:21  Phos  3.0     10-29  Mg     2.2     10-29    TPro  6.9  /  Alb  3.1[L]  /  TBili  0.6  /  DBili  x   /  AST  11  /  ALT  12  /  AlkPhos  76  10-29    CAPILLARY BLOOD GLUCOSE  POCT Blood Glucose.: 137 mg/dL (29 Oct 2024 08:34)    Urinalysis Basic - ( 28 Oct 2024 08:24 )    Color: x / Appearance: x / SG: x / pH: x  Gluc: 107 mg/dL / Ketone: x  / Bili: x / Urobili: x   Blood: x / Protein: x / Nitrite: x   Leuk Esterase: x / RBC: x / WBC x   Sq Epi: x / Non Sq Epi: x / Bacteria: x    RADIOLOGY & ADDITIONAL TESTS: Personally reviewed.     Consultant(s) Notes Reviewed:  [x] YES  [ ] NO   Discussed with JOSE/PATTIE, RN

## 2024-10-29 NOTE — PROGRESS NOTE ADULT - ASSESSMENT
79y year old Female with PMH of HFpEF, Chronic Kidney Disease 4, DVT (on eliquis) Hypertension, Hyperlipidemia, Type 2 Diabetes Mellitus, Anemia, OA who presents to the ER complaining of shortness of breath x2 days. Admitted for further evaluation of CHF exacerbation. Palliative care c/s for GOC.    Debility  - PPS 40-50%  - amb with walker at home; REC PT eval    Acute on chronic HFpEF exacerbation   Acute Kidney Injury on Chronic Kidney Disease 4, now on HD  Anemia of chronic disease  - TTE 10/20/24: LVEF 63%, grade 2 diastolic dysfunction  - s/p 1 U pRBC 10/20/24, s/p 1 U pRBC 10/22/24; continue to monitor Hgb  - nephro following- pt now on HD  - cardio following- gdmt contraindicated 2/2 CKD, hold diuretics; continue to monitor resp status, titrate supplemental O2 down as tolerated     Advanced care planning  - HCP: granddaughter Blanca Ferreira   - Pt is FULL CODE    Encounter for Palliative Care  - Spoke with pt at bedside who is aware she may need HD initiated. Pt's father and brother were on HD so she is familiar with the process. Her main concern is to "breath better". Supportive care given.  - Will continue to follow for supportive care and ongoing GOC discussions.    79y year old Female with PMH of HFpEF, Chronic Kidney Disease 4, DVT (on eliquis) Hypertension, Hyperlipidemia, Type 2 Diabetes Mellitus, Anemia, OA who presents to the ER complaining of shortness of breath x2 days. Admitted for further evaluation of CHF exacerbation. Palliative care c/s for GOC.    Debility  - PPS 40-50%  - amb with walker at home; REC PT eval. Pt okay with going to [facility] if needed    Acute on chronic HFpEF exacerbation, improved  Acute Kidney Injury on Chronic Kidney Disease 4, now on HD  Anemia of chronic disease, stable  - TTE 10/20/24: LVEF 63%, grade 2 diastolic dysfunction  - s/p 1 U pRBC 10/20/24, s/p 1 U pRBC 10/22/24; continue to monitor Hgb  - nephro following- pt now on HD  - cardio following- gdmt contraindicated 2/2 CKD, hold diuretics; continue to monitor resp status, titrate supplemental O2 down as tolerated     Advanced care planning  - HCP: granddaughter Blanca Ferreira   - Pt is FULL CODE    Encounter for Palliative Care  - Spoke with pt at bedside reports tolerating HD was better than what she expected. States she slept during session the other day and watched a movie yesterday. Discussed possibility of going to [facility] s/p hospitalization which she was okay with if needed. She also reports her family is uptodate on her clinical status. Supportive care given.  - Dispo planning per primary medical team. Palliative care team will SIGN OFF at this time; please reconsult as needed.

## 2024-10-29 NOTE — PROGRESS NOTE ADULT - SUBJECTIVE AND OBJECTIVE BOX
Indication for Geriatrics and Palliative Care Services/INTERVAL HPI: GOC    OVERNIGHT EVENTS: no acute events noted  SUBJECTIVE AND OBJECTIVE: Pt seen and examined at bedside this morning.      Allergies  ACE inhibitors (Angioedema)  statins (Muscle Pain)    Intolerances    predniSONE (Other)  MEDICATIONS  (STANDING):  apixaban 2.5 milliGRAM(s) Oral two times a day  Biotene Dry Mouth Oral Rinse 5 milliLiter(s) Swish and Spit daily  calcium acetate 1334 milliGRAM(s) Oral three times a day with meals  chlorhexidine 2% Cloths 1 Application(s) Topical daily  dextrose 5%. 1000 milliLiter(s) (50 mL/Hr) IV Continuous <Continuous>  dextrose 5%. 1000 milliLiter(s) (100 mL/Hr) IV Continuous <Continuous>  dextrose 50% Injectable 12.5 Gram(s) IV Push once  dextrose 50% Injectable 25 Gram(s) IV Push once  dextrose 50% Injectable 25 Gram(s) IV Push once  diltiazem    milliGRAM(s) Oral daily  ezetimibe 10 milliGRAM(s) Oral daily  ferrous    sulfate 325 milliGRAM(s) Oral daily  glucagon  Injectable 1 milliGRAM(s) IntraMuscular once  hydrALAZINE 100 milliGRAM(s) Oral three times a day  influenza  Vaccine (HIGH DOSE) 0.5 milliLiter(s) IntraMuscular once  insulin lispro (ADMELOG) corrective regimen sliding scale   SubCutaneous three times a day before meals  insulin lispro (ADMELOG) corrective regimen sliding scale   SubCutaneous at bedtime  labetalol 600 milliGRAM(s) Oral two times a day  pantoprazole    Tablet 40 milliGRAM(s) Oral before breakfast  rosuvastatin 5 milliGRAM(s) Oral at bedtime    MEDICATIONS  (PRN):  acetaminophen     Tablet .. 650 milliGRAM(s) Oral every 6 hours PRN Temp greater or equal to 38C (100.4F), Mild Pain (1 - 3)  albuterol    90 MICROgram(s) HFA Inhaler 2 Puff(s) Inhalation every 6 hours PRN Shortness of Breath  dextrose Oral Gel 15 Gram(s) Oral once PRN Blood Glucose LESS THAN 70 milliGRAM(s)/deciliter  melatonin 3 milliGRAM(s) Oral at bedtime PRN Insomnia  ondansetron Injectable 4 milliGRAM(s) IV Push every 8 hours PRN Nausea and/or Vomiting  sodium chloride 0.65% Nasal 1 Spray(s) Both Nostrils daily PRN Nasal Congestion  sodium chloride 0.9% lock flush 10 milliLiter(s) IV Push every 1 hour PRN Pre/post blood products, medications, blood draw, and to maintain line patency      ITEMS UNCHECKED ARE NOT PRESENT    PRESENT SYMPTOMS:     Pain:  [ ]yes [ ]no  QOL impact -   Location -                    Aggravating factors -  Quality -  Radiation -  Timing-  Severity (0-10 scale):  Minimal acceptable level (0-10 scale):     Dyspnea:                        Mild   Anxiety:                             none  Depressed:               does not report  Fatigue:                      mild  Nausea:                         none  Loss of appetite:              none  Constipation:                   none      Other Symptoms:  [x ]All other review of systems negative       Chaplaincy Referral:  Deferred   Palliative Performance Status Version 2:    50     %      http://npcrc.org/files/news/palliative_performance_scale_ppsv2.pdf    PHYSICAL EXAM:  Vital Signs Last 24 Hrs  T(C): 36.9 (29 Oct 2024 05:03), Max: 36.9 (29 Oct 2024 05:03)  T(F): 98.4 (29 Oct 2024 05:03), Max: 98.4 (29 Oct 2024 05:03)  HR: 69 (29 Oct 2024 05:03) (67 - 76)  BP: 135/67 (29 Oct 2024 05:03) (135/66 - 154/67)  BP(mean): --  RR: 17 (29 Oct 2024 05:03) (16 - 19)  SpO2: 93% (29 Oct 2024 05:03) (92% - 99%)    Parameters below as of 29 Oct 2024 05:03  Patient On (Oxygen Delivery Method): room air     I&O's Summary    28 Oct 2024 07:01  -  29 Oct 2024 07:00  --------------------------------------------------------  IN: 480 mL / OUT: 1750 mL / NET: -1270 mL       GENERAL: pt laying in bed in NAD  HEENT: NC/AT, mmm  PULMONARY: CTAB anteriorly, no wheezing  CARDIOVASCULAR:  RRR, no murmur  GASTROINTESTINAL: soft, nontender   Last BM: 10/27  MUSCULOSKELETAL:  +weakness, no pitting edema to BLE  Psych: A&Ox3, appropriate affect      LABS:                        7.9    10.02 )-----------( 196      ( 29 Oct 2024 06:21 )             25.5   10-29    133[L]  |  98  |  58[H]  ----------------------------<  128[H]  4.0   |  32[H]  |  2.42[H]    Ca    8.5      29 Oct 2024 06:21  Phos  3.0     10-29  Mg     2.2     10-29    TPro  6.9  /  Alb  3.1[L]  /  TBili  0.6  /  DBili  x   /  AST  11  /  ALT  12  /  AlkPhos  76  10-29      Urinalysis Basic - ( 29 Oct 2024 06:21 )    Color: x / Appearance: x / SG: x / pH: x  Gluc: 128 mg/dL / Ketone: x  / Bili: x / Urobili: x   Blood: x / Protein: x / Nitrite: x   Leuk Esterase: x / RBC: x / WBC x   Sq Epi: x / Non Sq Epi: x / Bacteria: x      RADIOLOGY & ADDITIONAL STUDIES:    Protein Calorie Malnutrition Present: [ ]mild [ ]moderate [ ]severe [ ]underweight [ ]morbid obesity  https://www.andeal.org/vault/2440/web/files/ONC/Table_Clinical%20Characteristics%20to%20Document%20Malnutrition-White%20JV%20et%20al%202012.pdf    Height (cm): 162.6 (10-25-24 @ 13:49), 162.6 (06-22-24 @ 12:09)  Weight (kg): 93 (10-25-24 @ 13:49), 94.3 (06-22-24 @ 12:09), 95.7 (05-06-24 @ 03:15)  BMI (kg/m2): 35.2 (10-25-24 @ 13:49), 35.7 (06-22-24 @ 12:09)    [ ]PPSV2 < or = 30%  [ ]significant weight loss [ ]poor nutritional intake [ ]anasarca[ ]Artificial Nutrition    Other REFERRALS:  [ ]Hospice  [ ]Child Life  [ ]Social Work  [ ]Case management [ ]Holistic Therapy  Indication for Geriatrics and Palliative Care Services/INTERVAL HPI: GOC    OVERNIGHT EVENTS: no acute events noted  SUBJECTIVE AND OBJECTIVE: Pt seen and examined at bedside this morning. She reports having nausea/vomiting the other s/p initiation of HD but reports feeling better this AM. Denies any pain, only notes lower abd discomfort likely 2/2 gas. Reports +flatus and BM yesterday.     Allergies  ACE inhibitors (Angioedema)  statins (Muscle Pain)    Intolerances    predniSONE (Other)  MEDICATIONS  (STANDING):  apixaban 2.5 milliGRAM(s) Oral two times a day  Biotene Dry Mouth Oral Rinse 5 milliLiter(s) Swish and Spit daily  calcium acetate 1334 milliGRAM(s) Oral three times a day with meals  chlorhexidine 2% Cloths 1 Application(s) Topical daily  dextrose 5%. 1000 milliLiter(s) (50 mL/Hr) IV Continuous <Continuous>  dextrose 5%. 1000 milliLiter(s) (100 mL/Hr) IV Continuous <Continuous>  dextrose 50% Injectable 12.5 Gram(s) IV Push once  dextrose 50% Injectable 25 Gram(s) IV Push once  dextrose 50% Injectable 25 Gram(s) IV Push once  diltiazem    milliGRAM(s) Oral daily  ezetimibe 10 milliGRAM(s) Oral daily  ferrous    sulfate 325 milliGRAM(s) Oral daily  glucagon  Injectable 1 milliGRAM(s) IntraMuscular once  hydrALAZINE 100 milliGRAM(s) Oral three times a day  influenza  Vaccine (HIGH DOSE) 0.5 milliLiter(s) IntraMuscular once  insulin lispro (ADMELOG) corrective regimen sliding scale   SubCutaneous three times a day before meals  insulin lispro (ADMELOG) corrective regimen sliding scale   SubCutaneous at bedtime  labetalol 600 milliGRAM(s) Oral two times a day  pantoprazole    Tablet 40 milliGRAM(s) Oral before breakfast  rosuvastatin 5 milliGRAM(s) Oral at bedtime    MEDICATIONS  (PRN):  acetaminophen     Tablet .. 650 milliGRAM(s) Oral every 6 hours PRN Temp greater or equal to 38C (100.4F), Mild Pain (1 - 3)  albuterol    90 MICROgram(s) HFA Inhaler 2 Puff(s) Inhalation every 6 hours PRN Shortness of Breath  dextrose Oral Gel 15 Gram(s) Oral once PRN Blood Glucose LESS THAN 70 milliGRAM(s)/deciliter  melatonin 3 milliGRAM(s) Oral at bedtime PRN Insomnia  ondansetron Injectable 4 milliGRAM(s) IV Push every 8 hours PRN Nausea and/or Vomiting  sodium chloride 0.65% Nasal 1 Spray(s) Both Nostrils daily PRN Nasal Congestion  sodium chloride 0.9% lock flush 10 milliLiter(s) IV Push every 1 hour PRN Pre/post blood products, medications, blood draw, and to maintain line patency      ITEMS UNCHECKED ARE NOT PRESENT    PRESENT SYMPTOMS:     Pain:  [ ]yes [ x]no  QOL impact -   Location -                    Aggravating factors -  Quality -  Radiation -  Timing-  Severity (0-10 scale):  Minimal acceptable level (0-10 scale):     Dyspnea:                        none  Anxiety:                             none  Depressed:               does not report  Fatigue:                      none  Nausea:                         none  Loss of appetite:              none  Constipation:                   none      Other Symptoms:  [x ]All other review of systems negative       Chaplaincy Referral:  Deferred   Palliative Performance Status Version 2:    50     %      http://npcrc.org/files/news/palliative_performance_scale_ppsv2.pdf    PHYSICAL EXAM:  Vital Signs Last 24 Hrs  T(C): 36.9 (29 Oct 2024 05:03), Max: 36.9 (29 Oct 2024 05:03)  T(F): 98.4 (29 Oct 2024 05:03), Max: 98.4 (29 Oct 2024 05:03)  HR: 69 (29 Oct 2024 05:03) (67 - 76)  BP: 135/67 (29 Oct 2024 05:03) (135/66 - 154/67)  BP(mean): --  RR: 17 (29 Oct 2024 05:03) (16 - 19)  SpO2: 93% (29 Oct 2024 05:03) (92% - 99%)    Parameters below as of 29 Oct 2024 05:03  Patient On (Oxygen Delivery Method): room air     I&O's Summary    28 Oct 2024 07:01  -  29 Oct 2024 07:00  --------------------------------------------------------  IN: 480 mL / OUT: 1750 mL / NET: -1270 mL       GENERAL: pt laying in bed in NAD  HEENT: NC/AT, mmm  PULMONARY: nonlabored breathing  CARDIOVASCULAR:  RRR, no murmur  GASTROINTESTINAL: soft, nontender   Last BM: 10/27  MUSCULOSKELETAL:  +weakness, no pitting edema to BLE  Psych: A&Ox3, appropriate affect      LABS:                        7.9    10.02 )-----------( 196      ( 29 Oct 2024 06:21 )             25.5   10-29    133[L]  |  98  |  58[H]  ----------------------------<  128[H]  4.0   |  32[H]  |  2.42[H]    Ca    8.5      29 Oct 2024 06:21  Phos  3.0     10-29  Mg     2.2     10-29    TPro  6.9  /  Alb  3.1[L]  /  TBili  0.6  /  DBili  x   /  AST  11  /  ALT  12  /  AlkPhos  76  10-29      Urinalysis Basic - ( 29 Oct 2024 06:21 )    Color: x / Appearance: x / SG: x / pH: x  Gluc: 128 mg/dL / Ketone: x  / Bili: x / Urobili: x   Blood: x / Protein: x / Nitrite: x   Leuk Esterase: x / RBC: x / WBC x   Sq Epi: x / Non Sq Epi: x / Bacteria: x      RADIOLOGY & ADDITIONAL STUDIES:    Protein Calorie Malnutrition Present: [ ]mild [ ]moderate [ ]severe [ ]underweight [ ]morbid obesity  https://www.andeal.org/vault/2440/web/files/ONC/Table_Clinical%20Characteristics%20to%20Document%20Malnutrition-White%20JV%20et%20al%202012.pdf    Height (cm): 162.6 (10-25-24 @ 13:49), 162.6 (06-22-24 @ 12:09)  Weight (kg): 93 (10-25-24 @ 13:49), 94.3 (06-22-24 @ 12:09), 95.7 (05-06-24 @ 03:15)  BMI (kg/m2): 35.2 (10-25-24 @ 13:49), 35.7 (06-22-24 @ 12:09)    [ ]PPSV2 < or = 30%  [ ]significant weight loss [ ]poor nutritional intake [ ]anasarca[ ]Artificial Nutrition    Other REFERRALS:  [ ]Hospice  [ ]Child Life  [ ]Social Work  [ ]Case management [ ]Holistic Therapy

## 2024-10-30 LAB
ALBUMIN SERPL ELPH-MCNC: 2.9 G/DL — LOW (ref 3.3–5)
ALP SERPL-CCNC: 62 U/L — SIGNIFICANT CHANGE UP (ref 40–120)
ALT FLD-CCNC: 18 U/L — SIGNIFICANT CHANGE UP (ref 10–45)
ANION GAP SERPL CALC-SCNC: 5 MMOL/L — SIGNIFICANT CHANGE UP (ref 5–17)
AST SERPL-CCNC: 13 U/L — SIGNIFICANT CHANGE UP (ref 10–40)
BILIRUB SERPL-MCNC: 0.5 MG/DL — SIGNIFICANT CHANGE UP (ref 0.2–1.2)
BLD GP AB SCN SERPL QL: SIGNIFICANT CHANGE UP
BUN SERPL-MCNC: 72 MG/DL — HIGH (ref 7–23)
CALCIUM SERPL-MCNC: 8.5 MG/DL — SIGNIFICANT CHANGE UP (ref 8.4–10.5)
CHLORIDE SERPL-SCNC: 98 MMOL/L — SIGNIFICANT CHANGE UP (ref 96–108)
CO2 SERPL-SCNC: 32 MMOL/L — HIGH (ref 22–31)
CREAT SERPL-MCNC: 2.82 MG/DL — HIGH (ref 0.5–1.3)
EGFR: 17 ML/MIN/1.73M2 — LOW
GLUCOSE BLDC GLUCOMTR-MCNC: 107 MG/DL — HIGH (ref 70–99)
GLUCOSE BLDC GLUCOMTR-MCNC: 121 MG/DL — HIGH (ref 70–99)
GLUCOSE BLDC GLUCOMTR-MCNC: 124 MG/DL — HIGH (ref 70–99)
GLUCOSE BLDC GLUCOMTR-MCNC: 147 MG/DL — HIGH (ref 70–99)
GLUCOSE SERPL-MCNC: 144 MG/DL — HIGH (ref 70–99)
HAV IGM SER-ACNC: SIGNIFICANT CHANGE UP
HBV CORE IGM SER-ACNC: SIGNIFICANT CHANGE UP
HBV SURFACE AB SER-ACNC: ABNORMAL
HBV SURFACE AG SER-ACNC: SIGNIFICANT CHANGE UP
HCT VFR BLD CALC: 23.8 % — LOW (ref 34.5–45)
HCV AB S/CO SERPL IA: 0.12 S/CO — SIGNIFICANT CHANGE UP (ref 0–0.99)
HCV AB SERPL-IMP: SIGNIFICANT CHANGE UP
HGB BLD-MCNC: 7.5 G/DL — LOW (ref 11.5–15.5)
MAGNESIUM SERPL-MCNC: 2.4 MG/DL — SIGNIFICANT CHANGE UP (ref 1.6–2.6)
MCHC RBC-ENTMCNC: 27.2 PG — SIGNIFICANT CHANGE UP (ref 27–34)
MCHC RBC-ENTMCNC: 31.5 G/DL — LOW (ref 32–36)
MCV RBC AUTO: 86.2 FL — SIGNIFICANT CHANGE UP (ref 80–100)
NRBC # BLD: 0 /100 WBCS — SIGNIFICANT CHANGE UP (ref 0–0)
PHOSPHATE SERPL-MCNC: 3.4 MG/DL — SIGNIFICANT CHANGE UP (ref 2.5–4.5)
PLATELET # BLD AUTO: 182 K/UL — SIGNIFICANT CHANGE UP (ref 150–400)
POTASSIUM SERPL-MCNC: 4.3 MMOL/L — SIGNIFICANT CHANGE UP (ref 3.5–5.3)
POTASSIUM SERPL-SCNC: 4.3 MMOL/L — SIGNIFICANT CHANGE UP (ref 3.5–5.3)
PROT SERPL-MCNC: 6.6 G/DL — SIGNIFICANT CHANGE UP (ref 6–8.3)
RBC # BLD: 2.76 M/UL — LOW (ref 3.8–5.2)
RBC # FLD: 17.2 % — HIGH (ref 10.3–14.5)
SODIUM SERPL-SCNC: 135 MMOL/L — SIGNIFICANT CHANGE UP (ref 135–145)
WBC # BLD: 10.58 K/UL — HIGH (ref 3.8–10.5)
WBC # FLD AUTO: 10.58 K/UL — HIGH (ref 3.8–10.5)

## 2024-10-30 PROCEDURE — 99233 SBSQ HOSP IP/OBS HIGH 50: CPT | Mod: GC

## 2024-10-30 RX ORDER — SIMETHICONE 80 MG/1
80 TABLET, CHEWABLE ORAL DAILY
Refills: 0 | Status: DISCONTINUED | OUTPATIENT
Start: 2024-10-30 | End: 2024-11-06

## 2024-10-30 RX ADMIN — EZETIMIBE 10 MILLIGRAM(S): 10 TABLET ORAL at 15:01

## 2024-10-30 RX ADMIN — HYDRALAZINE HYDROCHLORIDE 100 MILLIGRAM(S): 50 TABLET, FILM COATED ORAL at 15:00

## 2024-10-30 RX ADMIN — HYDRALAZINE HYDROCHLORIDE 100 MILLIGRAM(S): 50 TABLET, FILM COATED ORAL at 22:42

## 2024-10-30 RX ADMIN — HYDRALAZINE HYDROCHLORIDE 100 MILLIGRAM(S): 50 TABLET, FILM COATED ORAL at 05:37

## 2024-10-30 RX ADMIN — ERYTHROPOIETIN 10000 UNIT(S): 10000 INJECTION, SOLUTION INTRAVENOUS; SUBCUTANEOUS at 12:24

## 2024-10-30 RX ADMIN — PANTOPRAZOLE SODIUM 40 MILLIGRAM(S): 40 TABLET, DELAYED RELEASE ORAL at 05:37

## 2024-10-30 RX ADMIN — CALCIUM ACETATE 1334 MILLIGRAM(S): 667 CAPSULE ORAL at 08:24

## 2024-10-30 RX ADMIN — CALCIUM ACETATE 1334 MILLIGRAM(S): 667 CAPSULE ORAL at 15:00

## 2024-10-30 RX ADMIN — SIMETHICONE 80 MILLIGRAM(S): 80 TABLET, CHEWABLE ORAL at 18:18

## 2024-10-30 RX ADMIN — CHLORHEXIDINE GLUCONATE 1 APPLICATION(S): 40 SOLUTION TOPICAL at 15:05

## 2024-10-30 RX ADMIN — Medication 180 MILLIGRAM(S): at 05:36

## 2024-10-30 RX ADMIN — Medication 600 MILLIGRAM(S): at 18:11

## 2024-10-30 RX ADMIN — Medication 5 MILLILITER(S): at 15:01

## 2024-10-30 RX ADMIN — Medication 600 MILLIGRAM(S): at 05:37

## 2024-10-30 RX ADMIN — APIXABAN 2.5 MILLIGRAM(S): 5 TABLET, FILM COATED ORAL at 05:37

## 2024-10-30 RX ADMIN — Medication 325 MILLIGRAM(S): at 15:01

## 2024-10-30 RX ADMIN — LINAGLIPTIN 5 MILLIGRAM(S): 5 TABLET, FILM COATED ORAL at 15:01

## 2024-10-30 NOTE — PROGRESS NOTE ADULT - SUBJECTIVE AND OBJECTIVE BOX
Patient is a 79y old  Female who presents with a chief complaint of shortness of breath (28 Oct 2024 15:36)      INTERVAL HPI/ OVERNIGHT EVENTS: Patient seen and examined this morning.     MEDICATIONS  (STANDING):  apixaban 2.5 milliGRAM(s) Oral two times a day  Biotene Dry Mouth Oral Rinse 5 milliLiter(s) Swish and Spit daily  calcium acetate 1334 milliGRAM(s) Oral three times a day with meals  chlorhexidine 2% Cloths 1 Application(s) Topical daily  dextrose 5%. 1000 milliLiter(s) (100 mL/Hr) IV Continuous <Continuous>  dextrose 5%. 1000 milliLiter(s) (50 mL/Hr) IV Continuous <Continuous>  dextrose 50% Injectable 12.5 Gram(s) IV Push once  dextrose 50% Injectable 25 Gram(s) IV Push once  dextrose 50% Injectable 25 Gram(s) IV Push once  diltiazem    milliGRAM(s) Oral daily  ezetimibe 10 milliGRAM(s) Oral daily  ferrous    sulfate 325 milliGRAM(s) Oral daily  glucagon  Injectable 1 milliGRAM(s) IntraMuscular once  hydrALAZINE 100 milliGRAM(s) Oral three times a day  influenza  Vaccine (HIGH DOSE) 0.5 milliLiter(s) IntraMuscular once  insulin lispro (ADMELOG) corrective regimen sliding scale   SubCutaneous at bedtime  insulin lispro (ADMELOG) corrective regimen sliding scale   SubCutaneous three times a day before meals  labetalol 600 milliGRAM(s) Oral two times a day  linagliptin 5 milliGRAM(s) Oral once  pantoprazole    Tablet 40 milliGRAM(s) Oral before breakfast  rosuvastatin 5 milliGRAM(s) Oral at bedtime    MEDICATIONS  (PRN):  acetaminophen     Tablet .. 650 milliGRAM(s) Oral every 6 hours PRN Temp greater or equal to 38C (100.4F), Mild Pain (1 - 3)  albuterol    90 MICROgram(s) HFA Inhaler 2 Puff(s) Inhalation every 6 hours PRN Shortness of Breath  dextrose Oral Gel 15 Gram(s) Oral once PRN Blood Glucose LESS THAN 70 milliGRAM(s)/deciliter  melatonin 3 milliGRAM(s) Oral at bedtime PRN Insomnia  ondansetron Injectable 4 milliGRAM(s) IV Push every 8 hours PRN Nausea and/or Vomiting  sodium chloride 0.65% Nasal 1 Spray(s) Both Nostrils daily PRN Nasal Congestion  sodium chloride 0.9% lock flush 10 milliLiter(s) IV Push every 1 hour PRN Pre/post blood products, medications, blood draw, and to maintain line patency      Allergies    ACE inhibitors (Angioedema)  statins (Muscle Pain)    Intolerances  predniSONE (Other)      REVIEW OF SYSTEMS:  CONSTITUTIONAL: No fever or chills  HEENT:  No headache, no sore throat  RESPIRATORY: No cough, wheezing, or shortness of breath  CARDIOVASCULAR: No chest pain, palpitations  GASTROINTESTINAL: No abd pain, nausea, vomiting, or diarrhea  GENITOURINARY: No dysuria, frequency, or hematuria  NEUROLOGICAL: no focal weakness or dizziness  MUSCULOSKELETAL: no myalgias     Vital Signs Last 24 Hrs  T(C): 37 (30 Oct 2024 05:07), Max: 37.1 (29 Oct 2024 12:42)  T(F): 98.6 (30 Oct 2024 05:07), Max: 98.7 (29 Oct 2024 12:42)  HR: 71 (30 Oct 2024 05:07) (67 - 72)  BP: 154/68 (30 Oct 2024 05:07) (104/60 - 154/68)  BP(mean): 89 (29 Oct 2024 21:55) (89 - 89)  RR: 17 (30 Oct 2024 05:07) (14 - 18)  SpO2: 93% (30 Oct 2024 05:07) (93% - 95%)    Parameters below as of 30 Oct 2024 10:47  Patient On (Oxygen Delivery Method): room air      I&O's Summary    28 Oct 2024 07:01  -  28 Oct 2024 15:46  --------------------------------------------------------  IN: 480 mL / OUT: 1500 mL / NET: -1020 mL      BMI (kg/m2): 35.2 (10-25-24 @ 13:49)    PHYSICAL EXAM:  General: NAD, morbidly obese female, receiving dialysis via Left IJ  Respiratory: B/L crackles throughout, +improved expiratory wheezes  CV: RRR, +S1/S2, +systolic murmur  Abdominal: Soft, Non tender, Nondistended  Extremities: +chronic skin changes with b/l hyperpigmentation, no ulcers noted. No edema, 2+ peripheral pulses  NERVOUS SYSTEM: answers questions and follows commands appropriately, A&Ox3, grossly moves all extremities   PSYCH: Appropriate affect, Alert & Awake; Good judgement      LABS: Personally reviewed                        7.9    10.02 )-----------( 196      ( 29 Oct 2024 06:21 )             25.5     10-29    133[L]  |  98  |  58[H]  ----------------------------<  128[H]  4.0   |  32[H]  |  2.42[H]    Ca    8.5      29 Oct 2024 06:21  Phos  3.0     10-29  Mg     2.2     10-29    TPro  6.9  /  Alb  3.1[L]  /  TBili  0.6  /  DBili  x   /  AST  11  /  ALT  12  /  AlkPhos  76  10-29    CAPILLARY BLOOD GLUCOSE  POCT Blood Glucose.: 137 mg/dL (29 Oct 2024 08:34)    Urinalysis Basic - ( 28 Oct 2024 08:24 )    Color: x / Appearance: x / SG: x / pH: x  Gluc: 107 mg/dL / Ketone: x  / Bili: x / Urobili: x   Blood: x / Protein: x / Nitrite: x   Leuk Esterase: x / RBC: x / WBC x   Sq Epi: x / Non Sq Epi: x / Bacteria: x    RADIOLOGY & ADDITIONAL TESTS: Personally reviewed.     Consultant(s) Notes Reviewed:  [x] YES  [ ] NO   Discussed with JOSE/PATTIE, RN

## 2024-10-30 NOTE — CHART NOTE - NSCHARTNOTEFT_GEN_A_CORE
NUTRITION Follow Up Note    SOURCE: Patient [X]  Medical Record [X]  Nursing Staff [X]  Family Member []    DIET: Diet, DASH/TLC:   Sodium & Cholesterol Restricted  Consistent Carbohydrate {No Snacks}  1200mL Fluid Restriction ([id number]) (10-20-24 @ 09:49) [Active]    Average recorded intakes: >75% of meals over length of stay per nursing flow sheets. Sony any chewing/swallowing difficulties. CKD on HD. next HD session planned for today 10/30/24. K+, Na, & Phos WNL at this time. Hx T2DM noted. A1C: 6.4% (10/29/24). Addressed with consistent carbohydrate meal pattern. Continues with a 1.2 L fluid restriction. Food preferences obtained and noted on patients file with nutrition office.     EDEMA: 1+ generalized edema noted    LAST BM: 30-Oct-2024    SKIN: no pressure injuries noted    WEIGHT TRENDS:  Weight in k.7 (30 Oct 2024 05:07)  Weight in k.5 (28 Oct 2024 13:00)  Weight in k (28 Oct 2024 09:50)  Weight in k (28 Oct 2024 05:12)  Weight in k.5 (26 Oct 2024 16:46)  Weight in k.5 (26 Oct 2024 14:16)  Weight in k.5 (26 Oct 2024 05:09)  Weight in k.5 (25 Oct 2024 18:30)  Weight in k (25 Oct 2024 16:30)  Weight in k (24 Oct 2024 05:22)      PERTINENT MEDICATIONS: MEDICATIONS  (STANDING):  apixaban 2.5 milliGRAM(s) Oral two times a day  Biotene Dry Mouth Oral Rinse 5 milliLiter(s) Swish and Spit daily  calcium acetate 1334 milliGRAM(s) Oral three times a day with meals  chlorhexidine 2% Cloths 1 Application(s) Topical daily  dextrose 5%. 1000 milliLiter(s) (50 mL/Hr) IV Continuous <Continuous>  dextrose 5%. 1000 milliLiter(s) (100 mL/Hr) IV Continuous <Continuous>  dextrose 50% Injectable 12.5 Gram(s) IV Push once  dextrose 50% Injectable 25 Gram(s) IV Push once  dextrose 50% Injectable 25 Gram(s) IV Push once  diltiazem    milliGRAM(s) Oral daily  epoetin [id number] (RETACRIT) Injectable 04143 Unit(s) IV Push <User Schedule>  ezetimibe 10 milliGRAM(s) Oral daily  ferrous    sulfate 325 milliGRAM(s) Oral daily  glucagon  Injectable 1 milliGRAM(s) IntraMuscular once  hydrALAZINE 100 milliGRAM(s) Oral three times a day  influenza  Vaccine (HIGH DOSE) 0.5 milliLiter(s) IntraMuscular once  insulin lispro (ADMELOG) corrective regimen sliding scale   SubCutaneous at bedtime  insulin lispro (ADMELOG) corrective regimen sliding scale   SubCutaneous three times a day before meals  labetalol 600 milliGRAM(s) Oral two times a day  linagliptin 5 milliGRAM(s) Oral daily  pantoprazole    Tablet 40 milliGRAM(s) Oral before breakfast  rosuvastatin 5 milliGRAM(s) Oral at bedtime    MEDICATIONS  (PRN):  acetaminophen     Tablet .. 650 milliGRAM(s) Oral every 6 hours PRN Temp greater or equal to 38C (100.4F), Mild Pain (1 - 3)  albuterol    90 MICROgram(s) HFA Inhaler 2 Puff(s) Inhalation every 6 hours PRN Shortness of Breath  dextrose Oral Gel 15 Gram(s) Oral once PRN Blood Glucose LESS THAN 70 milliGRAM(s)/deciliter  melatonin 3 milliGRAM(s) Oral at bedtime PRN Insomnia  ondansetron Injectable 4 milliGRAM(s) IV Push every 8 hours PRN Nausea and/or Vomiting  sodium chloride 0.65% Nasal 1 Spray(s) Both Nostrils daily PRN Nasal Congestion  sodium chloride 0.9% lock flush 10 milliLiter(s) IV Push every 1 hour PRN Pre/post blood products, medications, blood draw, and to maintain line patency      PERTINENT LABS:  10-30 Na135 mmol/L Glu 144 mg/dL[H] K+ 4.3 mmol/L Cr  2.82 mg/dL[H] BUN 72 mg/dL[H] 10-30 Phos 3.4 mg/dL 10-30 Alb 2.9 g/dL[L]        ESTIMATED NEEDS:   [X] No Change Since Previous Assessment    PREVIOUS NUTRITION DIAGNOSIS:  1. Altered Nutrition Related Lab Values (A1C)    NUTRITION DIAGNOSIS IS [X] Ongoing     NEW NUTRITION DIAGNOSIS: [X] Not Applicable    INTERVENTIONS:   1. Recommend continue current nutrition plan of care as tolerated     MONITORING & EVALUATION:   1. Weights   2. PO intakes   3. Skin integrity   4. Tolerance to diet prescription   5. Labs & POCT  6. Follow up (per protocol)    Registered Dietitian/Nutritionist Remains Available.  Carlos A Huerta RD, CDN    Contact: [phone] or via [place] TEAMS

## 2024-10-30 NOTE — PROGRESS NOTE ADULT - ATTENDING COMMENTS
79y year old Female with PMH of HFpEF, Chronic Kidney Disease 4, DVT (on eliquis) Hypertension, Hyperlipidemia, Type 2 Diabetes Mellitus, Anemia, OA who presents to the ER complaining of shortness of breath x2 days. Admitted for acute on chronic dCHF. Plan: cont dialysis, monitor volume status, perma cath shuttle to  tomorrow, dc planning thereafter eleazar with dialysis, apprec nephro recs

## 2024-10-30 NOTE — PROGRESS NOTE ADULT - TIME BILLING
Time spent includes direct patient care  (interview and examination of patient), discussion with other providers, support staff and/or patient's family members, review of medical records, ordering diagnostic tests and analyzing results, and documentation, excluding time spent in ACP discussions.
Time spent includes direct patient care  (interview and examination of patient), discussion with other providers, support staff and/or patient's family members, review of medical records, ordering diagnostic tests and analyzing results, and documentation, excluding time spent in ACP discussions.
care coordination, plan of care discussed with patient face to face, 3E IDR  team
care coordination, plan of care discussed with patient face to face, 3E IDR weekend team
care coordination, plan of care discussed with patient face to face, 3E IDR weekend team
This includes reviewing results/imaging and discussions with specialists, nursing, case management/social work. Further tests, medications, and procedures have been ordered as indicated. Results and the plan of care were communicated to the patient and/or their family member. Supporting documentation was completed and added to the patient's chart
This includes reviewing results/imaging and discussions with specialists, nursing, case management/social work. Further tests, medications, and procedures have been ordered as indicated. Results and the plan of care were communicated to the patient and/or their family member. Supporting documentation was completed and added to the patient's chart.
care coordination, plan of care discussed with patient face to face, 3E IDR team
care coordination, plan of care discussed with patient face to face, 3E IDR team

## 2024-10-30 NOTE — PROGRESS NOTE ADULT - ASSESSMENT
BURKE ABARCA is a 79y year old Female with PMH of HFpEF, Chronic Kidney Disease 4, DVT (on eliquis) Hypertension, Hyperlipidemia, Type 2 Diabetes Mellitus, Anemia, OA who presents to the ER complaining of shortness of breath x2 days. Admitted for acute on chronic dCHF.     #Shortness of breath - likely 2/2 HFpEF exacerbation, acute on chronic diastolic CHF  #Anemia, likely chronic disease   - admit to medicine   - CXR with Interstitial edema with mild congestive heart failure  - remote tele, continuous O2  - s/p lasix 80mg ivp in ER, was on bumex 2mg bid since August per patient.   - s/p IV lasix 40mg   - TTE 10/20/24: LVEF 63%, grade 2 diastolic dysfunction  - proBNP 5495 increased from 3792 on 10/19  - s/p 1 U pRBC 10/20/24, repeat hgb 7.1  - s/p 1 U pRBC 10/22/24, repeat hgb 7.8  - iron studies: low transferrin, high ferritin  - hgb 7.6 today  - transfuse if <7  - keep type and screen active  - continue weekly epoietin  - nephro recs appreciated: hold diuretics, no NSAIDs, c/w epoetin  - cardio recs appreciated: gdmt contraindicated 2/2 CKD, hold diuretics  - change oxygen to humidified O2 due to increased congestion and bloody mucus per pt   - pt was up-titrated on oxygen due to increased SOB  - pulm consult recs appreciated: pleural effusion too small to drain at this time, improved CXR, perm cath required, continue to taper off O2   - s/p 3 sessions of HD, next session due for Wednesday  - currently not on oxygen, VSS, O2 sat at 93% on RA    #Acute Kidney Injury on Chronic Kidney Disease 4  - uremia improving  - s/p 3 HD sessions, resume dialysis on Wednesday  - Cr 2.42, BUN 58, GFR 20 today  - nephro recommendations appreciated- plan for tunneled dialysis catheter this week, reached out to Dr. Sepulveda to see if this can be done in house as requested by Dr. Storey    #Nausea/vomiting, likely 2/2 to HD, fluid changes- improved  -pt noted to have episode of nausea/vomiting 10/27/24  -zofran prn    #Hyponatremia  - Na 133  - monitor    #Hypocalcemia, improved  - Ca 8.5  - monitor    #Hyperphosphatemia, resolved  - phos 3.0  - improving with dialysis and phoslo  - monitor     #Hypertension  - continue home diltiazem, hydralazine, labetalol    #Hyperlipidemia  - continue home crestor, ezetemibe    #Type II Diabetes Mellitus  - hold home meds  - FS and DANGELO  - maintain blood glucose 100-180 while hospitalized   - f/u HbA1c  - noted to have elevated blood sugars on admission  - per diabetic educator recs, pt would benefit from STAT dose of linagliptin 5mg, can be started on daily dose tomorrow  - linagliptin 5mg x1 ordered for today  - trend blood sugars    #history of DVT  - per patient she had RUE DVT while at [facility]  - has been on eliquis 2.5mg bid for nearly 3 months   - c/w eliquis 2.5mg    #DVT ppx  - Eliquis    #GOC  -full code     #Diet  -DASH/TLC, consistent carbs    Dispo: pt medically active, possible [facility] when cleared    *Case discussed with Dr. Dowling.     BURKE ABARCA is a 79y year old Female with PMH of HFpEF, Chronic Kidney Disease 4, DVT (on eliquis) Hypertension, Hyperlipidemia, Type 2 Diabetes Mellitus, Anemia, OA who presents to the ER complaining of shortness of breath x2 days. Admitted for acute on chronic dCHF.     #Shortness of breath - likely 2/2 HFpEF exacerbation, acute on chronic diastolic CHF  #Anemia, likely chronic disease   - CXR with Interstitial edema with mild congestive heart failure  - remote tele, continuous O2  - s/p lasix 80mg ivp in ER, was on bumex 2mg bid since August per patient.   - s/p IV lasix 40mg   - TTE 10/20/24: LVEF 63%, grade 2 diastolic dysfunction  - proBNP 5495 increased from 3792 on 10/19  - s/p 1 U pRBC 10/20/24, repeat hgb 7.1  - s/p 1 U pRBC 10/22/24, repeat hgb 7.8  - iron studies: low transferrin, high ferritin  - hgb 7.5 today  - transfuse if <7  - keep type and screen active  - continue weekly epoietin  - nephro recs appreciated: hold diuretics, no NSAIDs, c/w epoetin  - cardio recs appreciated: gdmt contraindicated 2/2 CKD, hold diuretics  - change oxygen to humidified O2 due to increased congestion and bloody mucus per pt   - pt was up-titrated on oxygen due to increased SOB  - pulm consult recs appreciated: pleural effusion too small to drain at this time, improved CXR, perm cath required, continue to taper off O2   - s/p 4 sessions of HD  - currently not on oxygen, VSS, O2 sat at 93% on RA    #Acute Kidney Injury on Chronic Kidney Disease 4  - uremia improving  - s/p 4 sessions of HD, received dialysis today  - Cr 2.82, BUN 72, GFR 17 today  - nephro recommendations appreciated- plan for tunneled dialysis catheter this week  - consulted Dr. Sepulveda for dialysis tunneled catheter who suggested we consult vascular surgery   - ordered acute hepatitis panel, hep B Ab for dialysis services discharge planning    #Nausea/vomiting, likely 2/2 to HD, fluid changes- improved  -pt noted to have episode of nausea/vomiting 10/27/24  -zofran prn    #Hyponatremia, resolved  - Na 135  - monitor    #Hypocalcemia, resolved  - Ca 8.5  - monitor    #Hyperphosphatemia, resolved  - phos 3.4  - improving with dialysis and phoslo  - monitor     #Hypertension  - continue home diltiazem, hydralazine, labetalol    #Hyperlipidemia  - continue home crestor, ezetemibe    #Type II Diabetes Mellitus  - hold home meds  - FS and DANGELO  - maintain blood glucose 100-180 while hospitalized   - HbA1c 6.4  - noted to have elevated blood sugars on admission  - per diabetic educator recs, pt would benefit from STAT dose of linagliptin 5mg, can be started on daily dose tomorrow  - linagliptin 5mg daily  - trend blood sugars    #history of DVT  - per patient she had RUE DVT while at [facility]  - has been on eliquis 2.5mg bid for nearly 3 months   - c/w eliquis 2.5mg    #DVT ppx  - Eliquis    #GOC  -full code     #Diet  -DASH/TLC, consistent carbs    Dispo: pt medically active, possible [facility] when cleared, will need HD services set up    *Case discussed with Dr. Dowling.

## 2024-10-30 NOTE — PROGRESS NOTE ADULT - SUBJECTIVE AND OBJECTIVE BOX
Seen on HD    Vital Signs Last 24 Hrs  T(C): 36.7 (10-30-24 @ 20:54), Max: 37 (10-30-24 @ 05:07)  T(F): 98 (10-30-24 @ 20:54), Max: 98.6 (10-30-24 @ 05:07)  HR: 68 (10-30-24 @ 20:54) (68 - 75)  BP: 136/69 (10-30-24 @ 20:54) (121/60 - 154/68)  BP(mean): 89 (10-29-24 @ 21:55) (89 - 89)  RR: 18 (10-30-24 @ 20:54) (17 - 18)  SpO2: 94% (10-30-24 @ 20:54) (93% - 97%)    I&O's Detail    29 Oct 2024 07:01  -  30 Oct 2024 07:00  --------------------------------------------------------  IN:    Oral Fluid: 480 mL  Total IN: 480 mL    OUT:    Voided (mL): 400 mL  Total OUT: 400 mL    30 Oct 2024 07:01  -  30 Oct 2024 21:09  --------------------------------------------------------  IN:    Oral Fluid: 480 mL    Other (mL): 800 mL  Total IN: 1280 mL    OUT:    Other (mL): 2300 mL  Total OUT: 2300 mL    s1s2  b/l air entry  soft, ND  no edema                                                                       7.5    10.58 )-----------( 182      ( 30 Oct 2024 09:45 )             23.8     30 Oct 2024 09:45    135    |  98     |  72     ----------------------------<  144    4.3     |  32     |  2.82     Ca    8.5        30 Oct 2024 09:45  Phos  3.4       30 Oct 2024 09:45  Mg     2.4       30 Oct 2024 09:45    TPro  6.6    /  Alb  2.9    /  TBili  0.5    /  DBili  x      /  AST  13     /  ALT  18     /  AlkPhos  62     30 Oct 2024 09:45    LIVER FUNCTIONS - ( 30 Oct 2024 09:45 )  Alb: 2.9 g/dL / Pro: 6.6 g/dL / ALK PHOS: 62 U/L / ALT: 18 U/L / AST: 13 U/L / GGT: x           acetaminophen     Tablet .. 650 milliGRAM(s) Oral every 6 hours PRN  albuterol    90 MICROgram(s) HFA Inhaler 2 Puff(s) Inhalation every 6 hours PRN  Biotene Dry Mouth Oral Rinse 5 milliLiter(s) Swish and Spit daily  calcium acetate 1334 milliGRAM(s) Oral three times a day with meals  chlorhexidine 2% Cloths 1 Application(s) Topical daily  dextrose 5%. 1000 milliLiter(s) IV Continuous <Continuous>  dextrose 5%. 1000 milliLiter(s) IV Continuous <Continuous>  dextrose 50% Injectable 12.5 Gram(s) IV Push once  dextrose 50% Injectable 25 Gram(s) IV Push once  dextrose 50% Injectable 25 Gram(s) IV Push once  dextrose Oral Gel 15 Gram(s) Oral once PRN  diltiazem    milliGRAM(s) Oral daily  epoetin jacobo-epbx (RETACRIT) Injectable 81427 Unit(s) IV Push <User Schedule>  ezetimibe 10 milliGRAM(s) Oral daily  ferrous    sulfate 325 milliGRAM(s) Oral daily  glucagon  Injectable 1 milliGRAM(s) IntraMuscular once  hydrALAZINE 100 milliGRAM(s) Oral three times a day  influenza  Vaccine (HIGH DOSE) 0.5 milliLiter(s) IntraMuscular once  insulin lispro (ADMELOG) corrective regimen sliding scale   SubCutaneous at bedtime  insulin lispro (ADMELOG) corrective regimen sliding scale   SubCutaneous three times a day before meals  labetalol 600 milliGRAM(s) Oral two times a day  linagliptin 5 milliGRAM(s) Oral daily  melatonin 3 milliGRAM(s) Oral at bedtime PRN  ondansetron Injectable 4 milliGRAM(s) IV Push every 8 hours PRN  pantoprazole    Tablet 40 milliGRAM(s) Oral before breakfast  rosuvastatin 5 milliGRAM(s) Oral at bedtime  simethicone 80 milliGRAM(s) Chew daily PRN  sodium chloride 0.65% Nasal 1 Spray(s) Both Nostrils daily PRN  sodium chloride 0.9% lock flush 10 milliLiter(s) IV Push every 1 hour PRN    A/P:    DM, HTN, CKD 4 (Cr 2.13 - 7/2/24, Cr 2.35 - 10/7/24)  Adm w/SOB iso severe anemia, some PVC on CXR  S/p 2u PRBCs on this adm  Started on diuretics   Cardio-renal/hemodynamic ZORAIDA/CKD 4  UA w/pr 30, bland  Imaging w/o hydro   Diuretics held since 10/22  Serologies are negative   No improvement in renal fx  Started on HD 10/25 via temp HD cath   HD today   Epoetin w/HD  Plan for perm cath tomorrow   Pt will need arrangements for FELIC[facility] w/HD     [phone]

## 2024-10-30 NOTE — CHART NOTE - NSCHARTNOTEFT_GEN_A_CORE
Discussed with  IR RUI Foster for TDC placement. Dr. Demarco accepting case to be done early morning tomorrow.    -Plan for pt to leave from  at 6:30am  -AM labs (CBC, CMP, PTT, PT/INR ) ordered to be done prior to shuttle. Nurse Henley notified to provide signout to night nurse to draw labs prior to shuttle.  -Eliquis held for procedure  -NPO after midnight.    Transfer center contacted to arrange shuttle.

## 2024-10-31 ENCOUNTER — OUTPATIENT (OUTPATIENT)
Dept: INPATIENT UNIT | Facility: HOSPITAL | Age: 79
LOS: 1 days | Discharge: HOME | End: 2024-10-31
Payer: COMMERCIAL

## 2024-10-31 ENCOUNTER — RESULT REVIEW (OUTPATIENT)
Age: 79
End: 2024-10-31

## 2024-10-31 ENCOUNTER — TRANSCRIPTION ENCOUNTER (OUTPATIENT)
Age: 79
End: 2024-10-31

## 2024-10-31 VITALS
TEMPERATURE: 98 F | HEART RATE: 72 BPM | OXYGEN SATURATION: 95 % | RESPIRATION RATE: 16 BRPM | SYSTOLIC BLOOD PRESSURE: 116 MMHG | DIASTOLIC BLOOD PRESSURE: 63 MMHG

## 2024-10-31 VITALS
SYSTOLIC BLOOD PRESSURE: 143 MMHG | TEMPERATURE: 99 F | RESPIRATION RATE: 15 BRPM | DIASTOLIC BLOOD PRESSURE: 60 MMHG | HEART RATE: 66 BPM | HEIGHT: 64 IN | OXYGEN SATURATION: 97 % | WEIGHT: 205.03 LBS

## 2024-10-31 DIAGNOSIS — Z86.2 PERSONAL HISTORY OF DISEASES OF THE BLOOD AND BLOOD-FORMING ORGANS AND CERTAIN DISORDERS INVOLVING THE IMMUNE MECHANISM: ICD-10-CM

## 2024-10-31 DIAGNOSIS — Z49.01 ENCOUNTER FOR FITTING AND ADJUSTMENT OF EXTRACORPOREAL DIALYSIS CATHETER: ICD-10-CM

## 2024-10-31 DIAGNOSIS — I12.9 HYPERTENSIVE CHRONIC KIDNEY DISEASE WITH STAGE 1 THROUGH STAGE 4 CHRONIC KIDNEY DISEASE, OR UNSPECIFIED CHRONIC KIDNEY DISEASE: ICD-10-CM

## 2024-10-31 DIAGNOSIS — M19.90 UNSPECIFIED OSTEOARTHRITIS, UNSPECIFIED SITE: ICD-10-CM

## 2024-10-31 DIAGNOSIS — Z98.890 OTHER SPECIFIED POSTPROCEDURAL STATES: Chronic | ICD-10-CM

## 2024-10-31 DIAGNOSIS — Z79.84 LONG TERM (CURRENT) USE OF ORAL HYPOGLYCEMIC DRUGS: ICD-10-CM

## 2024-10-31 DIAGNOSIS — Z80.0 FAMILY HISTORY OF MALIGNANT NEOPLASM OF DIGESTIVE ORGANS: ICD-10-CM

## 2024-10-31 DIAGNOSIS — E89.0 POSTPROCEDURAL HYPOTHYROIDISM: Chronic | ICD-10-CM

## 2024-10-31 DIAGNOSIS — N18.30 CHRONIC KIDNEY DISEASE, STAGE 3 UNSPECIFIED: ICD-10-CM

## 2024-10-31 DIAGNOSIS — Z90.710 ACQUIRED ABSENCE OF BOTH CERVIX AND UTERUS: Chronic | ICD-10-CM

## 2024-10-31 DIAGNOSIS — Z98.49 CATARACT EXTRACTION STATUS, UNSPECIFIED EYE: Chronic | ICD-10-CM

## 2024-10-31 DIAGNOSIS — E66.01 MORBID (SEVERE) OBESITY DUE TO EXCESS CALORIES: ICD-10-CM

## 2024-10-31 DIAGNOSIS — E11.22 TYPE 2 DIABETES MELLITUS WITH DIABETIC CHRONIC KIDNEY DISEASE: ICD-10-CM

## 2024-10-31 DIAGNOSIS — E78.5 HYPERLIPIDEMIA, UNSPECIFIED: ICD-10-CM

## 2024-10-31 LAB
ALBUMIN SERPL ELPH-MCNC: 2.9 G/DL — LOW (ref 3.3–5)
ALP SERPL-CCNC: 76 U/L — SIGNIFICANT CHANGE UP (ref 40–120)
ALT FLD-CCNC: 10 U/L — SIGNIFICANT CHANGE UP (ref 10–45)
ANION GAP SERPL CALC-SCNC: 5 MMOL/L — SIGNIFICANT CHANGE UP (ref 5–17)
APTT BLD: 16.6 SEC — LOW (ref 24.5–35.6)
AST SERPL-CCNC: 14 U/L — SIGNIFICANT CHANGE UP (ref 10–40)
BILIRUB SERPL-MCNC: 0.5 MG/DL — SIGNIFICANT CHANGE UP (ref 0.2–1.2)
BUN SERPL-MCNC: 44 MG/DL — HIGH (ref 7–23)
CALCIUM SERPL-MCNC: 8.3 MG/DL — LOW (ref 8.4–10.5)
CHLORIDE SERPL-SCNC: 95 MMOL/L — LOW (ref 96–108)
CO2 SERPL-SCNC: 32 MMOL/L — HIGH (ref 22–31)
CREAT SERPL-MCNC: 2.03 MG/DL — HIGH (ref 0.5–1.3)
EGFR: 25 ML/MIN/1.73M2 — LOW
GLUCOSE BLDC GLUCOMTR-MCNC: 129 MG/DL — HIGH (ref 70–99)
GLUCOSE BLDC GLUCOMTR-MCNC: 200 MG/DL — HIGH (ref 70–99)
GLUCOSE SERPL-MCNC: 122 MG/DL — HIGH (ref 70–99)
HCT VFR BLD CALC: 23 % — LOW (ref 34.5–45)
HGB BLD-MCNC: 7.1 G/DL — LOW (ref 11.5–15.5)
INR BLD: 1.13 RATIO — SIGNIFICANT CHANGE UP (ref 0.85–1.16)
MAGNESIUM SERPL-MCNC: 2.1 MG/DL — SIGNIFICANT CHANGE UP (ref 1.6–2.6)
MCHC RBC-ENTMCNC: 27 PG — SIGNIFICANT CHANGE UP (ref 27–34)
MCHC RBC-ENTMCNC: 30.9 G/DL — LOW (ref 32–36)
MCV RBC AUTO: 87.5 FL — SIGNIFICANT CHANGE UP (ref 80–100)
NRBC # BLD: 0 /100 WBCS — SIGNIFICANT CHANGE UP (ref 0–0)
PHOSPHATE SERPL-MCNC: 3.1 MG/DL — SIGNIFICANT CHANGE UP (ref 2.5–4.5)
PLATELET # BLD AUTO: 159 K/UL — SIGNIFICANT CHANGE UP (ref 150–400)
POTASSIUM SERPL-MCNC: 4.3 MMOL/L — SIGNIFICANT CHANGE UP (ref 3.5–5.3)
POTASSIUM SERPL-SCNC: 4.3 MMOL/L — SIGNIFICANT CHANGE UP (ref 3.5–5.3)
PROT SERPL-MCNC: 6.7 G/DL — SIGNIFICANT CHANGE UP (ref 6–8.3)
PROTHROM AB SERPL-ACNC: 13.3 SEC — SIGNIFICANT CHANGE UP (ref 9.9–13.4)
RBC # BLD: 2.63 M/UL — LOW (ref 3.8–5.2)
RBC # FLD: 17.2 % — HIGH (ref 10.3–14.5)
SODIUM SERPL-SCNC: 132 MMOL/L — LOW (ref 135–145)
WBC # BLD: 8.51 K/UL — SIGNIFICANT CHANGE UP (ref 3.8–10.5)
WBC # FLD AUTO: 8.51 K/UL — SIGNIFICANT CHANGE UP (ref 3.8–10.5)

## 2024-10-31 PROCEDURE — C1769: CPT

## 2024-10-31 PROCEDURE — 76937 US GUIDE VASCULAR ACCESS: CPT | Mod: 26

## 2024-10-31 PROCEDURE — 36558 INSERT TUNNELED CV CATH: CPT

## 2024-10-31 PROCEDURE — 77001 FLUOROGUIDE FOR VEIN DEVICE: CPT

## 2024-10-31 PROCEDURE — C1894: CPT

## 2024-10-31 PROCEDURE — 36558 INSERT TUNNELED CV CATH: CPT | Mod: RT

## 2024-10-31 PROCEDURE — 76937 US GUIDE VASCULAR ACCESS: CPT

## 2024-10-31 PROCEDURE — C1750: CPT

## 2024-10-31 PROCEDURE — 77001 FLUOROGUIDE FOR VEIN DEVICE: CPT | Mod: 26

## 2024-10-31 PROCEDURE — 99233 SBSQ HOSP IP/OBS HIGH 50: CPT | Mod: GC

## 2024-10-31 RX ORDER — DILTIAZEM HCL 5 MG/ML
1 VIAL (ML) INTRAVENOUS
Refills: 0 | DISCHARGE

## 2024-10-31 RX ORDER — SENNA 187 MG
2 TABLET ORAL AT BEDTIME
Refills: 0 | Status: DISCONTINUED | OUTPATIENT
Start: 2024-10-31 | End: 2024-10-31

## 2024-10-31 RX ORDER — ROSUVASTATIN CALCIUM 10 MG
1 TABLET ORAL
Refills: 0 | DISCHARGE

## 2024-10-31 RX ORDER — EZETIMIBE 10 MG/1
1 TABLET ORAL
Refills: 0 | DISCHARGE

## 2024-10-31 RX ORDER — FERROUS SULFATE 325(65) MG
1 TABLET ORAL
Refills: 0 | DISCHARGE

## 2024-10-31 RX ORDER — CALCIUM ACETATE 667 MG/1
1 CAPSULE ORAL
Refills: 0 | DISCHARGE

## 2024-10-31 RX ORDER — APIXABAN 5 MG/1
2.5 TABLET, FILM COATED ORAL
Refills: 0 | Status: DISCONTINUED | OUTPATIENT
Start: 2024-11-01 | End: 2024-11-06

## 2024-10-31 RX ORDER — LABETALOL HCL 200 MG
2 TABLET ORAL
Refills: 0 | DISCHARGE

## 2024-10-31 RX ORDER — APIXABAN 5 MG/1
1 TABLET, FILM COATED ORAL
Refills: 0 | DISCHARGE

## 2024-10-31 RX ORDER — POLYETHYLENE GLYCOL 3350 17 G/17G
17 POWDER, FOR SOLUTION ORAL DAILY
Refills: 0 | Status: DISCONTINUED | OUTPATIENT
Start: 2024-10-31 | End: 2024-11-06

## 2024-10-31 RX ORDER — HYDRALAZINE HYDROCHLORIDE 50 MG/1
1 TABLET, FILM COATED ORAL
Refills: 0 | DISCHARGE

## 2024-10-31 RX ORDER — ONDANSETRON HYDROCHLORIDE 2 MG/ML
4 INJECTION, SOLUTION INTRAMUSCULAR; INTRAVENOUS ONCE
Refills: 0 | Status: DISCONTINUED | OUTPATIENT
Start: 2024-10-31 | End: 2024-10-31

## 2024-10-31 RX ORDER — SENNA 187 MG
2 TABLET ORAL AT BEDTIME
Refills: 0 | Status: DISCONTINUED | OUTPATIENT
Start: 2024-10-31 | End: 2024-11-06

## 2024-10-31 RX ORDER — FENTANYL CITRAT/DEXTROSE 5%/PF 1250MCG/50
25 PATIENT CONTROLLED ANALGESIA SYRINGE INTRAVENOUS
Refills: 0 | Status: DISCONTINUED | OUTPATIENT
Start: 2024-10-31 | End: 2024-10-31

## 2024-10-31 RX ORDER — POLYETHYLENE GLYCOL 3350 17 G/17G
17 POWDER, FOR SOLUTION ORAL DAILY
Refills: 0 | Status: DISCONTINUED | OUTPATIENT
Start: 2024-10-31 | End: 2024-10-31

## 2024-10-31 RX ORDER — CALCITRIOL 0.25 UG/1
1 CAPSULE, LIQUID FILLED ORAL
Refills: 0 | DISCHARGE

## 2024-10-31 RX ADMIN — HYDRALAZINE HYDROCHLORIDE 100 MILLIGRAM(S): 50 TABLET, FILM COATED ORAL at 17:52

## 2024-10-31 RX ADMIN — POLYETHYLENE GLYCOL 3350 17 GRAM(S): 17 POWDER, FOR SOLUTION ORAL at 20:24

## 2024-10-31 RX ADMIN — CALCIUM ACETATE 1334 MILLIGRAM(S): 667 CAPSULE ORAL at 17:52

## 2024-10-31 RX ADMIN — Medication 600 MILLIGRAM(S): at 06:28

## 2024-10-31 RX ADMIN — SIMETHICONE 80 MILLIGRAM(S): 80 TABLET, CHEWABLE ORAL at 00:26

## 2024-10-31 RX ADMIN — Medication 1: at 17:51

## 2024-10-31 RX ADMIN — Medication 180 MILLIGRAM(S): at 06:28

## 2024-10-31 RX ADMIN — HYDRALAZINE HYDROCHLORIDE 100 MILLIGRAM(S): 50 TABLET, FILM COATED ORAL at 06:29

## 2024-10-31 RX ADMIN — Medication 600 MILLIGRAM(S): at 17:52

## 2024-10-31 RX ADMIN — HYDRALAZINE HYDROCHLORIDE 100 MILLIGRAM(S): 50 TABLET, FILM COATED ORAL at 22:40

## 2024-10-31 NOTE — PROGRESS NOTE ADULT - SUBJECTIVE AND OBJECTIVE BOX
Patient is a 79y old  Female who presents with a chief complaint of shortness of breath (28 Oct 2024 15:36)      INTERVAL HPI/ OVERNIGHT EVENTS: Patient seen and examined this morning.     MEDICATIONS  (STANDING):  apixaban 2.5 milliGRAM(s) Oral two times a day  Biotene Dry Mouth Oral Rinse 5 milliLiter(s) Swish and Spit daily  calcium acetate 1334 milliGRAM(s) Oral three times a day with meals  chlorhexidine 2% Cloths 1 Application(s) Topical daily  dextrose 5%. 1000 milliLiter(s) (100 mL/Hr) IV Continuous <Continuous>  dextrose 5%. 1000 milliLiter(s) (50 mL/Hr) IV Continuous <Continuous>  dextrose 50% Injectable 12.5 Gram(s) IV Push once  dextrose 50% Injectable 25 Gram(s) IV Push once  dextrose 50% Injectable 25 Gram(s) IV Push once  diltiazem    milliGRAM(s) Oral daily  ezetimibe 10 milliGRAM(s) Oral daily  ferrous    sulfate 325 milliGRAM(s) Oral daily  glucagon  Injectable 1 milliGRAM(s) IntraMuscular once  hydrALAZINE 100 milliGRAM(s) Oral three times a day  influenza  Vaccine (HIGH DOSE) 0.5 milliLiter(s) IntraMuscular once  insulin lispro (ADMELOG) corrective regimen sliding scale   SubCutaneous at bedtime  insulin lispro (ADMELOG) corrective regimen sliding scale   SubCutaneous three times a day before meals  labetalol 600 milliGRAM(s) Oral two times a day  linagliptin 5 milliGRAM(s) Oral once  pantoprazole    Tablet 40 milliGRAM(s) Oral before breakfast  rosuvastatin 5 milliGRAM(s) Oral at bedtime    MEDICATIONS  (PRN):  acetaminophen     Tablet .. 650 milliGRAM(s) Oral every 6 hours PRN Temp greater or equal to 38C (100.4F), Mild Pain (1 - 3)  albuterol    90 MICROgram(s) HFA Inhaler 2 Puff(s) Inhalation every 6 hours PRN Shortness of Breath  dextrose Oral Gel 15 Gram(s) Oral once PRN Blood Glucose LESS THAN 70 milliGRAM(s)/deciliter  melatonin 3 milliGRAM(s) Oral at bedtime PRN Insomnia  ondansetron Injectable 4 milliGRAM(s) IV Push every 8 hours PRN Nausea and/or Vomiting  sodium chloride 0.65% Nasal 1 Spray(s) Both Nostrils daily PRN Nasal Congestion  sodium chloride 0.9% lock flush 10 milliLiter(s) IV Push every 1 hour PRN Pre/post blood products, medications, blood draw, and to maintain line patency      Allergies    ACE inhibitors (Angioedema)  statins (Muscle Pain)    Intolerances  predniSONE (Other)      REVIEW OF SYSTEMS:  CONSTITUTIONAL: No fever or chills  HEENT:  No headache, no sore throat  RESPIRATORY: No cough, wheezing, or shortness of breath  CARDIOVASCULAR: No chest pain, palpitations  GASTROINTESTINAL: No abd pain, nausea, vomiting, or diarrhea  GENITOURINARY: No dysuria, frequency, or hematuria  NEUROLOGICAL: no focal weakness or dizziness  MUSCULOSKELETAL: no myalgias     Vital Signs Last 24 Hrs  T(C): 37 (30 Oct 2024 05:07), Max: 37.1 (29 Oct 2024 12:42)  T(F): 98.6 (30 Oct 2024 05:07), Max: 98.7 (29 Oct 2024 12:42)  HR: 71 (30 Oct 2024 05:07) (67 - 72)  BP: 154/68 (30 Oct 2024 05:07) (104/60 - 154/68)  BP(mean): 89 (29 Oct 2024 21:55) (89 - 89)  RR: 17 (30 Oct 2024 05:07) (14 - 18)  SpO2: 93% (30 Oct 2024 05:07) (93% - 95%)    Parameters below as of 30 Oct 2024 10:47  Patient On (Oxygen Delivery Method): room air      I&O's Summary    28 Oct 2024 07:01  -  28 Oct 2024 15:46  --------------------------------------------------------  IN: 480 mL / OUT: 1500 mL / NET: -1020 mL      BMI (kg/m2): 35.2 (10-25-24 @ 13:49)    PHYSICAL EXAM:  General: NAD, morbidly obese female, receiving dialysis via Left IJ  Respiratory: B/L crackles throughout, +improved expiratory wheezes  CV: RRR, +S1/S2, +systolic murmur  Abdominal: Soft, Non tender, Nondistended  Extremities: +chronic skin changes with b/l hyperpigmentation, no ulcers noted. No edema, 2+ peripheral pulses  NERVOUS SYSTEM: answers questions and follows commands appropriately, A&Ox3, grossly moves all extremities   PSYCH: Appropriate affect, Alert & Awake; Good judgement      LABS: Personally reviewed                        7.9    10.02 )-----------( 196      ( 29 Oct 2024 06:21 )             25.5     10-29    133[L]  |  98  |  58[H]  ----------------------------<  128[H]  4.0   |  32[H]  |  2.42[H]    Ca    8.5      29 Oct 2024 06:21  Phos  3.0     10-29  Mg     2.2     10-29    TPro  6.9  /  Alb  3.1[L]  /  TBili  0.6  /  DBili  x   /  AST  11  /  ALT  12  /  AlkPhos  76  10-29    CAPILLARY BLOOD GLUCOSE  POCT Blood Glucose.: 137 mg/dL (29 Oct 2024 08:34)    Urinalysis Basic - ( 28 Oct 2024 08:24 )    Color: x / Appearance: x / SG: x / pH: x  Gluc: 107 mg/dL / Ketone: x  / Bili: x / Urobili: x   Blood: x / Protein: x / Nitrite: x   Leuk Esterase: x / RBC: x / WBC x   Sq Epi: x / Non Sq Epi: x / Bacteria: x    RADIOLOGY & ADDITIONAL TESTS: Personally reviewed.     Consultant(s) Notes Reviewed:  [x] YES  [ ] NO   Discussed with JOSE/PATTIE, RN     Patient is a 79y old Female who presents with a chief complaint of shortness of breath.       INTERVAL HPI/ OVERNIGHT EVENTS: Patient seen and examined at bedside. Sister at bedside. States that procedure went well and she feels fine. Has been able to eat, but has been constipated and would like stool softeners. No other acute complaints.     MEDICATIONS  (STANDING):  apixaban 2.5 milliGRAM(s) Oral two times a day  Biotene Dry Mouth Oral Rinse 5 milliLiter(s) Swish and Spit daily  calcium acetate 1334 milliGRAM(s) Oral three times a day with meals  chlorhexidine 2% Cloths 1 Application(s) Topical daily  dextrose 5%. 1000 milliLiter(s) (100 mL/Hr) IV Continuous <Continuous>  dextrose 5%. 1000 milliLiter(s) (50 mL/Hr) IV Continuous <Continuous>  dextrose 50% Injectable 12.5 Gram(s) IV Push once  dextrose 50% Injectable 25 Gram(s) IV Push once  dextrose 50% Injectable 25 Gram(s) IV Push once  diltiazem    milliGRAM(s) Oral daily  ezetimibe 10 milliGRAM(s) Oral daily  ferrous    sulfate 325 milliGRAM(s) Oral daily  glucagon  Injectable 1 milliGRAM(s) IntraMuscular once  hydrALAZINE 100 milliGRAM(s) Oral three times a day  influenza  Vaccine (HIGH DOSE) 0.5 milliLiter(s) IntraMuscular once  insulin lispro (ADMELOG) corrective regimen sliding scale   SubCutaneous at bedtime  insulin lispro (ADMELOG) corrective regimen sliding scale   SubCutaneous three times a day before meals  labetalol 600 milliGRAM(s) Oral two times a day  linagliptin 5 milliGRAM(s) Oral once  pantoprazole    Tablet 40 milliGRAM(s) Oral before breakfast  rosuvastatin 5 milliGRAM(s) Oral at bedtime    MEDICATIONS  (PRN):  acetaminophen     Tablet .. 650 milliGRAM(s) Oral every 6 hours PRN Temp greater or equal to 38C (100.4F), Mild Pain (1 - 3)  albuterol    90 MICROgram(s) HFA Inhaler 2 Puff(s) Inhalation every 6 hours PRN Shortness of Breath  dextrose Oral Gel 15 Gram(s) Oral once PRN Blood Glucose LESS THAN 70 milliGRAM(s)/deciliter  melatonin 3 milliGRAM(s) Oral at bedtime PRN Insomnia  ondansetron Injectable 4 milliGRAM(s) IV Push every 8 hours PRN Nausea and/or Vomiting  sodium chloride 0.65% Nasal 1 Spray(s) Both Nostrils daily PRN Nasal Congestion  sodium chloride 0.9% lock flush 10 milliLiter(s) IV Push every 1 hour PRN Pre/post blood products, medications, blood draw, and to maintain line patency      Allergies  ACE inhibitors (Angioedema)  statins (Muscle Pain)    Intolerances  predniSONE (Other)      REVIEW OF SYSTEMS:  CONSTITUTIONAL: No fever or chills  HEENT:  No headache, no sore throat  RESPIRATORY: No cough, wheezing, or shortness of breath  CARDIOVASCULAR: No chest pain, palpitations  GASTROINTESTINAL: No abd pain, nausea, vomiting, or diarrhea  GENITOURINARY: No dysuria, frequency, or hematuria  NEUROLOGICAL: no focal weakness or dizziness  MUSCULOSKELETAL: no myalgias     Vital Signs Last 24 Hrs  T(C): 36.6 (31 Oct 2024 14:30), Max: 37.1 (31 Oct 2024 09:16)  T(F): 97.8 (31 Oct 2024 14:30), Max: 98.7 (31 Oct 2024 09:16)  HR: 72 (31 Oct 2024 14:30) (62 - 72)  BP: 116/63 (31 Oct 2024 14:30) (112/58 - 152/61)  BP(mean): --  RR: 16 (31 Oct 2024 14:30) (13 - 22)  SpO2: 95% (31 Oct 2024 14:30) (94% - 100%)    Parameters below as of 31 Oct 2024 08:17  Patient On (Oxygen Delivery Method): room air      I&O's Summary    28 Oct 2024 07:01  -  28 Oct 2024 15:46  --------------------------------------------------------  IN: 480 mL / OUT: 1500 mL / NET: -1020 mL      BMI (kg/m2): 35.2 (10-25-24 @ 13:49)    PHYSICAL EXAM:  General: NAD, morbidly obese female, permacath in place [place]ela/dry/intact  Respiratory: B/L crackles throughout, +improved expiratory wheezes  CV: RRR, +S1/S2, +systolic murmur  Abdominal: Soft, Non tender, Nondistended  Extremities: +chronic skin changes with b/l hyperpigmentation, no ulcers noted. No edema, 2+ peripheral pulses  NERVOUS SYSTEM: answers questions and follows commands appropriately, A&Ox3, grossly moves all extremities   PSYCH: Appropriate affect, Alert & Awake; Good judgement      LABS: Personally reviewed                        7.1    8.51  )-----------( 159      ( 31 Oct 2024 04:20 )             23.0     10-31    132[L]  |  95[L]  |  44[H]  ----------------------------<  122[H]  4.3   |  32[H]  |  2.03[H]    Ca    8.3[L]      31 Oct 2024 04:20  Phos  3.1     10-31  Mg     2.1     10-31    TPro  6.7  /  Alb  2.9[L]  /  TBili  0.5  /  DBili  x   /  AST  14  /  ALT  10  /  AlkPhos  76  10-31    CAPILLARY BLOOD GLUCOSE  POCT Blood Glucose.: 200 mg/dL (31 Oct 2024 16:53)      RADIOLOGY & ADDITIONAL TESTS: Personally reviewed.     Consultant(s) Notes Reviewed:  [x] YES  [ ] NO   Discussed with JOSE/PATTIE, RN     Patient is a 79y old Female who presents with a chief complaint of shortness of breath.     INTERVAL HPI/ OVERNIGHT EVENTS: Patient seen and examined at bedside. Sister at bedside. States that procedure went well and she feels fine. Has been able to eat, but has been constipated and would like stool softeners. No other acute complaints.     MEDICATIONS  (STANDING):  apixaban 2.5 milliGRAM(s) Oral two times a day  Biotene Dry Mouth Oral Rinse 5 milliLiter(s) Swish and Spit daily  calcium acetate 1334 milliGRAM(s) Oral three times a day with meals  chlorhexidine 2% Cloths 1 Application(s) Topical daily  dextrose 5%. 1000 milliLiter(s) (100 mL/Hr) IV Continuous <Continuous>  dextrose 5%. 1000 milliLiter(s) (50 mL/Hr) IV Continuous <Continuous>  dextrose 50% Injectable 12.5 Gram(s) IV Push once  dextrose 50% Injectable 25 Gram(s) IV Push once  dextrose 50% Injectable 25 Gram(s) IV Push once  diltiazem    milliGRAM(s) Oral daily  ezetimibe 10 milliGRAM(s) Oral daily  ferrous    sulfate 325 milliGRAM(s) Oral daily  glucagon  Injectable 1 milliGRAM(s) IntraMuscular once  hydrALAZINE 100 milliGRAM(s) Oral three times a day  influenza  Vaccine (HIGH DOSE) 0.5 milliLiter(s) IntraMuscular once  insulin lispro (ADMELOG) corrective regimen sliding scale   SubCutaneous at bedtime  insulin lispro (ADMELOG) corrective regimen sliding scale   SubCutaneous three times a day before meals  labetalol 600 milliGRAM(s) Oral two times a day  linagliptin 5 milliGRAM(s) Oral once  pantoprazole    Tablet 40 milliGRAM(s) Oral before breakfast  rosuvastatin 5 milliGRAM(s) Oral at bedtime    MEDICATIONS  (PRN):  acetaminophen     Tablet .. 650 milliGRAM(s) Oral every 6 hours PRN Temp greater or equal to 38C (100.4F), Mild Pain (1 - 3)  albuterol    90 MICROgram(s) HFA Inhaler 2 Puff(s) Inhalation every 6 hours PRN Shortness of Breath  dextrose Oral Gel 15 Gram(s) Oral once PRN Blood Glucose LESS THAN 70 milliGRAM(s)/deciliter  melatonin 3 milliGRAM(s) Oral at bedtime PRN Insomnia  ondansetron Injectable 4 milliGRAM(s) IV Push every 8 hours PRN Nausea and/or Vomiting  sodium chloride 0.65% Nasal 1 Spray(s) Both Nostrils daily PRN Nasal Congestion  sodium chloride 0.9% lock flush 10 milliLiter(s) IV Push every 1 hour PRN Pre/post blood products, medications, blood draw, and to maintain line patency      Allergies  ACE inhibitors (Angioedema)  statins (Muscle Pain)    Intolerances  predniSONE (Other)      REVIEW OF SYSTEMS:  CONSTITUTIONAL: No fever or chills  HEENT:  No headache, no sore throat  RESPIRATORY: No cough, wheezing, or shortness of breath  CARDIOVASCULAR: No chest pain, palpitations  GASTROINTESTINAL: No abd pain, nausea, vomiting, or diarrhea  GENITOURINARY: No dysuria, frequency, or hematuria  NEUROLOGICAL: no focal weakness or dizziness  MUSCULOSKELETAL: no myalgias     Vital Signs Last 24 Hrs  T(C): 36.6 (31 Oct 2024 14:30), Max: 37.1 (31 Oct 2024 09:16)  T(F): 97.8 (31 Oct 2024 14:30), Max: 98.7 (31 Oct 2024 09:16)  HR: 72 (31 Oct 2024 14:30) (62 - 72)  BP: 116/63 (31 Oct 2024 14:30) (112/58 - 152/61)  RR: 16 (31 Oct 2024 14:30) (13 - 22)  SpO2: 95% (31 Oct 2024 14:30) (94% - 100%)    Parameters below as of 31 Oct 2024 08:17  Patient On (Oxygen Delivery Method): room air      I&O's Summary    28 Oct 2024 07:01  -  28 Oct 2024 15:46  --------------------------------------------------------  IN: 480 mL / OUT: 1500 mL / NET: -1020 mL      BMI (kg/m2): 35.2 (10-25-24 @ 13:49)    PHYSICAL EXAM:  General: NAD, morbidly obese female, TDC on right side in place clean/dry/intact  Respiratory: B/L crackles throughout, +improved expiratory wheezes  CV: RRR, +S1/S2, +systolic murmur  Abdominal: Soft, Non tender, Nondistended  Extremities: +chronic skin changes with b/l hyperpigmentation, no ulcers noted. No edema, 2+ peripheral pulses  NERVOUS SYSTEM: answers questions and follows commands appropriately, A&Ox3, grossly moves all extremities   PSYCH: Appropriate affect, Alert & Awake; Good judgement      LABS: Personally reviewed                        7.1    8.51  )-----------( 159      ( 31 Oct 2024 04:20 )             23.0     10-31    132[L]  |  95[L]  |  44[H]  ----------------------------<  122[H]  4.3   |  32[H]  |  2.03[H]    Ca    8.3[L]      31 Oct 2024 04:20  Phos  3.1     10-31  Mg     2.1     10-31    TPro  6.7  /  Alb  2.9[L]  /  TBili  0.5  /  DBili  x   /  AST  14  /  ALT  10  /  AlkPhos  76  10-31    CAPILLARY BLOOD GLUCOSE  POCT Blood Glucose.: 200 mg/dL (31 Oct 2024 16:53)      RADIOLOGY & ADDITIONAL TESTS: Personally reviewed.     Consultant(s) Notes Reviewed:  [x] YES  [ ] NO   Discussed with JOSE/PATTIE, RN

## 2024-10-31 NOTE — PROGRESS NOTE ADULT - SUBJECTIVE AND OBJECTIVE BOX
S/p perm cath, comfortable on RA    Vital Signs Last 24 Hrs  T(C): 36.7 (10-31-24 @ 18:03), Max: 37.1 (10-31-24 @ 09:16)  T(F): 98 (10-31-24 @ 18:03), Max: 98.7 (10-31-24 @ 09:16)  HR: 72 (10-31-24 @ 18:03) (62 - 72)  BP: 131/70 (10-31-24 @ 18:03) (112/58 - 152/61)  RR: 16 (10-31-24 @ 18:03) (13 - 22)  SpO2: 92% (10-31-24 @ 18:03) (92% - 100%)    I&O's Detail    30 Oct 2024 07:01  -  31 Oct 2024 07:00  --------------------------------------------------------  IN:    Oral Fluid: 480 mL    Other (mL): 800 mL  Total IN: 1280 mL    OUT:    Other (mL): 2300 mL    Voided (mL): 100 mL  Total OUT: 2400 mL    s1s2  b/l air entry  soft, ND  no edema                                                                             7.1    8.51  )-----------( 159      ( 31 Oct 2024 04:20 )             23.0     31 Oct 2024 04:20    132    |  95     |  44     ----------------------------<  122    4.3     |  32     |  2.03     Ca    8.3        31 Oct 2024 04:20  Phos  3.1       31 Oct 2024 04:20  Mg     2.1       31 Oct 2024 04:20    TPro  6.7    /  Alb  2.9    /  TBili  0.5    /  DBili  x      /  AST  14     /  ALT  10     /  AlkPhos  76     31 Oct 2024 04:20    LIVER FUNCTIONS - ( 31 Oct 2024 04:20 )  Alb: 2.9 g/dL / Pro: 6.7 g/dL / ALK PHOS: 76 U/L / ALT: 10 U/L / AST: 14 U/L / GGT: x           PT/INR - ( 31 Oct 2024 05:20 )   PT: 13.3 sec;   INR: 1.13 ratio      acetaminophen     Tablet .. 650 milliGRAM(s) Oral every 6 hours PRN  albuterol    90 MICROgram(s) HFA Inhaler 2 Puff(s) Inhalation every 6 hours PRN  Biotene Dry Mouth Oral Rinse 5 milliLiter(s) Swish and Spit daily  calcium acetate 1334 milliGRAM(s) Oral three times a day with meals  chlorhexidine 2% Cloths 1 Application(s) Topical daily  dextrose 5%. 1000 milliLiter(s) IV Continuous <Continuous>  dextrose 5%. 1000 milliLiter(s) IV Continuous <Continuous>  dextrose 50% Injectable 12.5 Gram(s) IV Push once  dextrose 50% Injectable 25 Gram(s) IV Push once  dextrose 50% Injectable 25 Gram(s) IV Push once  dextrose Oral Gel 15 Gram(s) Oral once PRN  diltiazem    milliGRAM(s) Oral daily  epoetin [id number]-epbx (RETACRIT) Injectable 22038 Unit(s) IV Push <User Schedule>  ezetimibe 10 milliGRAM(s) Oral daily  ferrous    sulfate 325 milliGRAM(s) Oral daily  glucagon  Injectable 1 milliGRAM(s) IntraMuscular once  hydrALAZINE 100 milliGRAM(s) Oral three times a day  influenza  Vaccine (HIGH DOSE) 0.5 milliLiter(s) IntraMuscular once  insulin lispro (ADMELOG) corrective regimen sliding scale   SubCutaneous at bedtime  insulin lispro (ADMELOG) corrective regimen sliding scale   SubCutaneous three times a day before meals  labetalol 600 milliGRAM(s) Oral two times a day  linagliptin 5 milliGRAM(s) Oral daily  melatonin 3 milliGRAM(s) Oral at bedtime PRN  ondansetron Injectable 4 milliGRAM(s) IV Push every 8 hours PRN  pantoprazole    Tablet 40 milliGRAM(s) Oral before breakfast  polyethylene glycol 3350 17 Gram(s) Oral daily  rosuvastatin 5 milliGRAM(s) Oral at bedtime  senna 2 Tablet(s) Oral at bedtime PRN  simethicone 80 milliGRAM(s) Chew daily PRN  sodium chloride 0.65% Nasal 1 Spray(s) Both Nostrils daily PRN  sodium chloride 0.9% lock flush 10 milliLiter(s) IV Push every 1 hour PRN    A/P:    DM, HTN, CKD 4 (Cr 2.13 - 7/2/24, Cr 2.35 - 10/7/24)  Adm w/SOB iso severe anemia, some PVC on CXR  S/p 2u PRBCs on this adm  Started on diuretics   Cardio-renal/hemodynamic ZORAIDA/CKD 4  UA w/pr 30, bland  Imaging w/o hydro   Diuretics held since 10/22  Serologies are negative, SPEP negative  No improvement in renal fx  Started on HD 10/25 via temp HD cath   S/p perm cath today  HD tomorrow   Epoetin w/HD  Consider PRBCs w/HD tomorrow if Hgb is not better  Needs arrangements for [facility] w/HD     [phone]

## 2024-10-31 NOTE — ASU PATIENT PROFILE, ADULT - FALL HARM RISK - HARM RISK INTERVENTIONS

## 2024-10-31 NOTE — ASU DISCHARGE PLAN (ADULT/PEDIATRIC) - FINANCIAL ASSISTANCE
[facility] provides services at a reduced cost to those who are determined to be eligible through [facility]’s financial assistance program. Information regarding [facility]’s financial assistance program can be found by going to [email or web address] or by calling [phone].

## 2024-10-31 NOTE — PROGRESS NOTE ADULT - ASSESSMENT
BURKE ABARCA is a 79y year old Female with PMH of HFpEF, Chronic Kidney Disease 4, DVT (on eliquis) Hypertension, Hyperlipidemia, Type 2 Diabetes Mellitus, Anemia, OA who presents to the ER complaining of shortness of breath x2 days. Admitted for acute on chronic dCHF.     #Shortness of breath - likely 2/2 HFpEF exacerbation, acute on chronic diastolic CHF  #Anemia, likely chronic disease   - CXR with Interstitial edema with mild congestive heart failure  - remote tele, continuous O2  - s/p lasix 80mg ivp in ER, was on bumex 2mg bid since August per patient.   - s/p IV lasix 40mg   - TTE 10/20/24: LVEF 63%, grade 2 diastolic dysfunction  - proBNP 5495 increased from 3792 on 10/19  - s/p 1 U pRBC 10/20/24, repeat hgb 7.1  - s/p 1 U pRBC 10/22/24, repeat hgb 7.8  - iron studies: low transferrin, high ferritin  - hgb 7.5 today  - transfuse if <7  - keep type and screen active  - continue weekly epoietin  - nephro recs appreciated: hold diuretics, no NSAIDs, c/w epoetin  - cardio recs appreciated: gdmt contraindicated 2/2 CKD, hold diuretics  - change oxygen to humidified O2 due to increased congestion and bloody mucus per pt   - pt was up-titrated on oxygen due to increased SOB  - pulm consult recs appreciated: pleural effusion too small to drain at this time, improved CXR, perm cath required, continue to taper off O2   - s/p 4 sessions of HD  - currently not on oxygen, VSS, O2 sat at 93% on RA    #Acute Kidney Injury on Chronic Kidney Disease 4  - uremia improving  - s/p 4 sessions of HD, received dialysis today  - Cr 2.82, BUN 72, GFR 17 today  - nephro recommendations appreciated- plan for tunneled dialysis catheter this week  - consulted Dr. Sepulveda for dialysis tunneled catheter who suggested we consult vascular surgery   - ordered acute hepatitis panel, hep B Ab for dialysis services discharge planning    #Nausea/vomiting, likely 2/2 to HD, fluid changes- improved  -pt noted to have episode of nausea/vomiting 10/27/24  -zofran prn    #Hyponatremia, resolved  - Na 135  - monitor    #Hypocalcemia, resolved  - Ca 8.5  - monitor    #Hyperphosphatemia, resolved  - phos 3.4  - improving with dialysis and phoslo  - monitor     #Hypertension  - continue home diltiazem, hydralazine, labetalol    #Hyperlipidemia  - continue home crestor, ezetemibe    #Type II Diabetes Mellitus  - hold home meds  - FS and DANGELO  - maintain blood glucose 100-180 while hospitalized   - HbA1c 6.4  - noted to have elevated blood sugars on admission  - per diabetic educator recs, pt would benefit from STAT dose of linagliptin 5mg, can be started on daily dose tomorrow  - linagliptin 5mg daily  - trend blood sugars    #history of DVT  - per patient she had RUE DVT while at [facility]  - has been on eliquis 2.5mg bid for nearly 3 months   - c/w eliquis 2.5mg    #DVT ppx  - Eliquis    #GOC  -full code     #Diet  -DASH/TLC, consistent carbs    Dispo: pt medically active, possible [facility] when cleared, will need HD services set up    *Case discussed with Dr. Dowling.     BURKE ABARCA is a 79y year old Female with PMH of HFpEF, Chronic Kidney Disease 4, DVT (on eliquis) Hypertension, Hyperlipidemia, Type 2 Diabetes Mellitus, Anemia, OA who presents to the ER complaining of shortness of breath x2 days. Admitted for acute on chronic dCHF.     #Shortness of breath - likely 2/2 HFpEF exacerbation, acute on chronic diastolic CHF  #Anemia, likely chronic disease   - CXR with Interstitial edema with mild congestive heart failure  - remote tele, continuous O2  - s/p lasix 80mg ivp in ER, was on bumex 2mg bid since August per patient.   - s/p IV lasix 40mg   - TTE 10/20/24: LVEF 63%, grade 2 diastolic dysfunction  - proBNP 5495 increased from 3792 on 10/19  - s/p 1 U pRBC 10/20/24, repeat hgb 7.1  - s/p 1 U pRBC 10/22/24, repeat hgb 7.8  - iron studies: low transferrin, high ferritin  - hgb 7.5 today  - transfuse if <7  - keep type and screen active  - continue weekly epoietin  - nephro recs appreciated: hold diuretics, no NSAIDs, c/w epoetin  - cardio recs appreciated: gdmt contraindicated 2/2 CKD, hold diuretics  - change oxygen to humidified O2 due to increased congestion and bloody mucus per pt   - pt was up-titrated on oxygen due to increased SOB  - pulm consult recs appreciated: pleural effusion too small to drain at this time, improved CXR, perm cath required, continue to taper off O2   - s/p 4 sessions of HD  - currently not on oxygen, VSS, O2 sat at 93% on RA    #Acute Kidney Injury on Chronic Kidney Disease 4  - uremia improving  - s/p 4 sessions of HD, received dialysis today  - Cr 2.82, BUN 72, GFR 17 today  - nephro recommendations appreciated- plan for tunneled dialysis catheter this week  - consulted Dr. Sepulveda for dialysis tunneled catheter who suggested we consult vascular surgery   - ordered acute hepatitis panel, hep B Ab for dialysis services discharge planning  - pt shuttled to  for placement of Tunneled dialysis catheter, procedure completed, pt has now returned to [facility]    #Nausea/vomiting, likely 2/2 to HD, fluid changes- improved  -pt noted to have episode of nausea/vomiting 10/27/24  -zofran prn    #Hyponatremia, resolved  - Na 135  - monitor    #Hypocalcemia, resolved  - Ca 8.5  - monitor    #Hyperphosphatemia, resolved  - phos 3.4  - improving with dialysis and phoslo  - monitor     #Hypertension  - continue home diltiazem, hydralazine, labetalol    #Hyperlipidemia  - continue home crestor, ezetemibe    #Type II Diabetes Mellitus  - hold home meds  - FS and DANGELO  - maintain blood glucose 100-180 while hospitalized   - HbA1c 6.4  - noted to have elevated blood sugars on admission  - per diabetic educator recs, pt would benefit from STAT dose of linagliptin 5mg, can be started on daily dose tomorrow  - linagliptin 5mg daily  - trend blood sugars    #history of DVT  - per patient she had RUE DVT while at [facility]  - has been on eliquis 2.5mg bid for nearly 3 months   - c/w eliquis 2.5mg    #DVT ppx  - Eliquis    #GOC  -full code     #Diet  -DASH/TLC, consistent carbs    Dispo: pt medically active, possible [facility] when cleared, will need HD services set up    *Case discussed with Dr. Dowling.     BURKE ABARCA is a 79y year old Female with PMH of HFpEF, Chronic Kidney Disease 4, DVT (on eliquis) Hypertension, Hyperlipidemia, Type 2 Diabetes Mellitus, Anemia, OA who presents to the ER complaining of shortness of breath x2 days. Admitted for acute on chronic dCHF.     #Shortness of breath - likely 2/2 HFpEF exacerbation, acute on chronic diastolic CHF  #Anemia, likely chronic disease   - CXR with Interstitial edema with mild congestive heart failure  - remote tele, continuous O2  - s/p lasix 80mg ivp in ER, was on bumex 2mg bid since August per patient.   - s/p IV lasix 40mg   - TTE 10/20/24: LVEF 63%, grade 2 diastolic dysfunction  - proBNP 5495 increased from 3792 on 10/19  - s/p 1 U pRBC 10/20/24, repeat hgb 7.1  - s/p 1 U pRBC 10/22/24, repeat hgb 7.8  - iron studies: low transferrin, high ferritin  - hgb 7.1 today  - transfuse if <7  - keep type and screen active  - continue weekly epoietin  - nephro recs appreciated: hold diuretics, no NSAIDs, c/w epoetin  - cardio recs appreciated: gdmt contraindicated 2/2 CKD, hold diuretics, re-consult as needed  - pulm consult recs appreciated: pleural effusion too small to drain at this time, improved CXR, perm cath required, continue to taper off O2   - s/p 4 sessions of HD, next session tomorrow  - currently not on oxygen, VSS, satting well on RA    #Acute Kidney Injury on Chronic Kidney Disease 4  - uremia improving  - s/p 4 sessions of HD  - Cr 2.03, BUN 44, GFR 25 today  - nephro recommendations appreciated- plan for tunneled dialysis catheter   - Hepatitis panel negative, Hep B surface Ab nonreactive  - pt shuttled to  for placement of Tunneled dialysis catheter, procedure completed, pt has now returned to [facility]    #Nausea/vomiting, likely 2/2 to HD, fluid changes- improved  -pt noted to have episode of nausea/vomiting 10/27/24  -zofran prn    #Hyponatremia, resolved  - Na 132  - monitor    #Hypocalcemia, resolved  - Ca 8.3  - monitor    #Hyperphosphatemia, resolved  - phos 3.1  - improving with dialysis and phoslo  - monitor     #Constipation  - c/w miralax, senna prn    #Hypertension  - c/w diltiazem, hydralazine, labetalol    #Hyperlipidemia  - c/w crestor, ezetemibe    #Type II Diabetes Mellitus  - hold home meds  - FS and DANGELO  - maintain blood glucose 100-180 while hospitalized   - HbA1c 6.4%  - noted to have elevated blood sugars on admission  - c/w linagliptin 5mg daily  - trend blood sugars    #history of DVT  - per patient she had RUE DVT while at [facility]  - has been on eliquis 2.5mg bid for nearly 3 months   - c/w eliquis 2.5mg    #DVT ppx  - Eliquis    #GOC  -full code     #Diet  -DASH/TLC, consistent carbs    Dispo: pt medically active, possible [facility] when cleared, will need HD services set up    Patient's sister updated at bedside.     *Case discussed with Dr. Fuentes

## 2024-10-31 NOTE — ASU PATIENT PROFILE, ADULT - NSICDXPASTMEDICALHX_GEN_ALL_CORE_FT
PAST MEDICAL HISTORY:  Anemia     Arthritis     Diabetes type 2    Hypercholesteremia     Hypertension     Kidney insufficiency     Mass of right knee     Myelodysplasia (myelodysplastic syndrome)     Other giant cell arteritis     PVD (peripheral vascular disease)     Thyroid disease     Thyromegaly s/p partial thyroidectomy long time ago    Urinary incontinence     
No

## 2024-10-31 NOTE — ASU DISCHARGE PLAN (ADULT/PEDIATRIC) - NURSING INSTRUCTIONS
Refer to the multicolored fact sheet for any problems you experience. If you have difficulty urinating or unable to urinate in 8 hours after surgery, call your Dr., or return to HH emergency room.  Notify your Dr. if your fever is 101 or greater. If the pain medicine your  recomends does not help you, or you have severe pain call your Dr.. If you cannot reach the doctor, call [facility] Emergency Department at [phone] or go to your local Emergency Department. A responsible adult should be with you for the rest of the day and night for your safety, and to help you.   Resume your medications as listed on the attached Medication Record.

## 2024-10-31 NOTE — PROGRESS NOTE ADULT - ATTENDING COMMENTS
Please see resident note for full details regarding the service.     Patient was shuttled for permanent HD access today. Unable to examine. Resident evaluated this afternoon.     Assessment:  - Shortness of breath secondary to CHFpEF exacerbation, acute on chronic diastolic CHF   - Anemia likely chronic disease   - ZORAIDA on CKD Stage 4/Cardiorenal syndrome   - History of HFpEF, Chronic Kidney Disease 4, DVT (on eliquis) Hypertension, Hyperlipidemia, Type 2 Diabetes Mellitus, Anemia, OA    Plan:   - s/p permanent access   - Not a candidate for SGLT2 inhibitor or MRA d/t progressive CKD   - Continue labetalol, hydralazine, Cardizem  - Will discuss adding isosorbide dinitrate with cardiology/nephrology   - Nephrology recs appreciated - phoslo for high phosphorus, started on HD 10/25 via temporary HD Cath   - Keep Hb > 8   - Monitor accuchecks, continue Lingliptin, ISS for now   - DVT ppx: restart Eliquis   - Code status: Full code   - Dispo: [facility] when optimized, needs HD services arranged     I spent a total of 50 minutes on the date of this encounter coordinating the patient's care, excluding resident teaching time. This includes reviewing results/imaging and discussions with specialists, nursing, case management/social work. Further tests, medications, and procedures have been ordered as indicated. Results and the plan of care were communicated to the patient and/or their family member. Supporting documentation was completed and added to the patient's chart.

## 2024-11-01 LAB
ALBUMIN SERPL ELPH-MCNC: 3.1 G/DL — LOW (ref 3.3–5)
ALP SERPL-CCNC: 76 U/L — SIGNIFICANT CHANGE UP (ref 40–120)
ALT FLD-CCNC: 13 U/L — SIGNIFICANT CHANGE UP (ref 10–45)
ANION GAP SERPL CALC-SCNC: 7 MMOL/L — SIGNIFICANT CHANGE UP (ref 5–17)
AST SERPL-CCNC: 11 U/L — SIGNIFICANT CHANGE UP (ref 10–40)
BILIRUB SERPL-MCNC: 0.9 MG/DL — SIGNIFICANT CHANGE UP (ref 0.2–1.2)
BUN SERPL-MCNC: 61 MG/DL — HIGH (ref 7–23)
CALCIUM SERPL-MCNC: 8.1 MG/DL — LOW (ref 8.4–10.5)
CHLORIDE SERPL-SCNC: 97 MMOL/L — SIGNIFICANT CHANGE UP (ref 96–108)
CO2 SERPL-SCNC: 30 MMOL/L — SIGNIFICANT CHANGE UP (ref 22–31)
CREAT SERPL-MCNC: 2.7 MG/DL — HIGH (ref 0.5–1.3)
EGFR: 17 ML/MIN/1.73M2 — LOW
GLUCOSE BLDC GLUCOMTR-MCNC: 108 MG/DL — HIGH (ref 70–99)
GLUCOSE BLDC GLUCOMTR-MCNC: 187 MG/DL — HIGH (ref 70–99)
GLUCOSE BLDC GLUCOMTR-MCNC: 196 MG/DL — HIGH (ref 70–99)
GLUCOSE SERPL-MCNC: 207 MG/DL — HIGH (ref 70–99)
HCT VFR BLD CALC: 23.6 % — LOW (ref 34.5–45)
HGB BLD-MCNC: 7.3 G/DL — LOW (ref 11.5–15.5)
MAGNESIUM SERPL-MCNC: 2.3 MG/DL — SIGNIFICANT CHANGE UP (ref 1.6–2.6)
MCHC RBC-ENTMCNC: 27 PG — SIGNIFICANT CHANGE UP (ref 27–34)
MCHC RBC-ENTMCNC: 30.9 G/DL — LOW (ref 32–36)
MCV RBC AUTO: 87.4 FL — SIGNIFICANT CHANGE UP (ref 80–100)
NRBC # BLD: 0 /100 WBCS — SIGNIFICANT CHANGE UP (ref 0–0)
PHOSPHATE SERPL-MCNC: 3.6 MG/DL — SIGNIFICANT CHANGE UP (ref 2.5–4.5)
PLATELET # BLD AUTO: 178 K/UL — SIGNIFICANT CHANGE UP (ref 150–400)
POTASSIUM SERPL-MCNC: 4.3 MMOL/L — SIGNIFICANT CHANGE UP (ref 3.5–5.3)
POTASSIUM SERPL-SCNC: 4.3 MMOL/L — SIGNIFICANT CHANGE UP (ref 3.5–5.3)
PROT SERPL-MCNC: 6.8 G/DL — SIGNIFICANT CHANGE UP (ref 6–8.3)
RBC # BLD: 2.7 M/UL — LOW (ref 3.8–5.2)
RBC # FLD: 17.4 % — HIGH (ref 10.3–14.5)
SODIUM SERPL-SCNC: 134 MMOL/L — LOW (ref 135–145)
WBC # BLD: 9.58 K/UL — SIGNIFICANT CHANGE UP (ref 3.8–10.5)
WBC # FLD AUTO: 9.58 K/UL — SIGNIFICANT CHANGE UP (ref 3.8–10.5)

## 2024-11-01 PROCEDURE — 99233 SBSQ HOSP IP/OBS HIGH 50: CPT | Mod: GC

## 2024-11-01 RX ORDER — SODIUM PHOSPHATE, DIBASIC AND SODIUM PHOSPHATE, MONOBASIC, UNSPECIFIED FORM 7; 19 G/118ML; G/118ML
1 ENEMA RECTAL ONCE
Refills: 0 | Status: COMPLETED | OUTPATIENT
Start: 2024-11-01 | End: 2024-11-01

## 2024-11-01 RX ADMIN — HYDRALAZINE HYDROCHLORIDE 100 MILLIGRAM(S): 50 TABLET, FILM COATED ORAL at 05:50

## 2024-11-01 RX ADMIN — Medication 325 MILLIGRAM(S): at 16:24

## 2024-11-01 RX ADMIN — EZETIMIBE 10 MILLIGRAM(S): 10 TABLET ORAL at 16:24

## 2024-11-01 RX ADMIN — PANTOPRAZOLE SODIUM 40 MILLIGRAM(S): 40 TABLET, DELAYED RELEASE ORAL at 05:50

## 2024-11-01 RX ADMIN — SODIUM PHOSPHATE, DIBASIC AND SODIUM PHOSPHATE, MONOBASIC, UNSPECIFIED FORM 1 ENEMA: 7; 19 ENEMA RECTAL at 16:25

## 2024-11-01 RX ADMIN — CALCIUM ACETATE 1334 MILLIGRAM(S): 667 CAPSULE ORAL at 18:07

## 2024-11-01 RX ADMIN — Medication 600 MILLIGRAM(S): at 05:51

## 2024-11-01 RX ADMIN — Medication 180 MILLIGRAM(S): at 05:50

## 2024-11-01 RX ADMIN — Medication 600 MILLIGRAM(S): at 18:07

## 2024-11-01 RX ADMIN — APIXABAN 2.5 MILLIGRAM(S): 5 TABLET, FILM COATED ORAL at 05:52

## 2024-11-01 RX ADMIN — ERYTHROPOIETIN 10000 UNIT(S): 10000 INJECTION, SOLUTION INTRAVENOUS; SUBCUTANEOUS at 12:56

## 2024-11-01 RX ADMIN — HYDRALAZINE HYDROCHLORIDE 100 MILLIGRAM(S): 50 TABLET, FILM COATED ORAL at 18:07

## 2024-11-01 RX ADMIN — POLYETHYLENE GLYCOL 3350 17 GRAM(S): 17 POWDER, FOR SOLUTION ORAL at 16:24

## 2024-11-01 RX ADMIN — LINAGLIPTIN 5 MILLIGRAM(S): 5 TABLET, FILM COATED ORAL at 16:25

## 2024-11-01 RX ADMIN — SIMETHICONE 80 MILLIGRAM(S): 80 TABLET, CHEWABLE ORAL at 21:42

## 2024-11-01 RX ADMIN — APIXABAN 2.5 MILLIGRAM(S): 5 TABLET, FILM COATED ORAL at 18:08

## 2024-11-01 RX ADMIN — Medication 1: at 18:10

## 2024-11-01 RX ADMIN — CALCIUM ACETATE 1334 MILLIGRAM(S): 667 CAPSULE ORAL at 08:45

## 2024-11-01 RX ADMIN — HYDRALAZINE HYDROCHLORIDE 100 MILLIGRAM(S): 50 TABLET, FILM COATED ORAL at 21:29

## 2024-11-01 NOTE — PROGRESS NOTE ADULT - SUBJECTIVE AND OBJECTIVE BOX
Patient is a 79y old Female who presents with a chief complaint of shortness of breath.     INTERVAL HPI/ OVERNIGHT EVENTS: Patient seen and examined at bedside. Reports that she feels better today, still having some trouble with bowel movements and would like an enema. No other acute complaints.     MEDICATIONS  (STANDING):  apixaban 2.5 milliGRAM(s) Oral two times a day  Biotene Dry Mouth Oral Rinse 5 milliLiter(s) Swish and Spit daily  calcium acetate 1334 milliGRAM(s) Oral three times a day with meals  chlorhexidine 2% Cloths 1 Application(s) Topical daily  dextrose 5%. 1000 milliLiter(s) (100 mL/Hr) IV Continuous <Continuous>  dextrose 5%. 1000 milliLiter(s) (50 mL/Hr) IV Continuous <Continuous>  dextrose 50% Injectable 12.5 Gram(s) IV Push once  dextrose 50% Injectable 25 Gram(s) IV Push once  dextrose 50% Injectable 25 Gram(s) IV Push once  diltiazem    milliGRAM(s) Oral daily  ezetimibe 10 milliGRAM(s) Oral daily  ferrous    sulfate 325 milliGRAM(s) Oral daily  glucagon  Injectable 1 milliGRAM(s) IntraMuscular once  hydrALAZINE 100 milliGRAM(s) Oral three times a day  influenza  Vaccine (HIGH DOSE) 0.5 milliLiter(s) IntraMuscular once  insulin lispro (ADMELOG) corrective regimen sliding scale   SubCutaneous at bedtime  insulin lispro (ADMELOG) corrective regimen sliding scale   SubCutaneous three times a day before meals  labetalol 600 milliGRAM(s) Oral two times a day  linagliptin 5 milliGRAM(s) Oral once  pantoprazole    Tablet 40 milliGRAM(s) Oral before breakfast  rosuvastatin 5 milliGRAM(s) Oral at bedtime    MEDICATIONS  (PRN):  acetaminophen     Tablet .. 650 milliGRAM(s) Oral every 6 hours PRN Temp greater or equal to 38C (100.4F), Mild Pain (1 - 3)  albuterol    90 MICROgram(s) HFA Inhaler 2 Puff(s) Inhalation every 6 hours PRN Shortness of Breath  dextrose Oral Gel 15 Gram(s) Oral once PRN Blood Glucose LESS THAN 70 milliGRAM(s)/deciliter  melatonin 3 milliGRAM(s) Oral at bedtime PRN Insomnia  ondansetron Injectable 4 milliGRAM(s) IV Push every 8 hours PRN Nausea and/or Vomiting  sodium chloride 0.65% Nasal 1 Spray(s) Both Nostrils daily PRN Nasal Congestion  sodium chloride 0.9% lock flush 10 milliLiter(s) IV Push every 1 hour PRN Pre/post blood products, medications, blood draw, and to maintain line patency      Allergies  ACE inhibitors (Angioedema)  statins (Muscle Pain)    Intolerances  predniSONE (Other)      REVIEW OF SYSTEMS:  CONSTITUTIONAL: No fever or chills  HEENT:  No headache, no sore throat  RESPIRATORY: No cough, wheezing, or shortness of breath  CARDIOVASCULAR: No chest pain, palpitations  GASTROINTESTINAL: No abd pain, nausea, vomiting, or diarrhea  GENITOURINARY: No dysuria, frequency, or hematuria  NEUROLOGICAL: no focal weakness or dizziness  MUSCULOSKELETAL: no myalgias     Vital Signs Last 24 Hrs  T(C): 36.7 (01 Nov 2024 14:35), Max: 36.7 (31 Oct 2024 18:03)  T(F): 98 (01 Nov 2024 14:35), Max: 98 (31 Oct 2024 18:03)  HR: 75 (01 Nov 2024 14:35) (66 - 75)  BP: 128/69 (01 Nov 2024 14:35) (128/69 - 149/75)  BP(mean): --  RR: 16 (01 Nov 2024 14:35) (16 - 17)  SpO2: 95% (01 Nov 2024 14:35) (92% - 97%)    Parameters below as of 01 Nov 2024 14:35  Patient On (Oxygen Delivery Method): room air      I&O's Summary    31 Oct 2024 07:01  -  01 Nov 2024 07:00  --------------------------------------------------------  IN: 0 mL / OUT: 300 mL / NET: -300 mL    01 Nov 2024 07:01  -  01 Nov 2024 15:40  --------------------------------------------------------  IN: 1040 mL / OUT: 3300 mL / NET: -2260 mL        BMI (kg/m2): 35.2 (10-25-24 @ 13:49)    PHYSICAL EXAM:  General: NAD, morbidly obese female, permacath on right side in place clean/dry/intact with some dried blood noted around the area  Respiratory: B/L crackles throughout, +improved expiratory wheezes  CV: RRR, +S1/S2, +systolic murmur  Abdominal: Soft, Non tender, Nondistended  Extremities: +chronic skin changes with b/l hyperpigmentation, no ulcers noted. No edema, 2+ peripheral pulses  NERVOUS SYSTEM: answers questions and follows commands appropriately, A&Ox3, grossly moves all extremities   PSYCH: Appropriate affect, Alert & Awake; Good judgement      LABS: Personally reviewed                        7.3    9.58  )-----------( 178      ( 01 Nov 2024 10:45 )             23.6     11-01    134[L]  |  97  |  61[H]  ----------------------------<  207[H]  4.3   |  30  |  2.70[H]    Ca    8.1[L]      01 Nov 2024 10:45  Phos  3.6     11-01  Mg     2.3     11-01    TPro  6.8  /  Alb  3.1[L]  /  TBili  0.9  /  DBili  x   /  AST  11  /  ALT  13  /  AlkPhos  76  11-01      RADIOLOGY & ADDITIONAL TESTS: Personally reviewed.     Consultant(s) Notes Reviewed:  [x] YES  [ ] NO   Discussed with JOSE/PATTIE, RN

## 2024-11-01 NOTE — PROGRESS NOTE ADULT - ATTENDING COMMENTS
Please see resident note for full details regarding the service.     PE: A+Ox3, no murmurs, lungs CTA b/l, abdomen soft/NT/ND, no lower extremity swelling, right wall HD catheter.     Assessment:  - Shortness of breath secondary to cardiorenal syndrome/CHFpEF exacerbation, acute on chronic diastolic CHF   - Anemia of chronic disease   - ZORAIDA on CKD Stage 4/Cardiorenal syndrome   - History of HFpEF, Chronic Kidney Disease 4, DVT (on eliquis) Hypertension, Hyperlipidemia, Type 2 Diabetes Mellitus, Anemia, OA    Plan:   - s/p permanent access   - Not a candidate for SGLT2 inhibitor or MRA d/t progressive CKD   - Continue labetalol, hydralazine, Cardizem  - Nephrology recs appreciated - phoslo for high phosphorus, started on HD 10/25 via temporary HD Cath   - Keep Hb > 8   - Hb 7.3 today, spoke to Dr. Storey, pt is non-toxic appearing and is doing well overall. Will hold on transfusing at this time.   - Monitor accuchecks, continue Lingliptin, ISS for now   - DVT ppx: restart Eliquis   - Code status: Full code   - Dispo: [facility] when optimized, needs HD services arranged     I spent a total of 50 minutes on the date of this encounter coordinating the patient's care, excluding resident teaching time. This includes reviewing results/imaging and discussions with specialists, nursing, case management/social work. Further tests, medications, and procedures have been ordered as indicated. Results and the plan of care were communicated to the patient and/or their family member. Supporting documentation was completed and added to the patient's chart.

## 2024-11-01 NOTE — PROGRESS NOTE ADULT - ASSESSMENT
BURKE ABARCA is a 79y year old Female with PMH of HFpEF, Chronic Kidney Disease 4, DVT (on eliquis) Hypertension, Hyperlipidemia, Type 2 Diabetes Mellitus, Anemia, OA who presents to the ER complaining of shortness of breath x2 days. Admitted for acute on chronic dCHF.     #Shortness of breath - likely 2/2 HFpEF exacerbation, acute on chronic diastolic CHF  #Anemia, likely chronic disease   - CXR with Interstitial edema with mild congestive heart failure  - pulse ox q8h  - TTE 10/20/24: LVEF 63%, grade 2 diastolic dysfunction  - proBNP 5495 increased from 3792 on 10/19  - s/p 1 U pRBC 10/20/24  - s/p 1 U pRBC 10/22/24  - iron studies: low transferrin, high ferritin  - hgb 7.3  - transfuse if <7  - keep type and screen active  - continue weekly epoietin  - nephro recs appreciated  - pulm consult recs appreciated  - s/p 5 sessions of HD, next session tomorrow with possible transfusion per nephro recs & H/H  - currently not on oxygen, VSS, saturating well on RA    #Acute Kidney Injury on Chronic Kidney Disease 4  - uremia improving  - s/p 5 sessions of HD  - Cr 2.70, BUN 61, GFR 17 today  - nephro recs appreciated   - Hepatitis panel negative, Hep B surface Ab nonreactive  - s/p placement of perma cath on 10/31/2024    #Nausea/vomiting, likely 2/2 to HD, fluid changes- improved  -pt noted to have episode of nausea/vomiting 10/27/24  -zofran prn    #Hyponatremia, improving  - Na 134  - monitor    #Hypocalcemia, improving  - Ca 8.1  - monitor    #Hyperphosphatemia, resolved  - phos 3.6  - improving with dialysis and phoslo  - monitor     #Constipation  - c/w miralax, senna prn  - fleet enema x 1 ordered    #Hypertension  - c/w diltiazem, hydralazine, labetalol    #Hyperlipidemia  - c/w crestor, ezetemibe    #Type II Diabetes Mellitus  - hold home meds  - FS and DANGELO  - maintain blood glucose 100-180 while hospitalized   - HbA1c 6.4%  - c/w linagliptin 5mg daily  - trend blood sugars daily    #history of DVT  - hx RUE DVT while at [facility]  - has been on eliquis 2.5mg bid for nearly 3 months   - c/w eliquis 2.5mg    #DVT ppx  -Eliquis    #GOC  -full code     #Diet  -DASH/TLC, consistent carbs    Dispo: [facility] with HD pending medical optimization.     Patient will update family.     *Case seen and discussed with Dr. Fuentes

## 2024-11-01 NOTE — PROGRESS NOTE ADULT - SUBJECTIVE AND OBJECTIVE BOX
S/p stable HD today, comfortable on RA    Vital Signs Last 24 Hrs  T(C): 36.7 (11-01-24 @ 14:35), Max: 36.7 (10-31-24 @ 18:03)  T(F): 98 (11-01-24 @ 14:35), Max: 98 (10-31-24 @ 18:03)  HR: 75 (11-01-24 @ 14:35) (66 - 75)  BP: 128/69 (11-01-24 @ 14:35) (128/69 - 149/75)  RR: 16 (11-01-24 @ 14:35) (16 - 17)  SpO2: 95% (11-01-24 @ 14:35) (92% - 97%)    I&O's Detail    31 Oct 2024 07:01  -  01 Nov 2024 07:00  --------------------------------------------------------  OUT:    Voided (mL): 300 mL  Total OUT: 300 mL    01 Nov 2024 07:01  -  01 Nov 2024 15:37  --------------------------------------------------------  IN:    Oral Fluid: 240 mL    Other (mL): 800 mL  Total IN: 1040 mL    OUT:    Other (mL): 3300 mL  Total OUT: 3300 mL    s1s2  b/l air entry  soft, ND  no edema                                                                                     7.3    9.58  )-----------( 178      ( 01 Nov 2024 10:45 )             23.6     01 Nov 2024 10:45    134    |  97     |  61     ----------------------------<  207    4.3     |  30     |  2.70     Ca    8.1        01 Nov 2024 10:45  Phos  3.6       01 Nov 2024 10:45  Mg     2.3       01 Nov 2024 10:45    TPro  6.8    /  Alb  3.1    /  TBili  0.9    /  DBili  x      /  AST  11     /  ALT  13     /  AlkPhos  76     01 Nov 2024 10:45    LIVER FUNCTIONS - ( 01 Nov 2024 10:45 )  Alb: 3.1 g/dL / Pro: 6.8 g/dL / ALK PHOS: 76 U/L / ALT: 13 U/L / AST: 11 U/L / GGT: x           PT/INR - ( 31 Oct 2024 05:20 )   PT: 13.3 sec;   INR: 1.13 ratio      acetaminophen     Tablet .. 650 milliGRAM(s) Oral every 6 hours PRN  albuterol    90 MICROgram(s) HFA Inhaler 2 Puff(s) Inhalation every 6 hours PRN  apixaban 2.5 milliGRAM(s) Oral two times a day  Biotene Dry Mouth Oral Rinse 5 milliLiter(s) Swish and Spit daily  calcium acetate 1334 milliGRAM(s) Oral three times a day with meals  chlorhexidine 2% Cloths 1 Application(s) Topical daily  dextrose 5%. 1000 milliLiter(s) IV Continuous <Continuous>  dextrose 5%. 1000 milliLiter(s) IV Continuous <Continuous>  dextrose 50% Injectable 12.5 Gram(s) IV Push once  dextrose 50% Injectable 25 Gram(s) IV Push once  dextrose 50% Injectable 25 Gram(s) IV Push once  dextrose Oral Gel 15 Gram(s) Oral once PRN  diltiazem    milliGRAM(s) Oral daily  epoetin jacobo-epbx (RETACRIT) Injectable 52452 Unit(s) IV Push <User Schedule>  ezetimibe 10 milliGRAM(s) Oral daily  ferrous    sulfate 325 milliGRAM(s) Oral daily  glucagon  Injectable 1 milliGRAM(s) IntraMuscular once  hydrALAZINE 100 milliGRAM(s) Oral three times a day  influenza  Vaccine (HIGH DOSE) 0.5 milliLiter(s) IntraMuscular once  insulin lispro (ADMELOG) corrective regimen sliding scale   SubCutaneous at bedtime  insulin lispro (ADMELOG) corrective regimen sliding scale   SubCutaneous three times a day before meals  labetalol 600 milliGRAM(s) Oral two times a day  linagliptin 5 milliGRAM(s) Oral daily  melatonin 3 milliGRAM(s) Oral at bedtime PRN  ondansetron Injectable 4 milliGRAM(s) IV Push every 8 hours PRN  pantoprazole    Tablet 40 milliGRAM(s) Oral before breakfast  polyethylene glycol 3350 17 Gram(s) Oral daily  rosuvastatin 5 milliGRAM(s) Oral at bedtime  saline laxative (FLEET) Rectal Enema 1 Enema Rectal once  senna 2 Tablet(s) Oral at bedtime PRN  simethicone 80 milliGRAM(s) Chew daily PRN  sodium chloride 0.65% Nasal 1 Spray(s) Both Nostrils daily PRN  sodium chloride 0.9% lock flush 10 milliLiter(s) IV Push every 1 hour PRN    A/P:    DM, HTN, CKD 4 (Cr 2.13 - 7/2/24, Cr 2.35 - 10/7/24)  Adm w/SOB iso severe anemia, some PVC on CXR  S/p 2u PRBCs on this adm  Started on diuretics   UA w/pr 30, bland  Imaging w/o hydro   Diuretics held since 10/22  Serologies are negative, SPEP negative  No improvement in renal fx  Cardio-renal/hemodynamic ZORAIDA/CKD 4 w/progression to ESRD  Started on HD 10/25 via temp HD cath   S/p perm cath yesterday  S/p stable HD today w/2.5kg fluid removal  Epoetin w/HD 3 x week  Consider PRBCs w/HD if Hgb is down-trending   Plan for [facility]EL[facility] w/HD     [phone]

## 2024-11-01 NOTE — PROGRESS NOTE ADULT - SUBJECTIVE AND OBJECTIVE BOX
Time of Visit:  Patient seen and examined. pat lying in bed comfortable     MEDICATIONS  (STANDING):  apixaban 2.5 milliGRAM(s) Oral two times a day  Biotene Dry Mouth Oral Rinse 5 milliLiter(s) Swish and Spit daily  calcium acetate 1334 milliGRAM(s) Oral three times a day with meals  chlorhexidine 2% Cloths 1 Application(s) Topical daily  dextrose 5%. 1000 milliLiter(s) (50 mL/Hr) IV Continuous <Continuous>  dextrose 5%. 1000 milliLiter(s) (100 mL/Hr) IV Continuous <Continuous>  dextrose 50% Injectable 12.5 Gram(s) IV Push once  dextrose 50% Injectable 25 Gram(s) IV Push once  dextrose 50% Injectable 25 Gram(s) IV Push once  diltiazem    milliGRAM(s) Oral daily  epoetin jacobo-epbx (RETACRIT) Injectable 25752 Unit(s) IV Push <User Schedule>  ezetimibe 10 milliGRAM(s) Oral daily  ferrous    sulfate 325 milliGRAM(s) Oral daily  glucagon  Injectable 1 milliGRAM(s) IntraMuscular once  hydrALAZINE 100 milliGRAM(s) Oral three times a day  influenza  Vaccine (HIGH DOSE) 0.5 milliLiter(s) IntraMuscular once  insulin lispro (ADMELOG) corrective regimen sliding scale   SubCutaneous at bedtime  insulin lispro (ADMELOG) corrective regimen sliding scale   SubCutaneous three times a day before meals  labetalol 600 milliGRAM(s) Oral two times a day  linagliptin 5 milliGRAM(s) Oral daily  pantoprazole    Tablet 40 milliGRAM(s) Oral before breakfast  polyethylene glycol 3350 17 Gram(s) Oral daily  rosuvastatin 5 milliGRAM(s) Oral at bedtime      MEDICATIONS  (PRN):  acetaminophen     Tablet .. 650 milliGRAM(s) Oral every 6 hours PRN Temp greater or equal to 38C (100.4F), Mild Pain (1 - 3)  albuterol    90 MICROgram(s) HFA Inhaler 2 Puff(s) Inhalation every 6 hours PRN Shortness of Breath  dextrose Oral Gel 15 Gram(s) Oral once PRN Blood Glucose LESS THAN 70 milliGRAM(s)/deciliter  melatonin 3 milliGRAM(s) Oral at bedtime PRN Insomnia  ondansetron Injectable 4 milliGRAM(s) IV Push every 8 hours PRN Nausea and/or Vomiting  senna 2 Tablet(s) Oral at bedtime PRN Constipation  simethicone 80 milliGRAM(s) Chew daily PRN Gas  sodium chloride 0.65% Nasal 1 Spray(s) Both Nostrils daily PRN Nasal Congestion  sodium chloride 0.9% lock flush 10 milliLiter(s) IV Push every 1 hour PRN Pre/post blood products, medications, blood draw, and to maintain line patency       Medications up to date at time of exam.      PHYSICAL EXAMINATION:  Patient has no new complaints.  GENERAL: The patient  in no apparent distress.     Vital Signs Last 24 Hrs  T(C): 36.7 (01 Nov 2024 05:20), Max: 36.7 (31 Oct 2024 18:03)  T(F): 98 (01 Nov 2024 05:20), Max: 98 (31 Oct 2024 18:03)  HR: 72 (01 Nov 2024 05:20) (71 - 72)  BP: 134/70 (01 Nov 2024 05:20) (116/63 - 138/72)  BP(mean): --  RR: 16 (01 Nov 2024 05:20) (16 - 16)  SpO2: 95% (01 Nov 2024 05:20) (92% - 95%)    Parameters below as of 01 Nov 2024 05:20  Patient On (Oxygen Delivery Method): room air       (if applicable)    Chest Tube (if applicable)    HEENT: Head is normocephalic and atraumatic. Extraocular muscles are intact. Mucous membranes are moist.     NECK: Supple, no palpable adenopathy.    LUNGS: Fair bilateral air entry   no wheezing, rales, or rhonchi. R subclavian dialysis cath      HEART: Regular rate and rhythm without murmur.    ABDOMEN: Soft, nontender, and nondistended.  No hepatosplenomegaly is noted.    : No painful voiding, no pelvic pain    EXTREMITIES: Without any cyanosis, clubbing, rash, lesions or edema. + B/L lower limb chronic skin changes and atrophy of muscle     NEUROLOGIC: Awake, alert, oriented, grossly intact    SKIN: Warm, dry, good turgor.      LABS:                        7.3    9.58  )-----------( 178      ( 01 Nov 2024 10:45 )             23.6     11-01    134[L]  |  97  |  61[H]  ----------------------------<  207[H]  4.3   |  30  |  2.70[H]    Ca    8.1[L]      01 Nov 2024 10:45  Phos  3.6     11-01  Mg     2.3     11-01    TPro  6.8  /  Alb  3.1[L]  /  TBili  0.9  /  DBili  x   /  AST  11  /  ALT  13  /  AlkPhos  76  11-01    PT/INR - ( 31 Oct 2024 05:20 )   PT: 13.3 sec;   INR: 1.13 ratio         PTT - ( 31 Oct 2024 05:20 )  PTT:16.6 sec  Urinalysis Basic - ( 01 Nov 2024 10:45 )    Color: x / Appearance: x / SG: x / pH: x  Gluc: 207 mg/dL / Ketone: x  / Bili: x / Urobili: x   Blood: x / Protein: x / Nitrite: x   Leuk Esterase: x / RBC: x / WBC x   Sq Epi: x / Non Sq Epi: x / Bacteria: x    MICROBIOLOGY: (if applicable)    RADIOLOGY & ADDITIONAL STUDIES:  EKG:   CXR:  ECHO:    IMPRESSION: 79y Female PAST MEDICAL & SURGICAL HISTORY:  Hypertension      Diabetes  type 2      Thyroid disease      Anemia      Hypercholesteremia      Myelodysplasia (myelodysplastic syndrome)      Thyromegaly  s/p partial thyroidectomy long time ago      Other giant cell arteritis      Arthritis      Kidney insufficiency      PVD (peripheral vascular disease)      Mass of right knee      Urinary incontinence      H/O partial thyroidectomy      H/O: hysterectomy      History of cataract surgery      History of vascular surgery       p/w         Assessment:  - Acute hypoxic resp failure   - Pulmonary edema   - Acute ex of CHF pEF  - Pleural effusion   - Anemia likely chronic disease   - ZORAIDA on CKD Stage 4/Cardiorenal syndrome   - DVT (on eliquis) Hypertension, Hyperlipidemia, Type 2 Diabetes Mellitus, Anemia, OA    Plan:     - Hypoxic resp failure resolved with HD and now tolerating ambient air  - Continue HD as per Neph   - Restrict fluid intake 1L / day   - Anticoag   - Continue BP and cardiac meds as per team

## 2024-11-02 LAB
ALBUMIN SERPL ELPH-MCNC: 2.9 G/DL — LOW (ref 3.3–5)
ALP SERPL-CCNC: 76 U/L — SIGNIFICANT CHANGE UP (ref 40–120)
ALT FLD-CCNC: 16 U/L — SIGNIFICANT CHANGE UP (ref 10–45)
ANION GAP SERPL CALC-SCNC: 7 MMOL/L — SIGNIFICANT CHANGE UP (ref 5–17)
AST SERPL-CCNC: 13 U/L — SIGNIFICANT CHANGE UP (ref 10–40)
BILIRUB SERPL-MCNC: 0.3 MG/DL — SIGNIFICANT CHANGE UP (ref 0.2–1.2)
BUN SERPL-MCNC: 43 MG/DL — HIGH (ref 7–23)
CALCIUM SERPL-MCNC: 8.2 MG/DL — LOW (ref 8.4–10.5)
CHLORIDE SERPL-SCNC: 101 MMOL/L — SIGNIFICANT CHANGE UP (ref 96–108)
CO2 SERPL-SCNC: 29 MMOL/L — SIGNIFICANT CHANGE UP (ref 22–31)
CREAT SERPL-MCNC: 2.35 MG/DL — HIGH (ref 0.5–1.3)
EGFR: 21 ML/MIN/1.73M2 — LOW
GLUCOSE BLDC GLUCOMTR-MCNC: 116 MG/DL — HIGH (ref 70–99)
GLUCOSE BLDC GLUCOMTR-MCNC: 122 MG/DL — HIGH (ref 70–99)
GLUCOSE BLDC GLUCOMTR-MCNC: 203 MG/DL — HIGH (ref 70–99)
GLUCOSE BLDC GLUCOMTR-MCNC: 260 MG/DL — HIGH (ref 70–99)
GLUCOSE SERPL-MCNC: 113 MG/DL — HIGH (ref 70–99)
HCT VFR BLD CALC: 24.1 % — LOW (ref 34.5–45)
HGB BLD-MCNC: 7.3 G/DL — LOW (ref 11.5–15.5)
MAGNESIUM SERPL-MCNC: 2.2 MG/DL — SIGNIFICANT CHANGE UP (ref 1.6–2.6)
MCHC RBC-ENTMCNC: 26.7 PG — LOW (ref 27–34)
MCHC RBC-ENTMCNC: 30.3 G/DL — LOW (ref 32–36)
MCV RBC AUTO: 88.3 FL — SIGNIFICANT CHANGE UP (ref 80–100)
NRBC # BLD: 0 /100 WBCS — SIGNIFICANT CHANGE UP (ref 0–0)
PHOSPHATE SERPL-MCNC: 2.7 MG/DL — SIGNIFICANT CHANGE UP (ref 2.5–4.5)
PLATELET # BLD AUTO: 188 K/UL — SIGNIFICANT CHANGE UP (ref 150–400)
POTASSIUM SERPL-MCNC: 4.2 MMOL/L — SIGNIFICANT CHANGE UP (ref 3.5–5.3)
POTASSIUM SERPL-SCNC: 4.2 MMOL/L — SIGNIFICANT CHANGE UP (ref 3.5–5.3)
PROT SERPL-MCNC: 6.7 G/DL — SIGNIFICANT CHANGE UP (ref 6–8.3)
RBC # BLD: 2.73 M/UL — LOW (ref 3.8–5.2)
RBC # FLD: 17.1 % — HIGH (ref 10.3–14.5)
SODIUM SERPL-SCNC: 137 MMOL/L — SIGNIFICANT CHANGE UP (ref 135–145)
WBC # BLD: 10.73 K/UL — HIGH (ref 3.8–10.5)
WBC # FLD AUTO: 10.73 K/UL — HIGH (ref 3.8–10.5)

## 2024-11-02 PROCEDURE — 99232 SBSQ HOSP IP/OBS MODERATE 35: CPT | Mod: GC

## 2024-11-02 RX ADMIN — HYDRALAZINE HYDROCHLORIDE 100 MILLIGRAM(S): 50 TABLET, FILM COATED ORAL at 14:54

## 2024-11-02 RX ADMIN — Medication 600 MILLIGRAM(S): at 17:19

## 2024-11-02 RX ADMIN — Medication 650 MILLIGRAM(S): at 22:11

## 2024-11-02 RX ADMIN — Medication 325 MILLIGRAM(S): at 12:16

## 2024-11-02 RX ADMIN — HYDRALAZINE HYDROCHLORIDE 100 MILLIGRAM(S): 50 TABLET, FILM COATED ORAL at 21:38

## 2024-11-02 RX ADMIN — PANTOPRAZOLE SODIUM 40 MILLIGRAM(S): 40 TABLET, DELAYED RELEASE ORAL at 06:05

## 2024-11-02 RX ADMIN — CALCIUM ACETATE 1334 MILLIGRAM(S): 667 CAPSULE ORAL at 08:35

## 2024-11-02 RX ADMIN — APIXABAN 2.5 MILLIGRAM(S): 5 TABLET, FILM COATED ORAL at 06:06

## 2024-11-02 RX ADMIN — Medication 5 MILLILITER(S): at 14:54

## 2024-11-02 RX ADMIN — Medication 180 MILLIGRAM(S): at 06:06

## 2024-11-02 RX ADMIN — Medication 3: at 12:26

## 2024-11-02 RX ADMIN — CALCIUM ACETATE 1334 MILLIGRAM(S): 667 CAPSULE ORAL at 17:19

## 2024-11-02 RX ADMIN — Medication 3 MILLIGRAM(S): at 21:42

## 2024-11-02 RX ADMIN — APIXABAN 2.5 MILLIGRAM(S): 5 TABLET, FILM COATED ORAL at 17:19

## 2024-11-02 RX ADMIN — POLYETHYLENE GLYCOL 3350 17 GRAM(S): 17 POWDER, FOR SOLUTION ORAL at 12:17

## 2024-11-02 RX ADMIN — Medication 600 MILLIGRAM(S): at 06:05

## 2024-11-02 RX ADMIN — CHLORHEXIDINE GLUCONATE 1 APPLICATION(S): 40 SOLUTION TOPICAL at 12:19

## 2024-11-02 RX ADMIN — Medication 650 MILLIGRAM(S): at 21:39

## 2024-11-02 RX ADMIN — LINAGLIPTIN 5 MILLIGRAM(S): 5 TABLET, FILM COATED ORAL at 12:17

## 2024-11-02 RX ADMIN — CALCIUM ACETATE 1334 MILLIGRAM(S): 667 CAPSULE ORAL at 12:16

## 2024-11-02 RX ADMIN — HYDRALAZINE HYDROCHLORIDE 100 MILLIGRAM(S): 50 TABLET, FILM COATED ORAL at 06:06

## 2024-11-02 RX ADMIN — EZETIMIBE 10 MILLIGRAM(S): 10 TABLET ORAL at 12:17

## 2024-11-02 NOTE — PROGRESS NOTE ADULT - SUBJECTIVE AND OBJECTIVE BOX
Patient is a 79y old Female who presents with a chief complaint of shortness of breath.     INTERVAL HPI/ OVERNIGHT EVENTS: Patient seen and examined at bedside. Reports that she feels better today, still having some trouble with bowel movements and would like an enema. No other acute complaints.     MEDICATIONS  (STANDING):  apixaban 2.5 milliGRAM(s) Oral two times a day  Biotene Dry Mouth Oral Rinse 5 milliLiter(s) Swish and Spit daily  calcium acetate 1334 milliGRAM(s) Oral three times a day with meals  chlorhexidine 2% Cloths 1 Application(s) Topical daily  dextrose 5%. 1000 milliLiter(s) (100 mL/Hr) IV Continuous <Continuous>  dextrose 5%. 1000 milliLiter(s) (50 mL/Hr) IV Continuous <Continuous>  dextrose 50% Injectable 12.5 Gram(s) IV Push once  dextrose 50% Injectable 25 Gram(s) IV Push once  dextrose 50% Injectable 25 Gram(s) IV Push once  diltiazem    milliGRAM(s) Oral daily  ezetimibe 10 milliGRAM(s) Oral daily  ferrous    sulfate 325 milliGRAM(s) Oral daily  glucagon  Injectable 1 milliGRAM(s) IntraMuscular once  hydrALAZINE 100 milliGRAM(s) Oral three times a day  influenza  Vaccine (HIGH DOSE) 0.5 milliLiter(s) IntraMuscular once  insulin lispro (ADMELOG) corrective regimen sliding scale   SubCutaneous at bedtime  insulin lispro (ADMELOG) corrective regimen sliding scale   SubCutaneous three times a day before meals  labetalol 600 milliGRAM(s) Oral two times a day  linagliptin 5 milliGRAM(s) Oral once  pantoprazole    Tablet 40 milliGRAM(s) Oral before breakfast  rosuvastatin 5 milliGRAM(s) Oral at bedtime    MEDICATIONS  (PRN):  acetaminophen     Tablet .. 650 milliGRAM(s) Oral every 6 hours PRN Temp greater or equal to 38C (100.4F), Mild Pain (1 - 3)  albuterol    90 MICROgram(s) HFA Inhaler 2 Puff(s) Inhalation every 6 hours PRN Shortness of Breath  dextrose Oral Gel 15 Gram(s) Oral once PRN Blood Glucose LESS THAN 70 milliGRAM(s)/deciliter  melatonin 3 milliGRAM(s) Oral at bedtime PRN Insomnia  ondansetron Injectable 4 milliGRAM(s) IV Push every 8 hours PRN Nausea and/or Vomiting  sodium chloride 0.65% Nasal 1 Spray(s) Both Nostrils daily PRN Nasal Congestion  sodium chloride 0.9% lock flush 10 milliLiter(s) IV Push every 1 hour PRN Pre/post blood products, medications, blood draw, and to maintain line patency      Allergies  ACE inhibitors (Angioedema)  statins (Muscle Pain)    Intolerances  predniSONE (Other)      REVIEW OF SYSTEMS:  CONSTITUTIONAL: No fever or chills  HEENT:  No headache, no sore throat  RESPIRATORY: No cough, wheezing, or shortness of breath  CARDIOVASCULAR: No chest pain, palpitations  GASTROINTESTINAL: No abd pain, nausea, vomiting, or diarrhea  GENITOURINARY: No dysuria, frequency, or hematuria  NEUROLOGICAL: no focal weakness or dizziness  MUSCULOSKELETAL: no myalgias     Vital Signs Last 24 Hrs  T(C): 36.7 (01 Nov 2024 14:35), Max: 36.7 (31 Oct 2024 18:03)  T(F): 98 (01 Nov 2024 14:35), Max: 98 (31 Oct 2024 18:03)  HR: 75 (01 Nov 2024 14:35) (66 - 75)  BP: 128/69 (01 Nov 2024 14:35) (128/69 - 149/75)  BP(mean): --  RR: 16 (01 Nov 2024 14:35) (16 - 17)  SpO2: 95% (01 Nov 2024 14:35) (92% - 97%)    Parameters below as of 01 Nov 2024 14:35  Patient On (Oxygen Delivery Method): room air      I&O's Summary    31 Oct 2024 07:01  -  01 Nov 2024 07:00  --------------------------------------------------------  IN: 0 mL / OUT: 300 mL / NET: -300 mL    01 Nov 2024 07:01  -  01 Nov 2024 15:40  --------------------------------------------------------  IN: 1040 mL / OUT: 3300 mL / NET: -2260 mL        BMI (kg/m2): 35.2 (10-25-24 @ 13:49)    PHYSICAL EXAM:  General: NAD, morbidly obese female, permacath on right side in place clean/dry/intact with some dried blood noted around the area  Respiratory: B/L crackles throughout, +improved expiratory wheezes  CV: RRR, +S1/S2, +systolic murmur  Abdominal: Soft, Non tender, Nondistended  Extremities: +chronic skin changes with b/l hyperpigmentation, no ulcers noted. No edema, 2+ peripheral pulses  NERVOUS SYSTEM: answers questions and follows commands appropriately, A&Ox3, grossly moves all extremities   PSYCH: Appropriate affect, Alert & Awake; Good judgement      LABS: Personally reviewed                        7.3    9.58  )-----------( 178      ( 01 Nov 2024 10:45 )             23.6     11-01    134[L]  |  97  |  61[H]  ----------------------------<  207[H]  4.3   |  30  |  2.70[H]    Ca    8.1[L]      01 Nov 2024 10:45  Phos  3.6     11-01  Mg     2.3     11-01    TPro  6.8  /  Alb  3.1[L]  /  TBili  0.9  /  DBili  x   /  AST  11  /  ALT  13  /  AlkPhos  76  11-01      RADIOLOGY & ADDITIONAL TESTS: Personally reviewed.     Consultant(s) Notes Reviewed:  [x] YES  [ ] NO   Discussed with JOSE/PATTIE, RN     Patient is a 79y old Female who presents with a chief complaint of shortness of breath.     INTERVAL HPI/ OVERNIGHT EVENTS: Patient seen and examined at bedside. Reports that she feels fine today. Had a bowel movement last night. No other acute complaints.      REVIEW OF SYSTEMS:  CONSTITUTIONAL: No fever or chills  HEENT:  No headache, no sore throat  RESPIRATORY: No cough, wheezing, or shortness of breath  CARDIOVASCULAR: No chest pain, palpitations  GASTROINTESTINAL: No abd pain, nausea, vomiting, or diarrhea  GENITOURINARY: No dysuria, frequency, or hematuria  NEUROLOGICAL: no focal weakness or dizziness  MUSCULOSKELETAL: no myalgias     Vital Signs Last 24 Hrs  T(C): 37 (02 Nov 2024 11:40), Max: 37.3 (01 Nov 2024 20:36)  T(F): 98.6 (02 Nov 2024 11:40), Max: 99.1 (01 Nov 2024 20:36)  HR: 70 (02 Nov 2024 11:40) (70 - 82)  BP: 134/66 (02 Nov 2024 11:40) (122/66 - 134/66)  BP(mean): --  RR: 17 (02 Nov 2024 11:40) (16 - 18)  SpO2: 94% (02 Nov 2024 11:40) (93% - 94%)    Parameters below as of 02 Nov 2024 11:40  Patient On (Oxygen Delivery Method): nasal cannula  O2 Flow (L/min): 2      I&O's Summary    01 Nov 2024 07:01  -  02 Nov 2024 07:00  --------------------------------------------------------  IN: 1040 mL / OUT: 3550 mL / NET: -2510 mL      BMI (kg/m2): 35.2 (10-25-24 @ 13:49)    PHYSICAL EXAM:  General: NAD, morbidly obese female, permacath on right side in place clean/dry/intact   Respiratory: B/L crackles throughout, +improved expiratory wheezes  CV: RRR, +S1/S2, +systolic murmur  Abdominal: Soft, Non tender, Nondistended  Extremities: +chronic skin changes with b/l hyperpigmentation, no ulcers noted. No edema, 2+ peripheral pulses  NERVOUS SYSTEM: answers questions and follows commands appropriately, A&Ox3, grossly moves all extremities   PSYCH: Appropriate affect, Alert & Awake; Good judgement      LABS: Personally reviewed                        7.3    10.73 )-----------( 188      ( 02 Nov 2024 07:36 )             24.1     11-02    137  |  101  |  43[H]  ----------------------------<  113[H]  4.2   |  29  |  2.35[H]    Ca    8.2[L]      02 Nov 2024 07:36  Phos  2.7     11-02  Mg     2.2     11-02    TPro  6.7  /  Alb  2.9[L]  /  TBili  0.3  /  DBili  x   /  AST  13  /  ALT  16  /  AlkPhos  76  11-02      RADIOLOGY & ADDITIONAL TESTS: Personally reviewed.     Consultant(s) Notes Reviewed:  [x] YES  [ ] NO   Discussed with JOSE/PATTIE, RN

## 2024-11-02 NOTE — PROGRESS NOTE ADULT - ATTENDING COMMENTS
Please see resident note for full details regarding the service.     PE: A+Ox3, no murmurs, lungs CTA b/l, abdomen soft/NT/ND, no lower extremity swelling, right wall HD catheter.     Assessment:  - Shortness of breath secondary to cardiorenal syndrome/CHFpEF exacerbation, acute on chronic diastolic CHF   - Anemia of chronic disease   - ZORAIDA on CKD Stage 4/Cardiorenal syndrome   - History of HFpEF, Chronic Kidney Disease 4, DVT (on eliquis) Hypertension, Hyperlipidemia, Type 2 Diabetes Mellitus, Anemia, OA    Plan:   - s/p permanent access   - Not a candidate for SGLT2 inhibitor or MRA d/t progressive CKD   - Continue labetalol, hydralazine, Cardizem  - Nephrology recs appreciated - phoslo for high phosphorus, started on HD 10/25 via temporary HD Cath   - Keep Hb > 8   - Hb 7.3 today, spoke to Dr. Storey yesterday, pt is non-toxic appearing and is doing well overall. Will hold on transfusing at this time.   - Monitor accuchecks, continue Lingliptin, ISS for now   - DVT ppx: restart Eliquis   - Code status: Full code   - Dispo: [facility] when optimized, needs HD services arranged     I spent a total of 35 minutes on the date of this encounter coordinating the patient's care, excluding resident teaching time. This includes reviewing results/imaging and discussions with specialists, nursing, case management/social work. Further tests, medications, and procedures have been ordered as indicated. Results and the plan of care were communicated to the patient and/or their family member. Supporting documentation was completed and added to the patient's chart.

## 2024-11-02 NOTE — PROGRESS NOTE ADULT - ASSESSMENT
BURKE ABARCA is a 79y year old Female with PMH of HFpEF, Chronic Kidney Disease 4, DVT (on eliquis) Hypertension, Hyperlipidemia, Type 2 Diabetes Mellitus, Anemia, OA who presents to the ER complaining of shortness of breath x2 days. Admitted for acute on chronic dCHF.     #Shortness of breath - likely 2/2 HFpEF exacerbation, acute on chronic diastolic CHF  #Anemia, likely chronic disease   - CXR with Interstitial edema with mild congestive heart failure  - pulse ox q8h  - TTE 10/20/24: LVEF 63%, grade 2 diastolic dysfunction  - proBNP 5495 increased from 3792 on 10/19  - s/p 1 U pRBC 10/20/24  - s/p 1 U pRBC 10/22/24  - iron studies: low transferrin, high ferritin  - hgb 7.3  - transfuse if <7  - keep type and screen active  - continue weekly epoietin  - nephro recs appreciated  - pulm consult recs appreciated  - s/p 5 sessions of HD, next session tomorrow with possible transfusion per nephro recs & H/H  - currently not on oxygen, VSS, saturating well on RA    #Acute Kidney Injury on Chronic Kidney Disease 4  - uremia improving  - s/p 5 sessions of HD  - Cr 2.70, BUN 61, GFR 17 today  - nephro recs appreciated   - Hepatitis panel negative, Hep B surface Ab nonreactive  - s/p placement of perma cath on 10/31/2024    #Nausea/vomiting, likely 2/2 to HD, fluid changes- improved  -pt noted to have episode of nausea/vomiting 10/27/24  -zofran prn    #Hyponatremia, improving  - Na 134  - monitor    #Hypocalcemia, improving  - Ca 8.1  - monitor    #Hyperphosphatemia, resolved  - phos 3.6  - improving with dialysis and phoslo  - monitor     #Constipation  - c/w miralax, senna prn  - fleet enema x 1 ordered    #Hypertension  - c/w diltiazem, hydralazine, labetalol    #Hyperlipidemia  - c/w crestor, ezetemibe    #Type II Diabetes Mellitus  - hold home meds  - FS and DANGELO  - maintain blood glucose 100-180 while hospitalized   - HbA1c 6.4%  - c/w linagliptin 5mg daily  - trend blood sugars daily    #history of DVT  - hx RUE DVT while at [facility]  - has been on eliquis 2.5mg bid for nearly 3 months   - c/w eliquis 2.5mg    #DVT ppx  -Eliquis    #GOC  -full code     #Diet  -DASH/TLC, consistent carbs    Dispo: [facility] with HD pending medical optimization.     Patient will update family.     *Case seen and discussed with Dr. Fuentes   BURKE ABARCA is a 79y year old Female with PMH of HFpEF, Chronic Kidney Disease 4, DVT (on eliquis) Hypertension, Hyperlipidemia, Type 2 Diabetes Mellitus, Anemia, OA who presents to the ER complaining of shortness of breath x2 days. Admitted for acute on chronic dCHF and worsening chronic kidney disease.     #Shortness of breath - likely 2/2 HFpEF exacerbation, acute on chronic diastolic CHF  #Anemia, likely chronic disease   - CXR with Interstitial edema with mild congestive heart failure  - pulse ox q8h  - TTE 10/20/24: LVEF 63%, grade 2 diastolic dysfunction  - proBNP 5495 increased from 3792 on 10/19  - s/p 2U pRBC  - iron studies: low transferrin, high ferritin  - hgb 7.3  - transfuse if <7  - keep type and screen active  - continue weekly epoietin  - nephro recs appreciated  - pulm consult recs appreciated  - currently not on oxygen, VSS, saturating well on RA    #Acute Kidney Injury on Chronic Kidney Disease 4  - uremia improving  - s/p 5 sessions of HD  - Cr 2.35, BUN 43, GFR 21 today  - nephro recs appreciated   - Hepatitis panel negative, Hep B surface Ab nonreactive  - s/p placement of perma cath on 10/31/2024    #Nausea/vomiting, likely 2/2 to HD, fluid changes- improved  -pt noted to have episode of nausea/vomiting 10/27/24  -zofran prn    #Hyponatremia, improving  - Na wnl  - monitor    #Hypocalcemia, improving  - Ca 8.2  - monitor    #Hyperphosphatemia, resolved  - phos wnl  - c/w dialysis and phoslo  - monitor     #Constipation, improved  - c/w miralax, senna prn  - s/p fleet enema  - pt reports having bowel movements     #Hypertension  - c/w diltiazem, hydralazine, labetalol    #Hyperlipidemia  - c/w crestor, ezetemibe    #Type II Diabetes Mellitus  - hold home meds  - FS and DANGELO  - maintain blood glucose 100-180 while hospitalized   - HbA1c 6.4%  - c/w linagliptin 5mg daily  - trend blood sugars daily    #history of DVT  - hx RUE DVT while at [facility]  - has been on eliquis 2.5mg bid for nearly 3 months   - c/w eliquis 2.5mg    #DVT ppx  -Eliquis    #GOC  -full code     #Diet  -DASH/TLC, consistent carbs    Dispo: [facility] with HD pending medical optimization and authorization.     *Case seen and discussed with Dr. Fuentes

## 2024-11-02 NOTE — PROGRESS NOTE ADULT - SUBJECTIVE AND OBJECTIVE BOX
Time of Visit:  Patient seen and examined. pat is lyin in bed comfortable . non new complaint     MEDICATIONS  (STANDING):  apixaban 2.5 milliGRAM(s) Oral two times a day  Biotene Dry Mouth Oral Rinse 5 milliLiter(s) Swish and Spit daily  calcium acetate 1334 milliGRAM(s) Oral three times a day with meals  chlorhexidine 2% Cloths 1 Application(s) Topical daily  dextrose 5%. 1000 milliLiter(s) (100 mL/Hr) IV Continuous <Continuous>  dextrose 5%. 1000 milliLiter(s) (50 mL/Hr) IV Continuous <Continuous>  dextrose 50% Injectable 12.5 Gram(s) IV Push once  dextrose 50% Injectable 25 Gram(s) IV Push once  dextrose 50% Injectable 25 Gram(s) IV Push once  diltiazem    milliGRAM(s) Oral daily  epoetin jacobo-epbx (RETACRIT) Injectable 01405 Unit(s) IV Push <User Schedule>  ezetimibe 10 milliGRAM(s) Oral daily  ferrous    sulfate 325 milliGRAM(s) Oral daily  glucagon  Injectable 1 milliGRAM(s) IntraMuscular once  hydrALAZINE 100 milliGRAM(s) Oral three times a day  influenza  Vaccine (HIGH DOSE) 0.5 milliLiter(s) IntraMuscular once  insulin lispro (ADMELOG) corrective regimen sliding scale   SubCutaneous at bedtime  insulin lispro (ADMELOG) corrective regimen sliding scale   SubCutaneous three times a day before meals  labetalol 600 milliGRAM(s) Oral two times a day  linagliptin 5 milliGRAM(s) Oral daily  pantoprazole    Tablet 40 milliGRAM(s) Oral before breakfast  polyethylene glycol 3350 17 Gram(s) Oral daily  rosuvastatin 5 milliGRAM(s) Oral at bedtime      MEDICATIONS  (PRN):  acetaminophen     Tablet .. 650 milliGRAM(s) Oral every 6 hours PRN Temp greater or equal to 38C (100.4F), Mild Pain (1 - 3)  albuterol    90 MICROgram(s) HFA Inhaler 2 Puff(s) Inhalation every 6 hours PRN Shortness of Breath  dextrose Oral Gel 15 Gram(s) Oral once PRN Blood Glucose LESS THAN 70 milliGRAM(s)/deciliter  melatonin 3 milliGRAM(s) Oral at bedtime PRN Insomnia  ondansetron Injectable 4 milliGRAM(s) IV Push every 8 hours PRN Nausea and/or Vomiting  senna 2 Tablet(s) Oral at bedtime PRN Constipation  simethicone 80 milliGRAM(s) Chew daily PRN Gas  sodium chloride 0.65% Nasal 1 Spray(s) Both Nostrils daily PRN Nasal Congestion  sodium chloride 0.9% lock flush 10 milliLiter(s) IV Push every 1 hour PRN Pre/post blood products, medications, blood draw, and to maintain line patency       Medications up to date at time of exam.      PHYSICAL EXAMINATION:  Patient has no new complaints.  GENERAL: The patient  in no apparent distress.     Vital Signs Last 24 Hrs  T(C): 37 (02 Nov 2024 11:40), Max: 37.3 (01 Nov 2024 20:36)  T(F): 98.6 (02 Nov 2024 11:40), Max: 99.1 (01 Nov 2024 20:36)  HR: 70 (02 Nov 2024 11:40) (70 - 82)  BP: 134/66 (02 Nov 2024 11:40) (122/66 - 149/75)  BP(mean): --  RR: 17 (02 Nov 2024 11:40) (16 - 18)  SpO2: 94% (02 Nov 2024 11:40) (93% - 97%)    Parameters below as of 02 Nov 2024 11:40  Patient On (Oxygen Delivery Method): nasal cannula  O2 Flow (L/min): 2     (if applicable)    Chest Tube (if applicable)    HEENT: Head is normocephalic and atraumatic. Extraocular muscles are intact. Mucous membranes are moist.     NECK: Supple, no palpable adenopathy.    LUNGS: Fair bilateral air entry   no wheezing, rales, or rhonchi. Right subclavian HD cath      HEART: Regular rate and rhythm without murmur.    ABDOMEN: Soft, nontender, and nondistended.  No hepatosplenomegaly is noted.    : No painful voiding, no pelvic pain    EXTREMITIES: B/L LE , no edema . + b/l chronic skin changes     NEUROLOGIC: Awake, alert, oriented, grossly intact    SKIN: Warm, dry, good turgor.      LABS:                        7.3    10.73 )-----------( 188      ( 02 Nov 2024 07:36 )             24.1     11-02    137  |  101  |  43[H]  ----------------------------<  113[H]  4.2   |  29  |  2.35[H]    Ca    8.2[L]      02 Nov 2024 07:36  Phos  2.7     11-02  Mg     2.2     11-02    TPro  6.7  /  Alb  2.9[L]  /  TBili  0.3  /  DBili  x   /  AST  13  /  ALT  16  /  AlkPhos  76  11-02      Urinalysis Basic - ( 02 Nov 2024 07:36 )    Color: x / Appearance: x / SG: x / pH: x  Gluc: 113 mg/dL / Ketone: x  / Bili: x / Urobili: x   Blood: x / Protein: x / Nitrite: x   Leuk Esterase: x / RBC: x / WBC x   Sq Epi: x / Non Sq Epi: x / Bacteria: x      MICROBIOLOGY: (if applicable)    RADIOLOGY & ADDITIONAL STUDIES:  EKG:   CXR:  ECHO:    IMPRESSION: 79y Female PAST MEDICAL & SURGICAL HISTORY:  Hypertension      Diabetes  type 2      Thyroid disease      Anemia      Hypercholesteremia      Myelodysplasia (myelodysplastic syndrome)      Thyromegaly  s/p partial thyroidectomy long time ago      Other giant cell arteritis      Arthritis      Kidney insufficiency      PVD (peripheral vascular disease)      Mass of right knee      Urinary incontinence      H/O partial thyroidectomy      H/O: hysterectomy      History of cataract surgery      History of vascular surgery       p/w       Assessment:  - Acute hypoxic resp failure   - Pulmonary edema   - Acute ex of CHF pEF  - Pleural effusion   - Anemia likely chronic disease   - ZORAIDA on CKD Stage 4/Cardiorenal syndrome   - DVT (on eliquis) Hypertension, Hyperlipidemia, Type 2 Diabetes Mellitus, Anemia, OA    Plan:     - Hypoxic resp failure resolved with HD and now tolerating ambient air  - Continue HD as per Neph prior  tx 11/1  - Restrict fluid intake 1L / day   - Anticoag   - Continue BP and cardiac meds as per team   - Optimize cardiac meds  - CXR in am ( started on supp O2 by primary team )

## 2024-11-03 LAB
ALBUMIN SERPL ELPH-MCNC: 3 G/DL — LOW (ref 3.3–5)
ALP SERPL-CCNC: 76 U/L — SIGNIFICANT CHANGE UP (ref 40–120)
ALT FLD-CCNC: 17 U/L — SIGNIFICANT CHANGE UP (ref 10–45)
ANION GAP SERPL CALC-SCNC: 8 MMOL/L — SIGNIFICANT CHANGE UP (ref 5–17)
AST SERPL-CCNC: 13 U/L — SIGNIFICANT CHANGE UP (ref 10–40)
BILIRUB SERPL-MCNC: 0.4 MG/DL — SIGNIFICANT CHANGE UP (ref 0.2–1.2)
BLD GP AB SCN SERPL QL: SIGNIFICANT CHANGE UP
BUN SERPL-MCNC: 56 MG/DL — HIGH (ref 7–23)
CALCIUM SERPL-MCNC: 8.5 MG/DL — SIGNIFICANT CHANGE UP (ref 8.4–10.5)
CHLORIDE SERPL-SCNC: 101 MMOL/L — SIGNIFICANT CHANGE UP (ref 96–108)
CO2 SERPL-SCNC: 27 MMOL/L — SIGNIFICANT CHANGE UP (ref 22–31)
CREAT SERPL-MCNC: 2.72 MG/DL — HIGH (ref 0.5–1.3)
EGFR: 17 ML/MIN/1.73M2 — LOW
GLUCOSE BLDC GLUCOMTR-MCNC: 115 MG/DL — HIGH (ref 70–99)
GLUCOSE BLDC GLUCOMTR-MCNC: 138 MG/DL — HIGH (ref 70–99)
GLUCOSE BLDC GLUCOMTR-MCNC: 146 MG/DL — HIGH (ref 70–99)
GLUCOSE BLDC GLUCOMTR-MCNC: 149 MG/DL — HIGH (ref 70–99)
GLUCOSE SERPL-MCNC: 98 MG/DL — SIGNIFICANT CHANGE UP (ref 70–99)
HCT VFR BLD CALC: 23.9 % — LOW (ref 34.5–45)
HGB BLD-MCNC: 7.2 G/DL — LOW (ref 11.5–15.5)
MAGNESIUM SERPL-MCNC: 2.3 MG/DL — SIGNIFICANT CHANGE UP (ref 1.6–2.6)
MCHC RBC-ENTMCNC: 27 PG — SIGNIFICANT CHANGE UP (ref 27–34)
MCHC RBC-ENTMCNC: 30.1 G/DL — LOW (ref 32–36)
MCV RBC AUTO: 89.5 FL — SIGNIFICANT CHANGE UP (ref 80–100)
NRBC # BLD: 0 /100 WBCS — SIGNIFICANT CHANGE UP (ref 0–0)
PHOSPHATE SERPL-MCNC: 3.3 MG/DL — SIGNIFICANT CHANGE UP (ref 2.5–4.5)
PLATELET # BLD AUTO: 183 K/UL — SIGNIFICANT CHANGE UP (ref 150–400)
POTASSIUM SERPL-MCNC: 4.4 MMOL/L — SIGNIFICANT CHANGE UP (ref 3.5–5.3)
POTASSIUM SERPL-SCNC: 4.4 MMOL/L — SIGNIFICANT CHANGE UP (ref 3.5–5.3)
PROT SERPL-MCNC: 6.7 G/DL — SIGNIFICANT CHANGE UP (ref 6–8.3)
RBC # BLD: 2.67 M/UL — LOW (ref 3.8–5.2)
RBC # FLD: 17.6 % — HIGH (ref 10.3–14.5)
SODIUM SERPL-SCNC: 136 MMOL/L — SIGNIFICANT CHANGE UP (ref 135–145)
WBC # BLD: 10.5 K/UL — SIGNIFICANT CHANGE UP (ref 3.8–10.5)
WBC # FLD AUTO: 10.5 K/UL — SIGNIFICANT CHANGE UP (ref 3.8–10.5)

## 2024-11-03 PROCEDURE — 99232 SBSQ HOSP IP/OBS MODERATE 35: CPT | Mod: GC

## 2024-11-03 PROCEDURE — 71045 X-RAY EXAM CHEST 1 VIEW: CPT | Mod: 26

## 2024-11-03 RX ADMIN — Medication 600 MILLIGRAM(S): at 06:55

## 2024-11-03 RX ADMIN — APIXABAN 2.5 MILLIGRAM(S): 5 TABLET, FILM COATED ORAL at 17:14

## 2024-11-03 RX ADMIN — CHLORHEXIDINE GLUCONATE 1 APPLICATION(S): 40 SOLUTION TOPICAL at 13:27

## 2024-11-03 RX ADMIN — CALCIUM ACETATE 1334 MILLIGRAM(S): 667 CAPSULE ORAL at 17:15

## 2024-11-03 RX ADMIN — Medication 3 MILLIGRAM(S): at 21:17

## 2024-11-03 RX ADMIN — HYDRALAZINE HYDROCHLORIDE 100 MILLIGRAM(S): 50 TABLET, FILM COATED ORAL at 13:17

## 2024-11-03 RX ADMIN — PANTOPRAZOLE SODIUM 40 MILLIGRAM(S): 40 TABLET, DELAYED RELEASE ORAL at 06:55

## 2024-11-03 RX ADMIN — EZETIMIBE 10 MILLIGRAM(S): 10 TABLET ORAL at 13:19

## 2024-11-03 RX ADMIN — HYDRALAZINE HYDROCHLORIDE 100 MILLIGRAM(S): 50 TABLET, FILM COATED ORAL at 06:54

## 2024-11-03 RX ADMIN — Medication 180 MILLIGRAM(S): at 06:56

## 2024-11-03 RX ADMIN — HYDRALAZINE HYDROCHLORIDE 100 MILLIGRAM(S): 50 TABLET, FILM COATED ORAL at 21:12

## 2024-11-03 RX ADMIN — LINAGLIPTIN 5 MILLIGRAM(S): 5 TABLET, FILM COATED ORAL at 13:16

## 2024-11-03 RX ADMIN — APIXABAN 2.5 MILLIGRAM(S): 5 TABLET, FILM COATED ORAL at 06:56

## 2024-11-03 RX ADMIN — POLYETHYLENE GLYCOL 3350 17 GRAM(S): 17 POWDER, FOR SOLUTION ORAL at 13:21

## 2024-11-03 RX ADMIN — CALCIUM ACETATE 1334 MILLIGRAM(S): 667 CAPSULE ORAL at 13:16

## 2024-11-03 RX ADMIN — Medication 600 MILLIGRAM(S): at 17:15

## 2024-11-03 RX ADMIN — CALCIUM ACETATE 1334 MILLIGRAM(S): 667 CAPSULE ORAL at 09:08

## 2024-11-03 RX ADMIN — SIMETHICONE 80 MILLIGRAM(S): 80 TABLET, CHEWABLE ORAL at 13:17

## 2024-11-03 RX ADMIN — Medication 5 MILLILITER(S): at 13:26

## 2024-11-03 RX ADMIN — Medication 325 MILLIGRAM(S): at 13:16

## 2024-11-03 NOTE — PROGRESS NOTE ADULT - ASSESSMENT
BURKE ABARCA is a 79y year old Female with PMH of HFpEF, Chronic Kidney Disease 4, DVT (on eliquis) Hypertension, Hyperlipidemia, Type 2 Diabetes Mellitus, Anemia, OA who presents to the ER complaining of shortness of breath x2 days. Admitted for acute on chronic dCHF and worsening chronic kidney disease.     #Shortness of breath - likely 2/2 HFpEF exacerbation, acute on chronic diastolic CHF, improving  #Anemia, likely chronic disease   - CXR with Interstitial edema with mild congestive heart failure  - pulse ox q8h  - TTE 10/20/24: LVEF 63%, grade 2 diastolic dysfunction  - proBNP 5495 < 3792 on 10/19  - s/p 2U pRBC transfusion  - iron studies: low transferrin, high ferritin, otherwise wnl  - hgb 7.2 11/03/2024  - transfuse if <7  - keep type and screen active  - continue weekly epoietin  - nephro recs appreciated  - pulm recs appreciated: repeat CXR ordered this AM  - currently not on oxygen, VSS, saturating well on RA    #Acute Kidney Injury on CKD Stage 4  - uremia improving  - s/p 5 sessions of HD  - Cr 2.72, BUN 56, GFR 17 11/03/2024  - nephro recs appreciated   - Hepatitis panel negative, Hep B surface Ab nonreactive  - s/p placement of perma cath on 10/31/2024    #Hyponatremia, resolved  - Na wnl  - monitor    #Hypocalcemia, resolved  - Ca 8.5  - monitor    #Hyperphosphatemia, resolved  - phos wnl  - c/w dialysis and phoslo  - monitor     #Constipation, improved  - c/w miralax, senna prn  - s/p fleet enema  - pt now having bowel movements    #Hypertension  - c/w diltiazem, hydralazine, labetalol    #Hyperlipidemia  - c/w crestor, ezetemibe    #Type II Diabetes Mellitus  - hold home meds  - FS and DANGELO  - maintain blood glucose 100-180 while hospitalized   - HbA1c 6.4%  - c/w linagliptin 5mg daily  - trend blood sugars daily    #history of DVT  - hx RUE DVT while at [facility]  - has been on Eliquis 2.5mg  - c/w Eliquis 2.5mg     #DVT ppx  -Eliquis    #GOC  -full code     #Diet  -DASH/TLC, consistent carbs, 1.2L fluid restriction    Dispo: [facility] with HD pending authorization     *Case seen and discussed with Dr. Fuentes   BURKE ABARCA is a 79y year old Female with PMH of HFpEF, Chronic Kidney Disease 4, DVT (on eliquis) Hypertension, Hyperlipidemia, Type 2 Diabetes Mellitus, Anemia, OA who presents to the ER complaining of shortness of breath x2 days. Admitted for acute on chronic dCHF and worsening chronic kidney disease.     #Shortness of breath - likely 2/2 HFpEF exacerbation, acute on chronic diastolic CHF, improving  #Anemia, likely chronic disease   - CXR with Interstitial edema with mild congestive heart failure  - pulse ox q8h  - TTE 10/20/24: LVEF 63%, grade 2 diastolic dysfunction  - proBNP 5495 < 3792 on 10/19  - s/p 2U pRBC transfusion  - iron studies: low transferrin, high ferritin, otherwise wnl  - hgb 7.2 11/03/2024  - transfuse if <7  - keep type and screen active  - continue weekly epoietin  - nephro recs appreciated  - pulm recs appreciated: repeat CXR ordered this AM  - currently not on oxygen, VSS, saturating well on RA    #Acute Kidney Injury on CKD Stage 4  - uremia improving  - s/p 5 sessions of HD  - next HD session 11/04/2024  - Cr 2.72, BUN 56, GFR 17 11/03/2024  - nephro recs appreciated   - Hepatitis panel negative, Hep B surface Ab nonreactive  - s/p placement of perma cath on 10/31/2024    #Hyponatremia, resolved  - Na wnl  - monitor    #Hypocalcemia, resolved  - Ca 8.5  - monitor    #Hyperphosphatemia, resolved  - phos wnl  - c/w dialysis and phoslo  - monitor     #Constipation, improved  - c/w miralax, senna prn  - s/p fleet enema  - pt now having bowel movements    #Hypertension  - c/w diltiazem, hydralazine, labetalol    #Hyperlipidemia  - c/w crestor, ezetemibe    #Type II Diabetes Mellitus  - hold home meds  - FS and DANGELO  - maintain blood glucose 100-180 while hospitalized   - HbA1c 6.4% 10/29/2024  - c/w linagliptin 5mg daily  - trend blood sugars daily    #history of DVT  - hx RUE DVT while at [facility]  - has been on Eliquis 2.5mg  - c/w Eliquis 2.5mg     #DVT ppx  -Eliquis    #GOC  -full code     #Diet  -DASH/TLC, consistent carbs, 1.2L fluid restriction    Dispo: [facility] with HD pending authorization     *Case seen and discussed with Dr. Fuentes

## 2024-11-03 NOTE — PROGRESS NOTE ADULT - SUBJECTIVE AND OBJECTIVE BOX
Time of Visit:  Patient seen and examined. pat is lyingin bed comfortable     MEDICATIONS  (STANDING):  apixaban 2.5 milliGRAM(s) Oral two times a day  Biotene Dry Mouth Oral Rinse 5 milliLiter(s) Swish and Spit daily  calcium acetate 1334 milliGRAM(s) Oral three times a day with meals  chlorhexidine 2% Cloths 1 Application(s) Topical daily  dextrose 5%. 1000 milliLiter(s) (50 mL/Hr) IV Continuous <Continuous>  dextrose 5%. 1000 milliLiter(s) (100 mL/Hr) IV Continuous <Continuous>  dextrose 50% Injectable 12.5 Gram(s) IV Push once  dextrose 50% Injectable 25 Gram(s) IV Push once  dextrose 50% Injectable 25 Gram(s) IV Push once  diltiazem    milliGRAM(s) Oral daily  epoetin jacobo-epbx (RETACRIT) Injectable 05882 Unit(s) IV Push <User Schedule>  ezetimibe 10 milliGRAM(s) Oral daily  ferrous    sulfate 325 milliGRAM(s) Oral daily  glucagon  Injectable 1 milliGRAM(s) IntraMuscular once  hydrALAZINE 100 milliGRAM(s) Oral three times a day  influenza  Vaccine (HIGH DOSE) 0.5 milliLiter(s) IntraMuscular once  insulin lispro (ADMELOG) corrective regimen sliding scale   SubCutaneous at bedtime  insulin lispro (ADMELOG) corrective regimen sliding scale   SubCutaneous three times a day before meals  labetalol 600 milliGRAM(s) Oral two times a day  linagliptin 5 milliGRAM(s) Oral daily  pantoprazole    Tablet 40 milliGRAM(s) Oral before breakfast  polyethylene glycol 3350 17 Gram(s) Oral daily  rosuvastatin 5 milliGRAM(s) Oral at bedtime      MEDICATIONS  (PRN):  acetaminophen     Tablet .. 650 milliGRAM(s) Oral every 6 hours PRN Temp greater or equal to 38C (100.4F), Mild Pain (1 - 3)  albuterol    90 MICROgram(s) HFA Inhaler 2 Puff(s) Inhalation every 6 hours PRN Shortness of Breath  dextrose Oral Gel 15 Gram(s) Oral once PRN Blood Glucose LESS THAN 70 milliGRAM(s)/deciliter  melatonin 3 milliGRAM(s) Oral at bedtime PRN Insomnia  ondansetron Injectable 4 milliGRAM(s) IV Push every 8 hours PRN Nausea and/or Vomiting  senna 2 Tablet(s) Oral at bedtime PRN Constipation  simethicone 80 milliGRAM(s) Chew daily PRN Gas  sodium chloride 0.65% Nasal 1 Spray(s) Both Nostrils daily PRN Nasal Congestion  sodium chloride 0.9% lock flush 10 milliLiter(s) IV Push every 1 hour PRN Pre/post blood products, medications, blood draw, and to maintain line patency       Medications up to date at time of exam.      PHYSICAL EXAMINATION:  Patient has no new complaints.  GENERAL: The patient  in no apparent distress.     Vital Signs Last 24 Hrs  T(C): 36.6 (03 Nov 2024 13:00), Max: 36.7 (02 Nov 2024 20:50)  T(F): 97.8 (03 Nov 2024 13:00), Max: 98 (02 Nov 2024 20:50)  HR: 65 (03 Nov 2024 13:00) (65 - 80)  BP: 137/64 (03 Nov 2024 13:00) (134/61 - 150/63)  BP(mean): --  RR: 16 (03 Nov 2024 13:00) (16 - 16)  SpO2: 94% (03 Nov 2024 13:00) (92% - 94%)    Parameters below as of 03 Nov 2024 13:00  Patient On (Oxygen Delivery Method): room air       (if applicable)    Chest Tube (if applicable)    HEENT: Head is normocephalic and atraumatic. Extraocular muscles are intact. Mucous membranes are moist.     NECK: Supple, no palpable adenopathy.    LUNGS: Fair bilateral air entry   no wheezing, rales, or rhonchi. Right subclavian shiley      HEART: Regular rate and rhythm without murmur.    ABDOMEN: Soft, nontender, and nondistended.  No hepatosplenomegaly is noted.    : No painful voiding, no pelvic pain    EXTREMITIES: Without any cyanosis, clubbing, rash, lesions or edema.    NEUROLOGIC: Awake, alert, oriented, grossly intact    SKIN: Warm, dry, good turgor.      LABS:                        7.2    10.50 )-----------( 183      ( 03 Nov 2024 06:50 )             23.9     11-03    136  |  101  |  56[H]  ----------------------------<  98  4.4   |  27  |  2.72[H]    Ca    8.5      03 Nov 2024 06:50  Phos  3.3     11-03  Mg     2.3     11-03    TPro  6.7  /  Alb  3.0[L]  /  TBili  0.4  /  DBili  x   /  AST  13  /  ALT  17  /  AlkPhos  76  11-03      Urinalysis Basic - ( 03 Nov 2024 06:50 )    Color: x / Appearance: x / SG: x / pH: x  Gluc: 98 mg/dL / Ketone: x  / Bili: x / Urobili: x   Blood: x / Protein: x / Nitrite: x   Leuk Esterase: x / RBC: x / WBC x   Sq Epi: x / Non Sq Epi: x / Bacteria: x        MICROBIOLOGY: (if applicable)    RADIOLOGY & ADDITIONAL STUDIES:  EKG:   CXR: 11/3 improved   ECHO:    IMPRESSION: 79y Female PAST MEDICAL & SURGICAL HISTORY:  Hypertension      Diabetes  type 2      Thyroid disease      Anemia      Hypercholesteremia      Myelodysplasia (myelodysplastic syndrome)      Thyromegaly  s/p partial thyroidectomy long time ago      Other giant cell arteritis      Arthritis      Kidney insufficiency      PVD (peripheral vascular disease)      Mass of right knee      Urinary incontinence      H/O partial thyroidectomy      H/O: hysterectomy      History of cataract surgery      History of vascular surgery       p/w       Assessment:  - Acute hypoxic resp failure   - Pulmonary edema   - Acute ex of CHF pEF  - Pleural effusion   - Anemia likely chronic disease   - ZORAIDA on CKD Stage 4/Cardiorenal syndrome   - DVT (on eliquis) Hypertension, Hyperlipidemia, Type 2 Diabetes Mellitus, Anemia, OA    Plan:     - Hypoxic resp failure resolved with HD and now tolerating ambient air  - Continue HD as per Neph prior  tx 11/1  - Restrict fluid intake 1L / day   - Anticoag   - Continue BP and cardiac meds as per team   - Optimize cardiac meds  - CXR in am ( started on supp O2 by primary team ) improved   - DVT GI prophy

## 2024-11-03 NOTE — PROGRESS NOTE ADULT - ATTENDING COMMENTS
Please see resident note for full details regarding the service.     PE: A+Ox3, no murmurs, lungs CTA b/l, abdomen soft/NT/ND, no lower extremity swelling, right wall HD catheter.     Assessment:  - Shortness of breath secondary to cardiorenal syndrome/CHFpEF exacerbation, acute on chronic diastolic CHF   - Anemia of chronic disease   - ZORAIDA on CKD Stage 4/Cardiorenal syndrome   - History of HFpEF, Chronic Kidney Disease 4, DVT (on eliquis) Hypertension, Hyperlipidemia, Type 2 Diabetes Mellitus, Anemia, OA    Plan:   - s/p permanent access   - Not a candidate for SGLT2 inhibitor or MRA d/t progressive CKD   - Keep Hb > 8. Spoke to Dr. Storey, pt is non-toxic appearing and is doing well overall. Will hold on transfusing at this time.   - Monitor accuchecks, continue Lingliptin, ISS for now   - DVT ppx: restart Eliquis   - Code status: Full code   - Dispo: [facility] when optimized, needs HD services arranged     I spent a total of 35 minutes on the date of this encounter coordinating the patient's care, excluding resident teaching time. This includes reviewing results/imaging and discussions with specialists, nursing, case management/social work. Further tests, medications, and procedures have been ordered as indicated. Results and the plan of care were communicated to the patient and/or their family member. Supporting documentation was completed and added to the patient's chart.

## 2024-11-03 NOTE — PROGRESS NOTE ADULT - SUBJECTIVE AND OBJECTIVE BOX
Patient is a 79y old Female who presents with a chief complaint of shortness of breath.     INTERVAL HPI/ OVERNIGHT EVENTS: Patient seen and examined at bedside. Reports that she feels fine today. No other acute complaints.     REVIEW OF SYSTEMS:  CONSTITUTIONAL: No fever or chills  HEENT:  No headache, no sore throat  RESPIRATORY: No cough, wheezing, or shortness of breath  CARDIOVASCULAR: No chest pain, palpitations  GASTROINTESTINAL: No abd pain, nausea, vomiting, or diarrhea  GENITOURINARY: No dysuria, frequency, or hematuria  NEUROLOGICAL: no focal weakness or dizziness  MUSCULOSKELETAL: no myalgias     Vital Signs Last 24 Hrs  T(C): 36.6 (03 Nov 2024 05:22), Max: 36.7 (02 Nov 2024 20:50)  T(F): 97.8 (03 Nov 2024 05:22), Max: 98 (02 Nov 2024 20:50)  HR: 65 (03 Nov 2024 05:22) (65 - 80)  BP: 137/66 (03 Nov 2024 05:22) (134/61 - 150/63)  RR: 16 (03 Nov 2024 05:22) (16 - 16)  SpO2: 92% (03 Nov 2024 05:22) (92% - 93%)    Parameters below as of 03 Nov 2024 05:22  Patient On (Oxygen Delivery Method): room air      I&O's Summary    02 Nov 2024 08:01  -  03 Nov 2024 07:00  --------------------------------------------------------  IN: 0 mL / OUT: 950 mL / NET: -950 mL        BMI (kg/m2): 35.2 (10-25-24 @ 13:49)    PHYSICAL EXAM:  General: NAD, morbidly obese female sitting in comfortably, permacath on right side in place clean/dry/intact   Respiratory: Clear to auscultation bilaterally, no wheezes or crackles  CV: +S1/S2, +systolic murmur, no rubs   Abdominal: Soft, Non tender, Nondistended, bowel sounds x 4  Extremities: +chronic skin changes with b/l hyperpigmentation, no ulcers noted. No edema, 2+ peripheral pulses  NERVOUS SYSTEM: answers questions and follows commands appropriately, A&Ox3, grossly moves all extremities   PSYCH: Appropriate affect, Alert & Awake; Good judgement      LABS: Personally reviewed                        7.2    10.50 )-----------( 183      ( 03 Nov 2024 06:50 )             23.9     11-03    136  |  101  |  56[H]  ----------------------------<  98  4.4   |  27  |  2.72[H]    Ca    8.5      03 Nov 2024 06:50  Phos  3.3     11-03  Mg     2.3     11-03    TPro  6.7  /  Alb  3.0[L]  /  TBili  0.4  /  DBili  x   /  AST  13  /  ALT  17  /  AlkPhos  76  11-03        RADIOLOGY & ADDITIONAL TESTS: Personally reviewed.

## 2024-11-04 LAB
ALBUMIN SERPL ELPH-MCNC: 2.8 G/DL — LOW (ref 3.3–5)
ALP SERPL-CCNC: 75 U/L — SIGNIFICANT CHANGE UP (ref 40–120)
ALT FLD-CCNC: 13 U/L — SIGNIFICANT CHANGE UP (ref 10–45)
ANION GAP SERPL CALC-SCNC: 8 MMOL/L — SIGNIFICANT CHANGE UP (ref 5–17)
AST SERPL-CCNC: 25 U/L — SIGNIFICANT CHANGE UP (ref 10–40)
BILIRUB SERPL-MCNC: 0.5 MG/DL — SIGNIFICANT CHANGE UP (ref 0.2–1.2)
BUN SERPL-MCNC: 62 MG/DL — HIGH (ref 7–23)
CALCIUM SERPL-MCNC: 8.4 MG/DL — SIGNIFICANT CHANGE UP (ref 8.4–10.5)
CHLORIDE SERPL-SCNC: 99 MMOL/L — SIGNIFICANT CHANGE UP (ref 96–108)
CO2 SERPL-SCNC: 24 MMOL/L — SIGNIFICANT CHANGE UP (ref 22–31)
CREAT SERPL-MCNC: 2.61 MG/DL — HIGH (ref 0.5–1.3)
EGFR: 18 ML/MIN/1.73M2 — LOW
GLUCOSE BLDC GLUCOMTR-MCNC: 108 MG/DL — HIGH (ref 70–99)
GLUCOSE BLDC GLUCOMTR-MCNC: 170 MG/DL — HIGH (ref 70–99)
GLUCOSE BLDC GLUCOMTR-MCNC: 185 MG/DL — HIGH (ref 70–99)
GLUCOSE BLDC GLUCOMTR-MCNC: 98 MG/DL — SIGNIFICANT CHANGE UP (ref 70–99)
GLUCOSE SERPL-MCNC: 98 MG/DL — SIGNIFICANT CHANGE UP (ref 70–99)
HCT VFR BLD CALC: 25.5 % — LOW (ref 34.5–45)
HGB BLD-MCNC: 8 G/DL — LOW (ref 11.5–15.5)
MAGNESIUM SERPL-MCNC: 2.2 MG/DL — SIGNIFICANT CHANGE UP (ref 1.6–2.6)
MCHC RBC-ENTMCNC: 27.2 PG — SIGNIFICANT CHANGE UP (ref 27–34)
MCHC RBC-ENTMCNC: 31.4 G/DL — LOW (ref 32–36)
MCV RBC AUTO: 86.7 FL — SIGNIFICANT CHANGE UP (ref 80–100)
NRBC # BLD: 0 /100 WBCS — SIGNIFICANT CHANGE UP (ref 0–0)
PHOSPHATE SERPL-MCNC: 3.7 MG/DL — SIGNIFICANT CHANGE UP (ref 2.5–4.5)
PLATELET # BLD AUTO: 241 K/UL — SIGNIFICANT CHANGE UP (ref 150–400)
POTASSIUM SERPL-MCNC: 4.9 MMOL/L — SIGNIFICANT CHANGE UP (ref 3.5–5.3)
POTASSIUM SERPL-SCNC: 4.9 MMOL/L — SIGNIFICANT CHANGE UP (ref 3.5–5.3)
PROT SERPL-MCNC: 6.7 G/DL — SIGNIFICANT CHANGE UP (ref 6–8.3)
RBC # BLD: 2.94 M/UL — LOW (ref 3.8–5.2)
RBC # FLD: 17.4 % — HIGH (ref 10.3–14.5)
SODIUM SERPL-SCNC: 131 MMOL/L — LOW (ref 135–145)
WBC # BLD: 9.29 K/UL — SIGNIFICANT CHANGE UP (ref 3.8–10.5)
WBC # FLD AUTO: 9.29 K/UL — SIGNIFICANT CHANGE UP (ref 3.8–10.5)

## 2024-11-04 PROCEDURE — 99232 SBSQ HOSP IP/OBS MODERATE 35: CPT | Mod: GC

## 2024-11-04 RX ADMIN — CHLORHEXIDINE GLUCONATE 1 APPLICATION(S): 40 SOLUTION TOPICAL at 14:03

## 2024-11-04 RX ADMIN — APIXABAN 2.5 MILLIGRAM(S): 5 TABLET, FILM COATED ORAL at 17:26

## 2024-11-04 RX ADMIN — Medication 5 MILLILITER(S): at 14:05

## 2024-11-04 RX ADMIN — Medication 1: at 17:29

## 2024-11-04 RX ADMIN — ERYTHROPOIETIN 10000 UNIT(S): 10000 INJECTION, SOLUTION INTRAVENOUS; SUBCUTANEOUS at 10:26

## 2024-11-04 RX ADMIN — CALCIUM ACETATE 1334 MILLIGRAM(S): 667 CAPSULE ORAL at 08:38

## 2024-11-04 RX ADMIN — Medication 600 MILLIGRAM(S): at 17:26

## 2024-11-04 RX ADMIN — HYDRALAZINE HYDROCHLORIDE 100 MILLIGRAM(S): 50 TABLET, FILM COATED ORAL at 05:44

## 2024-11-04 RX ADMIN — HYDRALAZINE HYDROCHLORIDE 100 MILLIGRAM(S): 50 TABLET, FILM COATED ORAL at 14:02

## 2024-11-04 RX ADMIN — LINAGLIPTIN 5 MILLIGRAM(S): 5 TABLET, FILM COATED ORAL at 14:02

## 2024-11-04 RX ADMIN — CALCIUM ACETATE 1334 MILLIGRAM(S): 667 CAPSULE ORAL at 14:02

## 2024-11-04 RX ADMIN — Medication 180 MILLIGRAM(S): at 05:44

## 2024-11-04 RX ADMIN — APIXABAN 2.5 MILLIGRAM(S): 5 TABLET, FILM COATED ORAL at 05:44

## 2024-11-04 RX ADMIN — Medication 325 MILLIGRAM(S): at 14:02

## 2024-11-04 RX ADMIN — CALCIUM ACETATE 1334 MILLIGRAM(S): 667 CAPSULE ORAL at 17:25

## 2024-11-04 RX ADMIN — HYDRALAZINE HYDROCHLORIDE 100 MILLIGRAM(S): 50 TABLET, FILM COATED ORAL at 21:10

## 2024-11-04 RX ADMIN — Medication 600 MILLIGRAM(S): at 05:44

## 2024-11-04 RX ADMIN — PANTOPRAZOLE SODIUM 40 MILLIGRAM(S): 40 TABLET, DELAYED RELEASE ORAL at 05:44

## 2024-11-04 RX ADMIN — EZETIMIBE 10 MILLIGRAM(S): 10 TABLET ORAL at 14:02

## 2024-11-04 NOTE — PROGRESS NOTE ADULT - SUBJECTIVE AND OBJECTIVE BOX
Patient is a 79y old Female who presents with a chief complaint of shortness of breath.     INTERVAL HPI/ OVERNIGHT EVENTS: Patient seen and examined at bedside. Reports that she feels fine today. No acute complaints.     REVIEW OF SYSTEMS:  CONSTITUTIONAL: No fever or chills  HEENT:  No headache, no sore throat  RESPIRATORY: No cough, wheezing, or shortness of breath  CARDIOVASCULAR: No chest pain, palpitations  GASTROINTESTINAL: No abd pain, nausea, vomiting, or diarrhea  GENITOURINARY: No dysuria, frequency, or hematuria  NEUROLOGICAL: no focal weakness or dizziness  MUSCULOSKELETAL: no myalgias     Vital Signs Last 24 Hrs  Vital Signs Last 24 Hrs  T(C): 36.5 (04 Nov 2024 09:50), Max: 36.7 (03 Nov 2024 20:10)  T(F): 97.7 (04 Nov 2024 09:50), Max: 98 (03 Nov 2024 20:10)  HR: 69 (04 Nov 2024 09:50) (65 - 75)  BP: 135/59 (04 Nov 2024 09:50) (118/54 - 150/61)  BP(mean): --  RR: 18 (04 Nov 2024 09:50) (16 - 18)  SpO2: 97% (04 Nov 2024 09:50) (93% - 97%)    Parameters below as of 04 Nov 2024 09:50  Patient On (Oxygen Delivery Method): room air        BMI (kg/m2): 35.2 (10-25-24 @ 13:49)    PHYSICAL EXAM:  General: NAD, morbidly obese female sitting in comfortably, permacath on right side in place clean/dry/intact   Respiratory: Clear to auscultation bilaterally, no wheezes or crackles  CV: +S1/S2, +systolic murmur, no rubs   Abdominal: Soft, Non tender, Nondistended, bowel sounds x 4  Extremities: +chronic skin changes with b/l hyperpigmentation, no ulcers noted. No edema, 2+ peripheral pulses  NERVOUS SYSTEM: answers questions and follows commands appropriately, A&Ox3, grossly moves all extremities   PSYCH: Appropriate affect, Alert & Awake; Good judgement      LABS: Personally reviewed                          7.2    10.50 )-----------( 183      ( 03 Nov 2024 06:50 )             23.9       11-04    131[L]  |  99  |  62[H]  ----------------------------<  98  4.9   |  24  |  2.61[H]    Ca    8.4      04 Nov 2024 08:31  Phos  3.7     11-04  Mg     2.2     11-04    TPro  6.7  /  Alb  2.8[L]  /  TBili  0.5  /  DBili  x   /  AST  25  /  ALT  13  /  AlkPhos  75  11-04      RADIOLOGY & ADDITIONAL TESTS: Personally reviewed.      Patient is a 79y old Female who presents with a chief complaint of shortness of breath.     INTERVAL HPI/ OVERNIGHT EVENTS: Patient seen and examined at bedside. Reports that she feels fine today. No acute complaints.     REVIEW OF SYSTEMS:  CONSTITUTIONAL: No fever or chills  HEENT:  No headache, no sore throat  RESPIRATORY: No cough, wheezing, or shortness of breath  CARDIOVASCULAR: No chest pain, palpitations  GASTROINTESTINAL: No abd pain, nausea, vomiting, or diarrhea  GENITOURINARY: No dysuria, frequency, or hematuria  NEUROLOGICAL: no focal weakness or dizziness  MUSCULOSKELETAL: no myalgias     Vital Signs Last 24 Hrs  T(C): 36.5 (04 Nov 2024 09:50), Max: 36.7 (03 Nov 2024 20:10)  T(F): 97.7 (04 Nov 2024 09:50), Max: 98 (03 Nov 2024 20:10)  HR: 69 (04 Nov 2024 09:50) (65 - 75)  BP: 135/59 (04 Nov 2024 09:50) (118/54 - 150/61)  RR: 18 (04 Nov 2024 09:50) (16 - 18)  SpO2: 97% (04 Nov 2024 09:50) (93% - 97%)    Parameters below as of 04 Nov 2024 09:50  Patient On (Oxygen Delivery Method): room air        BMI (kg/m2): 35.2 (10-25-24 @ 13:49)    PHYSICAL EXAM:  General: NAD, morbidly obese female sitting in comfortably, permacath on right side in place clean/dry/intact   Respiratory: Clear to auscultation bilaterally, no wheezes or crackles  CV: +S1/S2, +systolic murmur, no rubs   Abdominal: Soft, Non tender, Nondistended, bowel sounds x 4  Extremities: +chronic skin changes with b/l hyperpigmentation, no ulcers noted. No edema, 2+ peripheral pulses  NERVOUS SYSTEM: answers questions and follows commands appropriately, A&Ox3, grossly moves all extremities   PSYCH: Appropriate affect, Alert & Awake; Good judgement      LABS: Personally reviewed                          7.2    10.50 )-----------( 183      ( 03 Nov 2024 06:50 )             23.9       11-04    131[L]  |  99  |  62[H]  ----------------------------<  98  4.9   |  24  |  2.61[H]    Ca    8.4      04 Nov 2024 08:31  Phos  3.7     11-04  Mg     2.2     11-04    TPro  6.7  /  Alb  2.8[L]  /  TBili  0.5  /  DBili  x   /  AST  25  /  ALT  13  /  AlkPhos  75  11-04      RADIOLOGY & ADDITIONAL TESTS: Personally reviewed.

## 2024-11-04 NOTE — PROGRESS NOTE ADULT - SUBJECTIVE AND OBJECTIVE BOX
S/p stable HD today, comfortable on RA    Vital Signs Last 24 Hrs  T(C): 37.2 (11-04-24 @ 20:23), Max: 37.2 (11-04-24 @ 20:23)  T(F): 99 (11-04-24 @ 20:23), Max: 99 (11-04-24 @ 20:23)  HR: 76 (11-04-24 @ 20:23) (69 - 80)  BP: 144/68 (11-04-24 @ 20:23) (135/59 - 168/81)  RR: 18 (11-04-24 @ 20:23) (17 - 18)  SpO2: 94% (11-04-24 @ 20:23) (93% - 98%)    I&O's Detail    03 Nov 2024 07:01  -  04 Nov 2024 07:00  --------------------------------------------------------  OUT:    Voided (mL): 1100 mL  Total OUT: 1100 mL    04 Nov 2024 07:01  -  04 Nov 2024 22:38  --------------------------------------------------------  OUT:    Other (mL): 2500 mL  Total OUT: 2500 mL    s1s2  b/l air entry  soft, ND  no edema                                                                                            8.0    9.29  )-----------( 241      ( 04 Nov 2024 12:50 )             25.5     04 Nov 2024 08:31    131    |  99     |  62     ----------------------------<  98     4.9     |  24     |  2.61     Ca    8.4        04 Nov 2024 08:31  Phos  3.7       04 Nov 2024 08:31  Mg     2.2       04 Nov 2024 08:31    TPro  6.7    /  Alb  2.8    /  TBili  0.5    /  DBili  x      /  AST  25     /  ALT  13     /  AlkPhos  75     04 Nov 2024 08:31    LIVER FUNCTIONS - ( 04 Nov 2024 08:31 )  Alb: 2.8 g/dL / Pro: 6.7 g/dL / ALK PHOS: 75 U/L / ALT: 13 U/L / AST: 25 U/L / GGT: x           acetaminophen     Tablet .. 650 milliGRAM(s) Oral every 6 hours PRN  albuterol    90 MICROgram(s) HFA Inhaler 2 Puff(s) Inhalation every 6 hours PRN  apixaban 2.5 milliGRAM(s) Oral two times a day  Biotene Dry Mouth Oral Rinse 5 milliLiter(s) Swish and Spit daily  calcium acetate 1334 milliGRAM(s) Oral three times a day with meals  chlorhexidine 2% Cloths 1 Application(s) Topical daily  dextrose 5%. 1000 milliLiter(s) IV Continuous <Continuous>  dextrose 5%. 1000 milliLiter(s) IV Continuous <Continuous>  dextrose 50% Injectable 12.5 Gram(s) IV Push once  dextrose 50% Injectable 25 Gram(s) IV Push once  dextrose 50% Injectable 25 Gram(s) IV Push once  dextrose Oral Gel 15 Gram(s) Oral once PRN  diltiazem    milliGRAM(s) Oral daily  epoetin jacobo-epbx (RETACRIT) Injectable 09436 Unit(s) IV Push <User Schedule>  ezetimibe 10 milliGRAM(s) Oral daily  ferrous    sulfate 325 milliGRAM(s) Oral daily  glucagon  Injectable 1 milliGRAM(s) IntraMuscular once  hydrALAZINE 100 milliGRAM(s) Oral three times a day  influenza  Vaccine (HIGH DOSE) 0.5 milliLiter(s) IntraMuscular once  insulin lispro (ADMELOG) corrective regimen sliding scale   SubCutaneous at bedtime  insulin lispro (ADMELOG) corrective regimen sliding scale   SubCutaneous three times a day before meals  labetalol 600 milliGRAM(s) Oral two times a day  linagliptin 5 milliGRAM(s) Oral daily  melatonin 3 milliGRAM(s) Oral at bedtime PRN  ondansetron Injectable 4 milliGRAM(s) IV Push every 8 hours PRN  pantoprazole    Tablet 40 milliGRAM(s) Oral before breakfast  polyethylene glycol 3350 17 Gram(s) Oral daily  rosuvastatin 5 milliGRAM(s) Oral at bedtime  senna 2 Tablet(s) Oral at bedtime PRN  simethicone 80 milliGRAM(s) Chew daily PRN  sodium chloride 0.65% Nasal 1 Spray(s) Both Nostrils daily PRN  sodium chloride 0.9% lock flush 10 milliLiter(s) IV Push every 1 hour PRN    A/P:    DM, HTN, CKD 4 (Cr 2.13 - 7/2/24, Cr 2.35 - 10/7/24)  Adm w/SOB iso severe anemia, some PVC on CXR  S/p 2u PRBCs on this adm  Started on diuretics   UA w/pr 30, bland  Imaging w/o hydro   Diuretics held since 10/22  Serologies are negative, SPEP negative  No improvement in renal fx  Cardio-renal/hemodynamic ZORAIDA/CKD 4 w/progression to ESRD  Started on HD 10/25 via temp HD cath   S/p perm cath 10/31  S/p stable HD today w/2.5kg fluid removal  Epoetin w/HD 3 x week  Plan for [facility] w/HD     [phone]

## 2024-11-04 NOTE — PROGRESS NOTE ADULT - ASSESSMENT
BURKE ABARCA is a 79y year old Female with PMH of HFpEF, Chronic Kidney Disease 4, DVT (on eliquis) Hypertension, Hyperlipidemia, Type 2 Diabetes Mellitus, Anemia, OA who presents to the ER complaining of shortness of breath x2 days. Admitted for acute on chronic dCHF and worsening chronic kidney disease.     #Shortness of breath - likely 2/2 HFpEF exacerbation, acute on chronic diastolic CHF, improving  - CXR with Interstitial edema with mild congestive heart failure  - pulse ox q8h  - TTE 10/20/24: LVEF 63%, grade 2 diastolic dysfunction  - proBNP 5495 < 3792 on 10/19  - nephro recs appreciated  - pulm recs appreciated: f/u repeat CXR done today AM  - currently not on oxygen, VSS, saturating well on RA  - Not a candidate for SGLT2 inhibitor or MRA d/t progressive CKD     #Acute Kidney Injury on CKD Stage 4  - uremia improving  - HD MWF  - Cr 2.72, BUN 56, GFR 17 11/03/2024  - nephro recs appreciated   - Hepatitis panel negative, Hep B surface Ab nonreactive  - s/p placement of perma cath on 10/31/2024    #Anemia, likely chronic disease   - s/p 2U pRBC transfusion  - iron studies: low transferrin, high ferritin, otherwise wnl  - transfuse if <7. Discussed with Dr. Storey, pt is non-toxic appearing and is doing well overall. Will hold on transfusing at this time  - keep type and screen active, recently done on 11/3/24  - continue weekly epoietin    #Hyponatremia  #Hypocalcemia  #Hyperphosphatemia  - c/w dialysis and phoslo  - monitor     #Constipation, improved  - c/w miralax, senna prn  - s/p fleet enema  - pt now having bowel movements    #Hypertension  - c/w diltiazem, hydralazine, labetalol    #Hyperlipidemia  - c/w crestor, ezetemibe    #Type II Diabetes Mellitus  - hold home meds  - FS and DANGEOL  - maintain blood glucose 100-180 while hospitalized   - HbA1c 6.4% 10/29/2024  - c/w linagliptin 5mg daily  - trend blood sugars daily    #history of DVT  - hx RUE DVT while at [facility]  - has been on Eliquis 2.5mg  - c/w Eliquis 2.5mg     #DVT ppx  -Eliquis    #GOC  -full code     #Diet  -DASH/TLC, consistent carbs, 1.2L fluid restriction    Dispo: [facility] with HD pending authorization     *Case seen and discussed with Dr. Fuentes   BURKE ABARCA is a 79y year old Female with PMH of HFpEF, Chronic Kidney Disease 4, DVT (on eliquis) Hypertension, Hyperlipidemia, Type 2 Diabetes Mellitus, Anemia, OA who presents to the ER complaining of shortness of breath x2 days. Admitted for acute on chronic dCHF and worsening chronic kidney disease.     #Shortness of breath - likely 2/2 HFpEF exacerbation, acute on chronic diastolic CHF, improving  - CXR with Interstitial edema with mild congestive heart failure  - pulse ox q8h  - TTE 10/20/24: LVEF 63%, grade 2 diastolic dysfunction  - proBNP 5495 < 3792 on 10/19  - currently not on oxygen, VSS, saturating well on RA  - nephro recs appreciated: pt started on dialysis. HD MWF  - pulm recs appreciated: f/u repeat CXR done today AM  - Not a candidate for SGLT2 inhibitor or MRA d/t progressive CKD     #Acute Kidney Injury on CKD Stage 4  - uremia improving  - HD MWF  - Cr 2.72, BUN 56, GFR 17 11/03/2024  - nephro recs appreciated   - Hepatitis panel negative, Hep B surface Ab nonreactive  - s/p placement of perma cath on 10/31/2024    #Anemia, likely chronic disease   - s/p 2U pRBC transfusion  - iron studies: low transferrin, high ferritin, otherwise wnl  - transfuse if <7. Discussed with Dr. Storey, pt is non-toxic appearing and is doing well overall. Will hold on transfusing at this time  - keep type and screen active, recently done on 11/3/24  - continue weekly epoietin    #Hyponatremia  #Hypocalcemia  #Hyperphosphatemia  - c/w dialysis and phoslo  - monitor     #Constipation, improved  - c/w miralax, senna prn  - s/p fleet enema  - pt now having bowel movements    #Hypertension  - c/w diltiazem, hydralazine, labetalol    #Hyperlipidemia  - c/w crestor, ezetemibe    #Type II Diabetes Mellitus  - hold home meds  - FS and DANGELO  - maintain blood glucose 100-180 while hospitalized   - HbA1c 6.4% 10/29/2024  - c/w linagliptin 5mg daily  - trend blood sugars daily    #history of DVT  - hx RUE DVT while at [facility]  - has been on Eliquis 2.5mg  - c/w Eliquis 2.5mg     #DVT ppx  -Eliquis    #GOC  -full code     #Diet  -DASH/TLC, consistent carbs, 1.2L fluid restriction    Dispo: [facility] with HD pending authorization     *Case seen and discussed with Dr. Fuentes

## 2024-11-05 LAB
ALBUMIN SERPL ELPH-MCNC: 2.9 G/DL — LOW (ref 3.3–5)
ALP SERPL-CCNC: 78 U/L — SIGNIFICANT CHANGE UP (ref 40–120)
ALT FLD-CCNC: 12 U/L — SIGNIFICANT CHANGE UP (ref 10–45)
ANION GAP SERPL CALC-SCNC: 5 MMOL/L — SIGNIFICANT CHANGE UP (ref 5–17)
AST SERPL-CCNC: 9 U/L — LOW (ref 10–40)
BILIRUB SERPL-MCNC: 0.4 MG/DL — SIGNIFICANT CHANGE UP (ref 0.2–1.2)
BUN SERPL-MCNC: 39 MG/DL — HIGH (ref 7–23)
CALCIUM SERPL-MCNC: 8.6 MG/DL — SIGNIFICANT CHANGE UP (ref 8.4–10.5)
CHLORIDE SERPL-SCNC: 100 MMOL/L — SIGNIFICANT CHANGE UP (ref 96–108)
CO2 SERPL-SCNC: 32 MMOL/L — HIGH (ref 22–31)
CREAT SERPL-MCNC: 2.15 MG/DL — HIGH (ref 0.5–1.3)
EGFR: 23 ML/MIN/1.73M2 — LOW
GLUCOSE BLDC GLUCOMTR-MCNC: 117 MG/DL — HIGH (ref 70–99)
GLUCOSE BLDC GLUCOMTR-MCNC: 129 MG/DL — HIGH (ref 70–99)
GLUCOSE BLDC GLUCOMTR-MCNC: 160 MG/DL — HIGH (ref 70–99)
GLUCOSE BLDC GLUCOMTR-MCNC: 197 MG/DL — HIGH (ref 70–99)
GLUCOSE SERPL-MCNC: 112 MG/DL — HIGH (ref 70–99)
HCT VFR BLD CALC: 24.5 % — LOW (ref 34.5–45)
HGB BLD-MCNC: 7.6 G/DL — LOW (ref 11.5–15.5)
MCHC RBC-ENTMCNC: 27 PG — SIGNIFICANT CHANGE UP (ref 27–34)
MCHC RBC-ENTMCNC: 31 G/DL — LOW (ref 32–36)
MCV RBC AUTO: 86.9 FL — SIGNIFICANT CHANGE UP (ref 80–100)
NRBC # BLD: 0 /100 WBCS — SIGNIFICANT CHANGE UP (ref 0–0)
PLATELET # BLD AUTO: 256 K/UL — SIGNIFICANT CHANGE UP (ref 150–400)
POTASSIUM SERPL-MCNC: 3.8 MMOL/L — SIGNIFICANT CHANGE UP (ref 3.5–5.3)
POTASSIUM SERPL-SCNC: 3.8 MMOL/L — SIGNIFICANT CHANGE UP (ref 3.5–5.3)
PROT SERPL-MCNC: 6.6 G/DL — SIGNIFICANT CHANGE UP (ref 6–8.3)
RBC # BLD: 2.82 M/UL — LOW (ref 3.8–5.2)
RBC # FLD: 17.4 % — HIGH (ref 10.3–14.5)
SODIUM SERPL-SCNC: 137 MMOL/L — SIGNIFICANT CHANGE UP (ref 135–145)
WBC # BLD: 8.89 K/UL — SIGNIFICANT CHANGE UP (ref 3.8–10.5)
WBC # FLD AUTO: 8.89 K/UL — SIGNIFICANT CHANGE UP (ref 3.8–10.5)

## 2024-11-05 PROCEDURE — 99232 SBSQ HOSP IP/OBS MODERATE 35: CPT | Mod: GC

## 2024-11-05 RX ADMIN — Medication 1: at 12:47

## 2024-11-05 RX ADMIN — Medication 650 MILLIGRAM(S): at 09:24

## 2024-11-05 RX ADMIN — HYDRALAZINE HYDROCHLORIDE 100 MILLIGRAM(S): 50 TABLET, FILM COATED ORAL at 05:33

## 2024-11-05 RX ADMIN — CHLORHEXIDINE GLUCONATE 1 APPLICATION(S): 40 SOLUTION TOPICAL at 11:55

## 2024-11-05 RX ADMIN — APIXABAN 2.5 MILLIGRAM(S): 5 TABLET, FILM COATED ORAL at 17:10

## 2024-11-05 RX ADMIN — PANTOPRAZOLE SODIUM 40 MILLIGRAM(S): 40 TABLET, DELAYED RELEASE ORAL at 05:32

## 2024-11-05 RX ADMIN — Medication 600 MILLIGRAM(S): at 05:33

## 2024-11-05 RX ADMIN — CALCIUM ACETATE 1334 MILLIGRAM(S): 667 CAPSULE ORAL at 17:10

## 2024-11-05 RX ADMIN — CALCIUM ACETATE 1334 MILLIGRAM(S): 667 CAPSULE ORAL at 08:53

## 2024-11-05 RX ADMIN — Medication 600 MILLIGRAM(S): at 17:10

## 2024-11-05 RX ADMIN — CALCIUM ACETATE 1334 MILLIGRAM(S): 667 CAPSULE ORAL at 11:51

## 2024-11-05 RX ADMIN — EZETIMIBE 10 MILLIGRAM(S): 10 TABLET ORAL at 11:51

## 2024-11-05 RX ADMIN — Medication 650 MILLIGRAM(S): at 08:54

## 2024-11-05 RX ADMIN — HYDRALAZINE HYDROCHLORIDE 100 MILLIGRAM(S): 50 TABLET, FILM COATED ORAL at 13:08

## 2024-11-05 RX ADMIN — Medication 325 MILLIGRAM(S): at 11:51

## 2024-11-05 RX ADMIN — APIXABAN 2.5 MILLIGRAM(S): 5 TABLET, FILM COATED ORAL at 05:33

## 2024-11-05 RX ADMIN — Medication 180 MILLIGRAM(S): at 05:33

## 2024-11-05 RX ADMIN — HYDRALAZINE HYDROCHLORIDE 100 MILLIGRAM(S): 50 TABLET, FILM COATED ORAL at 21:40

## 2024-11-05 RX ADMIN — Medication 5 MILLILITER(S): at 11:51

## 2024-11-05 RX ADMIN — LINAGLIPTIN 5 MILLIGRAM(S): 5 TABLET, FILM COATED ORAL at 11:52

## 2024-11-05 NOTE — PROGRESS NOTE ADULT - SUBJECTIVE AND OBJECTIVE BOX
Patient is a 79y old Female who presents with a chief complaint of shortness of breath.     INTERVAL HPI/ OVERNIGHT EVENTS: Patient seen and examined at bedside. Reports that she feels fine today. No acute complaints.     REVIEW OF SYSTEMS:  CONSTITUTIONAL: No fever or chills  HEENT:  No headache, no sore throat  RESPIRATORY: No cough, wheezing, or shortness of breath  CARDIOVASCULAR: No chest pain, palpitations  GASTROINTESTINAL: No abd pain, nausea, vomiting, or diarrhea  GENITOURINARY: No dysuria, frequency, or hematuria  NEUROLOGICAL: no focal weakness or dizziness  MUSCULOSKELETAL: no myalgias     Vital Signs Last 24 Hrs  T(C): 36.5 (05 Nov 2024 12:21), Max: 37.2 (04 Nov 2024 20:23)  T(F): 97.7 (05 Nov 2024 12:21), Max: 99 (04 Nov 2024 20:23)  HR: 64 (05 Nov 2024 12:21) (64 - 80)  BP: 130/75 (05 Nov 2024 12:21) (130/75 - 168/81)  BP(mean): 95 (05 Nov 2024 04:56) (95 - 95)  RR: 18 (05 Nov 2024 12:21) (18 - 18)  SpO2: 96% (05 Nov 2024 12:21) (94% - 98%)    Parameters below as of 05 Nov 2024 12:21  Patient On (Oxygen Delivery Method): room air      BMI (kg/m2): 35.2 (10-25-24 @ 13:49)    PHYSICAL EXAM:  General: NAD, morbidly obese female sitting in comfortably, permacath on right side in place clean/dry/intact   Respiratory: Clear to auscultation bilaterally, no wheezes or crackles  CV: +S1/S2, +systolic murmur, no rubs   Abdominal: Soft, Non tender, Nondistended, bowel sounds x 4  Extremities: +chronic skin changes with b/l hyperpigmentation, no ulcers noted. No edema, 2+ peripheral pulses  NERVOUS SYSTEM: answers questions and follows commands appropriately, A&Ox3, grossly moves all extremities   PSYCH: Appropriate affect, Alert & Awake; Good judgement      LABS: Personally reviewed                        7.6    8.89  )-----------( 256      ( 05 Nov 2024 07:00 )             24.5     11-05    137  |  100  |  39[H]  ----------------------------<  112[H]  3.8   |  32[H]  |  2.15[H]    Ca    8.6      05 Nov 2024 07:00  Phos  3.7     11-04  Mg     2.2     11-04    TPro  6.6  /  Alb  2.9[L]  /  TBili  0.4  /  DBili  x   /  AST  9[L]  /  ALT  12  /  AlkPhos  78  11-05        RADIOLOGY & ADDITIONAL TESTS: Personally reviewed.   CHEST X-RAY 11/03/2024  IMPRESSION:  Comparison CXR 10/29/2024  Mild pulmonary vascular redistribution/congestion, more   pronounced on the left. Findings are improved on the right slightly worse  on the left compared to the prior image

## 2024-11-05 NOTE — PROGRESS NOTE ADULT - SUBJECTIVE AND OBJECTIVE BOX
Follow-up Pulmonary Progress Note  Chief Complaint : Acute on chronic systolic congestive heart failure      patient seen and examined  comfortable  on room air  has no pulmonary complaints  on room air      Allergies :ACE inhibitors (Angioedema)  statins (Muscle Pain)  predniSONE (Other)      PAST MEDICAL & SURGICAL HISTORY:  Hypertension    Diabetes  type 2    Thyroid disease    Anemia    Hypercholesteremia    Myelodysplasia (myelodysplastic syndrome)    Thyromegaly  s/p partial thyroidectomy long time ago    Other giant cell arteritis    Arthritis    Kidney insufficiency    PVD (peripheral vascular disease)    Mass of right knee    Urinary incontinence    H/O partial thyroidectomy    H/O: hysterectomy    History of cataract surgery    History of vascular surgery        Medications:  MEDICATIONS  (STANDING):  apixaban 2.5 milliGRAM(s) Oral two times a day  Biotene Dry Mouth Oral Rinse 5 milliLiter(s) Swish and Spit daily  calcium acetate 1334 milliGRAM(s) Oral three times a day with meals  chlorhexidine 2% Cloths 1 Application(s) Topical daily  dextrose 5%. 1000 milliLiter(s) (100 mL/Hr) IV Continuous <Continuous>  dextrose 5%. 1000 milliLiter(s) (50 mL/Hr) IV Continuous <Continuous>  dextrose 50% Injectable 12.5 Gram(s) IV Push once  dextrose 50% Injectable 25 Gram(s) IV Push once  dextrose 50% Injectable 25 Gram(s) IV Push once  diltiazem    milliGRAM(s) Oral daily  epoetin jacobo-epbx (RETACRIT) Injectable 74456 Unit(s) IV Push <User Schedule>  ezetimibe 10 milliGRAM(s) Oral daily  ferrous    sulfate 325 milliGRAM(s) Oral daily  glucagon  Injectable 1 milliGRAM(s) IntraMuscular once  hydrALAZINE 100 milliGRAM(s) Oral three times a day  influenza  Vaccine (HIGH DOSE) 0.5 milliLiter(s) IntraMuscular once  insulin lispro (ADMELOG) corrective regimen sliding scale   SubCutaneous three times a day before meals  insulin lispro (ADMELOG) corrective regimen sliding scale   SubCutaneous at bedtime  labetalol 600 milliGRAM(s) Oral two times a day  linagliptin 5 milliGRAM(s) Oral daily  pantoprazole    Tablet 40 milliGRAM(s) Oral before breakfast  polyethylene glycol 3350 17 Gram(s) Oral daily  rosuvastatin 5 milliGRAM(s) Oral at bedtime    MEDICATIONS  (PRN):  acetaminophen     Tablet .. 650 milliGRAM(s) Oral every 6 hours PRN Temp greater or equal to 38C (100.4F), Mild Pain (1 - 3)  albuterol    90 MICROgram(s) HFA Inhaler 2 Puff(s) Inhalation every 6 hours PRN Shortness of Breath  dextrose Oral Gel 15 Gram(s) Oral once PRN Blood Glucose LESS THAN 70 milliGRAM(s)/deciliter  melatonin 3 milliGRAM(s) Oral at bedtime PRN Insomnia  ondansetron Injectable 4 milliGRAM(s) IV Push every 8 hours PRN Nausea and/or Vomiting  senna 2 Tablet(s) Oral at bedtime PRN Constipation  simethicone 80 milliGRAM(s) Chew daily PRN Gas  sodium chloride 0.65% Nasal 1 Spray(s) Both Nostrils daily PRN Nasal Congestion  sodium chloride 0.9% lock flush 10 milliLiter(s) IV Push every 1 hour PRN Pre/post blood products, medications, blood draw, and to maintain line patency      Antibiotics History      Heme Medications   apixaban 2.5 milliGRAM(s) Oral two times a day, 11-01-24 @ 00:00      GI Medications  pantoprazole    Tablet 40 milliGRAM(s) Oral before breakfast, 10-28-24 @ 00:00, Routine  polyethylene glycol 3350 17 Gram(s) Oral daily, 10-31-24 @ 17:35,   senna 2 Tablet(s) Oral at bedtime, 10-31-24 @ 17:34, Routine PRN  simethicone 80 milliGRAM(s) Chew daily, 10-30-24 @ 16:43, Routine PRN        LABS:                        7.6    8.89  )-----------( 256      ( 05 Nov 2024 07:00 )             24.5     11-05    137  |  100  |  39[H]  ----------------------------<  112[H]  3.8   |  32[H]  |  2.15[H]    Ca    8.6      05 Nov 2024 07:00  Phos  3.7     11-04  Mg     2.2     11-04    TPro  6.6  /  Alb  2.9[L]  /  TBili  0.4  /  DBili  x   /  AST  9[L]  /  ALT  12  /  AlkPhos  78  11-05    CHUNG -- 10-22 @ 07:07  Anti SS-1 --  Anti SS-2 --  Anti RNP --  RF -- 10-22 @ 07:07          < from: Xray Chest 1 View- PORTABLE-Routine (Xray Chest 1 View- PORTABLE-Routine in AM.) (11.03.24 @ 09:44) >    ACC: [id number] EXAM:  XR CHEST PORTABLE ROUTINE 1V   ORDERED BY:  SALOMÓN WEAVER     PROCEDURE DATE:  11/03/2024          INTERPRETATION:  TIME OF EXAM: November 3, 2024 at 8:45 AM.    CLINICAL INFORMATION: Shortness of breath with pleural effusion.    COMPARISON:  October 29, 2024.    TECHNIQUE:   AP Portable chest x-ray.    INTERPRETATION:    Heart size and the mediastinum cannot be accurately evaluated on this   projection.  Right IJ catheter with tip in the SVC.  Surgical clips project over the right side of the neck and the thoracic   inlet.  There is mild pulmonary vascular redistribution/congestion, more   pronounced on the left. Findings are improved on the right and slightly   worse on the left compared to the prior image.  No pleural effusion or pneumothorax is seen.  There is osteoarthritic degenerative change of the shoulders.    IMPRESSION:  Mild pulmonary vascular redistribution/congestion, more   pronounced on the left. Findings are improved on the right slightly worse  on the left compared to the prior image.    --- End of Report ---        < end of copied text >      VITALS:  T(C): 36.5 (11-05-24 @ 12:21), Max: 37.2 (11-04-24 @ 20:23)  T(F): 97.7 (11-05-24 @ 12:21), Max: 99 (11-04-24 @ 20:23)  HR: 65 (11-05-24 @ 13:05) (64 - 80)  BP: 143/67 (11-05-24 @ 13:05) (130/75 - 150/67)  BP(mean): 95 (11-05-24 @ 04:56) (95 - 95)  ABP: --  ABP(mean): --  RR: 18 (11-05-24 @ 12:21) (18 - 18)  SpO2: 96% (11-05-24 @ 12:21) (94% - 96%)  CVP(mm Hg): --  CVP(cm H2O): --    Ins and Outs     11-04-24 @ 07:01  -  11-05-24 @ 07:00  --------------------------------------------------------  IN: 240 mL / OUT: 2500 mL / NET: -2260 mL    11-05-24 @ 07:01  -  11-05-24 @ 14:01  --------------------------------------------------------  IN: 120 mL / OUT: 0 mL / NET: 120 mL                I&O's Detail    04 Nov 2024 07:01  -  05 Nov 2024 07:00  --------------------------------------------------------  IN:    Oral Fluid: 240 mL  Total IN: 240 mL    OUT:    Other (mL): 2500 mL  Total OUT: 2500 mL    Total NET: -2260 mL      05 Nov 2024 07:01  -  05 Nov 2024 14:01  --------------------------------------------------------  IN:    Oral Fluid: 120 mL  Total IN: 120 mL    OUT:  Total OUT: 0 mL    Total NET: 120 mL

## 2024-11-05 NOTE — PROGRESS NOTE ADULT - ATTENDING COMMENTS
Please see resident note for full details regarding the service.     PE: A+Ox3, no murmurs, lungs CTA b/l, abdomen soft/NT/ND, no lower extremity swelling, right wall HD catheter.     Assessment:  - Shortness of breath secondary to cardiorenal syndrome/CHFpEF exacerbation, acute on chronic diastolic CHF   - Anemia of chronic disease   - ZORAIDA on CKD Stage 4/Cardiorenal syndrome   - History of HFpEF, Chronic Kidney Disease 4, DVT (on eliquis) Hypertension, Hyperlipidemia, Type 2 Diabetes Mellitus, Anemia, OA    Plan:   - s/p permanent access   - Not a candidate for SGLT2 inhibitor or MRA d/t progressive CKD   - Keep Hb > 8. Spoke to Dr. Storey, pt is non-toxic appearing and is doing well overall. Will hold on transfusing at this time.   - Monitor accuchecks, continue Lingliptin, ISS for now   - DVT ppx: restart Eliquis   - Code status: Full code   - Dispo: [facility] tomorrow     I spent a total of 35 minutes on the date of this encounter coordinating the patient's care, excluding resident teaching time. This includes reviewing results/imaging and discussions with specialists, nursing, case management/social work. Further tests, medications, and procedures have been ordered as indicated. Results and the plan of care were communicated to the patient and/or their family member. Supporting documentation was completed and added to the patient's chart.

## 2024-11-05 NOTE — PROGRESS NOTE ADULT - ASSESSMENT
Physical Examination:  GENERAL:               Alert, Oriented, no acute distress.    HEENT:                   No JVD, Moist MM, enlarged neck circumference   PULM:                     Bilateral air entry, Clear to auscultation bilaterally dimminished, no significant sputum production, no Rales, No Rhonchi, No Wheezing  CVS:                         S1, S2,  No Murmur  ABD:                        Soft, nondistended, nontender, normoactive bowel sounds,   EXT:                         no  edema, nontender, No Cyanosis or Clubbing    NEURO:                  Alert, oriented, interactive, nonfocal, follows commands  PSYC:                      Calm, + Insight.         Assessment  - Acute pulmonary Edema due to worsening renal function/non cardiogenic causes   - B/L Pl effusions and Dyspnea due to above   - Diastolic CHF - Chronic   - Underlying Hypertension, DM2   - Obesity    Plan  CXR improving with HD  s/p perm cath  no acute pulmonary intervention.  Will need perm cath  now on room air.   PT/OOB as tolerated   Rest of care as per primary team.   no acute pulmonary issues, reconsult as needed  dispo planning

## 2024-11-05 NOTE — PROGRESS NOTE ADULT - ASSESSMENT
BURKE ABARCA is a 79y year old Female with PMH of HFpEF, Chronic Kidney Disease 4, DVT (on eliquis) Hypertension, Hyperlipidemia, Type 2 Diabetes Mellitus, Anemia, OA who presents to the ER complaining of shortness of breath x2 days. Admitted for acute on chronic dCHF and worsening chronic kidney disease.     #Shortness of breath - likely 2/2 HFpEF exacerbation, acute on chronic diastolic CHF, improving  - CXR with Interstitial edema with mild congestive heart failure  - pulse ox q8h  - TTE 10/20/24: LVEF 63%, grade 2 diastolic dysfunction  - proBNP 5495 < 3792 on 10/19  - currently not on oxygen, VSS, saturating well on RA  - nephro recs appreciated  - pulm recs appreciated: CXR completed 11/3/2024  - Not a candidate for SGLT2 inhibitor or MRA d/t progressive CKD     #Acute Kidney Injury on CKD Stage 4  - uremia improving  - HD MWF  - Cr 2.15, BUN 39, GFR 23 11/05/2024  - nephro recs appreciated: pt c/w dialysis. HD MWF, last session Monday 11/4/2024, next session tomorrow 11/06/2024.   - Hepatitis panel negative, Hep B surface Ab nonreactive  - s/p placement of perma cath on 10/31/2024    #Anemia, likely chronic disease   - s/p 2U pRBC transfusion  - iron studies: low transferrin, high ferritin, otherwise wnl  - transfuse if <7. Discussed with Dr. Storey, pt is non-toxic appearing and is doing well overall. Will hold on transfusing at this time. Hgb has been stable and will likely not need transfusion, so okay to go to [facility].   - keep type and screen active, recently done on 11/3/24  - continue weekly epoietin    #Hyponatremia  #Hypocalcemia  #Hyperphosphatemia  - c/w dialysis and phoslo  - monitor     #Constipation, improved  - c/w miralax, senna prn  - s/p fleet enema  - pt now having bowel movements    #Hypertension  - c/w diltiazem, hydralazine, labetalol    #Hyperlipidemia  - c/w crestor, ezetemibe    #Type II Diabetes Mellitus  - hold home meds  - FS and DANGELO  - maintain blood glucose 100-180 while hospitalized   - HbA1c 6.4% 10/29/2024  - c/w linagliptin 5mg daily  - trend blood sugars daily    #history of DVT  - hx RUE DVT while at [facility]  - has been on Eliquis 2.5mg  - c/w Eliquis 2.5mg     #DVT ppx  -Eliquis    #GOC  -full code     #Diet  -DASH/TLC, consistent carbs, 1.2L fluid restriction    Dispo: [facility] tomorrow pending HD tomorrow AM    *Case seen and discussed with Dr. Fuentes

## 2024-11-05 NOTE — PROGRESS NOTE ADULT - SUBJECTIVE AND OBJECTIVE BOX
S/p stable HD yesterday, comfortable on RA    Vital Signs Last 24 Hrs  T(C): 36.5 (11-05-24 @ 12:21), Max: 37.2 (11-04-24 @ 20:23)  T(F): 97.7 (11-05-24 @ 12:21), Max: 99 (11-04-24 @ 20:23)  HR: 65 (11-05-24 @ 13:05) (64 - 80)  BP: 143/67 (11-05-24 @ 13:05) (130/75 - 150/67)  BP(mean): 95 (11-05-24 @ 04:56) (95 - 95)  RR: 18 (11-05-24 @ 12:21) (18 - 18)  SpO2: 96% (11-05-24 @ 12:21) (94% - 96%)    I&O's Detail    04 Nov 2024 07:01  -  05 Nov 2024 07:00  --------------------------------------------------------  OUT:    Other (mL): 2500 mL  Total OUT: 2500 mL    s1s2  b/l air entry  soft, ND  sm edema                                                                                                    7.6    8.89  )-----------( 256      ( 05 Nov 2024 07:00 )             24.5     05 Nov 2024 07:00    137    |  100    |  39     ----------------------------<  112    3.8     |  32     |  2.15     Ca    8.6        05 Nov 2024 07:00  Phos  3.7       04 Nov 2024 08:31  Mg     2.2       04 Nov 2024 08:31    TPro  6.6    /  Alb  2.9    /  TBili  0.4    /  DBili  x      /  AST  9      /  ALT  12     /  AlkPhos  78     05 Nov 2024 07:00    LIVER FUNCTIONS - ( 05 Nov 2024 07:00 )  Alb: 2.9 g/dL / Pro: 6.6 g/dL / ALK PHOS: 78 U/L / ALT: 12 U/L / AST: 9 U/L / GGT: x           acetaminophen     Tablet .. 650 milliGRAM(s) Oral every 6 hours PRN  albuterol    90 MICROgram(s) HFA Inhaler 2 Puff(s) Inhalation every 6 hours PRN  apixaban 2.5 milliGRAM(s) Oral two times a day  Biotene Dry Mouth Oral Rinse 5 milliLiter(s) Swish and Spit daily  calcium acetate 1334 milliGRAM(s) Oral three times a day with meals  chlorhexidine 2% Cloths 1 Application(s) Topical daily  dextrose 5%. 1000 milliLiter(s) IV Continuous <Continuous>  dextrose 5%. 1000 milliLiter(s) IV Continuous <Continuous>  dextrose 50% Injectable 12.5 Gram(s) IV Push once  dextrose 50% Injectable 25 Gram(s) IV Push once  dextrose 50% Injectable 25 Gram(s) IV Push once  dextrose Oral Gel 15 Gram(s) Oral once PRN  diltiazem    milliGRAM(s) Oral daily  epoetin jacobo-epbx (RETACRIT) Injectable 84707 Unit(s) IV Push <User Schedule>  ezetimibe 10 milliGRAM(s) Oral daily  ferrous    sulfate 325 milliGRAM(s) Oral daily  glucagon  Injectable 1 milliGRAM(s) IntraMuscular once  hydrALAZINE 100 milliGRAM(s) Oral three times a day  influenza  Vaccine (HIGH DOSE) 0.5 milliLiter(s) IntraMuscular once  insulin lispro (ADMELOG) corrective regimen sliding scale   SubCutaneous at bedtime  insulin lispro (ADMELOG) corrective regimen sliding scale   SubCutaneous three times a day before meals  labetalol 600 milliGRAM(s) Oral two times a day  linagliptin 5 milliGRAM(s) Oral daily  melatonin 3 milliGRAM(s) Oral at bedtime PRN  ondansetron Injectable 4 milliGRAM(s) IV Push every 8 hours PRN  pantoprazole    Tablet 40 milliGRAM(s) Oral before breakfast  polyethylene glycol 3350 17 Gram(s) Oral daily  rosuvastatin 5 milliGRAM(s) Oral at bedtime  senna 2 Tablet(s) Oral at bedtime PRN  simethicone 80 milliGRAM(s) Chew daily PRN  sodium chloride 0.65% Nasal 1 Spray(s) Both Nostrils daily PRN  sodium chloride 0.9% lock flush 10 milliLiter(s) IV Push every 1 hour PRN    A/P:    DM, HTN, CKD 4 (Cr 2.13 - 7/2/24, Cr 2.35 - 10/7/24)  Adm w/SOB iso severe anemia, PVC on CXR  S/p 2u PRBCs   Started on diuretics   UA w/pr 30, bland  Imaging w/o hydro   Progressive decline in renal fx   Diuretics held since 10/22  Serologies are negative, SPEP negative  No improvement in renal fx, developed uremic symptoms   Cardio-renal/hemodynamic ZORAIDA/CKD 4 w/progression to ESRD  Started on HD 10/25 via temp HD cath   S/p perm cath 10/31  S/p stable HD yesterday w/2.5kg fluid removal  Next HD tomorrow   Epoetin w/HD 3 x week  Plan for FELICIANO w/HD     [phone]

## 2024-11-06 ENCOUNTER — TRANSCRIPTION ENCOUNTER (OUTPATIENT)
Age: 79
End: 2024-11-06

## 2024-11-06 VITALS
RESPIRATION RATE: 19 BRPM | DIASTOLIC BLOOD PRESSURE: 70 MMHG | OXYGEN SATURATION: 94 % | SYSTOLIC BLOOD PRESSURE: 162 MMHG | TEMPERATURE: 99 F | HEART RATE: 77 BPM

## 2024-11-06 LAB
ALBUMIN SERPL ELPH-MCNC: 3 G/DL — LOW (ref 3.3–5)
ALP SERPL-CCNC: 74 U/L — SIGNIFICANT CHANGE UP (ref 40–120)
ALT FLD-CCNC: 16 U/L — SIGNIFICANT CHANGE UP (ref 10–45)
ANION GAP SERPL CALC-SCNC: 6 MMOL/L — SIGNIFICANT CHANGE UP (ref 5–17)
AST SERPL-CCNC: 14 U/L — SIGNIFICANT CHANGE UP (ref 10–40)
BILIRUB SERPL-MCNC: 0.4 MG/DL — SIGNIFICANT CHANGE UP (ref 0.2–1.2)
BLD GP AB SCN SERPL QL: SIGNIFICANT CHANGE UP
BUN SERPL-MCNC: 51 MG/DL — HIGH (ref 7–23)
CALCIUM SERPL-MCNC: 8.7 MG/DL — SIGNIFICANT CHANGE UP (ref 8.4–10.5)
CHLORIDE SERPL-SCNC: 100 MMOL/L — SIGNIFICANT CHANGE UP (ref 96–108)
CO2 SERPL-SCNC: 30 MMOL/L — SIGNIFICANT CHANGE UP (ref 22–31)
CREAT SERPL-MCNC: 2.64 MG/DL — HIGH (ref 0.5–1.3)
EGFR: 18 ML/MIN/1.73M2 — LOW
GLUCOSE BLDC GLUCOMTR-MCNC: 125 MG/DL — HIGH (ref 70–99)
GLUCOSE BLDC GLUCOMTR-MCNC: 125 MG/DL — HIGH (ref 70–99)
GLUCOSE BLDC GLUCOMTR-MCNC: 97 MG/DL — SIGNIFICANT CHANGE UP (ref 70–99)
GLUCOSE SERPL-MCNC: 116 MG/DL — HIGH (ref 70–99)
HCT VFR BLD CALC: 23.7 % — LOW (ref 34.5–45)
HGB BLD-MCNC: 7.1 G/DL — LOW (ref 11.5–15.5)
MAGNESIUM SERPL-MCNC: 2.2 MG/DL — SIGNIFICANT CHANGE UP (ref 1.6–2.6)
MCHC RBC-ENTMCNC: 26.4 PG — LOW (ref 27–34)
MCHC RBC-ENTMCNC: 30 G/DL — LOW (ref 32–36)
MCV RBC AUTO: 88.1 FL — SIGNIFICANT CHANGE UP (ref 80–100)
NRBC # BLD: 0 /100 WBCS — SIGNIFICANT CHANGE UP (ref 0–0)
PHOSPHATE SERPL-MCNC: 2.7 MG/DL — SIGNIFICANT CHANGE UP (ref 2.5–4.5)
PLATELET # BLD AUTO: 250 K/UL — SIGNIFICANT CHANGE UP (ref 150–400)
POTASSIUM SERPL-MCNC: 4.1 MMOL/L — SIGNIFICANT CHANGE UP (ref 3.5–5.3)
POTASSIUM SERPL-SCNC: 4.1 MMOL/L — SIGNIFICANT CHANGE UP (ref 3.5–5.3)
PROT SERPL-MCNC: 6.7 G/DL — SIGNIFICANT CHANGE UP (ref 6–8.3)
RBC # BLD: 2.69 M/UL — LOW (ref 3.8–5.2)
RBC # FLD: 17.4 % — HIGH (ref 10.3–14.5)
SODIUM SERPL-SCNC: 136 MMOL/L — SIGNIFICANT CHANGE UP (ref 135–145)
WBC # BLD: 8.79 K/UL — SIGNIFICANT CHANGE UP (ref 3.8–10.5)
WBC # FLD AUTO: 8.79 K/UL — SIGNIFICANT CHANGE UP (ref 3.8–10.5)

## 2024-11-06 PROCEDURE — 99239 HOSP IP/OBS DSCHRG MGMT >30: CPT | Mod: GC

## 2024-11-06 RX ORDER — GLIPIZIDE 5 MG
1 TABLET ORAL
Refills: 0 | DISCHARGE

## 2024-11-06 RX ORDER — LINAGLIPTIN 5 MG/1
1 TABLET, FILM COATED ORAL
Qty: 0 | Refills: 0 | DISCHARGE
Start: 2024-11-06

## 2024-11-06 RX ADMIN — HYDRALAZINE HYDROCHLORIDE 100 MILLIGRAM(S): 50 TABLET, FILM COATED ORAL at 14:37

## 2024-11-06 RX ADMIN — EZETIMIBE 10 MILLIGRAM(S): 10 TABLET ORAL at 14:37

## 2024-11-06 RX ADMIN — Medication 600 MILLIGRAM(S): at 05:15

## 2024-11-06 RX ADMIN — CALCIUM ACETATE 1334 MILLIGRAM(S): 667 CAPSULE ORAL at 14:37

## 2024-11-06 RX ADMIN — CALCIUM ACETATE 1334 MILLIGRAM(S): 667 CAPSULE ORAL at 08:53

## 2024-11-06 RX ADMIN — Medication 180 MILLIGRAM(S): at 05:14

## 2024-11-06 RX ADMIN — Medication 325 MILLIGRAM(S): at 14:36

## 2024-11-06 RX ADMIN — CHLORHEXIDINE GLUCONATE 1 APPLICATION(S): 40 SOLUTION TOPICAL at 14:38

## 2024-11-06 RX ADMIN — Medication 5 MILLILITER(S): at 14:37

## 2024-11-06 RX ADMIN — APIXABAN 2.5 MILLIGRAM(S): 5 TABLET, FILM COATED ORAL at 05:14

## 2024-11-06 RX ADMIN — HYDRALAZINE HYDROCHLORIDE 100 MILLIGRAM(S): 50 TABLET, FILM COATED ORAL at 05:14

## 2024-11-06 RX ADMIN — PANTOPRAZOLE SODIUM 40 MILLIGRAM(S): 40 TABLET, DELAYED RELEASE ORAL at 05:15

## 2024-11-06 RX ADMIN — LINAGLIPTIN 5 MILLIGRAM(S): 5 TABLET, FILM COATED ORAL at 14:37

## 2024-11-06 RX ADMIN — ERYTHROPOIETIN 10000 UNIT(S): 10000 INJECTION, SOLUTION INTRAVENOUS; SUBCUTANEOUS at 11:19

## 2024-11-06 NOTE — CHART NOTE - NSCHARTNOTEFT_GEN_A_CORE
NUTRITION Follow Up Note    SOURCE: Patient [X]  Medical Record [X]  Nursing Staff [X]  Family Member []    DIET: Diet, DASH/TLC:   Sodium & Cholesterol Restricted  Consistent Carbohydrate {No Snacks}  1200mL Fluid Restriction ([id number])  For patients receiving Renal Replacement - No Protein Restr, No Conc K, No Conc Phos, Low Sodium  No Concentrated Potassium  No Concentrated Phosphorus (10-30-24 @ 17:22) [Active]    Patient noted with good appetite, consuming >75% of meals at this time per nursing flowsheets. CKD4 noted. Receiving HD M/W/F. Addressed with renal diet restrictions. K+, Na, & Phos WNL at this time. Hx T2DM noted. A1C: 6.4% (10/29/24). Discussed consistent carbohydrate diet including food sources of carbohydrates, portions and serving sizes, pairing protein with carbohydrates, limiting sugar sweetened beverages in diet and the importance of consistent eating pattern to help optimize glycemic control.     EDEMA: no edema noted    LAST BM: 24    SKIN: no pressure injuries noted    WEIGHT TRENDS:  Weight in k.5 (2024 09:00)  Weight in k.5 (2024 04:57)  Weight in k.9 (2024 12:55)  Weight in k.4 (2024 09:50)  Weight in k.7 (2024 13:45)  Weight in k.2 (2024 10:45)  Weight in k.2 (31 Oct 2024 05:20)  Weight in k.2 (30 Oct 2024 12:45)      PERTINENT MEDICATIONS: MEDICATIONS  (STANDING):  apixaban 2.5 milliGRAM(s) Oral two times a day  Biotene Dry Mouth Oral Rinse 5 milliLiter(s) Swish and Spit daily  calcium acetate 1334 milliGRAM(s) Oral three times a day with meals  chlorhexidine 2% Cloths 1 Application(s) Topical daily  dextrose 5%. 1000 milliLiter(s) (50 mL/Hr) IV Continuous <Continuous>  dextrose 5%. 1000 milliLiter(s) (100 mL/Hr) IV Continuous <Continuous>  dextrose 50% Injectable 12.5 Gram(s) IV Push once  dextrose 50% Injectable 25 Gram(s) IV Push once  dextrose 50% Injectable 25 Gram(s) IV Push once  diltiazem    milliGRAM(s) Oral daily  epoetin jacobo-epbx (RETACRIT) Injectable 56220 Unit(s) IV Push <User Schedule>  ezetimibe 10 milliGRAM(s) Oral daily  ferrous    sulfate 325 milliGRAM(s) Oral daily  glucagon  Injectable 1 milliGRAM(s) IntraMuscular once  hydrALAZINE 100 milliGRAM(s) Oral three times a day  influenza  Vaccine (HIGH DOSE) 0.5 milliLiter(s) IntraMuscular once  insulin lispro (ADMELOG) corrective regimen sliding scale   SubCutaneous at bedtime  insulin lispro (ADMELOG) corrective regimen sliding scale   SubCutaneous three times a day before meals  labetalol 600 milliGRAM(s) Oral two times a day  linagliptin 5 milliGRAM(s) Oral daily  pantoprazole    Tablet 40 milliGRAM(s) Oral before breakfast  polyethylene glycol 3350 17 Gram(s) Oral daily  rosuvastatin 5 milliGRAM(s) Oral at bedtime    MEDICATIONS  (PRN):  acetaminophen     Tablet .. 650 milliGRAM(s) Oral every 6 hours PRN Temp greater or equal to 38C (100.4F), Mild Pain (1 - 3)  albuterol    90 MICROgram(s) HFA Inhaler 2 Puff(s) Inhalation every 6 hours PRN Shortness of Breath  dextrose Oral Gel 15 Gram(s) Oral once PRN Blood Glucose LESS THAN 70 milliGRAM(s)/deciliter  melatonin 3 milliGRAM(s) Oral at bedtime PRN Insomnia  ondansetron Injectable 4 milliGRAM(s) IV Push every 8 hours PRN Nausea and/or Vomiting  senna 2 Tablet(s) Oral at bedtime PRN Constipation  simethicone 80 milliGRAM(s) Chew daily PRN Gas  sodium chloride 0.65% Nasal 1 Spray(s) Both Nostrils daily PRN Nasal Congestion  sodium chloride 0.9% lock flush 10 milliLiter(s) IV Push every 1 hour PRN Pre/post blood products, medications, blood draw, and to maintain line patency      PERTINENT LABS:   Na136 mmol/L Glu 116 mg/dL[H] K+ 4.1 mmol/L Cr  2.64 mg/dL[H] BUN 51 mg/dL[H]  Phos 2.7 mg/dL  Alb 3.0 g/dL[L]        ESTIMATED NEEDS:   [X] No Change Since Previous Assessment    PREVIOUS NUTRITION DIAGNOSIS:  1. Altered Nutrition Related Lab Values - A1C    NUTRITION DIAGNOSIS IS [X] Ongoing     NEW NUTRITION DIAGNOSIS: [X] Not Applicable    INTERVENTIONS:   1. Recommend continue current nutrition plan of care as tolerated   2. Provide ongoing diet education as needed     MONITORING & EVALUATION:   1. Weights   2. PO intakes   3. Skin integrity   4. Tolerance to diet prescription   5. Labs & POCT  6. Follow up (per protocol)    Registered Dietitian/Nutritionist Remains Available.  Carlos A Huerta RD, CDN    Contact: [phone] or via [place] TEAMS

## 2024-11-06 NOTE — PROGRESS NOTE ADULT - SUBJECTIVE AND OBJECTIVE BOX
Patient is a 79y old Female who presents with a chief complaint of shortness of breath.     INTERVAL HPI/ OVERNIGHT EVENTS: Patient seen and examined at bedside in dialysis. Reports that she feels fine today. No acute complaints.     REVIEW OF SYSTEMS:  CONSTITUTIONAL: No fever or chills  HEENT:  No headache, no sore throat  RESPIRATORY: No cough, wheezing, or shortness of breath  CARDIOVASCULAR: No chest pain, palpitations  GASTROINTESTINAL: No abd pain, nausea, vomiting, or diarrhea  GENITOURINARY: No dysuria, frequency, or hematuria  NEUROLOGICAL: no focal weakness or dizziness  MUSCULOSKELETAL: no myalgias     Vital Signs Last 24 Hrs  T(C): 36.9 (06 Nov 2024 09:00), Max: 37 (06 Nov 2024 04:57)  T(F): 98.4 (06 Nov 2024 09:00), Max: 98.6 (06 Nov 2024 04:57)  HR: 69 (06 Nov 2024 09:00) (64 - 72)  BP: 107/52 (06 Nov 2024 09:00) (107/52 - 151/70)  RR: 18 (06 Nov 2024 09:00) (16 - 18)  SpO2: 98% (06 Nov 2024 09:00) (92% - 98%)    Parameters below as of 06 Nov 2024 09:00  Patient On (Oxygen Delivery Method): room air      BMI (kg/m2): 35.2 (10-25-24 @ 13:49)    PHYSICAL EXAM:  General: NAD, morbidly obese female sitting in comfortably, permacath on right side in place clean/dry/intact   Respiratory: Clear to auscultation bilaterally, no wheezes or crackles  CV: +S1/S2, +systolic murmur, no rubs   Abdominal: Soft, Non tender, Nondistended, bowel sounds x 4  Extremities: +chronic skin changes with b/l hyperpigmentation, no ulcers noted. No edema, 2+ peripheral pulses  NERVOUS SYSTEM: answers questions and follows commands appropriately, A&Ox3, grossly moves all extremities   PSYCH: Appropriate affect, Alert & Awake; Good judgement      LABS: Personally reviewed                        7.1    8.79  )-----------( 250      ( 06 Nov 2024 08:03 )             23.7     11-06    136  |  100  |  51[H]  ----------------------------<  116[H]  4.1   |  30  |  2.64[H]    Ca    8.7      06 Nov 2024 08:03  Phos  2.7     11-06  Mg     2.2     11-06    TPro  6.7  /  Alb  3.0[L]  /  TBili  0.4  /  DBili  x   /  AST  14  /  ALT  16  /  AlkPhos  74  11-06      RADIOLOGY & ADDITIONAL TESTS: Personally reviewed.   CHEST X-RAY 11/03/2024  IMPRESSION:  Comparison CXR 10/29/2024  Mild pulmonary vascular redistribution/congestion, more   pronounced on the left. Findings are improved on the right slightly worse  on the left compared to the prior image

## 2024-11-06 NOTE — PROGRESS NOTE ADULT - SUBJECTIVE AND OBJECTIVE BOX
Seen on HD, comfortable on RA    Vital Signs Last 24 Hrs  T(C): 37.1 (11-06-24 @ 14:34), Max: 37.1 (11-06-24 @ 14:34)  T(F): 98.7 (11-06-24 @ 14:34), Max: 98.7 (11-06-24 @ 14:34)  HR: 77 (11-06-24 @ 14:34) (65 - 77)  BP: 162/70 (11-06-24 @ 14:34) (107/52 - 162/70)  BP(mean): 101 (11-06-24 @ 14:34) (101 - 101)  RR: 19 (11-06-24 @ 14:34) (16 - 19)  SpO2: 94% (11-06-24 @ 14:34) (92% - 98%)    s1s2  b/l air entry  soft, ND  sm edema                                                                                                            7.1    8.79  )-----------( 250      ( 06 Nov 2024 08:03 )             23.7     06 Nov 2024 08:03    136    |  100    |  51     ----------------------------<  116    4.1     |  30     |  2.64     Ca    8.7        06 Nov 2024 08:03  Phos  2.7       06 Nov 2024 08:03  Mg     2.2       06 Nov 2024 08:03    TPro  6.7    /  Alb  3.0    /  TBili  0.4    /  DBili  x      /  AST  14     /  ALT  16     /  AlkPhos  74     06 Nov 2024 08:03    LIVER FUNCTIONS - ( 06 Nov 2024 08:03 )  Alb: 3.0 g/dL / Pro: 6.7 g/dL / ALK PHOS: 74 U/L / ALT: 16 U/L / AST: 14 U/L / GGT: x           acetaminophen     Tablet .. 650 milliGRAM(s) Oral every 6 hours PRN  albuterol    90 MICROgram(s) HFA Inhaler 2 Puff(s) Inhalation every 6 hours PRN  apixaban 2.5 milliGRAM(s) Oral two times a day  Biotene Dry Mouth Oral Rinse 5 milliLiter(s) Swish and Spit daily  calcium acetate 1334 milliGRAM(s) Oral three times a day with meals  chlorhexidine 2% Cloths 1 Application(s) Topical daily  dextrose 5%. 1000 milliLiter(s) IV Continuous <Continuous>  dextrose 5%. 1000 milliLiter(s) IV Continuous <Continuous>  dextrose 50% Injectable 12.5 Gram(s) IV Push once  dextrose 50% Injectable 25 Gram(s) IV Push once  dextrose 50% Injectable 25 Gram(s) IV Push once  dextrose Oral Gel 15 Gram(s) Oral once PRN  diltiazem    milliGRAM(s) Oral daily  epoetin jacobo-epbx (RETACRIT) Injectable 21323 Unit(s) IV Push <User Schedule>  ezetimibe 10 milliGRAM(s) Oral daily  ferrous    sulfate 325 milliGRAM(s) Oral daily  glucagon  Injectable 1 milliGRAM(s) IntraMuscular once  hydrALAZINE 100 milliGRAM(s) Oral three times a day  influenza  Vaccine (HIGH DOSE) 0.5 milliLiter(s) IntraMuscular once  insulin lispro (ADMELOG) corrective regimen sliding scale   SubCutaneous three times a day before meals  insulin lispro (ADMELOG) corrective regimen sliding scale   SubCutaneous at bedtime  labetalol 600 milliGRAM(s) Oral two times a day  linagliptin 5 milliGRAM(s) Oral daily  melatonin 3 milliGRAM(s) Oral at bedtime PRN  ondansetron Injectable 4 milliGRAM(s) IV Push every 8 hours PRN  pantoprazole    Tablet 40 milliGRAM(s) Oral before breakfast  polyethylene glycol 3350 17 Gram(s) Oral daily  rosuvastatin 5 milliGRAM(s) Oral at bedtime  senna 2 Tablet(s) Oral at bedtime PRN  simethicone 80 milliGRAM(s) Chew daily PRN  sodium chloride 0.65% Nasal 1 Spray(s) Both Nostrils daily PRN  sodium chloride 0.9% lock flush 10 milliLiter(s) IV Push every 1 hour PRN    A/P:    DM, HTN, CKD 4 (Cr 2.13 - 7/2/24, Cr 2.35 - 10/7/24)  Adm w/SOB iso severe anemia, PVC on CXR  Required PRBCs   Started on diuretics   UA w/pr 30, bland  Imaging w/o hydro   Progressive decline in renal fx   Diuretics held since 10/22  Serologies are negative, SPEP negative  No improvement in renal fx, developed uremic symptoms   Cardio-renal/hemodynamic ZORAIDA/CKD 4 w/progression to ESRD  Started on HD 10/25 via temp HD cath   S/p perm cath 10/31  Bld tx 1u w/HD today   TMP 3kg  Epoetin w/HD 3 x week  FELICIANO w/HD     [phone]

## 2024-11-06 NOTE — DISCHARGE NOTE NURSING/CASE MANAGEMENT/SOCIAL WORK - PATIENT PORTAL LINK FT
You can access the FollowMyHealth Patient Portal offered by [facility] by registering at the following website: [email or web address] By joining [email or web address] [email or web address] portal, you will also be able to view your health information using other applications (apps) compatible with our system.

## 2024-11-06 NOTE — CHART NOTE - NSCHARTNOTEFT_GEN_A_CORE
Contacted Dr. Alexander via teams and signed out patient going to [facility] today.

## 2024-11-06 NOTE — DISCHARGE NOTE NURSING/CASE MANAGEMENT/SOCIAL WORK - NSDCPEFALRISK_GEN_ALL_CORE
For information on Fall & Injury Prevention, visit: [email or web address] OR  [email or web address] OR  https://www.cdc.gov/steadi/patient.html

## 2024-11-06 NOTE — DISCHARGE NOTE NURSING/CASE MANAGEMENT/SOCIAL WORK - FINANCIAL ASSISTANCE
[place]
[facility] provides services at a reduced cost to those who are determined to be eligible through [facility]’s financial assistance program. Information regarding [facility]’s financial assistance program can be found by going to [email or web address] or by calling [phone].

## 2024-11-06 NOTE — CHART NOTE - NSCHARTNOTESELECT_GEN_ALL_CORE
Event Note
Nutrition Services
Event Note
Nutrition Services

## 2024-11-06 NOTE — PROGRESS NOTE ADULT - ATTENDING COMMENTS
CANDIDATE FOR ADVANCED INTERESTED IN CUSTOM DISTANCE. Please see resident note for full details regarding the service.     PE: A+Ox3, no murmurs, lungs CTA b/l, abdomen soft/NT/ND, no lower extremity swelling, right wall HD catheter.     Assessment:  - Shortness of breath secondary to cardiorenal syndrome/CHFpEF exacerbation, acute on chronic diastolic CHF   - Anemia of chronic disease   - ZORAIDA on CKD Stage 4/Cardiorenal syndrome   - History of HFpEF, Chronic Kidney Disease 4, DVT (on eliquis) Hypertension, Hyperlipidemia, Type 2 Diabetes Mellitus, Anemia, OA    Plan:   - s/p permanent access   - Not a candidate for SGLT2 inhibitor or MRA d/t progressive CKD   - Spoke to Dr. Storey today, will transfuse 1 unit of PRBCs today during HD   - Monitor accuchecks, continue Lingliptin, ISS for now   - DVT ppx: restart Eliquis   - Code status: Full code   - Dispo: d/c to [facility] today with Dr. Alexander accepting    I spent a total of 50 minutes on the date of this encounter coordinating the patient's care, excluding resident teaching time. This includes reviewing results/imaging and discussions with specialists, nursing, case management/social work. Further tests, medications, and procedures have been ordered as indicated. Results and the plan of care were communicated to the patient and/or their family member. Supporting documentation was completed and added to the patient's chart.

## 2024-11-06 NOTE — PROGRESS NOTE ADULT - PROVIDER SPECIALTY LIST ADULT
Cardiology
Family Medicine
Nephrology
Palliative Care
Pulmonology
Cardiology
Cardiology
Family Medicine
Nephrology
Pulmonology
Pulmonology
Family Medicine
Nephrology
Nephrology
Pulmonology
Family Medicine
Palliative Care
Family Medicine
Family Medicine

## 2024-11-06 NOTE — PROGRESS NOTE ADULT - ASSESSMENT
BURKE ABARCA is a 79y year old Female with PMH of HFpEF, Chronic Kidney Disease 4, DVT (on eliquis) Hypertension, Hyperlipidemia, Type 2 Diabetes Mellitus, Anemia, OA who presents to the ER complaining of shortness of breath x2 days. Admitted for acute on chronic dCHF and worsening chronic kidney disease.     #Shortness of breath - likely 2/2 HFpEF exacerbation, acute on chronic diastolic CHF, improving  - CXR with Interstitial edema with mild congestive heart failure  - pulse ox q8h  - TTE 10/20/24: LVEF 63%, grade 2 diastolic dysfunction  - proBNP 5495 < 3792 on 10/19  - currently not on oxygen, VSS, saturating well on RA  - nephro recs appreciated  - pulm recs appreciated: CXR completed 11/3/2024  - Not a candidate for SGLT2 inhibitor or MRA d/t progressive CKD     #Acute Kidney Injury on CKD Stage 4  - uremia improving  - HD MWF  - Cr 2.15, BUN 39, GFR 23 11/05/2024  - nephro recs appreciated: pt c/w dialysis. HD MWF, last session today 11/6/2024  - Hepatitis panel negative, Hep B surface Ab nonreactive  - s/p placement of perma cath on 10/31/2024    #Anemia, likely chronic disease   - s/p 2U pRBC transfusion  - iron studies: low transferrin, high ferritin, otherwise wnl  - transfuse if <7. Discussed with Dr. Storey, pt is non-toxic appearing and is doing well overall. Will hold on transfusing at this time. Hgb has been stable and will likely not need transfusion, so okay to go to [facility] ([place]).   - keep type and screen active, recently done on 11/3/24  - continue weekly epoietin    #Hyponatremia  #Hypocalcemia  #Hyperphosphatemia  - c/w dialysis and phoslo  - monitor     #Constipation, improved  - c/w miralax, senna prn  - s/p fleet enema  - pt now having bowel movements    #Hypertension  - c/w diltiazem, hydralazine, labetalol    #Hyperlipidemia  - c/w crestor, ezetemibe    #Type II Diabetes Mellitus  - hold home meds  - FS and DANGELO  - maintain blood glucose 100-180 while hospitalized   - HbA1c 6.4% 10/29/2024  - c/w linagliptin 5mg daily  - trend blood sugars daily    #history of DVT  - hx RUE DVT while at [facility] ([place])  - has been on Eliquis 2.5mg  - c/w Eliquis 2.5mg     #DVT ppx  -Eliquis    #GOC  -full code     #Diet  -DASH/TLC, consistent carbs, 1.2L fluid restriction    Dispo: [facility] today    Patient's emergency contact, Davida Ferreira, updated via telephone.     *Case seen and discussed with Dr. Fuentes   BURKE ABARCA is a 79y year old Female with PMH of HFpEF, Chronic Kidney Disease 4, DVT (on eliquis) Hypertension, Hyperlipidemia, Type 2 Diabetes Mellitus, Anemia, OA who presents to the ER complaining of shortness of breath x2 days. Admitted for acute on chronic dCHF and worsening chronic kidney disease.     #Shortness of breath - likely 2/2 HFpEF exacerbation, acute on chronic diastolic CHF, improving  - CXR with Interstitial edema with mild congestive heart failure  - pulse ox q8h  - TTE 10/20/24: LVEF 63%, grade 2 diastolic dysfunction  - proBNP 5495 < 3792 on 10/19  - currently not on oxygen, VSS, saturating well on RA  - nephro recs appreciated  - pulm recs appreciated: CXR completed 11/3/2024  - Not a candidate for SGLT2 inhibitor or MRA d/t progressive CKD     #Acute Kidney Injury on CKD Stage 4  - uremia improving  - HD MWF  - Cr 2.64, BUN 51, GFR 18 11/06/2024  - nephro recs appreciated: pt c/w dialysis. HD MWF, last session today 11/6/2024  - Hepatitis panel negative, Hep B surface Ab nonreactive  - s/p placement of perma cath on 10/31/2024    #Anemia, likely chronic disease   - s/p 2U pRBC transfusion  - iron studies: low transferrin, high ferritin, otherwise wnl  - transfuse if <7. Discussed with Dr. Storey, pt is non-toxic appearing and is doing well overall. Will hold on transfusing at this time. Hgb has been stable and will likely not need transfusion, so okay to go to [facility].   - keep type and screen active, recently done on 11/3/24  - continue weekly epoietin    #Hyponatremia  #Hypocalcemia  #Hyperphosphatemia  - c/w dialysis and phoslo  - monitor     #Constipation, improved  - c/w miralax, senna prn  - s/p fleet enema  - pt now having bowel movements    #Hypertension  - c/w diltiazem, hydralazine, labetalol    #Hyperlipidemia  - c/w crestor, ezetemibe    #Type II Diabetes Mellitus  - hold home meds  - FS and DANGELO  - maintain blood glucose 100-180 while hospitalized   - HbA1c 6.4% 10/29/2024  - c/w linagliptin 5mg daily  - trend blood sugars daily    #history of DVT  - hx RUE DVT while at [facility]  - has been on Eliquis 2.5mg  - c/w Eliquis 2.5mg     #DVT ppx  -Eliquis    #GOC  -full code     #Diet  -DASH/TLC, consistent carbs, 1.2L fluid restriction    Dispo: [facility] today    Patient's emergency contact, Davida Ferreira, updated via telephone.     *Case seen and discussed with Dr. Fuentes   BURKE ABARCA is a 79y year old Female with PMH of HFpEF, Chronic Kidney Disease 4, DVT (on eliquis) Hypertension, Hyperlipidemia, Type 2 Diabetes Mellitus, Anemia, OA who presents to the ER complaining of shortness of breath x2 days. Admitted for acute on chronic dCHF and worsening chronic kidney disease.     #Shortness of breath - likely 2/2 HFpEF exacerbation, acute on chronic diastolic CHF, improving  #Cardiorenal syndrome   - CXR with Interstitial edema with mild congestive heart failure  - pulse ox q8h  - TTE 10/20/24: LVEF 63%, grade 2 diastolic dysfunction  - proBNP 5495 < 3792 on 10/19  - currently not on oxygen, VSS, saturating well on RA  - nephro recs appreciated  - pulm recs appreciated: CXR completed 11/3/2024  - Not a candidate for SGLT2 inhibitor or MRA d/t progressive CKD     #Acute Kidney Injury on CKD Stage 4  - uremia improving  - HD MWF  - Cr 2.64, BUN 51, GFR 18 11/06/2024  - nephro recs appreciated: pt c/w dialysis. HD MWF, last session today 11/6/2024  - Hepatitis panel negative, Hep B surface Ab nonreactive  - s/p placement of perma cath on 10/31/2024    #Anemia, likely chronic disease   - s/p 2U pRBC transfusion  - iron studies: low transferrin, high ferritin, otherwise wnl  - transfuse if <7. Discussed with Dr. Storey, pt is non-toxic appearing and is doing well overall. Will hold on transfusing at this time. Hgb has been stable and will likely not need transfusion, so okay to go to [facility].   - keep type and screen active, recently done on 11/3/24  - continue weekly epoietin    #Hyponatremia  #Hypocalcemia  #Hyperphosphatemia  - c/w dialysis and phoslo  - monitor     #Constipation, improved  - c/w miralax, senna prn  - s/p fleet enema  - pt now having bowel movements    #Hypertension  - c/w diltiazem, hydralazine, labetalol    #Hyperlipidemia  - c/w crestor, ezetemibe    #Type II Diabetes Mellitus  - hold home meds  - FS and DANGELO  - maintain blood glucose 100-180 while hospitalized   - HbA1c 6.4% 10/29/2024  - c/w linagliptin 5mg daily  - trend blood sugars daily    #history of DVT  - hx RUE DVT while at [facility]  - has been on Eliquis 2.5mg  - c/w Eliquis 2.5mg     #DVT ppx  -Eliquis    #GOC  -full code     #Diet  -DASH/TLC, consistent carbs, 1.2L fluid restriction    Dispo: [facility] today    Patient's emergency contact, Davida Ferreira, updated via telephone.     *Case seen and discussed with Dr. Fuentes

## 2024-11-06 NOTE — CHART NOTE - NSCHARTNOTEFT_GEN_A_CORE
Called patient's emergency contact, Davida Blackburn, via telephone and updated regarding patient's discharge plan for today. All questions and concerns addressed.

## 2024-11-06 NOTE — DISCHARGE NOTE NURSING/CASE MANAGEMENT/SOCIAL WORK - NSDCFUADDAPPT_GEN_ALL_CORE_FT
APPTS ARE READY TO BE MADE: [ ] YES    Best Family or Patient Contact (if needed):    Additional Information about above appointments (if needed):    1: Cardiology  2: Nephrology  3: PCP (Dr. Hernandez)    Other comments or requests:

## 2024-11-26 PROCEDURE — 96374 THER/PROPH/DIAG INJ IV PUSH: CPT

## 2024-11-26 PROCEDURE — 97116 GAIT TRAINING THERAPY: CPT

## 2024-11-26 PROCEDURE — 86036 ANCA SCREEN EACH ANTIBODY: CPT

## 2024-11-26 PROCEDURE — 94640 AIRWAY INHALATION TREATMENT: CPT

## 2024-11-26 PROCEDURE — 80053 COMPREHEN METABOLIC PANEL: CPT

## 2024-11-26 PROCEDURE — 86901 BLOOD TYPING SEROLOGIC RH(D): CPT

## 2024-11-26 PROCEDURE — 85027 COMPLETE CBC AUTOMATED: CPT

## 2024-11-26 PROCEDURE — 83516 IMMUNOASSAY NONANTIBODY: CPT

## 2024-11-26 PROCEDURE — 36415 COLL VENOUS BLD VENIPUNCTURE: CPT

## 2024-11-26 PROCEDURE — 87340 HEPATITIS B SURFACE AG IA: CPT

## 2024-11-26 PROCEDURE — 86850 RBC ANTIBODY SCREEN: CPT

## 2024-11-26 PROCEDURE — 86160 COMPLEMENT ANTIGEN: CPT

## 2024-11-26 PROCEDURE — 93005 ELECTROCARDIOGRAM TRACING: CPT

## 2024-11-26 PROCEDURE — 85025 COMPLETE CBC W/AUTO DIFF WBC: CPT

## 2024-11-26 PROCEDURE — 80048 BASIC METABOLIC PNL TOTAL CA: CPT

## 2024-11-26 PROCEDURE — 87637 SARSCOV2&INF A&B&RSV AMP PRB: CPT

## 2024-11-26 PROCEDURE — 99261: CPT

## 2024-11-26 PROCEDURE — 97162 PT EVAL MOD COMPLEX 30 MIN: CPT

## 2024-11-26 PROCEDURE — 85610 PROTHROMBIN TIME: CPT

## 2024-11-26 PROCEDURE — 82746 ASSAY OF FOLIC ACID SERUM: CPT

## 2024-11-26 PROCEDURE — 86704 HEP B CORE ANTIBODY TOTAL: CPT

## 2024-11-26 PROCEDURE — 82607 VITAMIN B-12: CPT

## 2024-11-26 PROCEDURE — 82728 ASSAY OF FERRITIN: CPT

## 2024-11-26 PROCEDURE — 71045 X-RAY EXAM CHEST 1 VIEW: CPT

## 2024-11-26 PROCEDURE — 93306 TTE W/DOPPLER COMPLETE: CPT

## 2024-11-26 PROCEDURE — 83550 IRON BINDING TEST: CPT

## 2024-11-26 PROCEDURE — P9016: CPT

## 2024-11-26 PROCEDURE — 84466 ASSAY OF TRANSFERRIN: CPT

## 2024-11-26 PROCEDURE — 76775 US EXAM ABDO BACK WALL LIM: CPT

## 2024-11-26 PROCEDURE — 93970 EXTREMITY STUDY: CPT

## 2024-11-26 PROCEDURE — 82803 BLOOD GASES ANY COMBINATION: CPT

## 2024-11-26 PROCEDURE — 97110 THERAPEUTIC EXERCISES: CPT

## 2024-11-26 PROCEDURE — 85730 THROMBOPLASTIN TIME PARTIAL: CPT

## 2024-11-26 PROCEDURE — 83010 ASSAY OF HAPTOGLOBIN QUANT: CPT

## 2024-11-26 PROCEDURE — 83735 ASSAY OF MAGNESIUM: CPT

## 2024-11-26 PROCEDURE — 36430 TRANSFUSION BLD/BLD COMPNT: CPT

## 2024-11-26 PROCEDURE — 86803 HEPATITIS C AB TEST: CPT

## 2024-11-26 PROCEDURE — 80074 ACUTE HEPATITIS PANEL: CPT

## 2024-11-26 PROCEDURE — 81001 URINALYSIS AUTO W/SCOPE: CPT

## 2024-11-26 PROCEDURE — 86706 HEP B SURFACE ANTIBODY: CPT

## 2024-11-26 PROCEDURE — 71250 CT THORAX DX C-: CPT | Mod: MC

## 2024-11-26 PROCEDURE — 83036 HEMOGLOBIN GLYCOSYLATED A1C: CPT

## 2024-11-26 PROCEDURE — 84100 ASSAY OF PHOSPHORUS: CPT

## 2024-11-26 PROCEDURE — 84484 ASSAY OF TROPONIN QUANT: CPT

## 2024-11-26 PROCEDURE — 82272 OCCULT BLD FECES 1-3 TESTS: CPT

## 2024-11-26 PROCEDURE — 86920 COMPATIBILITY TEST SPIN: CPT

## 2024-11-26 PROCEDURE — 83880 ASSAY OF NATRIURETIC PEPTIDE: CPT

## 2024-11-26 PROCEDURE — 82962 GLUCOSE BLOOD TEST: CPT

## 2024-11-26 PROCEDURE — 0241U: CPT

## 2024-11-26 PROCEDURE — 86923 COMPATIBILITY TEST ELECTRIC: CPT

## 2024-11-26 PROCEDURE — 86900 BLOOD TYPING SEROLOGIC ABO: CPT

## 2024-11-26 PROCEDURE — 84550 ASSAY OF BLOOD/URIC ACID: CPT

## 2024-11-26 PROCEDURE — 99285 EMERGENCY DEPT VISIT HI MDM: CPT | Mod: 25

## 2024-11-26 PROCEDURE — 83540 ASSAY OF IRON: CPT

## 2024-11-26 PROCEDURE — 83615 LACTATE (LD) (LDH) ENZYME: CPT

## 2025-01-22 NOTE — H&P PST ADULT - I HAVE PERSONALLY SEEN AND EXAMINED THE PATIENT. THERE HAVE NOT BEEN ANY CHANGES IN THE PATIENT'S HISTORY OR EXAM UNLESS COMMENTED BELOW
Outreach attempt was made to schedule a Medicare Wellness Visit. This was the first attempt. Contact was made, [facility] appointment refused.    Patient is establishing with a different PCP in [place].     Statement Selected

## 2025-05-18 NOTE — CHART NOTE - NSCHARTNOTEFT_GEN_A_CORE
[facility]    Procedure: IR Procedure Note    Date/Time: 5/18/2025 3:25 AM    Performed by: Kim Staples MD  Authorized by: Kim Staples MD  IR Fellow Physician: Gwendolyn Mckeon    Pre Procedure Diagnosis: Intraabdominal bleeding  Post Procedure Diagnosis: Same    UNIVERSAL PROTOCOL   Site Marked: NA  Prior Images Obtained and Reviewed:  Yes  Required items: Required blood products, implants, devices and special equipment available    Patient identity confirmed:  Arm band, provided demographic data, hospital-assigned identification number and verbally with patient  Patient was reevaluated immediately before administering moderate or deep sedation or anesthesia  Confirmation Checklist:  Correct equipment/implants were available, procedure was appropriate and matched the consent or emergent situation, relevant allergies and patient's identity using two indicators  Time out: Immediately prior to the procedure a time out was called    Universal Protocol: the Joint Commission Universal Protocol was followed    Preparation: Patient was prepped and draped in usual sterile fashion    ESBL (mL):  10     ANESTHESIA    Anesthesia:  Local infiltration  Local Anesthetic:  Lidocaine 1% without epinephrine  Anesthetic Total (mL):  8      SEDATION    Patient Sedated: No    See dictated procedure note for full details.  Findings: Diffusely vasospastic and abnormal splenic artery with undulating caliber and brisk extrav from the proximal splenic artery. Some vasospasm noted in hepatic artery as well.     Coil embolization of splenic artery proximal to great pancreatic artery, but dorsal could not be salvaged given diffuse involvement and friability of the vessel. Due to vasospasm difficulty with coils forming in the vessel and proximally coil pack could not be formed tightly with minimal residual filling of the vessel through the pack. Further embolization of the splenic artery proximally with  D-dimer 1564.   Pt seen and examined at bedside. Continues to be SOB likely 2/2 possible CHF vs underlying PNA. Denies chest pain. Denies recent hx of surgeries or bleed.    HR 60s-70    Plan  -continuous pulse ox  -V/Q scan ordered  -US lower extremities  -Start heparin drip to be discontinued if V/Q scan is negative LAVA 34.    Completion angiogram without opacification of splenic artery, but common hepatic and left gastric remain patent.     Right CFA access closed with 6 Fr AngioSeal.    Specimens: none    Procedural Complications: None    Condition: Stable    Plan: Bedrest for 2 h with RLE straight and neurovascular checks.        PROCEDURE  Describe Procedure: Diffusely vasospastic and abnormal splenic artery with undulating caliber and brisk extrav from the proximal splenic artery. Some vasospasm noted in hepatic artery as well.     Coil embolization of splenic artery proximal to great pancreatic artery, but dorsal could not be salvaged given diffuse involvement and friability of the vessel. Due to vasospasm difficulty with coils forming in the vessel and proximally coil pack could not be formed tightly with minimal residual filling of the vessel through the pack. Further embolization of the splenic artery proximally with LAVA 34.    Completion angiogram without opacification of splenic artery, but common hepatic and left gastric remain patent.     Right CFA access closed with 6 Fr AngioSeal.  Patient Tolerance:  Patient tolerated the procedure well with no immediate complications  Length of time physician/provider present for 1:1 monitoring during sedation:  0 min     D-dimer 1564, phosphorus 6.3  Pt seen and examined at bedside. Continues to be SOB likely 2/2 possible CHF vs underlying PNA. Denies chest pain. Denies recent hx of surgeries or bleed.    HR 60s-70    Plan  #Elevated d-dimer  r/o PE/DVT  -continuous pulse ox  -V/Q scan ordered  -US lower extremities  -Start heparin drip to be discontinued if V/Q scan is negative    #Hyperphosphatemia  -start sevelamer  -f/u with nephro for further recs

## (undated) DEVICE — LAP PAD W RING 18 X 18"

## (undated) DEVICE — SUT SURGIPRO II 6-0 30" CV-1 DA

## (undated) DEVICE — VENODYNE/SCD SLEEVE CALF MEDIUM

## (undated) DEVICE — DRAIN RESERVOIR FOR JACKSON PRATT 100CC CARDINAL

## (undated) DEVICE — SUT SOFSILK 3-0 18" TIES

## (undated) DEVICE — SYR LUER LOK 5CC

## (undated) DEVICE — SUT SOFSILK 2-0 18" TIES

## (undated) DEVICE — SUT BIOSYN 4-0 18" P-12

## (undated) DEVICE — DRSG STERISTRIPS 0.5 X 4"

## (undated) DEVICE — DRSG KLING 6"

## (undated) DEVICE — SUT POLYSORB 3-0 30" V-20 UNDYED

## (undated) DEVICE — ELCTR GROUNDING PAD ADULT COVIDIEN

## (undated) DEVICE — SOL IRR POUR H2O 1500ML

## (undated) DEVICE — DRAIN JACKSON PRATT 10MM FLAT 3/4 NO TROCAR

## (undated) DEVICE — DRSG KERLIX ROLL 4.5"

## (undated) DEVICE — SUT SOFSILK 4-0 18" TIES

## (undated) DEVICE — DRSG CURITY GAUZE SPONGE 4 X 4" 12-PLY

## (undated) DEVICE — SOL IRR POUR NS 0.9% 1500ML

## (undated) DEVICE — PACK AV FISTULA

## (undated) DEVICE — SUT MONOSOF 4-0 18" P-12